# Patient Record
Sex: FEMALE | Race: WHITE | NOT HISPANIC OR LATINO | Employment: OTHER | URBAN - METROPOLITAN AREA
[De-identification: names, ages, dates, MRNs, and addresses within clinical notes are randomized per-mention and may not be internally consistent; named-entity substitution may affect disease eponyms.]

---

## 2017-11-08 ENCOUNTER — HOSPITAL ENCOUNTER (OUTPATIENT)
Dept: OTHER | Facility: HOSPITAL | Age: 72
Discharge: HOME | End: 2017-11-08
Attending: FAMILY MEDICINE | Admitting: FAMILY MEDICINE

## 2017-12-14 ENCOUNTER — CONVERSION ENCOUNTER (OUTPATIENT)
Dept: FAMILY MEDICINE CLINIC | Facility: CLINIC | Age: 72
End: 2017-12-14

## 2017-12-15 LAB
BASOPHILS # BLD AUTO: 70 CELLS/UL (ref 0–200)
BASOPHILS NFR BLD AUTO: 0.5 %
EOSINOPHIL # BLD AUTO: 1.4 %
EOSINOPHIL # BLD AUTO: 195 CELLS/UL (ref 15–500)
ERYTHROCYTE [DISTWIDTH] IN BLOOD BY AUTOMATED COUNT: 12.7 % (ref 11–15)
HCT VFR BLD AUTO: 40.7 % (ref 35–45)
HGB BLD-MCNC: 13.5 G/DL (ref 11.7–15.5)
LYMPHOCYTES # BLD AUTO: 7909 CELLS/UL (ref 850–3900)
LYMPHOCYTES NFR BLD AUTO: 56.9 %
MCH RBC QN AUTO: 29.1 PG (ref 27–33)
MCHC RBC AUTO-ENTMCNC: 33.2 G/DL (ref 32–36)
MCV RBC AUTO: 87.7 FL (ref 80–100)
MONOCYTES # BLD AUTO: 626 CELLS/UL (ref 200–950)
MONOCYTES NFR BLD AUTO: 4.5 %
NEUTROPHILS # BLD AUTO: 5101 CELLS/UL (ref 1500–7800)
NEUTROPHILS NFR BLD AUTO: 36.7 %
PLATELET # BLD AUTO: 265 10*3/UL (ref 140–400)
PMV BLD AUTO: 11 FL (ref 7.5–12.5)
RBC # BLD AUTO: 4.64 MILLION/UL (ref 3.8–5.1)
WBC # BLD AUTO: 13.9 10*3/UL (ref 3.8–10.8)

## 2018-04-02 ENCOUNTER — HOSPITAL ENCOUNTER (OUTPATIENT)
Dept: GENERAL RADIOLOGY | Facility: HOSPITAL | Age: 73
Discharge: HOME | End: 2018-04-02
Attending: FAMILY MEDICINE | Admitting: FAMILY MEDICINE

## 2018-04-16 ENCOUNTER — HOSPITAL ENCOUNTER (OUTPATIENT)
Dept: GENERAL RADIOLOGY | Facility: HOSPITAL | Age: 73
Discharge: HOME | End: 2018-04-16
Attending: FAMILY MEDICINE | Admitting: FAMILY MEDICINE

## 2018-05-04 ENCOUNTER — CONVERSION ENCOUNTER (OUTPATIENT)
Dept: FAMILY MEDICINE CLINIC | Facility: CLINIC | Age: 73
End: 2018-05-04

## 2018-05-05 LAB
ALBUMIN SERPL-MCNC: 4.4 G/DL (ref 3.6–5.1)
ALP SERPL-CCNC: 96 U/L (ref 33–130)
ALT SERPL-CCNC: 19 U/L (ref 6–29)
AST SERPL-CCNC: 19 U/L (ref 10–35)
BILIRUB SERPL-MCNC: 0.9 MG/DL (ref 0.2–1.2)
BUN SERPL-MCNC: 16 MG/DL (ref 7–25)
BUN/CREAT SERPL: NORMAL (CALC) (ref 6–22)
CALCIUM SERPL-MCNC: 9.5 MG/DL (ref 8.6–10.4)
CHLORIDE SERPL-SCNC: 101 MMOL/L (ref 98–110)
CHOLEST SERPL-MCNC: 163 MG/DL
CHOLEST/HDLC SERPL: 3.7 (CALC)
CONV CO2: 28 MMOL/L (ref 20–31)
CONV TOTAL PROTEIN: 7 G/DL (ref 6.1–8.1)
CREAT UR-MCNC: 0.7 MG/DL (ref 0.6–0.93)
GLOBULIN UR ELPH-MCNC: 2.6 MG/DL (ref 1.9–3.7)
GLUCOSE UR QL: 93 MG/DL (ref 65–99)
HDLC SERPL-MCNC: 44 MG/DL
INSULIN SERPL-ACNC: 1.7 (CALC) (ref 1–2.5)
LDLC SERPL CALC-MCNC: 94 MG/DL
NONHDLC SERPL-MCNC: 119 MG/DL
POTASSIUM SERPL-SCNC: 4 MMOL/L (ref 3.5–5.3)
SODIUM SERPL-SCNC: 140 MMOL/L (ref 135–146)
TRIGL SERPL-MCNC: 149 MG/DL

## 2019-01-18 ENCOUNTER — HOSPITAL ENCOUNTER (OUTPATIENT)
Dept: OTHER | Facility: HOSPITAL | Age: 74
Discharge: HOME | End: 2019-01-18
Attending: FAMILY MEDICINE | Admitting: FAMILY MEDICINE

## 2019-02-04 ENCOUNTER — HOSPITAL ENCOUNTER (OUTPATIENT)
Dept: OTHER | Facility: HOSPITAL | Age: 74
Discharge: HOME | End: 2019-02-04
Attending: FAMILY MEDICINE | Admitting: FAMILY MEDICINE

## 2019-03-05 ENCOUNTER — HOSPITAL ENCOUNTER (OUTPATIENT)
Dept: OTHER | Facility: HOSPITAL | Age: 74
Discharge: HOME | End: 2019-03-05
Attending: FAMILY MEDICINE | Admitting: FAMILY MEDICINE

## 2019-03-14 ENCOUNTER — HOSPITAL ENCOUNTER (OUTPATIENT)
Dept: OTHER | Facility: HOSPITAL | Age: 74
Discharge: HOME | End: 2019-03-14
Attending: FAMILY MEDICINE | Admitting: FAMILY MEDICINE

## 2019-05-15 ENCOUNTER — EPISODE CHANGES (OUTPATIENT)
Dept: CASE MANAGEMENT | Facility: OTHER | Age: 74
End: 2019-05-15

## 2019-05-15 ENCOUNTER — PATIENT OUTREACH (OUTPATIENT)
Dept: CASE MANAGEMENT | Facility: OTHER | Age: 74
End: 2019-05-15

## 2019-05-15 NOTE — OUTREACH NOTE
SNF Follow-up Note    Skilled Nursing Facility Discharge Assessment 5/15/2019   Acute Facility Discharged From [facility]   Acute Discharge Date 5/13/2019   Name of the Skilled Nursing Facility? [facility]   Purpose of SNF Admission SN;PT;OT   Estimated length of stay for the patient? Discharge notification via EPIC report   Who is the insurance provider or payor of patient stay? Medicare   Progression of Patient? New admission       Sarina Andersen RN    5/15/2019, 11:02 AM

## 2019-05-16 ENCOUNTER — PATIENT OUTREACH (OUTPATIENT)
Dept: CASE MANAGEMENT | Facility: OTHER | Age: 74
End: 2019-05-16

## 2019-05-16 NOTE — OUTREACH NOTE
SNF Follow-up Note    Skilled Nursing Facility Discharge Assessment 5/16/2019   Acute Facility Discharged From [facility]   Acute Discharge Date 5/13/2019   Name of the Skilled Nursing Facility? [facility]   Purpose of SNF Admission SN;PT;OT   Estimated length of stay for the patient? No d/c date set.   Who is the insurance provider or payor of patient stay? Medicare   Progression of Patient? Within Medicare Limits       Sarina Andersen RN    5/16/2019, 1:52 PM

## 2019-05-23 ENCOUNTER — PATIENT OUTREACH (OUTPATIENT)
Dept: CASE MANAGEMENT | Facility: OTHER | Age: 74
End: 2019-05-23

## 2019-05-23 NOTE — OUTREACH NOTE
SNF Follow-up Note    Skilled Nursing Facility Discharge Assessment 5/23/2019   Acute Facility Discharged From [facility]   Acute Discharge Date 5/13/2019   Name of the Skilled Nursing Facility? [facility]   Purpose of SNF Admission SN;PT;OT   Estimated length of stay for the patient? No d/c date set.   Who is the insurance provider or payor of patient stay? Medicare   Progression of Patient? Within Medicare Limits       Sarina Andersen RN    5/23/2019, 1:32 PM

## 2019-05-30 ENCOUNTER — PATIENT OUTREACH (OUTPATIENT)
Dept: CASE MANAGEMENT | Facility: OTHER | Age: 74
End: 2019-05-30

## 2019-05-30 NOTE — OUTREACH NOTE
SNF Follow-up Note    Skilled Nursing Facility Discharge Assessment 5/30/2019   Acute Facility Discharged From [facility]   Acute Discharge Date 5/13/2019   Name of the Skilled Nursing Facility? [facility]   Purpose of SNF Admission SN;PT;OT   Estimated length of stay for the patient? Discharged   Who is the insurance provider or payor of patient stay? -   Progression of Patient? -   Skilled Nursing Discharge Date? 5/28/2019   Where was the patient discharged to? Home       Sarina Andersen RN    5/30/2019, 2:49 PM

## 2019-06-06 ENCOUNTER — PATIENT OUTREACH (OUTPATIENT)
Dept: CASE MANAGEMENT | Facility: OTHER | Age: 74
End: 2019-06-06

## 2019-06-06 NOTE — OUTREACH NOTE
"Care Plan Note    Care Management Plan 6/6/2019   Lifestyle Goals Eat a healthy diet;Decrease falls risk;Have more energy;Medication management;Routine follow-up with doctor(s);Self monitor blood pressure   Self Management Home BP Monitoring;Medication Adherence   Suggested Appointments Other (See Comment)   Suggested Appointments Follow with providers as recommended.   Annual Wellness Visit:  Patient Has Completed   Specific Disease Process Teaching Heart Disease   Ed Visits past 12 months: 1   Hospitalizations past 12 months 2 or 3   Discharged From: [facility]   Discharged to: Home -    Discharge destination: Home   Medication Adherence Other (see comment)   Medication Adherence Patient states adherent.  Daughter assists as needed.     RN-CC outreach to patient following d/c to home from [facility].  Patient had acute non-stemi event at [facility] 05/03-13, 2019.  Patient was transferred to SNF for continued care.  Patient reports she came home \"a few days ago.\"  She reports having been seen by PCP Antwon since discharge and has another appointment next Tuesday.  She reported recent labs reflected a WBC of 14 and a decreased RBC.  She stated her instructions were to rest, eat well, and follow with provider as recommended.  Education provided.  Patient expresses pleasure of being home with her kitten Yo-Yo. Falls risk reviewed, patient v/u.   Patient indicated her daughter is available for assistance as needed.  Assessment and care plan created this session.  RN-CC contact information provided and patient encouraged to call as needed.     Sarina Andersen RN    6/6/2019, 2:50 PM    "

## 2019-06-13 LAB
BASOPHILS # BLD AUTO: 0.1 X10E3/UL (ref 0–0.2)
BASOPHILS NFR BLD AUTO: 0 %
EOSINOPHIL # BLD AUTO: 0.2 X10E3/UL (ref 0–0.4)
EOSINOPHIL NFR BLD AUTO: 1 %
ERYTHROCYTE [DISTWIDTH] IN BLOOD BY AUTOMATED COUNT: 15.1 % (ref 12.3–15.4)
HCT VFR BLD AUTO: 38.4 % (ref 34–46.6)
HGB BLD-MCNC: 12.1 G/DL (ref 11.1–15.9)
IMM GRANULOCYTES # BLD AUTO: 0 X10E3/UL (ref 0–0.1)
IMM GRANULOCYTES NFR BLD AUTO: 0 %
IRON SATN MFR SERPL: 15 % (ref 15–55)
IRON SERPL-MCNC: 42 UG/DL (ref 27–139)
LYMPHOCYTES # BLD AUTO: 15.2 X10E3/UL (ref 0.7–3.1)
LYMPHOCYTES NFR BLD AUTO: 57 %
MCH RBC QN AUTO: 28.2 PG (ref 26.6–33)
MCHC RBC AUTO-ENTMCNC: 31.5 G/DL (ref 31.5–35.7)
MCV RBC AUTO: 90 FL (ref 79–97)
MONOCYTES # BLD AUTO: 1.3 X10E3/UL (ref 0.1–0.9)
MONOCYTES NFR BLD AUTO: 5 %
MORPHOLOGY BLD-IMP: (no result)
NEUTROPHILS # BLD AUTO: 9.9 X10E3/UL (ref 1.4–7)
NEUTROPHILS NFR BLD AUTO: 37 %
PLATELET # BLD AUTO: 452 X10E3/UL (ref 150–450)
RBC # BLD AUTO: 4.29 X10E6/UL (ref 3.77–5.28)
TIBC SERPL-MCNC: 283 UG/DL (ref 250–450)
UIBC SERPL-MCNC: 241 UG/DL (ref 118–369)
WBC # BLD AUTO: 26.8 X10E3/UL (ref 3.4–10.8)

## 2019-06-18 ENCOUNTER — TELEPHONE (OUTPATIENT)
Dept: FAMILY MEDICINE CLINIC | Facility: CLINIC | Age: 74
End: 2019-06-18

## 2019-06-19 DIAGNOSIS — B00.1 RECURRENT COLD SORES: Primary | ICD-10-CM

## 2019-06-19 DIAGNOSIS — B00.1 RECURRENT COLD SORES: ICD-10-CM

## 2019-06-19 RX ORDER — ACYCLOVIR 200 MG/1
200 CAPSULE ORAL
Qty: 50 CAPSULE | Refills: 0 | Status: SHIPPED | OUTPATIENT
Start: 2019-06-19 | End: 2019-06-19 | Stop reason: SDUPTHER

## 2019-06-20 RX ORDER — ACYCLOVIR 200 MG/1
200 CAPSULE ORAL
Qty: 50 CAPSULE | Refills: 0 | Status: SHIPPED | OUTPATIENT
Start: 2019-06-20 | End: 2019-06-30

## 2019-07-05 ENCOUNTER — OFFICE VISIT (OUTPATIENT)
Dept: FAMILY MEDICINE CLINIC | Facility: CLINIC | Age: 74
End: 2019-07-05

## 2019-07-05 VITALS
SYSTOLIC BLOOD PRESSURE: 110 MMHG | BODY MASS INDEX: 30.08 KG/M2 | TEMPERATURE: 98.1 F | RESPIRATION RATE: 22 BRPM | HEART RATE: 95 BPM | OXYGEN SATURATION: 92 % | WEIGHT: 187.2 LBS | HEIGHT: 66 IN | DIASTOLIC BLOOD PRESSURE: 62 MMHG

## 2019-07-05 DIAGNOSIS — B96.89 ACUTE BACTERIAL BRONCHITIS: ICD-10-CM

## 2019-07-05 DIAGNOSIS — J44.1 CHRONIC OBSTRUCTIVE PULMONARY DISEASE WITH ACUTE EXACERBATION (HCC): ICD-10-CM

## 2019-07-05 DIAGNOSIS — I50.22 SYSTOLIC CHF, CHRONIC (HCC): Primary | ICD-10-CM

## 2019-07-05 DIAGNOSIS — J20.8 ACUTE BACTERIAL BRONCHITIS: ICD-10-CM

## 2019-07-05 PROBLEM — C91.10 CLL (CHRONIC LYMPHOCYTIC LEUKEMIA): Status: ACTIVE | Noted: 2019-07-05

## 2019-07-05 PROBLEM — J45.40 MODERATE PERSISTENT ASTHMA WITHOUT COMPLICATION: Status: ACTIVE | Noted: 2019-07-05

## 2019-07-05 PROBLEM — J43.1 PANLOBULAR EMPHYSEMA: Status: ACTIVE | Noted: 2019-07-05

## 2019-07-05 PROBLEM — E87.6 HYPOKALEMIA: Status: ACTIVE | Noted: 2019-07-05

## 2019-07-05 PROBLEM — J44.9 CHRONIC OBSTRUCTIVE PULMONARY DISEASE: Status: ACTIVE | Noted: 2019-06-04

## 2019-07-05 PROBLEM — D64.9 ANEMIA: Status: ACTIVE | Noted: 2019-07-05

## 2019-07-05 PROBLEM — I10 HYPERTENSION: Status: ACTIVE | Noted: 2019-06-04

## 2019-07-05 PROBLEM — M19.90 ARTHRITIS: Status: ACTIVE | Noted: 2019-07-05

## 2019-07-05 PROBLEM — K21.9 GERD (GASTROESOPHAGEAL REFLUX DISEASE): Status: ACTIVE | Noted: 2019-07-05

## 2019-07-05 PROBLEM — E78.5 DYSLIPIDEMIA: Status: ACTIVE | Noted: 2019-06-04

## 2019-07-05 PROCEDURE — 99214 OFFICE O/P EST MOD 30 MIN: CPT | Performed by: FAMILY MEDICINE

## 2019-07-05 RX ORDER — CEPHALEXIN 500 MG/1
500 CAPSULE ORAL 3 TIMES DAILY
Qty: 30 CAPSULE | Refills: 0 | Status: SHIPPED | OUTPATIENT
Start: 2019-07-05 | End: 2019-07-15

## 2019-07-05 RX ORDER — LISINOPRIL 10 MG/1
TABLET ORAL EVERY 24 HOURS
COMMUNITY
Start: 2019-06-04 | End: 2019-08-30

## 2019-07-05 RX ORDER — BUDESONIDE AND FORMOTEROL FUMARATE DIHYDRATE 160; 4.5 UG/1; UG/1
2 AEROSOL RESPIRATORY (INHALATION)
COMMUNITY
Start: 2019-04-18 | End: 2020-03-06

## 2019-07-05 RX ORDER — PAROXETINE HYDROCHLORIDE 20 MG/1
20 TABLET, FILM COATED ORAL DAILY
COMMUNITY
Start: 2019-06-24 | End: 2019-09-19 | Stop reason: SDUPTHER

## 2019-07-05 RX ORDER — POTASSIUM CHLORIDE 20 MEQ/1
TABLET, EXTENDED RELEASE ORAL
COMMUNITY
Start: 2019-05-31 | End: 2019-07-05 | Stop reason: SDUPTHER

## 2019-07-05 RX ORDER — GUAIFENESIN 1200 MG/1
1 TABLET, EXTENDED RELEASE ORAL DAILY
COMMUNITY
Start: 2019-06-04 | End: 2022-08-23

## 2019-07-05 RX ORDER — PRAMIPEXOLE DIHYDROCHLORIDE 0.25 MG/1
TABLET ORAL
COMMUNITY
Start: 2019-06-04 | End: 2020-05-14

## 2019-07-05 RX ORDER — CARVEDILOL 6.25 MG/1
TABLET ORAL
COMMUNITY
Start: 2019-06-04 | End: 2019-08-30

## 2019-07-05 RX ORDER — POTASSIUM CHLORIDE 20 MEQ/1
20 TABLET, EXTENDED RELEASE ORAL DAILY
Qty: 30 TABLET | Refills: 5 | Status: SHIPPED | OUTPATIENT
Start: 2019-07-05 | End: 2019-12-30

## 2019-07-05 RX ORDER — ASPIRIN 81 MG/1
81 TABLET ORAL DAILY
COMMUNITY
Start: 2019-06-04

## 2019-07-05 RX ORDER — BUMETANIDE 1 MG/1
1 TABLET ORAL 2 TIMES DAILY
Qty: 60 TABLET | Refills: 5 | Status: SHIPPED | OUTPATIENT
Start: 2019-07-05 | End: 2019-12-30

## 2019-07-05 RX ORDER — BUMETANIDE 1 MG/1
1 TABLET ORAL 2 TIMES DAILY
COMMUNITY
Start: 2019-05-31 | End: 2019-07-05 | Stop reason: SDUPTHER

## 2019-07-05 RX ORDER — ALBUTEROL SULFATE 2.5 MG/3ML
SOLUTION RESPIRATORY (INHALATION) EVERY 6 HOURS
COMMUNITY
Start: 2019-06-04 | End: 2020-04-14

## 2019-07-05 RX ORDER — TIOTROPIUM BROMIDE INHALATION SPRAY 3.12 UG/1
2 SPRAY, METERED RESPIRATORY (INHALATION)
COMMUNITY
Start: 2019-06-08 | End: 2020-05-07 | Stop reason: SDUPTHER

## 2019-07-05 RX ORDER — MULTIVIT WITH MINERALS/LUTEIN
TABLET ORAL
COMMUNITY

## 2019-07-05 RX ORDER — MONTELUKAST SODIUM 10 MG/1
10 TABLET ORAL NIGHTLY
COMMUNITY
Start: 2019-06-08 | End: 2019-09-10 | Stop reason: SDUPTHER

## 2019-07-05 RX ORDER — ESOMEPRAZOLE MAGNESIUM 40 MG/1
40 CAPSULE, DELAYED RELEASE ORAL
COMMUNITY
Start: 2019-06-04

## 2019-07-05 RX ORDER — LISINOPRIL AND HYDROCHLOROTHIAZIDE 20; 12.5 MG/1; MG/1
1 TABLET ORAL DAILY
COMMUNITY
Start: 2019-04-28 | End: 2019-11-23 | Stop reason: SDUPTHER

## 2019-07-05 RX ORDER — SIMVASTATIN 20 MG
20 TABLET ORAL DAILY
COMMUNITY
Start: 2019-06-25 | End: 2020-03-30

## 2019-07-05 NOTE — PROGRESS NOTES
Subjective   Krystyna Bennett is a 74 y.o. female.     Chief Complaint   Patient presents with   • Wheezing       Wheezing    This is a new problem. The current episode started in the past 7 days. The problem occurs constantly. Associated symptoms include coughing, shortness of breath and a sore throat. Pertinent negatives include no abdominal pain, chest pain or chills.   COPD   This is a recurrent problem. The current episode started in the past 7 days. The problem occurs constantly. The problem has been unchanged. Associated symptoms include coughing and a sore throat. Pertinent negatives include no abdominal pain, chest pain, chills, diaphoresis or nausea.        I personally reviewed and updated the patient's allergies, medications, problem list, and past medical, surgical, social, and family history.       History reviewed. No pertinent family history.    Social History     Tobacco Use   • Smoking status: Former Smoker   • Tobacco comment: quit 20 years ago   Substance Use Topics   • Alcohol use: Yes     Comment: rarely   • Drug use: No       Past Surgical History:   Procedure Laterality Date   • HYSTERECTOMY         Patient Active Problem List   Diagnosis   • Chronic obstructive pulmonary disease (CMS/HCC)   • Dyslipidemia   • Hypertension   • CLL (chronic lymphocytic leukemia) (CMS/HCC)   • Panlobular emphysema (CMS/HCC)   • Moderate persistent asthma without complication   • GERD (gastroesophageal reflux disease)   • Arthritis   • Systolic CHF, chronic (CMS/HCC)   • Anemia   • Hypokalemia   • Acute bacterial bronchitis         Current Outpatient Medications:   •  albuterol (PROVENTIL) (2.5 MG/3ML) 0.083% nebulizer solution, Every 6 (Six) Hours., Disp: , Rfl:   •  aspirin 81 MG EC tablet, Daily., Disp: , Rfl:   •  bumetanide (BUMEX) 1 MG tablet, Take 1 tablet by mouth 2 (Two) Times a Day., Disp: 60 tablet, Rfl: 5  •  carvedilol (COREG) 6.25 MG tablet, CARVEDILOL 6.25 MG TABS, Disp: , Rfl:   •  esomeprazole  "(NEXIUM) 40 MG capsule, NEXIUM 40 MG CPDR, Disp: , Rfl:   •  Glucosamine-Chondroitin (OSTEO BI-FLEX REGULAR STRENGTH) 250-200 MG tablet, Daily., Disp: , Rfl:   •  guaiFENesin ER 1200 MG tablet sustained-release 12 hour, Every 12 (Twelve) Hours., Disp: , Rfl:   •  lisinopril (PRINIVIL,ZESTRIL) 10 MG tablet, Daily., Disp: , Rfl:   •  lisinopril-hydrochlorothiazide (PRINZIDE,ZESTORETIC) 20-12.5 MG per tablet, , Disp: , Rfl:   •  montelukast (SINGULAIR) 10 MG tablet, , Disp: , Rfl:   •  Multiple Vitamins-Minerals (CENTRUM SILVER) tablet, CENTRUM SILVER TABS, Disp: , Rfl:   •  PARoxetine (PAXIL) 20 MG tablet, , Disp: , Rfl:   •  potassium chloride (K-DUR,KLOR-CON) 20 MEQ CR tablet, Take 1 tablet by mouth Daily., Disp: 30 tablet, Rfl: 5  •  pramipexole (MIRAPEX) 0.25 MG tablet, PRAMIPEXOLE DIHYDROCHLORIDE 0.25 MG TABS, Disp: , Rfl:   •  simvastatin (ZOCOR) 20 MG tablet, , Disp: , Rfl:   •  SPIRIVA RESPIMAT 2.5 MCG/ACT aerosol solution inhaler, , Disp: , Rfl:   •  SYMBICORT 160-4.5 MCG/ACT inhaler, , Disp: , Rfl:   •  cephalexin (KEFLEX) 500 MG capsule, Take 1 capsule by mouth 3 (Three) Times a Day for 10 days., Disp: 30 capsule, Rfl: 0         Review of Systems   Constitutional: Negative for chills and diaphoresis.   HENT: Positive for sore throat.    Eyes: Negative for visual disturbance.   Respiratory: Positive for cough, shortness of breath and wheezing.    Cardiovascular: Negative for chest pain and palpitations.   Gastrointestinal: Negative for abdominal pain and nausea.   Endocrine: Negative for polydipsia and polyphagia.   Musculoskeletal: Negative for neck stiffness.   Skin: Negative for color change and pallor.   Neurological: Negative for seizures and syncope.   Hematological: Negative for adenopathy.       Objective   /62   Pulse 95   Temp 98.1 °F (36.7 °C)   Resp 22   Ht 167.6 cm (66\")   Wt 84.9 kg (187 lb 3.2 oz)   SpO2 92% Comment: on 4 liter of oxygen  BMI 30.21 kg/m²   Physical Exam "   Constitutional: She is oriented to person, place, and time. She appears well-developed and well-nourished.   HENT:   Head: Normocephalic.   Right Ear: Hearing, external ear and ear canal normal. Tympanic membrane is erythematous. A middle ear effusion is present.   Left Ear: Hearing, external ear and ear canal normal. Tympanic membrane is erythematous. A middle ear effusion is present.   Nose: Congestion present. Right sinus exhibits maxillary sinus tenderness and frontal sinus tenderness. Left sinus exhibits maxillary sinus tenderness and frontal sinus tenderness.   Mouth/Throat: Posterior oropharyngeal erythema present. No tonsillar abscesses. Tonsils are 1+ on the right. Tonsils are 1+ on the left.   Eyes: Conjunctivae, EOM and lids are normal. Pupils are equal, round, and reactive to light.   Neck: No Brudzinski's sign and no Kernig's sign noted.   Cardiovascular: Normal rate, regular rhythm, S1 normal, S2 normal, normal heart sounds and normal pulses. Exam reveals no gallop and no friction rub.   No murmur heard.  Pulmonary/Chest: Effort normal. No accessory muscle usage or stridor. No respiratory distress. She has no decreased breath sounds. She has wheezes in the right upper field, the right middle field, the right lower field, the left upper field, the left middle field and the left lower field. She has rhonchi in the right upper field, the right middle field, the right lower field, the left upper field, the left middle field and the left lower field. She has no rales.   Abdominal: Soft. Normal appearance and bowel sounds are normal. She exhibits no distension and no mass. There is no tenderness. No hernia.   Neurological: She is alert and oriented to person, place, and time. No cranial nerve deficit. Coordination and gait normal.   Skin: Skin is warm and dry. Turgor is normal. She is not diaphoretic. No pallor.         Assessment/Plan   Problems Addressed this Visit        Cardiovascular and Mediastinum     Systolic CHF, chronic (CMS/[facility]) - Primary     Overall improved  She ran out of water pills, restart  follow up recheck  Call or return if worsening or persistent symptoms.          Relevant Medications    carvedilol (COREG) 6.25 MG tablet       Respiratory    Chronic obstructive pulmonary disease (CMS/HCC)    Relevant Medications    albuterol (PROVENTIL) (2.5 MG/3ML) 0.083% nebulizer solution    SYMBICORT 160-4.5 MCG/ACT inhaler    guaiFENesin ER 1200 MG tablet sustained-release 12 hour    montelukast (SINGULAIR) 10 MG tablet    SPIRIVA RESPIMAT 2.5 MCG/ACT aerosol solution inhaler    Acute bacterial bronchitis     Start antibiotics  Call or return if worsening or persistent symptoms.          Relevant Medications    albuterol (PROVENTIL) (2.5 MG/3ML) 0.083% nebulizer solution    SYMBICORT 160-4.5 MCG/ACT inhaler    guaiFENesin ER 1200 MG tablet sustained-release 12 hour    montelukast (SINGULAIR) 10 MG tablet    SPIRIVA RESPIMAT 2.5 MCG/ACT aerosol solution inhaler    cephalexin (KEFLEX) 500 MG capsule            Expected course, medications, and adverse effects discussed.  Call or return if worsening or persistent symptoms.

## 2019-07-05 NOTE — ASSESSMENT & PLAN NOTE
Overall improved  She ran out of water pills, restart  follow up recheck  Call or return if worsening or persistent symptoms.

## 2019-07-12 ENCOUNTER — TELEPHONE (OUTPATIENT)
Dept: FAMILY MEDICINE CLINIC | Facility: CLINIC | Age: 74
End: 2019-07-12

## 2019-08-21 ENCOUNTER — TELEPHONE (OUTPATIENT)
Dept: FAMILY MEDICINE CLINIC | Facility: CLINIC | Age: 74
End: 2019-08-21

## 2019-08-22 ENCOUNTER — TELEPHONE (OUTPATIENT)
Dept: FAMILY MEDICINE CLINIC | Facility: CLINIC | Age: 74
End: 2019-08-22

## 2019-08-30 ENCOUNTER — OFFICE VISIT (OUTPATIENT)
Dept: FAMILY MEDICINE CLINIC | Facility: CLINIC | Age: 74
End: 2019-08-30

## 2019-08-30 VITALS
HEIGHT: 66 IN | DIASTOLIC BLOOD PRESSURE: 80 MMHG | WEIGHT: 179 LBS | RESPIRATION RATE: 19 BRPM | OXYGEN SATURATION: 93 % | SYSTOLIC BLOOD PRESSURE: 136 MMHG | BODY MASS INDEX: 28.77 KG/M2 | TEMPERATURE: 98.9 F | HEART RATE: 83 BPM

## 2019-08-30 DIAGNOSIS — M94.0 COSTOCHONDRITIS: ICD-10-CM

## 2019-08-30 DIAGNOSIS — I50.22 SYSTOLIC CHF, CHRONIC (HCC): Primary | ICD-10-CM

## 2019-08-30 DIAGNOSIS — J43.1 PANLOBULAR EMPHYSEMA (HCC): ICD-10-CM

## 2019-08-30 PROBLEM — C43.9 MELANOMA: Status: ACTIVE | Noted: 2018-01-08

## 2019-08-30 PROBLEM — D72.820 LYMPHOCYTOSIS: Status: ACTIVE | Noted: 2018-01-08

## 2019-08-30 PROBLEM — J44.9 CHRONIC OBSTRUCTIVE PULMONARY DISEASE: Status: RESOLVED | Noted: 2019-06-04 | Resolved: 2019-08-30

## 2019-08-30 PROBLEM — Z86.19 HISTORY OF CLOSTRIDIUM DIFFICILE COLITIS: Status: ACTIVE | Noted: 2018-01-08

## 2019-08-30 PROCEDURE — 99214 OFFICE O/P EST MOD 30 MIN: CPT | Performed by: FAMILY MEDICINE

## 2019-08-30 RX ORDER — DIAPER,BRIEF,INFANT-TODD,DISP
1 EACH MISCELLANEOUS AS NEEDED
COMMUNITY
End: 2019-12-19

## 2019-08-30 RX ORDER — CHOLECALCIFEROL (VITAMIN D3) 25 MCG
1000 TABLET,CHEWABLE ORAL DAILY
COMMUNITY

## 2019-08-30 RX ORDER — ACYCLOVIR 50 MG/G
1 OINTMENT TOPICAL
COMMUNITY
End: 2020-03-10

## 2019-08-30 RX ORDER — CA/D3/MAG OX/ZINC/COP/MANG/BOR 600 MG-800
2 TABLET,CHEWABLE ORAL DAILY
COMMUNITY
Start: 2019-06-04

## 2019-08-30 NOTE — PROGRESS NOTES
Subjective   Krystyna Bennett is a 74 y.o. female.     Krystyna was seen by Dr. Geiger 08/29/2019 for a follow up on heart cath performed 05/2019.       Congestive Heart Failure   Presents for follow-up visit. Pertinent negatives include no abdominal pain, chest pain, chest pressure, claudication, muscle weakness, near-syncope, palpitations or shortness of breath. The symptoms have been stable. Compliance with total regimen is %.        The following portions of the patient's history were reviewed and updated as appropriate: allergies, current medications, past family history, past medical history, past social history, past surgical history and problem list.    History reviewed. No pertinent family history.    Social History     Tobacco Use   • Smoking status: Former Smoker     Types: Cigars   • Smokeless tobacco: Never Used   • Tobacco comment: quit 20 years ago   Substance Use Topics   • Alcohol use: Yes     Frequency: 2-4 times a month     Drinks per session: 1 or 2     Binge frequency: Never     Comment: rarely   • Drug use: No       Past Surgical History:   Procedure Laterality Date   • CARDIAC CATHETERIZATION  05/2019    [facility].   • HYSTERECTOMY         Patient Active Problem List   Diagnosis   • Dyslipidemia   • Hypertension   • CLL (chronic lymphocytic leukemia) (CMS/HCC)   • Panlobular emphysema (CMS/HCC)   • Moderate persistent asthma without complication   • GERD (gastroesophageal reflux disease)   • Arthritis   • Systolic CHF, chronic (CMS/HCC)   • Anemia   • Hypokalemia   • Acute bacterial bronchitis   • History of Clostridium difficile colitis   • Lymphocytosis   • Melanoma (CMS/HCC)       Current Outpatient Medications on File Prior to Visit   Medication Sig Dispense Refill   • albuterol (PROVENTIL) (2.5 MG/3ML) 0.083% nebulizer solution Every 6 (Six) Hours.     • aspirin 81 MG EC tablet Take 81 mg by mouth Daily.     • bumetanide (BUMEX) 1 MG tablet Take 1 tablet by mouth 2 (Two) Times a Day. 60  tablet 5   • Calcium Carbonate-Vit D-Min (CALTRATE 600+D PLUS MINERALS) 600-800 MG-UNIT chewable tablet Chew 2 tablets Daily.     • esomeprazole (NEXIUM) 40 MG capsule 1 tablet po daily     • Glucosamine-Chondroitin (OSTEO BI-FLEX REGULAR STRENGTH) 250-200 MG tablet Take 1 tablet by mouth Daily.     • guaiFENesin ER 1200 MG tablet sustained-release 12 hour Take 1 tablet by mouth Daily.     • lisinopril-hydrochlorothiazide (PRINZIDE,ZESTORETIC) 20-12.5 MG per tablet Take 1 tablet by mouth Daily.     • montelukast (SINGULAIR) 10 MG tablet Take 10 mg by mouth Every Night.     • Multiple Vitamins-Minerals (CENTRUM SILVER) tablet 1 tablet po daily     • PARoxetine (PAXIL) 20 MG tablet Take 20 mg by mouth Daily.     • potassium chloride (K-DUR,KLOR-CON) 20 MEQ CR tablet Take 1 tablet by mouth Daily. 30 tablet 5   • pramipexole (MIRAPEX) 0.25 MG tablet 1 tablet po daily     • simvastatin (ZOCOR) 20 MG tablet Take 20 mg by mouth Daily.     • SPIRIVA RESPIMAT 2.5 MCG/ACT aerosol solution inhaler Inhale 2 puffs Daily.     • SYMBICORT 160-4.5 MCG/ACT inhaler Inhale 2 puffs 2 (Two) Times a Day.     • [DISCONTINUED] lisinopril (PRINIVIL,ZESTRIL) 10 MG tablet Daily.     • acyclovir (ZOVIRAX) 5 % ointment Apply 1 application topically to the appropriate area as directed Every 3 (Three) Hours.     • Cyanocobalamin (B-12) 1000 MCG capsule Take 1,000 mcg by mouth Daily.     • hydrocortisone 1 % cream Apply 1 application topically to the appropriate area as directed As Needed.     • [DISCONTINUED] carvedilol (COREG) 6.25 MG tablet CARVEDILOL 6.25 MG TABS     • [DISCONTINUED] predniSONE (DELTASONE) 5 MG tablet 8 tablets p.o. day 1, wean by 1 tablet a day over 8 days 36 tablet 0     No current facility-administered medications on file prior to visit.        Allergies   Allergen Reactions   • Sulfacetamide Sodium-Sulfur Other (See Comments)     Tunnel vision,light headed       Review of Systems   Constitutional: Negative for activity  "change and fever.   HENT: Negative for ear pain, rhinorrhea, sinus pressure and voice change.    Eyes: Negative for visual disturbance.   Respiratory: Negative for cough and shortness of breath.    Cardiovascular: Negative for chest pain, palpitations, claudication and near-syncope.   Gastrointestinal: Negative for abdominal pain, diarrhea, nausea and vomiting.   Endocrine: Negative for cold intolerance and heat intolerance.   Genitourinary: Negative for frequency and urgency.   Musculoskeletal: Negative for arthralgias and muscle weakness.   Skin: Negative for rash.   Neurological: Negative for syncope.   Hematological: Does not bruise/bleed easily.   Psychiatric/Behavioral: Negative for depressed mood. The patient is not nervous/anxious.        Objective   Visit Vitals  /80 (BP Location: Right arm, Patient Position: Sitting, Cuff Size: Adult)   Pulse 83   Temp 98.9 °F (37.2 °C) (Oral)   Resp 19   Ht 167.6 cm (66\")   Wt 81.2 kg (179 lb)   LMP  (LMP Unknown)   SpO2 93% Comment: 4 L   Breastfeeding? No   BMI 28.89 kg/m²     Physical Exam   Constitutional: She is oriented to person, place, and time. She appears well-developed and well-nourished. She is cooperative.   Cardiovascular: Normal rate, regular rhythm and normal heart sounds. Exam reveals no gallop and no friction rub.   No murmur heard.  Pulmonary/Chest: Effort normal and breath sounds normal. She has no wheezes. She has no rales. She exhibits tenderness.       Neurological: She is alert and oriented to person, place, and time. Coordination normal.   Skin: Skin is warm and dry.   Psychiatric: She has a normal mood and affect.   Vitals reviewed.        Assessment/Plan .  Problem List Items Addressed This Visit        Cardiovascular and Mediastinum    Systolic CHF, chronic (CMS/HCC) - Primary       Respiratory    Panlobular emphysema (CMS/HCC)      Other Visit Diagnoses     Costochondritis            Chronic issues are stable. Discused nsaids for the " costochondritis. If it gets worse, she is to let me know  Went over cath report. She is on 4 liters. Advised she should go back to pulm rehab since cath was normal

## 2019-09-03 ENCOUNTER — TELEPHONE (OUTPATIENT)
Dept: FAMILY MEDICINE CLINIC | Facility: CLINIC | Age: 74
End: 2019-09-03

## 2019-09-10 RX ORDER — MONTELUKAST SODIUM 10 MG/1
TABLET ORAL
Qty: 90 TABLET | Refills: 11 | Status: SHIPPED | OUTPATIENT
Start: 2019-09-10 | End: 2020-09-09

## 2019-09-20 ENCOUNTER — OFFICE VISIT (OUTPATIENT)
Dept: FAMILY MEDICINE CLINIC | Facility: CLINIC | Age: 74
End: 2019-09-20

## 2019-09-20 VITALS
BODY MASS INDEX: 29.79 KG/M2 | DIASTOLIC BLOOD PRESSURE: 82 MMHG | RESPIRATION RATE: 16 BRPM | WEIGHT: 178.8 LBS | OXYGEN SATURATION: 94 % | SYSTOLIC BLOOD PRESSURE: 122 MMHG | TEMPERATURE: 98.3 F | HEART RATE: 86 BPM | HEIGHT: 65 IN

## 2019-09-20 DIAGNOSIS — E78.5 HYPERLIPIDEMIA, UNSPECIFIED HYPERLIPIDEMIA TYPE: ICD-10-CM

## 2019-09-20 DIAGNOSIS — Z23 INFLUENZA VACCINE NEEDED: ICD-10-CM

## 2019-09-20 DIAGNOSIS — Z23 NEED FOR VACCINATION FOR STREP PNEUMONIAE: ICD-10-CM

## 2019-09-20 DIAGNOSIS — J43.1 PANLOBULAR EMPHYSEMA (HCC): ICD-10-CM

## 2019-09-20 DIAGNOSIS — I10 ESSENTIAL HYPERTENSION: ICD-10-CM

## 2019-09-20 DIAGNOSIS — I50.22 SYSTOLIC CHF, CHRONIC (HCC): Primary | ICD-10-CM

## 2019-09-20 PROBLEM — J45.909 ASTHMA: Status: ACTIVE | Noted: 2019-09-20

## 2019-09-20 PROBLEM — Z86.79 HISTORY OF HYPERTENSION: Status: ACTIVE | Noted: 2019-09-20

## 2019-09-20 PROCEDURE — 99214 OFFICE O/P EST MOD 30 MIN: CPT | Performed by: FAMILY MEDICINE

## 2019-09-20 PROCEDURE — G0008 ADMIN INFLUENZA VIRUS VAC: HCPCS | Performed by: FAMILY MEDICINE

## 2019-09-20 PROCEDURE — 90653 IIV ADJUVANT VACCINE IM: CPT | Performed by: FAMILY MEDICINE

## 2019-09-20 RX ORDER — PAROXETINE HYDROCHLORIDE 20 MG/1
TABLET, FILM COATED ORAL
Qty: 30 TABLET | Refills: 11 | Status: SHIPPED | OUTPATIENT
Start: 2019-09-20 | End: 2020-05-12 | Stop reason: SDUPTHER

## 2019-09-20 NOTE — PROGRESS NOTES
Subjective   Krystyna Bennett is a 74 y.o. female.     Hyperlipidemia   This is a chronic problem. The current episode started more than 1 year ago. There are no known factors aggravating her hyperlipidemia. Associated symptoms include shortness of breath. Pertinent negatives include no chest pain, leg pain or myalgias. Current antihyperlipidemic treatment includes statins. There are no compliance problems.  Risk factors for coronary artery disease include post-menopausal, a sedentary lifestyle and hypertension.   Hypertension   This is a chronic problem. The current episode started more than 1 year ago. The problem is controlled. Associated symptoms include shortness of breath. Pertinent negatives include no chest pain. There are no associated agents to hypertension. Current antihypertension treatment includes diuretics and ACE inhibitors. There are no compliance problems.    COPD   She complains of shortness of breath. There is no cough. This is a chronic problem. The current episode started more than 1 year ago. The problem occurs constantly. The problem has been unchanged. Pertinent negatives include no chest pain, ear pain, fever, myalgias or rhinorrhea. Her symptoms are aggravated by climbing stairs, change in weather, exercise and URI. Relieved by: meds and o2. She reports significant improvement on treatment. Risk factors for lung disease include smoking/tobacco exposure.        The following portions of the patient's history were reviewed and updated as appropriate: allergies, current medications, past family history, past medical history, past social history, past surgical history and problem list.    Allergies:  Allergies   Allergen Reactions   • Sulfacetamide Sodium-Sulfur Other (See Comments)     Tunnel vision,light headed       Social History:  Social History     Socioeconomic History   • Marital status:      Spouse name: Not on file   • Number of children: Not on file   • Years of education: Not  on file   • Highest education level: Not on file   Tobacco Use   • Smoking status: Former Smoker     Types: Cigars   • Smokeless tobacco: Never Used   • Tobacco comment: quit 20 years ago   Substance and Sexual Activity   • Alcohol use: Yes     Frequency: 2-4 times a month     Drinks per session: 1 or 2     Binge frequency: Never     Comment: rarely   • Drug use: No   • Sexual activity: Defer       Family History:  History reviewed. No pertinent family history.    Past Medical History :  Patient Active Problem List   Diagnosis   • Dyslipidemia   • Hypertension   • CLL (chronic lymphocytic leukemia) (CMS/HCC)   • Panlobular emphysema (CMS/HCC)   • Moderate persistent asthma without complication   • GERD (gastroesophageal reflux disease)   • Arthritis   • Systolic CHF, chronic (CMS/HCC)   • Anemia   • Hypokalemia   • Acute bacterial bronchitis   • History of Clostridium difficile colitis   • Lymphocytosis   • Melanoma (CMS/HCC)   • Asthma   • History of hypertension   • Hyperlipidemia       Medication List:    Current Outpatient Medications:   •  acyclovir (ZOVIRAX) 5 % ointment, Apply 1 application topically to the appropriate area as directed Every 3 (Three) Hours., Disp: , Rfl:   •  albuterol (PROVENTIL) (2.5 MG/3ML) 0.083% nebulizer solution, Every 6 (Six) Hours., Disp: , Rfl:   •  aspirin 81 MG EC tablet, Take 81 mg by mouth Daily., Disp: , Rfl:   •  bumetanide (BUMEX) 1 MG tablet, Take 1 tablet by mouth 2 (Two) Times a Day., Disp: 60 tablet, Rfl: 5  •  Calcium Carbonate-Vit D-Min (CALTRATE 600+D PLUS MINERALS) 600-800 MG-UNIT chewable tablet, Chew 2 tablets Daily., Disp: , Rfl:   •  Cyanocobalamin (B-12) 1000 MCG capsule, Take 1,000 mcg by mouth Daily., Disp: , Rfl:   •  esomeprazole (NEXIUM) 40 MG capsule, 1 tablet po daily, Disp: , Rfl:   •  Glucosamine-Chondroitin (OSTEO BI-FLEX REGULAR STRENGTH) 250-200 MG tablet, Take 1 tablet by mouth Daily., Disp: , Rfl:   •  guaiFENesin ER 1200 MG tablet  sustained-release 12 hour, Take 1 tablet by mouth Daily., Disp: , Rfl:   •  hydrocortisone 1 % cream, Apply 1 application topically to the appropriate area as directed As Needed., Disp: , Rfl:   •  lisinopril-hydrochlorothiazide (PRINZIDE,ZESTORETIC) 20-12.5 MG per tablet, Take 1 tablet by mouth Daily., Disp: , Rfl:   •  montelukast (SINGULAIR) 10 MG tablet, TAKE ONE TABLET BY MOUTH AT BEDTIME, Disp: 90 tablet, Rfl: 11  •  Multiple Vitamins-Minerals (CENTRUM SILVER) tablet, 1 tablet po daily, Disp: , Rfl:   •  PARoxetine (PAXIL) 20 MG tablet, TAKE ONE TABLET BY MOUTH DAILY, Disp: 30 tablet, Rfl: 11  •  potassium chloride (K-DUR,KLOR-CON) 20 MEQ CR tablet, Take 1 tablet by mouth Daily., Disp: 30 tablet, Rfl: 5  •  pramipexole (MIRAPEX) 0.25 MG tablet, 1 tablet po daily, Disp: , Rfl:   •  simvastatin (ZOCOR) 20 MG tablet, Take 20 mg by mouth Daily., Disp: , Rfl:   •  SPIRIVA RESPIMAT 2.5 MCG/ACT aerosol solution inhaler, Inhale 2 puffs Daily., Disp: , Rfl:   •  SYMBICORT 160-4.5 MCG/ACT inhaler, Inhale 2 puffs 2 (Two) Times a Day., Disp: , Rfl:     Past Surgical History:  Past Surgical History:   Procedure Laterality Date   • CARDIAC CATHETERIZATION  05/2019    [facility].   • HYSTERECTOMY         Review of Systems:  Review of Systems   Constitutional: Negative for activity change and fever.   HENT: Negative for ear pain, rhinorrhea, sinus pressure and voice change.    Eyes: Negative for visual disturbance.   Respiratory: Positive for shortness of breath. Negative for cough.    Cardiovascular: Negative for chest pain.   Gastrointestinal: Negative for abdominal pain, diarrhea, nausea and vomiting.   Endocrine: Negative for cold intolerance and heat intolerance.   Genitourinary: Negative for frequency and urgency.   Musculoskeletal: Negative for arthralgias and myalgias.   Skin: Negative for rash.   Neurological: Negative for syncope.   Hematological: Does not bruise/bleed easily.   Psychiatric/Behavioral: Negative for  "depressed mood. The patient is not nervous/anxious.        Physical Exam:  Vital Signs:  Visit Vitals  /82   Pulse 86   Temp 98.3 °F (36.8 °C)   Resp 16   Ht 165.1 cm (65\")   Wt 81.1 kg (178 lb 12.8 oz)   LMP  (LMP Unknown)   SpO2 94%   BMI 29.75 kg/m²       Physical Exam   Constitutional: She is oriented to person, place, and time. She appears well-developed and well-nourished. She is cooperative.   Cardiovascular: Normal rate, regular rhythm and normal heart sounds. Exam reveals no gallop and no friction rub.   No murmur heard.  Pulmonary/Chest: Effort normal and breath sounds normal. She has no wheezes. She has no rales.   Neurological: She is alert and oriented to person, place, and time. Coordination normal.   Skin: Skin is warm and dry.   Psychiatric: She has a normal mood and affect.   Vitals reviewed.      Assessment and Plan:  Problem List Items Addressed This Visit        Cardiovascular and Mediastinum    Hypertension    Systolic CHF, chronic (CMS/HCC) - Primary    Hyperlipidemia    Relevant Orders    Comprehensive Metabolic Panel (Completed)    Lipid Panel With / Chol / HDL Ratio (Completed)       Respiratory    Panlobular emphysema (CMS/HCC)    Overview     She is in 4 liters  She has not started her therapy for this yet           Other Visit Diagnoses     Influenza vaccine needed        Relevant Orders    Fluad Tri 65yr+ (Completed)    Need for vaccination for Strep pneumoniae          Stable chronic issues. Will get labs      An After Visit Summary and PPPS were given to the patient.     "

## 2019-09-21 LAB
ALBUMIN SERPL-MCNC: 4.8 G/DL (ref 3.5–4.8)
ALBUMIN/GLOB SERPL: 2.3 {RATIO} (ref 1.2–2.2)
ALP SERPL-CCNC: 106 IU/L (ref 39–117)
ALT SERPL-CCNC: 22 IU/L (ref 0–32)
AST SERPL-CCNC: 23 IU/L (ref 0–40)
BILIRUB SERPL-MCNC: 0.6 MG/DL (ref 0–1.2)
BUN SERPL-MCNC: 20 MG/DL (ref 8–27)
BUN/CREAT SERPL: 21 (ref 12–28)
CALCIUM SERPL-MCNC: 10.3 MG/DL (ref 8.7–10.3)
CHLORIDE SERPL-SCNC: 92 MMOL/L (ref 96–106)
CHOLEST SERPL-MCNC: 187 MG/DL (ref 100–199)
CHOLEST/HDLC SERPL: 4.1 RATIO (ref 0–4.4)
CO2 SERPL-SCNC: 32 MMOL/L (ref 20–29)
CREAT SERPL-MCNC: 0.97 MG/DL (ref 0.57–1)
GLOBULIN SER CALC-MCNC: 2.1 G/DL (ref 1.5–4.5)
GLUCOSE SERPL-MCNC: 111 MG/DL (ref 65–99)
HDLC SERPL-MCNC: 46 MG/DL
LDLC SERPL CALC-MCNC: 93 MG/DL (ref 0–99)
POTASSIUM SERPL-SCNC: 4 MMOL/L (ref 3.5–5.2)
PROT SERPL-MCNC: 6.9 G/DL (ref 6–8.5)
SODIUM SERPL-SCNC: 143 MMOL/L (ref 134–144)
TRIGL SERPL-MCNC: 240 MG/DL (ref 0–149)
VLDLC SERPL CALC-MCNC: 48 MG/DL (ref 5–40)

## 2019-11-08 ENCOUNTER — TELEPHONE (OUTPATIENT)
Dept: FAMILY MEDICINE CLINIC | Facility: CLINIC | Age: 74
End: 2019-11-08

## 2019-11-08 DIAGNOSIS — J45.20 MILD INTERMITTENT ASTHMA WITHOUT COMPLICATION: Primary | ICD-10-CM

## 2019-11-08 RX ORDER — ALBUTEROL SULFATE 90 UG/1
2 AEROSOL, METERED RESPIRATORY (INHALATION) EVERY 4 HOURS PRN
Qty: 1 INHALER | Refills: 12 | Status: SHIPPED | OUTPATIENT
Start: 2019-11-08 | End: 2022-08-23

## 2019-11-25 RX ORDER — LISINOPRIL AND HYDROCHLOROTHIAZIDE 20; 12.5 MG/1; MG/1
TABLET ORAL
Qty: 90 TABLET | Refills: 1 | Status: SHIPPED | OUTPATIENT
Start: 2019-11-25 | End: 2020-05-18

## 2019-12-19 ENCOUNTER — OFFICE VISIT (OUTPATIENT)
Dept: FAMILY MEDICINE CLINIC | Facility: CLINIC | Age: 74
End: 2019-12-19

## 2019-12-19 VITALS
HEART RATE: 97 BPM | RESPIRATION RATE: 20 BRPM | DIASTOLIC BLOOD PRESSURE: 77 MMHG | BODY MASS INDEX: 27 KG/M2 | OXYGEN SATURATION: 93 % | SYSTOLIC BLOOD PRESSURE: 130 MMHG | TEMPERATURE: 97.7 F | HEIGHT: 67 IN | WEIGHT: 172 LBS

## 2019-12-19 DIAGNOSIS — E78.2 MIXED HYPERLIPIDEMIA: ICD-10-CM

## 2019-12-19 DIAGNOSIS — J43.1 PANLOBULAR EMPHYSEMA (HCC): Primary | ICD-10-CM

## 2019-12-19 DIAGNOSIS — I10 ESSENTIAL HYPERTENSION: ICD-10-CM

## 2019-12-19 DIAGNOSIS — I50.22 SYSTOLIC CHF, CHRONIC (HCC): ICD-10-CM

## 2019-12-19 DIAGNOSIS — B00.9 HERPES SIMPLEX: ICD-10-CM

## 2019-12-19 PROBLEM — N18.2 STAGE 2 CHRONIC KIDNEY DISEASE: Status: ACTIVE | Noted: 2019-12-19

## 2019-12-19 PROBLEM — E78.5 DYSLIPIDEMIA: Status: RESOLVED | Noted: 2019-06-04 | Resolved: 2019-12-19

## 2019-12-19 PROBLEM — J20.8 ACUTE BACTERIAL BRONCHITIS: Status: RESOLVED | Noted: 2019-07-05 | Resolved: 2019-12-19

## 2019-12-19 PROBLEM — J45.909 ASTHMA: Status: RESOLVED | Noted: 2019-09-20 | Resolved: 2019-12-19

## 2019-12-19 PROBLEM — N18.30 STAGE 3 CHRONIC KIDNEY DISEASE: Status: ACTIVE | Noted: 2019-12-19

## 2019-12-19 PROBLEM — B96.89 ACUTE BACTERIAL BRONCHITIS: Status: RESOLVED | Noted: 2019-07-05 | Resolved: 2019-12-19

## 2019-12-19 PROBLEM — E87.6 HYPOKALEMIA: Status: RESOLVED | Noted: 2019-07-05 | Resolved: 2019-12-19

## 2019-12-19 PROCEDURE — 99214 OFFICE O/P EST MOD 30 MIN: CPT | Performed by: FAMILY MEDICINE

## 2019-12-19 RX ORDER — ACYCLOVIR 200 MG/1
200 CAPSULE ORAL
Qty: 25 CAPSULE | Refills: 12 | Status: SHIPPED | OUTPATIENT
Start: 2019-12-19 | End: 2022-04-04

## 2019-12-19 RX ORDER — ACYCLOVIR 50 MG/G
OINTMENT TOPICAL
Qty: 2 EACH | Refills: 12 | Status: SHIPPED | OUTPATIENT
Start: 2019-12-19 | End: 2020-03-10

## 2019-12-19 NOTE — PROGRESS NOTES
Subjective   Krystyna Bennett is a 74 y.o. female.     Chief Complaint   Patient presents with   • Hyperlipidemia   • Hypertension       Hyperlipidemia   This is a chronic problem. The current episode started more than 1 year ago. Recent lipid tests were reviewed and are variable. Pertinent negatives include no chest pain or shortness of breath. Risk factors for coronary artery disease include post-menopausal, hypertension and dyslipidemia.   Hypertension   This is a chronic problem. The current episode started more than 1 year ago. The problem has been waxing and waning since onset. The problem is controlled. Pertinent negatives include no chest pain or shortness of breath. Risk factors for coronary artery disease include dyslipidemia and post-menopausal state. Past treatments include ACE inhibitors. Current antihypertension treatment includes ACE inhibitors and diuretics. The current treatment provides moderate improvement.   COPD   There is no cough or shortness of breath. The current episode started more than 1 year ago. The problem occurs constantly. The problem has been unchanged. Pertinent negatives include no chest pain, ear pain, fever or rhinorrhea. Her symptoms are aggravated by climbing stairs, exposure to smoke, exposure to fumes, pollen, strenuous activity and URI. Relieved by: oxygen. She reports significant improvement on treatment.   Congestive Heart Failure   Presents for follow-up visit. Pertinent negatives include no abdominal pain, chest pain or shortness of breath. The symptoms have been stable. Compliance with total regimen is %.        The following portions of the patient's history were reviewed and updated as appropriate: allergies, current medications, past family history, past medical history, past social history, past surgical history and problem list.    Allergies:  Allergies   Allergen Reactions   • Sulfacetamide Sodium-Sulfur Other (See Comments)     Tunnel vision,light headed        Social History:  Social History     Socioeconomic History   • Marital status:      Spouse name: Not on file   • Number of children: Not on file   • Years of education: Not on file   • Highest education level: Not on file   Tobacco Use   • Smoking status: Former Smoker     Types: Cigars   • Smokeless tobacco: Never Used   • Tobacco comment: quit 20 years ago   Substance and Sexual Activity   • Alcohol use: Yes     Frequency: 2-4 times a month     Drinks per session: 1 or 2     Binge frequency: Never     Comment: rarely   • Drug use: No   • Sexual activity: Defer       Family History:  History reviewed. No pertinent family history.    Past Medical History :  Patient Active Problem List   Diagnosis   • Essential hypertension   • CLL (chronic lymphocytic leukemia) (CMS/HCC)   • Panlobular emphysema (CMS/HCC)   • Moderate persistent asthma without complication   • GERD (gastroesophageal reflux disease)   • Arthritis   • Systolic CHF, chronic (CMS/HCC)   • Anemia   • History of Clostridium difficile colitis   • Lymphocytosis   • Melanoma (CMS/HCC)   • Mixed hyperlipidemia   • Herpes simplex   • Stage 2 chronic kidney disease       Medication List:    Current Outpatient Medications:   •  acyclovir (ZOVIRAX) 5 % ointment, Apply 1 application topically to the appropriate area as directed Every 3 (Three) Hours., Disp: , Rfl:   •  albuterol (PROVENTIL) (2.5 MG/3ML) 0.083% nebulizer solution, Every 6 (Six) Hours., Disp: , Rfl:   •  albuterol sulfate HFA (VENTOLIN HFA) 108 (90 Base) MCG/ACT inhaler, Inhale 2 puffs Every 4 (Four) Hours As Needed for Wheezing., Disp: 1 inhaler, Rfl: 12  •  aspirin 81 MG EC tablet, Take 81 mg by mouth Daily., Disp: , Rfl:   •  bumetanide (BUMEX) 1 MG tablet, Take 1 tablet by mouth 2 (Two) Times a Day., Disp: 60 tablet, Rfl: 5  •  Calcium Carbonate-Vit D-Min (CALTRATE 600+D PLUS MINERALS) 600-800 MG-UNIT chewable tablet, Chew 2 tablets Daily., Disp: , Rfl:   •  Coenzyme Q10 (COQ10 PO),  Take  by mouth., Disp: , Rfl:   •  Cyanocobalamin (B-12) 1000 MCG capsule, Take 1,000 mcg by mouth Daily., Disp: , Rfl:   •  esomeprazole (NEXIUM) 40 MG capsule, 1 tablet po daily, Disp: , Rfl:   •  Glucosamine-Chondroitin (OSTEO BI-FLEX REGULAR STRENGTH) 250-200 MG tablet, Take 1 tablet by mouth Daily., Disp: , Rfl:   •  guaiFENesin ER 1200 MG tablet sustained-release 12 hour, Take 1 tablet by mouth Daily., Disp: , Rfl:   •  lisinopril-hydrochlorothiazide (PRINZIDE,ZESTORETIC) 20-12.5 MG per tablet, TAKE ONE TABLET BY MOUTH DAILY, Disp: 90 tablet, Rfl: 1  •  montelukast (SINGULAIR) 10 MG tablet, TAKE ONE TABLET BY MOUTH AT BEDTIME, Disp: 90 tablet, Rfl: 11  •  Multiple Vitamins-Minerals (CENTRUM SILVER) tablet, 1 tablet po daily, Disp: , Rfl:   •  Omega-3 Fatty Acids (FISH OIL PO), Take  by mouth., Disp: , Rfl:   •  PARoxetine (PAXIL) 20 MG tablet, TAKE ONE TABLET BY MOUTH DAILY, Disp: 30 tablet, Rfl: 11  •  potassium chloride (K-DUR,KLOR-CON) 20 MEQ CR tablet, Take 1 tablet by mouth Daily., Disp: 30 tablet, Rfl: 5  •  pramipexole (MIRAPEX) 0.25 MG tablet, 1 tablet po daily, Disp: , Rfl:   •  simvastatin (ZOCOR) 20 MG tablet, Take 20 mg by mouth Daily., Disp: , Rfl:   •  SPIRIVA RESPIMAT 2.5 MCG/ACT aerosol solution inhaler, Inhale 2 puffs Daily., Disp: , Rfl:   •  SYMBICORT 160-4.5 MCG/ACT inhaler, Inhale 2 puffs 2 (Two) Times a Day., Disp: , Rfl:   •  acyclovir (ZOVIRAX) 200 MG capsule, Take 1 capsule by mouth Every 4 (Four) Hours While Awake. Take at first sign of recurrence., Disp: 25 capsule, Rfl: 12  •  acyclovir (ZOVIRAX) 5 % ointment, Apply  topically to the appropriate area as directed 5 (Five) Times a Day. Begin treatment at earliest sign or symptom., Disp: 2 each, Rfl: 12    Past Surgical History:  Past Surgical History:   Procedure Laterality Date   • CARDIAC CATHETERIZATION  05/2019    [facility].   • HYSTERECTOMY         Review of Systems:  Review of Systems   Constitutional: Negative for activity change and  "fever.   HENT: Negative for ear pain, rhinorrhea, sinus pressure and voice change.    Eyes: Negative for visual disturbance.   Respiratory: Negative for cough and shortness of breath.    Cardiovascular: Negative for chest pain.   Gastrointestinal: Negative for abdominal pain, diarrhea, nausea and vomiting.   Endocrine: Negative for cold intolerance and heat intolerance.   Genitourinary: Negative for frequency and urgency.   Musculoskeletal: Negative for arthralgias.   Skin: Negative for rash.   Neurological: Negative for syncope.   Hematological: Does not bruise/bleed easily.   Psychiatric/Behavioral: Negative for depressed mood. The patient is not nervous/anxious.        Physical Exam:  Vital Signs:  Visit Vitals  /77   Pulse 97   Temp 97.7 °F (36.5 °C) (Oral)   Resp 20   Ht 168.9 cm (66.5\")   Wt 78 kg (172 lb)   LMP  (LMP Unknown)   SpO2 93% Comment: pt on oxygen   BMI 27.35 kg/m²       Physical Exam   Constitutional: She is oriented to person, place, and time. She appears well-developed and well-nourished. She is cooperative.   HENT:   Head: Normocephalic and atraumatic.   Right Ear: External ear normal. Tympanic membrane is not injected, not erythematous, not retracted and not bulging. No middle ear effusion.   Left Ear: External ear normal. Tympanic membrane is not injected, not erythematous, not retracted and not bulging.  No middle ear effusion.   Nose: Nose normal. No rhinorrhea.   Mouth/Throat: Oropharynx is clear and moist. No oropharyngeal exudate.   Cardiovascular: Normal rate, regular rhythm and normal heart sounds. Exam reveals no gallop and no friction rub.   No murmur heard.  Pulmonary/Chest: Effort normal and breath sounds normal. No respiratory distress. She has no wheezes. She has no rales.   Lymphadenopathy:     She has no cervical adenopathy.   Neurological: She is alert and oriented to person, place, and time. Coordination normal.   Skin: Skin is warm and dry.   Blister under left nostril " (herpes simplex)   Psychiatric: She has a normal mood and affect.   Vitals reviewed.      Assessment and Plan:  Problem List Items Addressed This Visit        Cardiovascular and Mediastinum    Essential hypertension    Overview     stable         Systolic CHF, chronic (CMS/HCC)    Overview     stable         Mixed hyperlipidemia    Overview     Will get labs         Relevant Orders    Comprehensive Metabolic Panel (Completed)    Lipid Panel With / Chol / HDL Ratio (Completed)       Respiratory    Panlobular emphysema (CMS/HCC) - Primary    Overview     She is in 4 liters  She has not started her therapy for this yet            Other    Herpes simplex    Overview     Under left nostril         Relevant Medications    acyclovir (ZOVIRAX) 200 MG capsule    acyclovir (ZOVIRAX) 5 % ointment        Diagnosis, treatment and and course discussed. Potential side effects discussed. Return if there is worsening or persistence of symptoms.     An After Visit Summary and PPPS were given to the patient.

## 2019-12-20 LAB
ALBUMIN SERPL-MCNC: 4.5 G/DL (ref 3.5–4.8)
ALBUMIN/GLOB SERPL: 2 {RATIO} (ref 1.2–2.2)
ALP SERPL-CCNC: 98 IU/L (ref 39–117)
ALT SERPL-CCNC: 21 IU/L (ref 0–32)
AST SERPL-CCNC: 22 IU/L (ref 0–40)
BILIRUB SERPL-MCNC: 0.5 MG/DL (ref 0–1.2)
BUN SERPL-MCNC: 15 MG/DL (ref 8–27)
BUN/CREAT SERPL: 16 (ref 12–28)
CALCIUM SERPL-MCNC: 9.7 MG/DL (ref 8.7–10.3)
CHLORIDE SERPL-SCNC: 93 MMOL/L (ref 96–106)
CHOLEST SERPL-MCNC: 156 MG/DL (ref 100–199)
CHOLEST/HDLC SERPL: 3.2 RATIO (ref 0–4.4)
CO2 SERPL-SCNC: 31 MMOL/L (ref 20–29)
CREAT SERPL-MCNC: 0.96 MG/DL (ref 0.57–1)
GLOBULIN SER CALC-MCNC: 2.3 G/DL (ref 1.5–4.5)
GLUCOSE SERPL-MCNC: 97 MG/DL (ref 65–99)
HDLC SERPL-MCNC: 49 MG/DL
LDLC SERPL CALC-MCNC: 72 MG/DL (ref 0–99)
POTASSIUM SERPL-SCNC: 3.9 MMOL/L (ref 3.5–5.2)
PROT SERPL-MCNC: 6.8 G/DL (ref 6–8.5)
SODIUM SERPL-SCNC: 141 MMOL/L (ref 134–144)
TRIGL SERPL-MCNC: 177 MG/DL (ref 0–149)
VLDLC SERPL CALC-MCNC: 35 MG/DL (ref 5–40)

## 2019-12-26 DIAGNOSIS — I10 ESSENTIAL HYPERTENSION: Primary | ICD-10-CM

## 2019-12-26 DIAGNOSIS — I50.22 SYSTOLIC CHF, CHRONIC (HCC): ICD-10-CM

## 2019-12-30 RX ORDER — POTASSIUM CHLORIDE 20 MEQ/1
TABLET, EXTENDED RELEASE ORAL
Qty: 30 TABLET | Refills: 4 | Status: SHIPPED | OUTPATIENT
Start: 2019-12-30 | End: 2020-05-26

## 2019-12-30 RX ORDER — BUMETANIDE 1 MG/1
TABLET ORAL
Qty: 60 TABLET | Refills: 4 | Status: SHIPPED | OUTPATIENT
Start: 2019-12-30 | End: 2020-06-01

## 2020-02-26 DIAGNOSIS — J43.1 PANLOBULAR EMPHYSEMA (HCC): Primary | ICD-10-CM

## 2020-03-06 RX ORDER — BUDESONIDE AND FORMOTEROL FUMARATE DIHYDRATE 160; 4.5 UG/1; UG/1
AEROSOL RESPIRATORY (INHALATION)
Qty: 10.2 G | Refills: 11 | Status: SHIPPED | OUTPATIENT
Start: 2020-03-06 | End: 2020-11-02 | Stop reason: ALTCHOICE

## 2020-03-08 ENCOUNTER — HOSPITAL ENCOUNTER (EMERGENCY)
Facility: HOSPITAL | Age: 75
Discharge: HOME | End: 2020-03-09
Admitting: EMERGENCY MEDICINE

## 2020-03-08 ENCOUNTER — APPOINTMENT (OUTPATIENT)
Dept: GENERAL RADIOLOGY | Facility: HOSPITAL | Age: 75
End: 2020-03-08

## 2020-03-08 DIAGNOSIS — J44.1 COPD EXACERBATION (HCC): Primary | ICD-10-CM

## 2020-03-08 DIAGNOSIS — R06.09 DYSPNEA ON EXERTION: ICD-10-CM

## 2020-03-08 DIAGNOSIS — C91.10 CLL (CHRONIC LYMPHOCYTIC LEUKEMIA) (HCC): ICD-10-CM

## 2020-03-08 LAB
ALBUMIN SERPL-MCNC: 4.5 G/DL (ref 3.5–5.2)
ALBUMIN/GLOB SERPL: 1.7 G/DL
ALP SERPL-CCNC: 99 U/L (ref 39–117)
ALT SERPL W P-5'-P-CCNC: 23 U/L (ref 1–33)
ANION GAP SERPL CALCULATED.3IONS-SCNC: 13 MMOL/L (ref 5–15)
AST SERPL-CCNC: 21 U/L (ref 1–32)
BILIRUB SERPL-MCNC: 0.3 MG/DL (ref 0.2–1.2)
BUN BLD-MCNC: 17 MG/DL (ref 8–23)
BUN/CREAT SERPL: 17.2 (ref 7–25)
CALCIUM SPEC-SCNC: 9.8 MG/DL (ref 8.6–10.5)
CHLORIDE SERPL-SCNC: 94 MMOL/L (ref 98–107)
CO2 SERPL-SCNC: 35 MMOL/L (ref 22–29)
CREAT BLD-MCNC: 0.99 MG/DL (ref 0.57–1)
GFR SERPL CREATININE-BSD FRML MDRD: 55 ML/MIN/1.73
GLOBULIN UR ELPH-MCNC: 2.6 GM/DL
GLUCOSE BLD-MCNC: 97 MG/DL (ref 65–99)
HOLD SPECIMEN: NORMAL
HOLD SPECIMEN: NORMAL
INR PPP: 1 (ref 0.9–1.1)
NT-PROBNP SERPL-MCNC: 143.3 PG/ML (ref 5–900)
POTASSIUM BLD-SCNC: 4.6 MMOL/L (ref 3.5–5.2)
PROT SERPL-MCNC: 7.1 G/DL (ref 6–8.5)
PROTHROMBIN TIME: 10.5 SECONDS (ref 9.6–11.7)
SODIUM BLD-SCNC: 142 MMOL/L (ref 136–145)
TROPONIN T SERPL-MCNC: <0.01 NG/ML (ref 0–0.03)
WHOLE BLOOD HOLD SPECIMEN: NORMAL
WHOLE BLOOD HOLD SPECIMEN: NORMAL

## 2020-03-08 PROCEDURE — 94799 UNLISTED PULMONARY SVC/PX: CPT

## 2020-03-08 PROCEDURE — 25010000002 METHYLPREDNISOLONE PER 125 MG: Performed by: NURSE PRACTITIONER

## 2020-03-08 PROCEDURE — 94640 AIRWAY INHALATION TREATMENT: CPT

## 2020-03-08 PROCEDURE — 85007 BL SMEAR W/DIFF WBC COUNT: CPT | Performed by: NURSE PRACTITIONER

## 2020-03-08 PROCEDURE — 83880 ASSAY OF NATRIURETIC PEPTIDE: CPT | Performed by: NURSE PRACTITIONER

## 2020-03-08 PROCEDURE — 85025 COMPLETE CBC W/AUTO DIFF WBC: CPT | Performed by: NURSE PRACTITIONER

## 2020-03-08 PROCEDURE — 84484 ASSAY OF TROPONIN QUANT: CPT | Performed by: NURSE PRACTITIONER

## 2020-03-08 PROCEDURE — 99284 EMERGENCY DEPT VISIT MOD MDM: CPT

## 2020-03-08 PROCEDURE — 85610 PROTHROMBIN TIME: CPT | Performed by: NURSE PRACTITIONER

## 2020-03-08 PROCEDURE — 96374 THER/PROPH/DIAG INJ IV PUSH: CPT

## 2020-03-08 PROCEDURE — 71045 X-RAY EXAM CHEST 1 VIEW: CPT

## 2020-03-08 PROCEDURE — 80053 COMPREHEN METABOLIC PANEL: CPT | Performed by: NURSE PRACTITIONER

## 2020-03-08 PROCEDURE — 93005 ELECTROCARDIOGRAM TRACING: CPT

## 2020-03-08 RX ORDER — SODIUM CHLORIDE 0.9 % (FLUSH) 0.9 %
10 SYRINGE (ML) INJECTION AS NEEDED
Status: DISCONTINUED | OUTPATIENT
Start: 2020-03-08 | End: 2020-03-09 | Stop reason: HOSPADM

## 2020-03-08 RX ORDER — ALBUTEROL SULFATE 2.5 MG/3ML
2.5 SOLUTION RESPIRATORY (INHALATION) ONCE
Status: COMPLETED | OUTPATIENT
Start: 2020-03-08 | End: 2020-03-08

## 2020-03-08 RX ORDER — METHYLPREDNISOLONE SODIUM SUCCINATE 125 MG/2ML
125 INJECTION, POWDER, LYOPHILIZED, FOR SOLUTION INTRAMUSCULAR; INTRAVENOUS ONCE
Status: COMPLETED | OUTPATIENT
Start: 2020-03-08 | End: 2020-03-08

## 2020-03-08 RX ORDER — IPRATROPIUM BROMIDE AND ALBUTEROL SULFATE 2.5; .5 MG/3ML; MG/3ML
3 SOLUTION RESPIRATORY (INHALATION) ONCE
Status: COMPLETED | OUTPATIENT
Start: 2020-03-08 | End: 2020-03-08

## 2020-03-08 RX ADMIN — IPRATROPIUM BROMIDE AND ALBUTEROL SULFATE 3 ML: .5; 3 SOLUTION RESPIRATORY (INHALATION) at 23:14

## 2020-03-08 RX ADMIN — ALBUTEROL SULFATE 2.5 MG: 2.5 SOLUTION RESPIRATORY (INHALATION) at 23:08

## 2020-03-08 RX ADMIN — METHYLPREDNISOLONE SODIUM SUCCINATE 125 MG: 125 INJECTION, POWDER, FOR SOLUTION INTRAMUSCULAR; INTRAVENOUS at 23:15

## 2020-03-08 RX ADMIN — ALBUTEROL SULFATE 2.5 MG: 2.5 SOLUTION RESPIRATORY (INHALATION) at 23:11

## 2020-03-09 VITALS
SYSTOLIC BLOOD PRESSURE: 106 MMHG | HEIGHT: 66 IN | DIASTOLIC BLOOD PRESSURE: 44 MMHG | TEMPERATURE: 98.1 F | OXYGEN SATURATION: 96 % | BODY MASS INDEX: 27 KG/M2 | HEART RATE: 89 BPM | WEIGHT: 167.99 LBS | RESPIRATION RATE: 16 BRPM

## 2020-03-09 LAB
DEPRECATED RDW RBC AUTO: 41.6 FL (ref 37–54)
ERYTHROCYTE [DISTWIDTH] IN BLOOD BY AUTOMATED COUNT: 13.2 % (ref 12.3–15.4)
HCT VFR BLD AUTO: 34.4 % (ref 34–46.6)
HGB BLD-MCNC: 11.3 G/DL (ref 12–15.9)
LAB AP CASE REPORT: NORMAL
LYMPHOCYTES # BLD MANUAL: 18.88 10*3/MM3 (ref 0.7–3.1)
LYMPHOCYTES NFR BLD MANUAL: 5 % (ref 5–12)
LYMPHOCYTES NFR BLD MANUAL: 64 % (ref 19.6–45.3)
MCH RBC QN AUTO: 29.4 PG (ref 26.6–33)
MCHC RBC AUTO-ENTMCNC: 32.8 G/DL (ref 31.5–35.7)
MCV RBC AUTO: 89.7 FL (ref 79–97)
MONOCYTES # BLD AUTO: 1.48 10*3/MM3 (ref 0.1–0.9)
NEUTROPHILS # BLD AUTO: 9.15 10*3/MM3 (ref 1.7–7)
NEUTROPHILS NFR BLD MANUAL: 31 % (ref 42.7–76)
PATH REPORT.FINAL DX SPEC: NORMAL
PATHOLOGY REVIEW: YES
PLAT MORPH BLD: NORMAL
PLATELET # BLD AUTO: 294 10*3/MM3 (ref 140–450)
PMV BLD AUTO: 8.8 FL (ref 6–12)
RBC # BLD AUTO: 3.83 10*6/MM3 (ref 3.77–5.28)
RBC MORPH BLD: NORMAL
SCAN SLIDE: NORMAL
WBC MORPH BLD: NORMAL
WBC NRBC COR # BLD: 29.5 10*3/MM3 (ref 3.4–10.8)

## 2020-03-09 RX ORDER — PREDNISONE 20 MG/1
20 TABLET ORAL DAILY
Qty: 5 TABLET | Refills: 0 | Status: SHIPPED | OUTPATIENT
Start: 2020-03-09 | End: 2020-03-10

## 2020-03-09 NOTE — ED PROVIDER NOTES
Subjective   Is a 74-year-old female who presents with shortness of breath she does have COPD and emphysema and she states that she lives at home alone and today she was walking from her chair to the kitchen which is approximately 10 steps and she became so short of breath she had to stop-she denies pain she states she does have cough that is mildly productive with some mild yellow sputum.  She denies fever or chills she denies travel outside the [place] last 21 days.          Review of Systems   Constitutional: Negative for chills, fatigue and fever.   HENT: Negative for congestion, tinnitus and trouble swallowing.    Eyes: Negative for photophobia, discharge and redness.   Respiratory: Positive for cough, chest tightness and shortness of breath.    Cardiovascular: Negative for chest pain and palpitations.   Gastrointestinal: Negative for abdominal pain, diarrhea, nausea and vomiting.   Genitourinary: Negative for dysuria, frequency and urgency.   Musculoskeletal: Negative for back pain, joint swelling and myalgias.   Skin: Negative for rash.   Neurological: Negative for dizziness and headaches.   Psychiatric/Behavioral: Negative for confusion.   All other systems reviewed and are negative.      Past Medical History:   Diagnosis Date   • Acute bacterial bronchitis 7/5/2019   • Anemia 7/5/2019   • Arthritis 7/5/2019   • Asthma    • Chronic obstructive pulmonary disease (CMS/[facility]) 6/4/2019   • CLL (chronic lymphocytic leukemia) (CMS/[facility]) 7/5/2019   • COPD (chronic obstructive pulmonary disease) (CMS/[facility])    • GERD (gastroesophageal reflux disease) 7/5/2019   • History of Clostridium difficile colitis 1/8/2018   • Hypertension    • Hypokalemia 7/5/2019   • Lymphocytosis 1/8/2018   • Melanoma (CMS/[facility]) 1/8/2018   • Moderate persistent asthma without complication 7/5/2019   • Panlobular emphysema (CMS/[facility]) 7/5/2019   • Systolic CHF, chronic (CMS/[facility]) 7/5/2019       Allergies   Allergen Reactions   • Sulfacetamide  Sodium-Sulfur Other (See Comments)     Tunnel vision,light headed       Past Surgical History:   Procedure Laterality Date   • CARDIAC CATHETERIZATION  05/2019    [facility].   • HYSTERECTOMY         No family history on file.    Social History     Socioeconomic History   • Marital status:      Spouse name: Not on file   • Number of children: Not on file   • Years of education: Not on file   • Highest education level: Not on file   Tobacco Use   • Smoking status: Former Smoker     Types: Cigars   • Smokeless tobacco: Never Used   • Tobacco comment: quit 20 years ago   Substance and Sexual Activity   • Alcohol use: Yes     Frequency: 2-4 times a month     Drinks per session: 1 or 2     Binge frequency: Never     Comment: rarely   • Drug use: No   • Sexual activity: Defer           Objective   Physical Exam   Constitutional: She is oriented to person, place, and time. She appears well-developed and well-nourished.   HENT:   Head: Normocephalic and atraumatic.   Eyes: Pupils are equal, round, and reactive to light. Conjunctivae and EOM are normal.   Neck: Normal range of motion. Neck supple.   Cardiovascular: Normal rate, regular rhythm, normal heart sounds and intact distal pulses.   Pulmonary/Chest: Effort normal. No respiratory distress. She has decreased breath sounds in the right lower field and the left lower field. She has wheezes in the right lower field and the left lower field.   Abdominal: Soft. Bowel sounds are normal. She exhibits no distension and no mass. There is no tenderness. There is no rebound and no guarding.   Musculoskeletal: Normal range of motion. She exhibits no deformity.        Right lower leg: Normal.        Left lower leg: Normal.   Neurological: She is alert and oriented to person, place, and time. She displays normal reflexes. No cranial nerve deficit or sensory deficit. GCS eye subscore is 4. GCS verbal subscore is 5. GCS motor subscore is 6.   Skin: Skin is warm, dry and intact.  "Capillary refill takes less than 2 seconds. No rash noted.   Psychiatric: She has a normal mood and affect. Her speech is normal and behavior is normal. Judgment and thought content normal. Cognition and memory are normal.   Nursing note and vitals reviewed.      ECG 12 Lead    Date/Time: 3/9/2020 12:32 AM  Performed by: Nicki Coates APRN  Authorized by: Nicki Coates APRN   Interpreted by physician (Lelia)  Comparison: compared with previous ECG from 3/8/2020  Similar to previous ECG  Rhythm: sinus rhythm  Ectopy: atrial premature contractions  Rate: normal  BPM: 87  QRS axis: normal  Conduction: conduction normal  ST Segments: ST segments normal  T Waves: T waves normal  Other: no other findings  Clinical impression: non-specific ECG                 ED Course      /48   Pulse 93   Temp 99.9 °F (37.7 °C) (Rectal)   Resp 16   Ht 166.4 cm (65.5\")   Wt 76.2 kg (167 lb 15.9 oz)   LMP  (LMP Unknown)   SpO2 97%   Breastfeeding No   BMI 27.53 kg/m²   Labs Reviewed   COMPREHENSIVE METABOLIC PANEL - Abnormal; Notable for the following components:       Result Value    Chloride 94 (*)     CO2 35.0 (*)     eGFR Non  Amer 55 (*)     All other components within normal limits    Narrative:     GFR Normal >60  Chronic Kidney Disease <60  Kidney Failure <15     CBC WITH AUTO DIFFERENTIAL - Abnormal; Notable for the following components:    WBC 29.50 (*)     Hemoglobin 11.3 (*)     All other components within normal limits   MANUAL DIFFERENTIAL - Abnormal; Notable for the following components:    Neutrophil % 31.0 (*)     Lymphocyte % 64.0 (*)     Neutrophils Absolute 9.15 (*)     Lymphocytes Absolute 18.88 (*)     Monocytes Absolute 1.48 (*)     All other components within normal limits   PROTIME-INR - Normal   BNP (IN-HOUSE) - Normal    Narrative:     Among patients with dyspnea, NT-proBNP is highly sensitive for the detection of acute congestive heart failure. In addition NT-proBNP of <300 " pg/ml effectively rules out acute congestive heart failure with 99% negative predictive value.    Results may be falsely decreased if patient taking Biotin.     TROPONIN (IN-HOUSE) - Normal    Narrative:     Troponin T Reference Range:  <= 0.03 ng/mL-   Negative for AMI  >0.03 ng/mL-     Abnormal for myocardial necrosis.  Clinicians would have to utilize clinical acumen, EKG, Troponin and serial changes to determine if it is an Acute Myocardial Infarction or myocardial injury due to an underlying chronic condition.       Results may be falsely decreased if patient taking Biotin.     RAINBOW DRAW    Narrative:     The following orders were created for panel order [place] Draw.  Procedure                               Abnormality         Status                     ---------                               -----------         ------                     Light Blue Top[[id number]]                                   Final result               Green Top (Gel)[[id number]]                                  Final result               Lavender Top[[id number]]                                     Final result               Gold Top - SST[[id number]]                                   Final result                 Please view results for these tests on the individual orders.   SCAN SLIDE   PATH CONSULT REFLEX   PATHOLOGY CONSULTATION   LIGHT BLUE TOP   GREEN TOP   LAVENDER TOP   GOLD TOP - SST   CBC AND DIFFERENTIAL    Narrative:     The following orders were created for panel order CBC & Differential.  Procedure                               Abnormality         Status                     ---------                               -----------         ------                     CBC Auto Differential[[id number]]        Abnormal            Final result                 Please view results for these tests on the individual orders.     Medications   sodium chloride 0.9 % flush 10 mL (has no administration in time range)   sodium chloride 0.9 % flush  10 mL (has no administration in time range)   ipratropium-albuterol (DUO-NEB) nebulizer solution 3 mL (3 mL Nebulization Given 3/8/20 2314)   albuterol (PROVENTIL) nebulizer solution 0.083% 2.5 mg/3mL (2.5 mg Nebulization Given 3/8/20 2311)   albuterol (PROVENTIL) nebulizer solution 0.083% 2.5 mg/3mL (2.5 mg Nebulization Given 3/8/20 2308)   methylPREDNISolone sodium succinate (SOLU-Medrol) injection 125 mg (125 mg Intravenous Given 3/8/20 2315)     Chest xray- negative per Dr. Rowell                                     MDM  Number of Diagnoses or Management Options  CLL (chronic lymphocytic leukemia) (CMS/HCC):   COPD exacerbation (CMS/HCC):   Dyspnea on exertion:   Diagnosis management comments: She had IV established and blood work was obtained patient was given 125 of Solu-Medrol and duo and 2 albuterol's and chest x-ray was found to be negative white count was chronically high due to CLL-and patient has chronic renal disease which was stable.  On reevaluation the patient states she feels much better her lung sounds have cleared and she will be discharged home with some prednisone she verbalized understood the need to follow-up with Dr. Kapoor in 3 days for recheck return to the ER if worse       Amount and/or Complexity of Data Reviewed  Clinical lab tests: reviewed  Tests in the radiology section of CPT®: reviewed  Tests in the medicine section of CPT®: reviewed  Independent visualization of images, tracings, or specimens: yes    Patient Progress  Patient progress: improved      Final diagnoses:   CLL (chronic lymphocytic leukemia) (CMS/HCC)   Dyspnea on exertion   COPD exacerbation (CMS/[facility])            Nicki Coates, APRN  03/09/20 0029       Nicki Coates, APRN  03/09/20 0033

## 2020-03-10 ENCOUNTER — OFFICE VISIT (OUTPATIENT)
Dept: FAMILY MEDICINE CLINIC | Facility: CLINIC | Age: 75
End: 2020-03-10

## 2020-03-10 ENCOUNTER — EPISODE CHANGES (OUTPATIENT)
Dept: CASE MANAGEMENT | Facility: OTHER | Age: 75
End: 2020-03-10

## 2020-03-10 VITALS
TEMPERATURE: 98.4 F | RESPIRATION RATE: 18 BRPM | SYSTOLIC BLOOD PRESSURE: 124 MMHG | DIASTOLIC BLOOD PRESSURE: 78 MMHG | OXYGEN SATURATION: 97 % | HEIGHT: 66 IN | HEART RATE: 76 BPM | WEIGHT: 169 LBS | BODY MASS INDEX: 27.16 KG/M2

## 2020-03-10 DIAGNOSIS — J43.1 PANLOBULAR EMPHYSEMA (HCC): Primary | ICD-10-CM

## 2020-03-10 DIAGNOSIS — J20.8 ACUTE BACTERIAL BRONCHITIS: ICD-10-CM

## 2020-03-10 DIAGNOSIS — B96.89 ACUTE BACTERIAL BRONCHITIS: ICD-10-CM

## 2020-03-10 PROCEDURE — 99213 OFFICE O/P EST LOW 20 MIN: CPT | Performed by: FAMILY MEDICINE

## 2020-03-10 RX ORDER — CEFTRIAXONE SODIUM 250 MG/1
1 INJECTION, POWDER, FOR SOLUTION INTRAMUSCULAR; INTRAVENOUS ONCE
Status: COMPLETED | OUTPATIENT
Start: 2020-03-10 | End: 2020-03-10

## 2020-03-10 RX ORDER — DOXYCYCLINE 100 MG/1
100 CAPSULE ORAL 2 TIMES DAILY
Qty: 20 CAPSULE | Refills: 0 | Status: SHIPPED | OUTPATIENT
Start: 2020-03-10 | End: 2020-07-06

## 2020-03-10 RX ORDER — METHYLPREDNISOLONE SODIUM SUCCINATE 125 MG/2ML
125 INJECTION, POWDER, LYOPHILIZED, FOR SOLUTION INTRAMUSCULAR; INTRAVENOUS ONCE
Status: COMPLETED | OUTPATIENT
Start: 2020-03-10 | End: 2020-03-10

## 2020-03-10 RX ORDER — PREDNISONE 20 MG/1
20 TABLET ORAL DAILY
Qty: 20 TABLET | Refills: 0 | Status: SHIPPED | OUTPATIENT
Start: 2020-03-10 | End: 2020-07-06

## 2020-03-10 RX ADMIN — CEFTRIAXONE SODIUM 1 G: 250 INJECTION, POWDER, FOR SOLUTION INTRAMUSCULAR; INTRAVENOUS at 17:02

## 2020-03-10 RX ADMIN — METHYLPREDNISOLONE SODIUM SUCCINATE 125 MG: 125 INJECTION, POWDER, LYOPHILIZED, FOR SOLUTION INTRAMUSCULAR; INTRAVENOUS at 17:02

## 2020-03-10 NOTE — PROGRESS NOTES
Subjective  Answers for HPI/ROS submitted by the patient on 3/10/2020   Shortness of breath  What is the primary reason for your visit?: Shortness of Breath    Krystyna Bennett is a 74 y.o. female.     Chief Complaint   Patient presents with   • COPD   • URI       Patient went to [facility] 3/8/2020 for Shortness of breath and COPD exacerbation.  She was told she didn't have pneumonia, but feels like she's gotten worse since Sunday.    COPD   She complains of cough. There is no shortness of breath or wheezing. The current episode started in the past 7 days (saturday 3/7/20). The problem occurs constantly. The problem has been gradually worsening. Associated symptoms include rhinorrhea. Pertinent negatives include no chest pain, ear pain, fever, postnasal drip, sneezing, sore throat or trouble swallowing.   URI    This is a chronic problem. The current episode started in the past 7 days. The problem has been gradually worsening. There has been no fever. Associated symptoms include congestion, coughing, rhinorrhea and sinus pain. Pertinent negatives include no abdominal pain, chest pain, diarrhea, ear pain, nausea, rash, sneezing, sore throat, swollen glands, vomiting or wheezing.        The following portions of the patient's history were reviewed and updated as appropriate: allergies, current medications, past family history, past medical history, past social history, past surgical history and problem list.    Allergies:  Allergies   Allergen Reactions   • Sulfacetamide Sodium-Sulfur Other (See Comments)     Tunnel vision,light headed       Social History:  Social History     Socioeconomic History   • Marital status:      Spouse name: Not on file   • Number of children: Not on file   • Years of education: Not on file   • Highest education level: Not on file   Tobacco Use   • Smoking status: Former Smoker     Types: Cigars   • Smokeless tobacco: Never Used   • Tobacco comment: quit 20 years ago   Substance and  Sexual Activity   • Alcohol use: Yes     Frequency: 2-4 times a month     Drinks per session: 1 or 2     Binge frequency: Never     Comment: rarely   • Drug use: No   • Sexual activity: Defer       Family History:  History reviewed. No pertinent family history.    Past Medical History :  Patient Active Problem List   Diagnosis   • Essential hypertension   • CLL (chronic lymphocytic leukemia) (CMS/HCC)   • Panlobular emphysema (CMS/[facility])   • Moderate persistent asthma without complication   • GERD (gastroesophageal reflux disease)   • Arthritis   • Systolic CHF, chronic (CMS/[facility])   • Anemia   • History of Clostridium difficile colitis   • Lymphocytosis   • Melanoma (CMS/HCC)   • Mixed hyperlipidemia   • Herpes simplex   • Stage 3 chronic kidney disease (CMS/[facility])       Medication List:    Current Outpatient Medications:   •  albuterol (PROVENTIL) (2.5 MG/3ML) 0.083% nebulizer solution, Every 6 (Six) Hours., Disp: , Rfl:   •  albuterol sulfate HFA (VENTOLIN HFA) 108 (90 Base) MCG/ACT inhaler, Inhale 2 puffs Every 4 (Four) Hours As Needed for Wheezing., Disp: 1 inhaler, Rfl: 12  •  aspirin 81 MG EC tablet, Take 81 mg by mouth Daily., Disp: , Rfl:   •  budesonide-formoterol (SYMBICORT) 160-4.5 MCG/ACT inhaler, INHALE TWO PUFFS BY MOUTH TWICE A DAY, Disp: 10.2 g, Rfl: 11  •  bumetanide (BUMEX) 1 MG tablet, TAKE ONE TABLET BY MOUTH TWICE A DAY, Disp: 60 tablet, Rfl: 4  •  Calcium Carbonate-Vit D-Min (CALTRATE 600+D PLUS MINERALS) 600-800 MG-UNIT chewable tablet, Chew 2 tablets Daily., Disp: , Rfl:   •  Coenzyme Q10 (COQ10 PO), Take  by mouth., Disp: , Rfl:   •  Cyanocobalamin (B-12) 1000 MCG capsule, Take 1,000 mcg by mouth Daily., Disp: , Rfl:   •  esomeprazole (NEXIUM) 40 MG capsule, 1 tablet po daily, Disp: , Rfl:   •  Glucosamine-Chondroitin (OSTEO BI-FLEX REGULAR STRENGTH) 250-200 MG tablet, Take 1 tablet by mouth Daily., Disp: , Rfl:   •  guaiFENesin ER 1200 MG tablet sustained-release 12 hour, Take 1 tablet by mouth  Daily., Disp: , Rfl:   •  ipratropium (ATROVENT) 0.02 % nebulizer solution, Take 2.5 mL by nebulization Every 6 (Six) Hours As Needed for Wheezing or Shortness of Air., Disp: 90 mL, Rfl: 6  •  ipratropium (ATROVENT) 0.02 % nebulizer solution, INHALE CONTENTS OF ONE VIAL VIA NEBULIZER EVERY 6 HOURS AS NEEDED, Disp: 62.5 mL, Rfl: 11  •  lisinopril-hydrochlorothiazide (PRINZIDE,ZESTORETIC) 20-12.5 MG per tablet, TAKE ONE TABLET BY MOUTH DAILY, Disp: 90 tablet, Rfl: 1  •  montelukast (SINGULAIR) 10 MG tablet, TAKE ONE TABLET BY MOUTH AT BEDTIME, Disp: 90 tablet, Rfl: 11  •  Multiple Vitamins-Minerals (CENTRUM SILVER) tablet, 1 tablet po daily, Disp: , Rfl:   •  Omega-3 Fatty Acids (FISH OIL PO), Take  by mouth., Disp: , Rfl:   •  PARoxetine (PAXIL) 20 MG tablet, TAKE ONE TABLET BY MOUTH DAILY, Disp: 30 tablet, Rfl: 11  •  potassium chloride (K-DUR,KLOR-CON) 20 MEQ CR tablet, TAKE ONE TABLET BY MOUTH DAILY, Disp: 30 tablet, Rfl: 4  •  pramipexole (MIRAPEX) 0.25 MG tablet, 1 tablet po daily, Disp: , Rfl:   •  simvastatin (ZOCOR) 20 MG tablet, Take 20 mg by mouth Daily., Disp: , Rfl:   •  SPIRIVA RESPIMAT 2.5 MCG/ACT aerosol solution inhaler, Inhale 2 puffs Daily., Disp: , Rfl:   •  acyclovir (ZOVIRAX) 200 MG capsule, Take 1 capsule by mouth Every 4 (Four) Hours While Awake. Take at first sign of recurrence., Disp: 25 capsule, Rfl: 12  •  doxycycline (MONODOX) 100 MG capsule, Take 1 capsule by mouth 2 (Two) Times a Day., Disp: 20 capsule, Rfl: 0  •  predniSONE (DELTASONE) 20 MG tablet, Take 1 tablet by mouth Daily. TID x 3 days, BID x 3 days, QD x 3 days, 1/2 tab daily x 4 days, Disp: 20 tablet, Rfl: 0    Current Facility-Administered Medications:   •  methylPREDNISolone sodium succinate (SOLU-Medrol) injection 125 mg, 125 mg, Intramuscular, Once, Mireya Kapoor MD    Past Surgical History:  Past Surgical History:   Procedure Laterality Date   • CARDIAC CATHETERIZATION  05/2019    [facility].   • HYSTERECTOMY         Review of  "Systems:  Review of Systems   Constitutional: Negative for activity change, chills and fever.   HENT: Positive for congestion and rhinorrhea. Negative for ear pain, postnasal drip, sinus pressure, sneezing, sore throat, swollen glands, trouble swallowing and voice change.    Eyes: Negative for pain, redness, itching and visual disturbance.   Respiratory: Positive for cough. Negative for shortness of breath and wheezing.    Cardiovascular: Negative for chest pain.   Gastrointestinal: Negative for abdominal pain, diarrhea, nausea and vomiting.   Endocrine: Negative for cold intolerance and heat intolerance.   Genitourinary: Negative for frequency and urgency.   Musculoskeletal: Negative for arthralgias.   Skin: Negative for rash.   Neurological: Negative for syncope.   Hematological: Does not bruise/bleed easily.   Psychiatric/Behavioral: Negative for depressed mood. The patient is not nervous/anxious.        Physical Exam:  Vital Signs:  Visit Vitals  /78   Pulse 76   Temp 98.4 °F (36.9 °C)   Resp 18   Ht 166.4 cm (65.5\")   Wt 76.7 kg (169 lb)   LMP  (LMP Unknown)   SpO2 97% Comment: 4 liters   BMI 27.70 kg/m²       Physical Exam   Constitutional: She is oriented to person, place, and time. She appears well-developed and well-nourished. She is cooperative.   HENT:   Head: Normocephalic and atraumatic.   Right Ear: External ear normal. Tympanic membrane is not injected, not erythematous, not retracted and not bulging. No middle ear effusion.   Left Ear: External ear normal. Tympanic membrane is not injected, not erythematous, not retracted and not bulging.  No middle ear effusion.   Nose: Nose normal. No rhinorrhea.   Mouth/Throat: Oropharynx is clear and moist. No oropharyngeal exudate.   Cardiovascular: Normal rate, regular rhythm and normal heart sounds. Exam reveals no gallop and no friction rub.   No murmur heard.  Pulmonary/Chest: Effort normal. No respiratory distress. She has wheezes. She has no rales. "   Lymphadenopathy:     She has no cervical adenopathy.   Neurological: She is alert and oriented to person, place, and time. Coordination normal.   Skin: Skin is warm and dry.   Psychiatric: She has a normal mood and affect.   Vitals reviewed.      Assessment and Plan:  Problem List Items Addressed This Visit        Respiratory    Panlobular emphysema (CMS/HCC) - Primary    Overview     She is in 4 liters           Relevant Medications    methylPREDNISolone sodium succinate (SOLU-Medrol) injection 125 mg (Start on 3/10/2020  5:18 PM)    predniSONE (DELTASONE) 20 MG tablet      Other Visit Diagnoses     Acute bacterial bronchitis        Relevant Medications    doxycycline (MONODOX) 100 MG capsule        increase fluids, tylenol for fever, motrin for pain. Humidifier to help with congestion and to sleep at night. Dicussed OTC meds, gargle with warm salt water. If there is recurrent fever, shortness of breath, lethargy, advised to come in to the office or go to the ER.      An After Visit Summary and PPPS were given to the patient.

## 2020-03-11 ENCOUNTER — APPOINTMENT (OUTPATIENT)
Dept: MAMMOGRAPHY | Facility: HOSPITAL | Age: 75
End: 2020-03-11

## 2020-03-11 ENCOUNTER — EPISODE CHANGES (OUTPATIENT)
Dept: CASE MANAGEMENT | Facility: OTHER | Age: 75
End: 2020-03-11

## 2020-03-13 ENCOUNTER — OFFICE VISIT (OUTPATIENT)
Dept: FAMILY MEDICINE CLINIC | Facility: CLINIC | Age: 75
End: 2020-03-13

## 2020-03-13 ENCOUNTER — EPISODE CHANGES (OUTPATIENT)
Dept: CASE MANAGEMENT | Facility: OTHER | Age: 75
End: 2020-03-13

## 2020-03-13 ENCOUNTER — APPOINTMENT (OUTPATIENT)
Dept: MAMMOGRAPHY | Facility: HOSPITAL | Age: 75
End: 2020-03-13

## 2020-03-13 VITALS
HEART RATE: 94 BPM | TEMPERATURE: 97.8 F | WEIGHT: 168.8 LBS | OXYGEN SATURATION: 91 % | HEIGHT: 66 IN | RESPIRATION RATE: 16 BRPM | BODY MASS INDEX: 27.13 KG/M2 | SYSTOLIC BLOOD PRESSURE: 110 MMHG | DIASTOLIC BLOOD PRESSURE: 64 MMHG

## 2020-03-13 DIAGNOSIS — B96.89 ACUTE BACTERIAL BRONCHITIS: ICD-10-CM

## 2020-03-13 DIAGNOSIS — J20.8 ACUTE BACTERIAL BRONCHITIS: ICD-10-CM

## 2020-03-13 DIAGNOSIS — J43.1 PANLOBULAR EMPHYSEMA (HCC): Primary | ICD-10-CM

## 2020-03-13 PROCEDURE — 99214 OFFICE O/P EST MOD 30 MIN: CPT | Performed by: FAMILY MEDICINE

## 2020-03-13 PROCEDURE — 96372 THER/PROPH/DIAG INJ SC/IM: CPT | Performed by: FAMILY MEDICINE

## 2020-03-13 RX ORDER — CEFTRIAXONE SODIUM 250 MG/1
1 INJECTION, POWDER, FOR SOLUTION INTRAMUSCULAR; INTRAVENOUS ONCE
Status: COMPLETED | OUTPATIENT
Start: 2020-03-13 | End: 2020-03-13

## 2020-03-13 RX ADMIN — CEFTRIAXONE SODIUM 1 G: 250 INJECTION, POWDER, FOR SOLUTION INTRAMUSCULAR; INTRAVENOUS at 15:52

## 2020-03-13 NOTE — PROGRESS NOTES
Subjective   Krystyna Bennett is a 74 y.o. female.     Chief Complaint   Patient presents with   • Bronchitis     follow up        Bronchitis   This is a chronic problem. The current episode started in the past 7 days. The problem occurs daily. The problem has been gradually improving. Associated symptoms include headaches, a sore throat and swollen glands. Pertinent negatives include no abdominal pain, arthralgias, chest pain, chills, coughing, fever, nausea, neck pain, rash or vomiting. The symptoms are aggravated by exertion, walking, eating and standing. She has tried position changes and rest for the symptoms.   Shortness of Breath   This is a chronic problem. The current episode started in the past 7 days. The problem occurs daily. The problem has been unchanged. The average episode lasts 2 minutes. Associated symptoms include coryza, ear pain, headaches, orthopnea, rhinorrhea, a sore throat, sputum production, swollen glands and wheezing. Pertinent negatives include no abdominal pain, chest pain, claudication, fever, hemoptysis, leg pain, leg swelling, neck pain, PND, rash, syncope or vomiting. The symptoms are aggravated by exercise. The patient has no known risk factors for DVT/PE. She has tried body position changes, oral steroids, OTC cough suppressants, rest and steroid inhalers for the symptoms. The treatment provided mild relief. Her past medical history is significant for bronchiolitis, chronic lung disease, a heart failure and pneumonia.        The following portions of the patient's history were reviewed and updated as appropriate: allergies, current medications, past family history, past medical history, past social history, past surgical history and problem list.    Allergies:  Allergies   Allergen Reactions   • Sulfacetamide Sodium-Sulfur Other (See Comments)     Tunnel vision,light headed       Social History:  Social History     Socioeconomic History   • Marital status:      Spouse name:  Not on file   • Number of children: Not on file   • Years of education: Not on file   • Highest education level: Not on file   Tobacco Use   • Smoking status: Former Smoker     Types: Cigars   • Smokeless tobacco: Never Used   • Tobacco comment: quit 20 years ago   Substance and Sexual Activity   • Alcohol use: Yes     Frequency: 2-4 times a month     Drinks per session: 1 or 2     Binge frequency: Never     Comment: rarely   • Drug use: No   • Sexual activity: Defer       Family History:  History reviewed. No pertinent family history.    Past Medical History :  Patient Active Problem List   Diagnosis   • Essential hypertension   • CLL (chronic lymphocytic leukemia) (CMS/HCC)   • Panlobular emphysema (CMS/HCC)   • Moderate persistent asthma without complication   • GERD (gastroesophageal reflux disease)   • Arthritis   • Systolic CHF, chronic (CMS/HCC)   • Anemia   • History of Clostridium difficile colitis   • Lymphocytosis   • Melanoma (CMS/HCC)   • Mixed hyperlipidemia   • Herpes simplex   • Stage 3 chronic kidney disease (CMS/HCC)       Medication List:    Current Outpatient Medications:   •  acyclovir (ZOVIRAX) 200 MG capsule, Take 1 capsule by mouth Every 4 (Four) Hours While Awake. Take at first sign of recurrence., Disp: 25 capsule, Rfl: 12  •  albuterol (PROVENTIL) (2.5 MG/3ML) 0.083% nebulizer solution, Every 6 (Six) Hours., Disp: , Rfl:   •  albuterol sulfate HFA (VENTOLIN HFA) 108 (90 Base) MCG/ACT inhaler, Inhale 2 puffs Every 4 (Four) Hours As Needed for Wheezing., Disp: 1 inhaler, Rfl: 12  •  aspirin 81 MG EC tablet, Take 81 mg by mouth Daily., Disp: , Rfl:   •  budesonide-formoterol (SYMBICORT) 160-4.5 MCG/ACT inhaler, INHALE TWO PUFFS BY MOUTH TWICE A DAY, Disp: 10.2 g, Rfl: 11  •  bumetanide (BUMEX) 1 MG tablet, TAKE ONE TABLET BY MOUTH TWICE A DAY, Disp: 60 tablet, Rfl: 4  •  Calcium Carbonate-Vit D-Min (CALTRATE 600+D PLUS MINERALS) 600-800 MG-UNIT chewable tablet, Chew 2 tablets Daily., Disp: ,  Rfl:   •  Coenzyme Q10 (COQ10 PO), Take  by mouth., Disp: , Rfl:   •  Cyanocobalamin (B-12) 1000 MCG capsule, Take 1,000 mcg by mouth Daily., Disp: , Rfl:   •  doxycycline (MONODOX) 100 MG capsule, Take 1 capsule by mouth 2 (Two) Times a Day., Disp: 20 capsule, Rfl: 0  •  esomeprazole (NEXIUM) 40 MG capsule, 1 tablet po daily, Disp: , Rfl:   •  Glucosamine-Chondroitin (OSTEO BI-FLEX REGULAR STRENGTH) 250-200 MG tablet, Take 1 tablet by mouth Daily., Disp: , Rfl:   •  guaiFENesin ER 1200 MG tablet sustained-release 12 hour, Take 1 tablet by mouth Daily., Disp: , Rfl:   •  ipratropium (ATROVENT) 0.02 % nebulizer solution, Take 2.5 mL by nebulization Every 6 (Six) Hours As Needed for Wheezing or Shortness of Air., Disp: 90 mL, Rfl: 6  •  ipratropium (ATROVENT) 0.02 % nebulizer solution, INHALE CONTENTS OF ONE VIAL VIA NEBULIZER EVERY 6 HOURS AS NEEDED, Disp: 62.5 mL, Rfl: 11  •  lisinopril-hydrochlorothiazide (PRINZIDE,ZESTORETIC) 20-12.5 MG per tablet, TAKE ONE TABLET BY MOUTH DAILY, Disp: 90 tablet, Rfl: 1  •  montelukast (SINGULAIR) 10 MG tablet, TAKE ONE TABLET BY MOUTH AT BEDTIME, Disp: 90 tablet, Rfl: 11  •  Multiple Vitamins-Minerals (CENTRUM SILVER) tablet, 1 tablet po daily, Disp: , Rfl:   •  Omega-3 Fatty Acids (FISH OIL PO), Take  by mouth., Disp: , Rfl:   •  PARoxetine (PAXIL) 20 MG tablet, TAKE ONE TABLET BY MOUTH DAILY, Disp: 30 tablet, Rfl: 11  •  potassium chloride (K-DUR,KLOR-CON) 20 MEQ CR tablet, TAKE ONE TABLET BY MOUTH DAILY, Disp: 30 tablet, Rfl: 4  •  pramipexole (MIRAPEX) 0.25 MG tablet, 1 tablet po daily, Disp: , Rfl:   •  predniSONE (DELTASONE) 20 MG tablet, Take 1 tablet by mouth Daily. TID x 3 days, BID x 3 days, QD x 3 days, 1/2 tab daily x 4 days, Disp: 20 tablet, Rfl: 0  •  simvastatin (ZOCOR) 20 MG tablet, Take 20 mg by mouth Daily., Disp: , Rfl:   •  SPIRIVA RESPIMAT 2.5 MCG/ACT aerosol solution inhaler, Inhale 2 puffs Daily., Disp: , Rfl:     Past Surgical History:  Past Surgical  "History:   Procedure Laterality Date   • CARDIAC CATHETERIZATION  05/2019    [facility].   • HYSTERECTOMY         Review of Systems:  Review of Systems   Constitutional: Negative for activity change, chills and fever.   HENT: Positive for ear pain, rhinorrhea, sore throat and swollen glands. Negative for postnasal drip, sinus pressure, sneezing, trouble swallowing and voice change.    Eyes: Negative for pain, redness, itching and visual disturbance.   Respiratory: Positive for sputum production, shortness of breath and wheezing. Negative for cough and hemoptysis.    Cardiovascular: Positive for orthopnea. Negative for chest pain, claudication, leg swelling, syncope and PND.   Gastrointestinal: Negative for abdominal pain, diarrhea, nausea and vomiting.   Endocrine: Negative for cold intolerance and heat intolerance.   Genitourinary: Negative for frequency and urgency.   Musculoskeletal: Negative for arthralgias and neck pain.   Skin: Negative for rash.   Neurological: Negative for syncope.   Hematological: Does not bruise/bleed easily.   Psychiatric/Behavioral: Negative for depressed mood. The patient is not nervous/anxious.        Physical Exam:  Vital Signs:  Visit Vitals  /64 (BP Location: Left arm, Patient Position: Sitting, Cuff Size: Adult)   Pulse 94   Temp 97.8 °F (36.6 °C) (Oral)   Resp 16   Ht 166.4 cm (65.51\")   Wt 76.6 kg (168 lb 12.8 oz)   LMP  (LMP Unknown)   SpO2 91%   Breastfeeding No   BMI 27.65 kg/m²       Physical Exam   Constitutional: She is oriented to person, place, and time. She appears well-developed and well-nourished. She is cooperative.   HENT:   Head: Normocephalic and atraumatic.   Right Ear: External ear normal. Tympanic membrane is not injected, not erythematous, not retracted and not bulging. No middle ear effusion.   Left Ear: External ear normal. Tympanic membrane is not injected, not erythematous, not retracted and not bulging.  No middle ear effusion.   Nose: Nose normal. No " rhinorrhea.   Mouth/Throat: Oropharynx is clear and moist. No oropharyngeal exudate.   Cardiovascular: Normal rate, regular rhythm and normal heart sounds. Exam reveals no gallop and no friction rub.   No murmur heard.  Pulmonary/Chest: Effort normal. No respiratory distress. She has wheezes. She has no rales.   Lymphadenopathy:     She has no cervical adenopathy.   Neurological: She is alert and oriented to person, place, and time. Coordination normal.   Skin: Skin is warm and dry.   Psychiatric: She has a normal mood and affect.   Vitals reviewed.      Assessment and Plan:  Problem List Items Addressed This Visit        Respiratory    Panlobular emphysema (CMS/HCC) - Primary    Overview     She is in 4 liters  Exacerbated. She did not get her steroid. She will get it today             Other Visit Diagnoses     Acute bacterial bronchitis        Relevant Medications    cefTRIAXone (ROCEPHIN) injection 1 g (Completed)        increase fluids, tylenol for fever, motrin for pain. Humidifier to help with congestion and to sleep at night. Dicussed OTC meds, gargle with warm salt water. If there is recurrent fever, shortness of breath, lethargy, advised to come in to the office or go to the ER.      An After Visit Summary and PPPS were given to the patient.     Answers for HPI/ROS submitted by the patient on 3/12/2020   Shortness of breath  What is the primary reason for your visit?: Shortness of Breath

## 2020-03-18 ENCOUNTER — EPISODE CHANGES (OUTPATIENT)
Dept: CASE MANAGEMENT | Facility: OTHER | Age: 75
End: 2020-03-18

## 2020-03-19 ENCOUNTER — TELEPHONE (OUTPATIENT)
Dept: FAMILY MEDICINE CLINIC | Facility: CLINIC | Age: 75
End: 2020-03-19

## 2020-03-20 ENCOUNTER — TELEPHONE (OUTPATIENT)
Dept: FAMILY MEDICINE CLINIC | Facility: CLINIC | Age: 75
End: 2020-03-20

## 2020-03-20 NOTE — TELEPHONE ENCOUNTER
Called pt and asked how she was feeling. Pt replied said that she still had an upset stomach and diarrhea, but she is feeling much better.

## 2020-03-26 ENCOUNTER — APPOINTMENT (OUTPATIENT)
Dept: CARDIOLOGY | Facility: HOSPITAL | Age: 75
End: 2020-03-26

## 2020-03-30 RX ORDER — SIMVASTATIN 20 MG
TABLET ORAL
Qty: 90 TABLET | Refills: 3 | Status: SHIPPED | OUTPATIENT
Start: 2020-03-30 | End: 2021-03-08

## 2020-04-14 RX ORDER — ALBUTEROL SULFATE 2.5 MG/3ML
SOLUTION RESPIRATORY (INHALATION)
Qty: 75 VIAL | Refills: 11 | Status: SHIPPED | OUTPATIENT
Start: 2020-04-14 | End: 2020-04-16 | Stop reason: SDUPTHER

## 2020-04-17 ENCOUNTER — TELEPHONE (OUTPATIENT)
Dept: FAMILY MEDICINE CLINIC | Facility: CLINIC | Age: 75
End: 2020-04-17

## 2020-04-17 DIAGNOSIS — J45.20 MILD INTERMITTENT ASTHMA WITHOUT COMPLICATION: Primary | ICD-10-CM

## 2020-04-17 DIAGNOSIS — J43.1 PANLOBULAR EMPHYSEMA (HCC): ICD-10-CM

## 2020-04-17 RX ORDER — ALBUTEROL SULFATE 2.5 MG/3ML
2.5 SOLUTION RESPIRATORY (INHALATION) EVERY 4 HOURS PRN
Qty: 75 VIAL | Refills: 11 | Status: SHIPPED | OUTPATIENT
Start: 2020-04-17 | End: 2023-02-14 | Stop reason: SDUPTHER

## 2020-04-17 RX ORDER — ALBUTEROL SULFATE 2.5 MG/3ML
2.5 SOLUTION RESPIRATORY (INHALATION) EVERY 4 HOURS PRN
Qty: 75 VIAL | Refills: 11 | Status: SHIPPED | OUTPATIENT
Start: 2020-04-17 | End: 2020-04-17 | Stop reason: SDUPTHER

## 2020-04-20 ENCOUNTER — TELEPHONE (OUTPATIENT)
Dept: FAMILY MEDICINE CLINIC | Facility: CLINIC | Age: 75
End: 2020-04-20

## 2020-04-20 DIAGNOSIS — J45.20 MILD INTERMITTENT ASTHMA WITHOUT COMPLICATION: Primary | ICD-10-CM

## 2020-04-20 DIAGNOSIS — J43.1 PANLOBULAR EMPHYSEMA (HCC): ICD-10-CM

## 2020-05-07 ENCOUNTER — TELEPHONE (OUTPATIENT)
Dept: FAMILY MEDICINE CLINIC | Facility: CLINIC | Age: 75
End: 2020-05-07

## 2020-05-07 DIAGNOSIS — J44.1 CHRONIC OBSTRUCTIVE PULMONARY DISEASE WITH ACUTE EXACERBATION (HCC): Primary | ICD-10-CM

## 2020-05-07 NOTE — TELEPHONE ENCOUNTER
Pt daughter Ashley called and aske if she needed to refill her mothers Spiriva inhaler because she was out and just not taking it. If she needs to be taking it she would need a refill to [facility]. Please call back if she needs it.

## 2020-05-12 ENCOUNTER — TELEPHONE (OUTPATIENT)
Dept: FAMILY MEDICINE CLINIC | Facility: CLINIC | Age: 75
End: 2020-05-12

## 2020-05-12 DIAGNOSIS — J44.1 CHRONIC OBSTRUCTIVE PULMONARY DISEASE WITH ACUTE EXACERBATION (HCC): ICD-10-CM

## 2020-05-12 NOTE — TELEPHONE ENCOUNTER
Krystyna's Daughter called and asked if she is supposed to taking the Spiriva inhaler , pt ran out and had not been taking it. If we are going to refill Ashley would like to have us call her to pick it up at the pharmacy

## 2020-05-14 RX ORDER — PRAMIPEXOLE DIHYDROCHLORIDE 0.25 MG/1
TABLET ORAL
Qty: 90 TABLET | Refills: 2 | Status: SHIPPED | OUTPATIENT
Start: 2020-05-14 | End: 2021-02-08

## 2020-05-14 RX ORDER — PAROXETINE HYDROCHLORIDE 20 MG/1
20 TABLET, FILM COATED ORAL DAILY
Qty: 30 TABLET | Refills: 11 | Status: SHIPPED | OUTPATIENT
Start: 2020-05-14 | End: 2021-06-03

## 2020-05-18 RX ORDER — LISINOPRIL AND HYDROCHLOROTHIAZIDE 20; 12.5 MG/1; MG/1
TABLET ORAL
Qty: 90 TABLET | Refills: 12 | Status: SHIPPED | OUTPATIENT
Start: 2020-05-18 | End: 2021-05-18

## 2020-05-26 ENCOUNTER — TELEPHONE (OUTPATIENT)
Dept: FAMILY MEDICINE CLINIC | Facility: CLINIC | Age: 75
End: 2020-05-26

## 2020-05-26 DIAGNOSIS — I50.22 SYSTOLIC CHF, CHRONIC (HCC): ICD-10-CM

## 2020-05-26 RX ORDER — POTASSIUM CHLORIDE 20 MEQ/1
TABLET, EXTENDED RELEASE ORAL
Qty: 30 TABLET | Refills: 3 | Status: SHIPPED | OUTPATIENT
Start: 2020-05-26 | End: 2020-09-28

## 2020-05-31 DIAGNOSIS — I50.22 SYSTOLIC CHF, CHRONIC (HCC): ICD-10-CM

## 2020-06-01 RX ORDER — BUMETANIDE 1 MG/1
TABLET ORAL
Qty: 60 TABLET | Refills: 3 | Status: SHIPPED | OUTPATIENT
Start: 2020-06-01 | End: 2020-09-28

## 2020-06-02 ENCOUNTER — TELEPHONE (OUTPATIENT)
Dept: FAMILY MEDICINE CLINIC | Facility: CLINIC | Age: 75
End: 2020-06-02

## 2020-06-02 NOTE — TELEPHONE ENCOUNTER
Patient called and said that she is about 100% sure she has a sinus infection and was wondering if Dr. Kapoor could send her in something to help her sore throat due to drainage, eyeballs hurting and both ears are stopped up and hurting. Please send to [facility] in [place].

## 2020-06-04 ENCOUNTER — TELEPHONE (OUTPATIENT)
Dept: FAMILY MEDICINE CLINIC | Facility: CLINIC | Age: 75
End: 2020-06-04

## 2020-06-04 DIAGNOSIS — J01.00 ACUTE NON-RECURRENT MAXILLARY SINUSITIS: Primary | ICD-10-CM

## 2020-06-04 RX ORDER — CEPHALEXIN 500 MG/1
500 CAPSULE ORAL 3 TIMES DAILY
Qty: 30 CAPSULE | Refills: 0 | Status: SHIPPED | OUTPATIENT
Start: 2020-06-04 | End: 2020-07-06

## 2020-07-06 ENCOUNTER — OFFICE VISIT (OUTPATIENT)
Dept: FAMILY MEDICINE CLINIC | Facility: CLINIC | Age: 75
End: 2020-07-06

## 2020-07-06 VITALS
OXYGEN SATURATION: 93 % | RESPIRATION RATE: 16 BRPM | HEART RATE: 78 BPM | TEMPERATURE: 98 F | DIASTOLIC BLOOD PRESSURE: 62 MMHG | SYSTOLIC BLOOD PRESSURE: 112 MMHG | BODY MASS INDEX: 26.07 KG/M2 | WEIGHT: 162.2 LBS | HEIGHT: 66 IN

## 2020-07-06 DIAGNOSIS — C91.10 CLL (CHRONIC LYMPHOCYTIC LEUKEMIA) (HCC): ICD-10-CM

## 2020-07-06 DIAGNOSIS — N18.30 STAGE 3 CHRONIC KIDNEY DISEASE (HCC): ICD-10-CM

## 2020-07-06 DIAGNOSIS — R63.4 WEIGHT LOSS: ICD-10-CM

## 2020-07-06 DIAGNOSIS — I50.22 SYSTOLIC CHF, CHRONIC (HCC): ICD-10-CM

## 2020-07-06 DIAGNOSIS — I10 ESSENTIAL HYPERTENSION: ICD-10-CM

## 2020-07-06 DIAGNOSIS — Z13.29 SCREENING FOR THYROID DISORDER: ICD-10-CM

## 2020-07-06 DIAGNOSIS — Z00.00 MEDICARE ANNUAL WELLNESS VISIT, SUBSEQUENT: Primary | ICD-10-CM

## 2020-07-06 DIAGNOSIS — J43.1 PANLOBULAR EMPHYSEMA (HCC): ICD-10-CM

## 2020-07-06 DIAGNOSIS — J45.40 MODERATE PERSISTENT ASTHMA WITHOUT COMPLICATION: ICD-10-CM

## 2020-07-06 DIAGNOSIS — E78.2 MIXED HYPERLIPIDEMIA: ICD-10-CM

## 2020-07-06 PROBLEM — B00.9 HERPES SIMPLEX: Status: RESOLVED | Noted: 2019-12-19 | Resolved: 2020-07-06

## 2020-07-06 PROCEDURE — 99213 OFFICE O/P EST LOW 20 MIN: CPT | Performed by: FAMILY MEDICINE

## 2020-07-06 PROCEDURE — G0439 PPPS, SUBSEQ VISIT: HCPCS | Performed by: FAMILY MEDICINE

## 2020-07-06 PROCEDURE — 99497 ADVNCD CARE PLAN 30 MIN: CPT | Performed by: FAMILY MEDICINE

## 2020-07-06 NOTE — PROGRESS NOTES
QUICK REFERENCE INFORMATION:  The ABCs of the Annual Wellness Visit    Subsequent Medicare Wellness Visit     HEALTH RISK ASSESSMENT    : 1945    Recent Hospitalizations:  No hospitalization(s) within the last year..  ccc    Current Medical Providers:  Patient Care Team:  Mireya Kapoor MD as PCP - General  Mireya Kapoor MD as PCP - Family Medicine  Mireya Kapoor MD as PCP - Claims Attributed    Smoking Status:  Social History     Tobacco Use   Smoking Status Former Smoker   • Years: 45.00   • Types: Cigars   Smokeless Tobacco Never Used   Tobacco Comment    quit 20 years ago       Alcohol Consumption:  Social History     Substance and Sexual Activity   Alcohol Use Yes   • Frequency: 2-4 times a month   • Drinks per session: 1 or 2   • Binge frequency: Never    Comment: rarely       Depression Screen:   PHQ-2/PHQ-9 Depression Screening 2020   Little interest or pleasure in doing things 0   Feeling down, depressed, or hopeless 0   Trouble falling or staying asleep, or sleeping too much 0   Feeling tired or having little energy 0   Poor appetite or overeating 0   Feeling bad about yourself - or that you are a failure or have let yourself or your family down 0   Trouble concentrating on things, such as reading the newspaper or watching television 0   Moving or speaking so slowly that other people could have noticed. Or the opposite - being so fidgety or restless that you have been moving around a lot more than usual 0   Thoughts that you would be better off dead, or of hurting yourself in some way 0   Total Score 0   If you checked off any problems, how difficult have these problems made it for you to do your work, take care of things at home, or get along with other people? Not difficult at all     We spent more than 16 minutes asking patient questions, counseling and documenting in the chart.    Health Habits and Functional and Cognitive Screening:  Functional & Cognitive Status 2020   Do  you have difficulty preparing food and eating? No   Do you have difficulty bathing yourself, getting dressed or grooming yourself? No   Do you have difficulty using the toilet? No   Do you have difficulty moving around from place to place? No   Do you have trouble with steps or getting out of a bed or a chair? No   Current Diet Well Balanced Diet   Dental Exam Not up to date   Eye Exam Up to date   Exercise (times per week) 1 times per week   Current Exercise Activities Include Housecleaning   Do you need help using the phone?  No   Are you deaf or do you have serious difficulty hearing?  No   Do you need help with transportation? No   Do you need help shopping? No   Do you need help preparing meals?  No   Do you need help with housework?  No   Do you need help with laundry? No   Do you need help taking your medications? No   Do you need help managing money? No   Do you ever drive or ride in a car without wearing a seat belt? No   Have you felt unusual stress, anger or loneliness in the last month? No   Who do you live with? Alone   If you need help, do you have trouble finding someone available to you? No   Do you have difficulty concentrating, remembering or making decisions? No       Does the patient have evidence of cognitive impairment? No    Aspirin use counseling: Start ASA 81 mg daily     Recent Lab Results:    Lab Results   Component Value Date    GLU 97 12/19/2019        Lab Results   Component Value Date    CHOL 164 03/07/2019    TRIG 177 (H) 12/19/2019    HDL 49 12/19/2019    VLDL 35 12/19/2019       Age-appropriate Screening Schedule:  Refer to the list below for future screening recommendations based on patient's age, sex and/or medical conditions. Orders for these recommended tests are listed in the plan section. The patient has been provided with a written plan.    Health Maintenance   Topic Date Due   • TDAP/TD VACCINES (1 - Tdap) 07/06/1956   • ZOSTER VACCINE (1 of 2) 07/06/1997   • COLONOSCOPY   08/30/2020 (Originally 5/15/2019)   • INFLUENZA VACCINE  08/01/2020   • DIABETIC EYE EXAM  10/23/2020   • LIPID PANEL  12/19/2020   • MAMMOGRAM  03/14/2021   • DIABETIC FOOT EXAM  Discontinued   • HEMOGLOBIN A1C  Discontinued   • URINE MICROALBUMIN  Discontinued        Subjective   History of Present Illness    Krystyna Bennett is a 75 y.o. female who presents for an Annual Wellness Visit.  Hypertension   This is a chronic problem. The current episode started more than 1 year ago. The problem is unchanged. The problem is controlled. Pertinent negatives include no chest pain, malaise/fatigue, palpitations, PND or shortness of breath. Risk factors for coronary artery disease include post-menopausal state. Past treatments include beta blockers and ACE inhibitors. Current antihypertension treatment includes beta blockers. The current treatment provides significant improvement. There are no compliance problems.    Hyperlipidemia   This is a chronic problem. The current episode started more than 1 year ago. The problem is controlled. Factors aggravating her hyperlipidemia include thiazides and beta blockers. Pertinent negatives include no chest pain or shortness of breath. Current antihyperlipidemic treatment includes statins.   COPD   There is no cough, shortness of breath or wheezing. This is a chronic problem. The current episode started more than 1 year ago. The problem occurs constantly. The problem has been unchanged. Pertinent negatives include no chest pain, ear pain, fever, malaise/fatigue, orthopnea, PND, postnasal drip or rhinorrhea. Her symptoms are aggravated by change in weather, URI, exposure to smoke, exposure to fumes and exercise. Relieved by: oxygen. She reports moderate improvement on treatment.   Congestive Heart Failure   Presents for follow-up visit. Pertinent negatives include no abdominal pain, chest pain, orthopnea, palpitations or shortness of breath. The symptoms have been stable. Compliance with  total regimen is %. Compliance problems include adherence to exercise.    Chronic Kidney Disease   This is a chronic problem. The current episode started more than 1 year ago. The problem occurs constantly. The problem has been unchanged. Pertinent negatives include no abdominal pain, arthralgias, chest pain, coughing, fever, nausea, rash or vomiting. Nothing aggravates the symptoms. She has tried nothing for the symptoms.       The following portions of the patient's history were reviewed and updated as appropriate: allergies, current medications, past family history, past medical history, past social history, past surgical history and problem list.    Outpatient Medications Prior to Visit   Medication Sig Dispense Refill   • acyclovir (ZOVIRAX) 200 MG capsule Take 1 capsule by mouth Every 4 (Four) Hours While Awake. Take at first sign of recurrence. 25 capsule 12   • albuterol (PROVENTIL) (2.5 MG/3ML) 0.083% nebulizer solution Take 2.5 mg by nebulization Every 4 (Four) Hours As Needed for Wheezing. 75 vial 11   • albuterol sulfate HFA (VENTOLIN HFA) 108 (90 Base) MCG/ACT inhaler Inhale 2 puffs Every 4 (Four) Hours As Needed for Wheezing. 1 inhaler 12   • aspirin 81 MG EC tablet Take 81 mg by mouth Daily.     • budesonide-formoterol (SYMBICORT) 160-4.5 MCG/ACT inhaler INHALE TWO PUFFS BY MOUTH TWICE A DAY 10.2 g 11   • bumetanide (BUMEX) 1 MG tablet TAKE ONE TABLET BY MOUTH TWICE A DAY 60 tablet 3   • Calcium Carbonate-Vit D-Min (CALTRATE 600+D PLUS MINERALS) 600-800 MG-UNIT chewable tablet Chew 2 tablets Daily.     • Coenzyme Q10 (COQ10 PO) Take  by mouth.     • Cyanocobalamin (B-12) 1000 MCG capsule Take 1,000 mcg by mouth Daily.     • esomeprazole (NEXIUM) 40 MG capsule 1 tablet po daily     • Glucosamine-Chondroitin (OSTEO BI-FLEX REGULAR STRENGTH) 250-200 MG tablet Take 1 tablet by mouth Daily.     • guaiFENesin ER 1200 MG tablet sustained-release 12 hour Take 1 tablet by mouth Daily.     • ipratropium  (ATROVENT) 0.02 % nebulizer solution Take 2.5 mL by nebulization 3 (Three) Times a Day As Needed for Wheezing or Shortness of Air. 62.5 mL 11   • lisinopril-hydrochlorothiazide (PRINZIDE,ZESTORETIC) 20-12.5 MG per tablet TAKE ONE TABLET BY MOUTH DAILY 90 tablet 12   • montelukast (SINGULAIR) 10 MG tablet TAKE ONE TABLET BY MOUTH AT BEDTIME 90 tablet 11   • Multiple Vitamins-Minerals (CENTRUM SILVER) tablet 1 tablet po daily     • Omega-3 Fatty Acids (FISH OIL PO) Take  by mouth.     • PARoxetine (PAXIL) 20 MG tablet Take 1 tablet by mouth Daily. 30 tablet 11   • potassium chloride (K-DUR,KLOR-CON) 20 MEQ CR tablet TAKE ONE TABLET BY MOUTH DAILY 30 tablet 3   • pramipexole (MIRAPEX) 0.25 MG tablet TAKE ONE TABLET BY MOUTH DAILY 90 tablet 2   • simvastatin (ZOCOR) 20 MG tablet TAKE ONE TABLET BY MOUTH DAILY 90 tablet 3   • tiotropium bromide monohydrate (Spiriva Respimat) 2.5 MCG/ACT aerosol solution inhaler Inhale 2 puffs Daily. 1 inhaler 12   • cephalexin (KEFLEX) 500 MG capsule Take 1 capsule by mouth 3 (Three) Times a Day. 30 capsule 0   • doxycycline (MONODOX) 100 MG capsule Take 1 capsule by mouth 2 (Two) Times a Day. 20 capsule 0   • predniSONE (DELTASONE) 20 MG tablet Take 1 tablet by mouth Daily. TID x 3 days, BID x 3 days, QD x 3 days, 1/2 tab daily x 4 days 20 tablet 0     No facility-administered medications prior to visit.        Patient Active Problem List   Diagnosis   • Essential hypertension   • CLL (chronic lymphocytic leukemia) (CMS/HCC)   • Panlobular emphysema (CMS/HCC)   • Moderate persistent asthma without complication   • GERD (gastroesophageal reflux disease)   • Arthritis   • Systolic CHF, chronic (CMS/HCC)   • Anemia   • History of Clostridium difficile colitis   • Melanoma (CMS/HCC)   • Mixed hyperlipidemia   • Stage 3 chronic kidney disease (CMS/HCC)   • Medicare annual wellness visit, subsequent       Advance Care Planning:  ACP discussion was held with the patient during this visit.  Patient does not have an advance directive, information provided.     A face-to-face visit was completed today with patient.  Counseling explanation, and discussion of advanced directives was performed.   The last advanced care visit was performed in 2019.  In a near life ending situation, from which she is not expected to recover functionally, and she is not able to speak for her, she does not want life sustaining measures. We discussed feeding tubes, ventilators and cardiac support as well as dialysis.    We spent more than 16 minutes discussing Advanced Care Planning information and documenting in the chart.      Identification of Risk Factors:  Risk factors include: Advance Directive Discussion  Breast Cancer/Mammogram Screening.    Review of Systems   Constitutional: Negative for activity change, fever and malaise/fatigue.   HENT: Negative for ear pain, postnasal drip, rhinorrhea, sinus pressure and voice change.    Eyes: Negative for visual disturbance.   Respiratory: Negative for cough, shortness of breath and wheezing.    Cardiovascular: Negative for chest pain, palpitations and PND.   Gastrointestinal: Negative for abdominal pain, diarrhea, nausea and vomiting.   Endocrine: Negative for cold intolerance and heat intolerance.   Genitourinary: Negative for frequency and urgency.   Musculoskeletal: Negative for arthralgias.   Skin: Negative for rash.   Neurological: Negative for syncope.   Hematological: Does not bruise/bleed easily.   Psychiatric/Behavioral: Negative for depressed mood. The patient is not nervous/anxious.          Compared to one year ago, the patient feels her physical health is the same.  Compared to one year ago, the patient feels her mental health is the same.    Objective     Physical Exam   Constitutional: She is oriented to person, place, and time. She appears well-developed and well-nourished. She is cooperative. No distress.   HENT:   Head: Normocephalic and atraumatic.   Right Ear:  "External ear normal.   Left Ear: External ear normal.   Nose: Nose normal.   Mouth/Throat: Oropharynx is clear and moist. No oropharyngeal exudate.   Eyes: Pupils are equal, round, and reactive to light. Conjunctivae and EOM are normal. Right eye exhibits no discharge. Left eye exhibits no discharge. No scleral icterus.   Neck: Normal range of motion. Neck supple. No thyromegaly present.   Cardiovascular: Normal rate, regular rhythm, normal heart sounds and intact distal pulses. Exam reveals no gallop and no friction rub.   No murmur heard.  Pulmonary/Chest: Effort normal and breath sounds normal. No respiratory distress. She has no wheezes. She has no rales. Right breast exhibits no inverted nipple, no mass, no nipple discharge, no skin change and no tenderness. Left breast exhibits no inverted nipple, no mass, no nipple discharge, no skin change and no tenderness.   Abdominal: Soft. Bowel sounds are normal. She exhibits no distension. There is no tenderness. There is no rebound and no guarding.   Genitourinary:   Genitourinary Comments: decline   Musculoskeletal: Normal range of motion. She exhibits no edema, tenderness or deformity.   Lymphadenopathy:     She has no cervical adenopathy.   Neurological: She is alert and oriented to person, place, and time. She displays normal reflexes. No cranial nerve deficit. She exhibits normal muscle tone. Coordination normal.   Skin: Skin is warm and dry. Capillary refill takes less than 2 seconds. Turgor is normal. No rash noted. She is not diaphoretic. No erythema. No pallor.   Psychiatric: She has a normal mood and affect. Her behavior is normal.   Vitals reviewed.      Vitals:    07/06/20 1526 07/06/20 1606   BP: 112/62    BP Location: Left arm    Pulse: 51 78   Resp: 16    Temp: 98 °F (36.7 °C)    TempSrc: Oral    SpO2: 91% 93%   Weight: 73.6 kg (162 lb 3.2 oz)    Height: 166.4 cm (65.51\")        Patient's Body mass index is 26.57 kg/m². BMI is within normal parameters. " No follow-up required..      Assessment/Plan   Patient Self-Management and Personalized Health Advice  The patient has been provided with information about: diet, exercise and designing advance directives and preventive services including:   · Annual Wellness Visit (AWV).    Visit Diagnoses:    Problem List Items Addressed This Visit        Cardiovascular and Mediastinum    Essential hypertension     Stable with current treatment         Systolic CHF, chronic (CMS/[facility])     Stable with current treatment         Mixed hyperlipidemia    Relevant Orders    Lipid Panel With / Chol / HDL Ratio       Respiratory    Panlobular emphysema (CMS/[facility])     Stable on 4 liters Continue current treatments             Moderate persistent asthma without complication       Genitourinary    Stage 3 chronic kidney disease (CMS/[facility])     She sees Dr Bob  Stable            Hematopoietic and Hemostatic    CLL (chronic lymphocytic leukemia) (CMS/[facility])       Other    Medicare annual wellness visit, subsequent - Primary    Relevant Orders    CBC & Differential    Comprehensive Metabolic Panel    POC Urinalysis Dipstick, Automated      Other Visit Diagnoses     Screening for thyroid disorder        Relevant Orders    TSH    Weight loss        Relevant Orders    XR Chest 2 View       Weight loss just began. Will get cxr and labs     Reviewed use of high risk medication in the elderly: yes  Reviewed for potential of harmful drug interactions in the elderly: yes    Follow Up:  No follow-ups on file.     An After Visit Summary and PPPS with all of these plans were given to the patient.           Discussed wearing sunscreen, seatbelts. Avoidance or caution with alcohol. Discussed screening as appropriate for age.     I wore protective equipment throughout this patient encounter to include mask and gloves. Hand hygiene was performed before donning protective equipment and after removal when leaving the room.

## 2020-07-12 PROBLEM — D72.820 LYMPHOCYTOSIS: Status: RESOLVED | Noted: 2018-01-08 | Resolved: 2020-07-12

## 2020-07-13 NOTE — TELEPHONE ENCOUNTER
Patient calling to get a refill on the fever blister medication that Dr. Kapoor had previously prescribed. Thanks lForida   No

## 2020-08-06 ENCOUNTER — HOSPITAL ENCOUNTER (OUTPATIENT)
Dept: GENERAL RADIOLOGY | Facility: HOSPITAL | Age: 75
Discharge: HOME | End: 2020-08-06
Admitting: FAMILY MEDICINE

## 2020-08-06 PROCEDURE — 71046 X-RAY EXAM CHEST 2 VIEWS: CPT

## 2020-08-07 LAB
25(OH)D3+25(OH)D2 SERPL-MCNC: 52.4 NG/ML (ref 30–100)
ALBUMIN SERPL-MCNC: 4.4 G/DL (ref 3.7–4.7)
ALBUMIN/CREAT UR: 12 MG/G CREAT (ref 0–29)
ALBUMIN/GLOB SERPL: 2.1 {RATIO} (ref 1.2–2.2)
ALP SERPL-CCNC: 102 IU/L (ref 39–117)
ALT SERPL-CCNC: 22 IU/L (ref 0–32)
APPEARANCE UR: CLEAR
AST SERPL-CCNC: 26 IU/L (ref 0–40)
BASOPHILS # BLD AUTO: 0.1 X10E3/UL (ref 0–0.2)
BASOPHILS NFR BLD AUTO: 0 %
BILIRUB SERPL-MCNC: 0.3 MG/DL (ref 0–1.2)
BILIRUB UR QL STRIP: NEGATIVE
BUN SERPL-MCNC: 22 MG/DL (ref 8–27)
BUN/CREAT SERPL: 23 (ref 12–28)
CALCIUM SERPL-MCNC: 9.7 MG/DL (ref 8.7–10.3)
CHLORIDE SERPL-SCNC: 91 MMOL/L (ref 96–106)
CHOLEST SERPL-MCNC: 168 MG/DL (ref 100–199)
CHOLEST/HDLC SERPL: 3.5 RATIO (ref 0–4.4)
CO2 SERPL-SCNC: 37 MMOL/L (ref 20–29)
COLOR UR: YELLOW
CREAT SERPL-MCNC: 0.97 MG/DL (ref 0.57–1)
CREAT UR-MCNC: 30.1 MG/DL
EOSINOPHIL # BLD AUTO: 0.5 X10E3/UL (ref 0–0.4)
EOSINOPHIL NFR BLD AUTO: 2 %
ERYTHROCYTE [DISTWIDTH] IN BLOOD BY AUTOMATED COUNT: 11.8 % (ref 11.7–15.4)
GLOBULIN SER CALC-MCNC: 2.1 G/DL (ref 1.5–4.5)
GLUCOSE SERPL-MCNC: 85 MG/DL (ref 65–99)
GLUCOSE UR QL: NEGATIVE
HCT VFR BLD AUTO: 35.4 % (ref 34–46.6)
HDLC SERPL-MCNC: 48 MG/DL
HGB BLD-MCNC: 10.9 G/DL (ref 11.1–15.9)
HGB UR QL STRIP: NEGATIVE
IMM GRANULOCYTES # BLD AUTO: 0 X10E3/UL (ref 0–0.1)
IMM GRANULOCYTES NFR BLD AUTO: 0 %
KETONES UR QL STRIP: NEGATIVE
LDLC SERPL CALC-MCNC: 80 MG/DL (ref 0–99)
LEUKOCYTE ESTERASE UR QL STRIP: NEGATIVE
LYMPHOCYTES # BLD AUTO: 24.7 X10E3/UL (ref 0.7–3.1)
LYMPHOCYTES NFR BLD AUTO: 72 %
MCH RBC QN AUTO: 28.8 PG (ref 26.6–33)
MCHC RBC AUTO-ENTMCNC: 30.8 G/DL (ref 31.5–35.7)
MCV RBC AUTO: 93 FL (ref 79–97)
MICRO URNS: NORMAL
MICROALBUMIN UR-MCNC: 3.5 UG/ML
MONOCYTES # BLD AUTO: 4 X10E3/UL (ref 0.1–0.9)
MONOCYTES NFR BLD AUTO: 12 %
MORPHOLOGY BLD-IMP: ABNORMAL
NEUTROPHILS # BLD AUTO: 5 X10E3/UL (ref 1.4–7)
NEUTROPHILS NFR BLD AUTO: 14 %
NITRITE UR QL STRIP: NEGATIVE
PH UR STRIP: 6.5 [PH] (ref 5–7.5)
PHOSPHATE SERPL-MCNC: 3.6 MG/DL (ref 3–4.3)
PLATELET # BLD AUTO: 268 X10E3/UL (ref 150–450)
POTASSIUM SERPL-SCNC: 4.2 MMOL/L (ref 3.5–5.2)
PROT SERPL-MCNC: 6.5 G/DL (ref 6–8.5)
PROT UR QL STRIP: NEGATIVE
PTH-INTACT SERPL-MCNC: 65 PG/ML (ref 15–65)
RBC # BLD AUTO: 3.79 X10E6/UL (ref 3.77–5.28)
SODIUM SERPL-SCNC: 140 MMOL/L (ref 134–144)
SP GR UR: 1.01 (ref 1–1.03)
TRIGL SERPL-MCNC: 198 MG/DL (ref 0–149)
TSH SERPL DL<=0.005 MIU/L-ACNC: 1.42 UIU/ML (ref 0.45–4.5)
UROBILINOGEN UR STRIP-MCNC: 0.2 MG/DL (ref 0.2–1)
VLDLC SERPL CALC-MCNC: 40 MG/DL (ref 5–40)
WBC # BLD AUTO: 34.4 X10E3/UL (ref 3.4–10.8)

## 2020-08-18 ENCOUNTER — HOSPITAL ENCOUNTER (OUTPATIENT)
Dept: MAMMOGRAPHY | Facility: HOSPITAL | Age: 75
Discharge: HOME | End: 2020-08-18
Admitting: FAMILY MEDICINE

## 2020-08-18 ENCOUNTER — HOSPITAL ENCOUNTER (OUTPATIENT)
Dept: CARDIOLOGY | Facility: HOSPITAL | Age: 75
Discharge: HOME | End: 2020-08-18

## 2020-08-18 VITALS — WEIGHT: 162 LBS | BODY MASS INDEX: 26.03 KG/M2 | HEIGHT: 66 IN

## 2020-08-18 DIAGNOSIS — Z12.31 SCREENING MAMMOGRAM, ENCOUNTER FOR: ICD-10-CM

## 2020-08-18 DIAGNOSIS — I27.20 PULMONARY HYPERTENSION (HCC): ICD-10-CM

## 2020-08-18 PROCEDURE — 93306 TTE W/DOPPLER COMPLETE: CPT

## 2020-08-18 PROCEDURE — 77067 SCR MAMMO BI INCL CAD: CPT

## 2020-08-18 PROCEDURE — 77063 BREAST TOMOSYNTHESIS BI: CPT

## 2020-08-19 LAB
BH CV ECHO MEAS - ACS: 1.5 CM
BH CV ECHO MEAS - AO MAX PG (FULL): 3 MMHG
BH CV ECHO MEAS - AO MAX PG: 6.5 MMHG
BH CV ECHO MEAS - AO MEAN PG (FULL): 2 MMHG
BH CV ECHO MEAS - AO MEAN PG: 3.6 MMHG
BH CV ECHO MEAS - AO ROOT AREA (BSA CORRECTED): 1.8
BH CV ECHO MEAS - AO ROOT AREA: 8.1 CM^2
BH CV ECHO MEAS - AO ROOT DIAM: 3.2 CM
BH CV ECHO MEAS - AO V2 MAX: 127.9 CM/SEC
BH CV ECHO MEAS - AO V2 MEAN: 90.5 CM/SEC
BH CV ECHO MEAS - AO V2 VTI: 24.6 CM
BH CV ECHO MEAS - AVA(I,A): 2.5 CM^2
BH CV ECHO MEAS - AVA(I,D): 2.5 CM^2
BH CV ECHO MEAS - AVA(V,A): 2.4 CM^2
BH CV ECHO MEAS - AVA(V,D): 2.4 CM^2
BH CV ECHO MEAS - BSA(HAYCOCK): 1.9 M^2
BH CV ECHO MEAS - BSA: 1.8 M^2
BH CV ECHO MEAS - BZI_BMI: 26.5 KILOGRAMS/M^2
BH CV ECHO MEAS - BZI_METRIC_HEIGHT: 166.4 CM
BH CV ECHO MEAS - BZI_METRIC_WEIGHT: 73.5 KG
BH CV ECHO MEAS - EDV(CUBED): 95.3 ML
BH CV ECHO MEAS - EDV(MOD-SP4): 47.1 ML
BH CV ECHO MEAS - EDV(TEICH): 95.7 ML
BH CV ECHO MEAS - EF(CUBED): 64.2 %
BH CV ECHO MEAS - EF(MOD-BP): 55 %
BH CV ECHO MEAS - EF(MOD-SP4): 70 %
BH CV ECHO MEAS - EF(TEICH): 55.8 %
BH CV ECHO MEAS - ESV(CUBED): 34.1 ML
BH CV ECHO MEAS - ESV(MOD-SP4): 14.1 ML
BH CV ECHO MEAS - ESV(TEICH): 42.3 ML
BH CV ECHO MEAS - FS: 29 %
BH CV ECHO MEAS - IVS/LVPW: 0.83
BH CV ECHO MEAS - IVSD: 0.81 CM
BH CV ECHO MEAS - LA DIMENSION: 3 CM
BH CV ECHO MEAS - LA/AO: 0.93
BH CV ECHO MEAS - LV DIASTOLIC VOL/BSA (35-75): 25.9 ML/M^2
BH CV ECHO MEAS - LV MASS(C)D: 133.2 GRAMS
BH CV ECHO MEAS - LV MASS(C)DI: 73.2 GRAMS/M^2
BH CV ECHO MEAS - LV MAX PG: 3.5 MMHG
BH CV ECHO MEAS - LV MEAN PG: 1.6 MMHG
BH CV ECHO MEAS - LV SYSTOLIC VOL/BSA (12-30): 7.8 ML/M^2
BH CV ECHO MEAS - LV V1 MAX: 93.6 CM/SEC
BH CV ECHO MEAS - LV V1 MEAN: 59.6 CM/SEC
BH CV ECHO MEAS - LV V1 VTI: 18.9 CM
BH CV ECHO MEAS - LVIDD: 4.6 CM
BH CV ECHO MEAS - LVIDS: 3.2 CM
BH CV ECHO MEAS - LVOT AREA: 3.3 CM^2
BH CV ECHO MEAS - LVOT DIAM: 2 CM
BH CV ECHO MEAS - LVPWD: 0.97 CM
BH CV ECHO MEAS - MV A MAX VEL: 83.7 CM/SEC
BH CV ECHO MEAS - MV E MAX VEL: 65.5 CM/SEC
BH CV ECHO MEAS - MV E/A: 0.78
BH CV ECHO MEAS - MV MAX PG: 4 MMHG
BH CV ECHO MEAS - MV MEAN PG: 1.7 MMHG
BH CV ECHO MEAS - MV V2 MAX: 100.5 CM/SEC
BH CV ECHO MEAS - MV V2 MEAN: 60.5 CM/SEC
BH CV ECHO MEAS - MV V2 VTI: 22.3 CM
BH CV ECHO MEAS - MVA(VTI): 2.8 CM^2
BH CV ECHO MEAS - PA ACC TIME: 0.09 SEC
BH CV ECHO MEAS - PA MAX PG: 8.9 MMHG
BH CV ECHO MEAS - PA PR(ACCEL): 39.2 MMHG
BH CV ECHO MEAS - PA V2 MAX: 149 CM/SEC
BH CV ECHO MEAS - PI END-D VEL: 155.9 CM/SEC
BH CV ECHO MEAS - RAP SYSTOLE: 10 MMHG
BH CV ECHO MEAS - RVDD: 1.8 CM
BH CV ECHO MEAS - RVSP: 47.9 MMHG
BH CV ECHO MEAS - SI(AO): 109.7 ML/M^2
BH CV ECHO MEAS - SI(CUBED): 33.6 ML/M^2
BH CV ECHO MEAS - SI(LVOT): 34.2 ML/M^2
BH CV ECHO MEAS - SI(MOD-SP4): 18.1 ML/M^2
BH CV ECHO MEAS - SI(TEICH): 29.4 ML/M^2
BH CV ECHO MEAS - SV(AO): 199.5 ML
BH CV ECHO MEAS - SV(CUBED): 61.2 ML
BH CV ECHO MEAS - SV(LVOT): 62.2 ML
BH CV ECHO MEAS - SV(MOD-SP4): 32.9 ML
BH CV ECHO MEAS - SV(TEICH): 53.4 ML
BH CV ECHO MEAS - TR MAX VEL: 301.7 CM/SEC
LV EF 2D ECHO EST: 55 %
MAXIMAL PREDICTED HEART RATE: 147 BPM
STRESS TARGET HR: 125 BPM

## 2020-08-19 PROCEDURE — 93306 TTE W/DOPPLER COMPLETE: CPT | Performed by: INTERNAL MEDICINE

## 2020-09-09 RX ORDER — MONTELUKAST SODIUM 10 MG/1
TABLET ORAL
Qty: 90 TABLET | Refills: 3 | Status: SHIPPED | OUTPATIENT
Start: 2020-09-09 | End: 2021-04-20 | Stop reason: HOSPADM

## 2020-09-28 DIAGNOSIS — I50.22 SYSTOLIC CHF, CHRONIC (HCC): ICD-10-CM

## 2020-09-28 RX ORDER — BUMETANIDE 1 MG/1
TABLET ORAL
Qty: 60 TABLET | Refills: 2 | Status: SHIPPED | OUTPATIENT
Start: 2020-09-28 | End: 2020-12-29

## 2020-09-28 RX ORDER — POTASSIUM CHLORIDE 1500 MG/1
TABLET, EXTENDED RELEASE ORAL
Qty: 30 TABLET | Refills: 2 | Status: SHIPPED | OUTPATIENT
Start: 2020-09-28 | End: 2020-12-29

## 2020-10-21 ENCOUNTER — OFFICE VISIT (OUTPATIENT)
Dept: PULMONOLOGY | Facility: HOSPITAL | Age: 75
End: 2020-10-21

## 2020-10-21 ENCOUNTER — TELEPHONE (OUTPATIENT)
Dept: FAMILY MEDICINE CLINIC | Facility: CLINIC | Age: 75
End: 2020-10-21

## 2020-10-21 VITALS
RESPIRATION RATE: 18 BRPM | OXYGEN SATURATION: 97 % | TEMPERATURE: 98.7 F | HEIGHT: 65 IN | SYSTOLIC BLOOD PRESSURE: 83 MMHG | WEIGHT: 154 LBS | DIASTOLIC BLOOD PRESSURE: 56 MMHG | BODY MASS INDEX: 25.66 KG/M2 | HEART RATE: 101 BPM

## 2020-10-21 DIAGNOSIS — J96.11 CHRONIC RESPIRATORY FAILURE WITH HYPOXIA (HCC): ICD-10-CM

## 2020-10-21 DIAGNOSIS — J43.2 CENTRILOBULAR EMPHYSEMA (HCC): ICD-10-CM

## 2020-10-21 DIAGNOSIS — J44.9 CHRONIC OBSTRUCTIVE PULMONARY DISEASE, UNSPECIFIED COPD TYPE (HCC): Primary | ICD-10-CM

## 2020-10-21 DIAGNOSIS — I50.22 SYSTOLIC CHF, CHRONIC (HCC): ICD-10-CM

## 2020-10-21 PROCEDURE — G0463 HOSPITAL OUTPT CLINIC VISIT: HCPCS
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
## 2020-10-22 ENCOUNTER — TELEPHONE (OUTPATIENT)
Dept: FAMILY MEDICINE CLINIC | Facility: CLINIC | Age: 75
End: 2020-10-22

## 2020-10-22 NOTE — TELEPHONE ENCOUNTER
Called pt and stated that she is feeling ok. Pt stated that is taking Lisinopril- hydrochlorothiazide 20-12.5mg and she is taking her bumex . Pt stated that she was going to go buy a blood pressure machine. If it is still low or feels like she is dizzy or lightheaded to go to be seen in the ER. Called and informed pt that if still low to hold her lisinopril - hydrochlorothiazide and to call the office with readings.

## 2020-11-02 NOTE — TELEPHONE ENCOUNTER
Pt called requesting a Rx for Trelegy sent to pharmacy. LOV Dr. Barcenas gave her a sample and stated that it works well for her. Updated medication list and Rx ready for co~signature.

## 2020-11-10 ENCOUNTER — TELEPHONE (OUTPATIENT)
Dept: FAMILY MEDICINE CLINIC | Facility: CLINIC | Age: 75
End: 2020-11-10

## 2020-11-10 NOTE — TELEPHONE ENCOUNTER
Pt called in stated that her blood pressure is elevated since starting Trilegy prescribed by Dr. Barcenas 3 weeks ago. Pt states that her BP has been 130/90 and in the same range. Pt asked if she should take her BP medication twice a day instead of one time a day?

## 2020-11-13 ENCOUNTER — RESULTS ENCOUNTER (OUTPATIENT)
Dept: FAMILY MEDICINE CLINIC | Facility: CLINIC | Age: 75
End: 2020-11-13

## 2020-11-13 ENCOUNTER — OFFICE VISIT (OUTPATIENT)
Dept: FAMILY MEDICINE CLINIC | Facility: CLINIC | Age: 75
End: 2020-11-13

## 2020-11-13 VITALS
DIASTOLIC BLOOD PRESSURE: 60 MMHG | HEART RATE: 88 BPM | HEIGHT: 65 IN | RESPIRATION RATE: 18 BRPM | WEIGHT: 159 LBS | BODY MASS INDEX: 26.49 KG/M2 | TEMPERATURE: 98 F | SYSTOLIC BLOOD PRESSURE: 112 MMHG | OXYGEN SATURATION: 96 %

## 2020-11-13 DIAGNOSIS — E78.2 MIXED HYPERLIPIDEMIA: ICD-10-CM

## 2020-11-13 DIAGNOSIS — Z23 NEED FOR INFLUENZA VACCINATION: Primary | ICD-10-CM

## 2020-11-13 DIAGNOSIS — Z12.11 COLON CANCER SCREENING: ICD-10-CM

## 2020-11-13 DIAGNOSIS — I10 ESSENTIAL HYPERTENSION: ICD-10-CM

## 2020-11-13 PROBLEM — G25.81 RESTLESS LEGS SYNDROME: Status: ACTIVE | Noted: 2020-11-13

## 2020-11-13 PROBLEM — F41.9 ANXIETY: Status: ACTIVE | Noted: 2020-11-13

## 2020-11-13 PROCEDURE — 99213 OFFICE O/P EST LOW 20 MIN: CPT | Performed by: FAMILY MEDICINE

## 2020-11-13 PROCEDURE — 90694 VACC AIIV4 NO PRSRV 0.5ML IM: CPT | Performed by: FAMILY MEDICINE

## 2020-11-13 PROCEDURE — G0008 ADMIN INFLUENZA VIRUS VAC: HCPCS | Performed by: FAMILY MEDICINE

## 2020-11-13 NOTE — PROGRESS NOTES
Subjective   Krystyna Bennett is a 75 y.o. female.     Chief Complaint   Patient presents with   • Hypertension       Switched to treilgy and BP has been fluctuating dramatically    Hypertension  This is a chronic problem. The current episode started more than 1 year ago. The problem has been waxing and waning since onset. The problem is controlled. Associated symptoms include shortness of breath and sweats. Pertinent negatives include no anxiety, blurred vision, chest pain, headaches, malaise/fatigue, neck pain or palpitations. There are no associated agents to hypertension. Risk factors for coronary artery disease include dyslipidemia. Past treatments include nothing. Current antihypertension treatment includes ACE inhibitors. The current treatment provides moderate improvement. There are no compliance problems.     The trelegy for her breathing has really helped. She had lost weight but it is getting better. She has eating better now. Weight is going up.     The following portions of the patient's history were reviewed and updated as appropriate: allergies, current medications, past family history, past medical history, past social history, past surgical history and problem list.    Allergies:  Allergies   Allergen Reactions   • Sulfacetamide Sodium-Sulfur Other (See Comments)     Tunnel vision,light headed/ may not be allergic to it any longer        Social History:  Social History     Socioeconomic History   • Marital status:      Spouse name: Not on file   • Number of children: Not on file   • Years of education: Not on file   • Highest education level: Not on file   Tobacco Use   • Smoking status: Former Smoker     Years: 45.00     Types: Cigars   • Smokeless tobacco: Never Used   • Tobacco comment: quit 20 years ago   Substance and Sexual Activity   • Alcohol use: Yes     Frequency: 2-4 times a month     Drinks per session: 1 or 2     Binge frequency: Never     Comment: rarely   • Drug use: No   •  Sexual activity: Defer       Family History:  Family History   Problem Relation Age of Onset   • Heart disease Father    • Stroke Father    • Leukemia Paternal Grandmother    • Anemia Paternal Grandmother    • Heart disease Paternal Grandmother        Past Medical History :  Patient Active Problem List   Diagnosis   • Essential hypertension   • CLL (chronic lymphocytic leukemia) (CMS/HCC)   • Centrilobular emphysema (CMS/HCC)   • COPD (chronic obstructive pulmonary disease) (CMS/HCC)   • GERD (gastroesophageal reflux disease)   • Arthritis   • Systolic CHF, chronic (CMS/HCC)   • Anemia   • History of Clostridium difficile colitis   • Melanoma (CMS/HCC)   • Mixed hyperlipidemia   • Stage 3 chronic kidney disease   • Medicare annual wellness visit, subsequent   • Chronic respiratory failure with hypoxia (CMS/HCC)   • Anxiety   • Restless legs syndrome   • Colon cancer screening       Medication List:    Current Outpatient Medications:   •  acyclovir (ZOVIRAX) 200 MG capsule, Take 1 capsule by mouth Every 4 (Four) Hours While Awake. Take at first sign of recurrence., Disp: 25 capsule, Rfl: 12  •  albuterol (PROVENTIL) (2.5 MG/3ML) 0.083% nebulizer solution, Take 2.5 mg by nebulization Every 4 (Four) Hours As Needed for Wheezing., Disp: 75 vial, Rfl: 11  •  albuterol sulfate HFA (VENTOLIN HFA) 108 (90 Base) MCG/ACT inhaler, Inhale 2 puffs Every 4 (Four) Hours As Needed for Wheezing., Disp: 1 inhaler, Rfl: 12  •  aspirin 81 MG EC tablet, Take 81 mg by mouth Daily., Disp: , Rfl:   •  bumetanide (BUMEX) 1 MG tablet, TAKE ONE TABLET BY MOUTH TWICE A DAY, Disp: 60 tablet, Rfl: 2  •  Calcium Carbonate-Vit D-Min (CALTRATE 600+D PLUS MINERALS) 600-800 MG-UNIT chewable tablet, Chew 2 tablets Daily., Disp: , Rfl:   •  Coenzyme Q10 (COQ10 PO), Take  by mouth., Disp: , Rfl:   •  Cyanocobalamin (B-12) 1000 MCG capsule, Take 1,000 mcg by mouth Daily., Disp: , Rfl:   •  esomeprazole (NEXIUM) 40 MG capsule, 1 tablet po daily, Disp: ,  Rfl:   •  Fluticasone-Umeclidin-Vilant 100-62.5-25 MCG/INH aerosol powder , Inhale 1 puff Daily., Disp: 60 each, Rfl: 3  •  Glucosamine-Chondroitin (OSTEO BI-FLEX REGULAR STRENGTH) 250-200 MG tablet, Take 1 tablet by mouth Daily., Disp: , Rfl:   •  guaiFENesin ER 1200 MG tablet sustained-release 12 hour, Take 1 tablet by mouth Daily., Disp: , Rfl:   •  ipratropium (ATROVENT) 0.02 % nebulizer solution, Take 2.5 mL by nebulization 3 (Three) Times a Day As Needed for Wheezing or Shortness of Air., Disp: 62.5 mL, Rfl: 11  •  KLOR-CON 20 MEQ CR tablet, TAKE ONE TABLET BY MOUTH DAILY, Disp: 30 tablet, Rfl: 2  •  lisinopril-hydrochlorothiazide (PRINZIDE,ZESTORETIC) 20-12.5 MG per tablet, TAKE ONE TABLET BY MOUTH DAILY, Disp: 90 tablet, Rfl: 12  •  montelukast (SINGULAIR) 10 MG tablet, TAKE ONE TABLET BY MOUTH AT BEDTIME, Disp: 90 tablet, Rfl: 3  •  Multiple Vitamins-Minerals (CENTRUM SILVER) tablet, 1 tablet po daily, Disp: , Rfl:   •  Omega-3 Fatty Acids (FISH OIL PO), Take  by mouth., Disp: , Rfl:   •  PARoxetine (PAXIL) 20 MG tablet, Take 1 tablet by mouth Daily., Disp: 30 tablet, Rfl: 11  •  pramipexole (MIRAPEX) 0.25 MG tablet, TAKE ONE TABLET BY MOUTH DAILY, Disp: 90 tablet, Rfl: 2  •  simvastatin (ZOCOR) 20 MG tablet, TAKE ONE TABLET BY MOUTH DAILY, Disp: 90 tablet, Rfl: 3    Past Surgical History:  Past Surgical History:   Procedure Laterality Date   • CARDIAC CATHETERIZATION  05/2019    [facility].   • HYSTERECTOMY         Review of Systems:  Review of Systems   Constitutional: Negative for activity change, fever and malaise/fatigue.   HENT: Negative for ear pain, rhinorrhea, sinus pressure and voice change.    Eyes: Negative for blurred vision and visual disturbance.   Respiratory: Positive for shortness of breath. Negative for cough.    Cardiovascular: Negative for chest pain and palpitations.   Gastrointestinal: Negative for abdominal pain, diarrhea, nausea and vomiting.   Endocrine: Negative for cold intolerance and  "heat intolerance.   Genitourinary: Negative for frequency and urgency.   Musculoskeletal: Negative for arthralgias and neck pain.   Skin: Negative for rash.   Neurological: Negative for syncope.   Hematological: Does not bruise/bleed easily.   Psychiatric/Behavioral: Negative for depressed mood. The patient is not nervous/anxious.        Physical Exam:  Vital Signs:  Visit Vitals  /60   Pulse 88   Temp 98 °F (36.7 °C)   Resp 18   Ht 165.1 cm (65\")   Wt 72.1 kg (159 lb)   LMP  (LMP Unknown)   SpO2 96%   BMI 26.46 kg/m²       Physical Exam  Vitals signs reviewed.   Constitutional:       Appearance: Normal appearance. She is well-developed.   HENT:      Head: Normocephalic and atraumatic.   Cardiovascular:      Rate and Rhythm: Normal rate and regular rhythm.      Heart sounds: Normal heart sounds. No murmur. No friction rub. No gallop.    Pulmonary:      Effort: Pulmonary effort is normal.      Breath sounds: Normal breath sounds. No wheezing or rales.   Skin:     General: Skin is warm and dry.   Neurological:      Mental Status: She is alert and oriented to person, place, and time.      Coordination: Coordination normal.      Gait: Gait normal.   Psychiatric:         Behavior: Behavior is cooperative.         Assessment and Plan:  Problem List Items Addressed This Visit        Cardiovascular and Mediastinum    Essential hypertension    Overview     Trending low. Cut zestoretic in half.   Recheck in 2 weeks.          Mixed hyperlipidemia    Overview     Will get labs         Relevant Orders    Comprehensive Metabolic Panel    Lipid Panel With / Chol / HDL Ratio       Other    Colon cancer screening    Relevant Orders    Cologuard - Stool, Per Rectum      Other Visit Diagnoses     Need for influenza vaccination    -  Primary    Relevant Orders    Fluad Quad >65 years (Completed)          An After Visit Summary and PPPS were given to the patient.             I wore protective equipment throughout this patient " encounter to include mask, gloves and eye protection. Hand hygiene was performed before donning protective equipment and after removal when leaving the room.

## 2020-11-24 ENCOUNTER — OFFICE VISIT (OUTPATIENT)
Dept: FAMILY MEDICINE CLINIC | Facility: CLINIC | Age: 75
End: 2020-11-24

## 2020-11-24 VITALS
TEMPERATURE: 97.7 F | DIASTOLIC BLOOD PRESSURE: 84 MMHG | OXYGEN SATURATION: 95 % | BODY MASS INDEX: 26.26 KG/M2 | HEART RATE: 105 BPM | HEIGHT: 65 IN | SYSTOLIC BLOOD PRESSURE: 122 MMHG | WEIGHT: 157.6 LBS | RESPIRATION RATE: 18 BRPM

## 2020-11-24 DIAGNOSIS — I10 ESSENTIAL HYPERTENSION: ICD-10-CM

## 2020-11-24 DIAGNOSIS — H01.132 ECZEMATOUS DERMATITIS OF UPPER AND LOWER EYELIDS OF BOTH EYES: Primary | ICD-10-CM

## 2020-11-24 DIAGNOSIS — H01.135 ECZEMATOUS DERMATITIS OF UPPER AND LOWER EYELIDS OF BOTH EYES: Primary | ICD-10-CM

## 2020-11-24 DIAGNOSIS — H01.134 ECZEMATOUS DERMATITIS OF UPPER AND LOWER EYELIDS OF BOTH EYES: Primary | ICD-10-CM

## 2020-11-24 DIAGNOSIS — H01.131 ECZEMATOUS DERMATITIS OF UPPER AND LOWER EYELIDS OF BOTH EYES: Primary | ICD-10-CM

## 2020-11-24 PROCEDURE — 99213 OFFICE O/P EST LOW 20 MIN: CPT | Performed by: FAMILY MEDICINE

## 2020-11-25 LAB
ALBUMIN SERPL-MCNC: 4.3 G/DL (ref 3.7–4.7)
ALBUMIN/GLOB SERPL: 1.9 {RATIO} (ref 1.2–2.2)
ALP SERPL-CCNC: 118 IU/L (ref 39–117)
ALT SERPL-CCNC: 16 IU/L (ref 0–32)
AST SERPL-CCNC: 18 IU/L (ref 0–40)
BILIRUB SERPL-MCNC: 0.5 MG/DL (ref 0–1.2)
BUN SERPL-MCNC: 60 MG/DL (ref 8–27)
BUN/CREAT SERPL: 38 (ref 12–28)
CALCIUM SERPL-MCNC: 10.1 MG/DL (ref 8.7–10.3)
CHLORIDE SERPL-SCNC: 90 MMOL/L (ref 96–106)
CHOLEST SERPL-MCNC: 178 MG/DL (ref 100–199)
CHOLEST/HDLC SERPL: 4.2 RATIO (ref 0–4.4)
CO2 SERPL-SCNC: 31 MMOL/L (ref 20–29)
CREAT SERPL-MCNC: 1.58 MG/DL (ref 0.57–1)
GLOBULIN SER CALC-MCNC: 2.3 G/DL (ref 1.5–4.5)
GLUCOSE SERPL-MCNC: 107 MG/DL (ref 65–99)
HDLC SERPL-MCNC: 42 MG/DL
LDLC SERPL CALC-MCNC: 97 MG/DL (ref 0–99)
POTASSIUM SERPL-SCNC: 4.3 MMOL/L (ref 3.5–5.2)
PROT SERPL-MCNC: 6.6 G/DL (ref 6–8.5)
SODIUM SERPL-SCNC: 140 MMOL/L (ref 134–144)
TRIGL SERPL-MCNC: 229 MG/DL (ref 0–149)
VLDLC SERPL CALC-MCNC: 39 MG/DL (ref 5–40)

## 2020-11-25 NOTE — ASSESSMENT & PLAN NOTE
Diagnosis, treatment and and course discussed. Potential side effects discussed. Return if there is worsening or persistence of symptoms.

## 2020-12-03 ENCOUNTER — TELEPHONE (OUTPATIENT)
Dept: FAMILY MEDICINE CLINIC | Facility: CLINIC | Age: 75
End: 2020-12-03

## 2020-12-03 DIAGNOSIS — E78.2 MIXED HYPERLIPIDEMIA: Primary | ICD-10-CM

## 2020-12-03 RX ORDER — SIMVASTATIN 40 MG
40 TABLET ORAL NIGHTLY
Qty: 30 TABLET | Refills: 12 | Status: CANCELLED | OUTPATIENT
Start: 2020-12-03

## 2020-12-27 DIAGNOSIS — I50.22 SYSTOLIC CHF, CHRONIC (HCC): ICD-10-CM

## 2020-12-29 ENCOUNTER — TELEPHONE (OUTPATIENT)
Dept: FAMILY MEDICINE CLINIC | Facility: CLINIC | Age: 75
End: 2020-12-29

## 2020-12-29 RX ORDER — POTASSIUM CHLORIDE 20 MEQ/1
TABLET, EXTENDED RELEASE ORAL
Qty: 30 TABLET | Refills: 1 | Status: SHIPPED | OUTPATIENT
Start: 2020-12-29 | End: 2021-02-23

## 2020-12-29 RX ORDER — BUMETANIDE 1 MG/1
TABLET ORAL
Qty: 60 TABLET | Refills: 1 | Status: SHIPPED | OUTPATIENT
Start: 2020-12-29 | End: 2021-02-23

## 2020-12-29 NOTE — TELEPHONE ENCOUNTER
Pt called asking about refills for her water pill and potassium called and let her know that we sent it in.

## 2021-01-15 ENCOUNTER — TELEPHONE (OUTPATIENT)
Dept: FAMILY MEDICINE CLINIC | Facility: CLINIC | Age: 76
End: 2021-01-15

## 2021-02-08 RX ORDER — PRAMIPEXOLE DIHYDROCHLORIDE 0.25 MG/1
TABLET ORAL
Qty: 90 TABLET | Refills: 1 | Status: SHIPPED | OUTPATIENT
Start: 2021-02-08 | End: 2021-07-26

## 2021-02-22 DIAGNOSIS — I50.22 SYSTOLIC CHF, CHRONIC (HCC): ICD-10-CM

## 2021-02-23 RX ORDER — POTASSIUM CHLORIDE 1500 MG/1
TABLET, FILM COATED, EXTENDED RELEASE ORAL
Qty: 30 TABLET | Refills: 12 | Status: SHIPPED | OUTPATIENT
Start: 2021-02-23 | End: 2022-02-28 | Stop reason: SDUPTHER

## 2021-02-23 RX ORDER — BUMETANIDE 1 MG/1
TABLET ORAL
Qty: 60 TABLET | Refills: 12 | Status: SHIPPED | OUTPATIENT
Start: 2021-02-23 | End: 2022-02-28 | Stop reason: SDUPTHER

## 2021-03-08 RX ORDER — SIMVASTATIN 20 MG
TABLET ORAL
Qty: 90 TABLET | Refills: 2 | Status: SHIPPED | OUTPATIENT
Start: 2021-03-08 | End: 2021-12-13

## 2021-04-20 ENCOUNTER — OFFICE VISIT (OUTPATIENT)
Dept: PULMONOLOGY | Facility: HOSPITAL | Age: 76
End: 2021-04-20

## 2021-04-20 VITALS
TEMPERATURE: 98.2 F | HEART RATE: 69 BPM | HEIGHT: 65 IN | WEIGHT: 158 LBS | DIASTOLIC BLOOD PRESSURE: 68 MMHG | SYSTOLIC BLOOD PRESSURE: 104 MMHG | OXYGEN SATURATION: 98 % | RESPIRATION RATE: 14 BRPM | BODY MASS INDEX: 26.33 KG/M2

## 2021-04-20 DIAGNOSIS — J44.9 CHRONIC OBSTRUCTIVE PULMONARY DISEASE, UNSPECIFIED COPD TYPE (HCC): Primary | ICD-10-CM

## 2021-04-20 PROCEDURE — G0463 HOSPITAL OUTPT CLINIC VISIT: HCPCS
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
## 2021-04-22 ENCOUNTER — TRANSCRIBE ORDERS (OUTPATIENT)
Dept: ADMINISTRATIVE | Facility: HOSPITAL | Age: 76
End: 2021-04-22

## 2021-04-22 DIAGNOSIS — Z01.818 OTHER SPECIFIED PRE-OPERATIVE EXAMINATION: Primary | ICD-10-CM

## 2021-05-03 ENCOUNTER — LAB (OUTPATIENT)
Dept: LAB | Facility: HOSPITAL | Age: 76
End: 2021-05-03

## 2021-05-03 DIAGNOSIS — Z01.818 OTHER SPECIFIED PRE-OPERATIVE EXAMINATION: ICD-10-CM

## 2021-05-03 LAB — SARS-COV-2 ORF1AB RESP QL NAA+PROBE: NOT DETECTED

## 2021-05-03 PROCEDURE — U0004 COV-19 TEST NON-CDC HGH THRU: HCPCS

## 2021-05-03 PROCEDURE — C9803 HOPD COVID-19 SPEC COLLECT: HCPCS

## 2021-05-03 PROCEDURE — U0005 INFEC AGEN DETEC AMPLI PROBE: HCPCS

## 2021-05-05 ENCOUNTER — HOSPITAL ENCOUNTER (OUTPATIENT)
Dept: CT IMAGING | Facility: HOSPITAL | Age: 76
Discharge: HOME | End: 2021-05-05

## 2021-05-05 ENCOUNTER — HOSPITAL ENCOUNTER (OUTPATIENT)
Dept: RESPIRATORY THERAPY | Facility: HOSPITAL | Age: 76
Discharge: HOME | End: 2021-05-05

## 2021-05-05 DIAGNOSIS — J44.9 CHRONIC OBSTRUCTIVE PULMONARY DISEASE, UNSPECIFIED COPD TYPE (HCC): ICD-10-CM

## 2021-05-05 PROCEDURE — 94726 PLETHYSMOGRAPHY LUNG VOLUMES: CPT

## 2021-05-05 PROCEDURE — 94060 EVALUATION OF WHEEZING: CPT

## 2021-05-05 PROCEDURE — 71250 CT THORAX DX C-: CPT

## 2021-05-05 PROCEDURE — 63710000001 ALBUTEROL SULFATE HFA 108 (90 BASE) MCG/ACT AEROSOL SOLUTION 6.7 G INHALER: Performed by: INTERNAL MEDICINE

## 2021-05-05 PROCEDURE — A9270 NON-COVERED ITEM OR SERVICE: HCPCS | Performed by: INTERNAL MEDICINE

## 2021-05-05 PROCEDURE — 94729 DIFFUSING CAPACITY: CPT

## 2021-05-05 RX ORDER — ALBUTEROL SULFATE 90 UG/1
2 AEROSOL, METERED RESPIRATORY (INHALATION) ONCE
Status: COMPLETED | OUTPATIENT
Start: 2021-05-05 | End: 2021-05-05

## 2021-05-05 RX ADMIN — ALBUTEROL SULFATE 2 PUFF: 108 AEROSOL, METERED RESPIRATORY (INHALATION) at 15:08

## 2021-05-18 RX ORDER — LISINOPRIL AND HYDROCHLOROTHIAZIDE 20; 12.5 MG/1; MG/1
TABLET ORAL
Qty: 90 TABLET | Refills: 11 | Status: SHIPPED | OUTPATIENT
Start: 2021-05-18 | End: 2022-07-30

## 2021-06-03 RX ORDER — PAROXETINE HYDROCHLORIDE 20 MG/1
TABLET, FILM COATED ORAL
Qty: 30 TABLET | Refills: 10 | Status: SHIPPED | OUTPATIENT
Start: 2021-06-03 | End: 2022-03-08

## 2021-07-26 RX ORDER — PRAMIPEXOLE DIHYDROCHLORIDE 0.25 MG/1
TABLET ORAL
Qty: 90 TABLET | Refills: 0 | Status: SHIPPED | OUTPATIENT
Start: 2021-07-26 | End: 2021-10-22

## 2021-08-28 RX ORDER — MONTELUKAST SODIUM 10 MG/1
TABLET ORAL
Qty: 90 TABLET | Refills: 3 | Status: SHIPPED | OUTPATIENT
Start: 2021-08-28 | End: 2021-12-09

## 2021-10-20 ENCOUNTER — OFFICE VISIT (OUTPATIENT)
Dept: PULMONOLOGY | Facility: HOSPITAL | Age: 76
End: 2021-10-20

## 2021-10-20 VITALS
BODY MASS INDEX: 27.99 KG/M2 | WEIGHT: 168 LBS | DIASTOLIC BLOOD PRESSURE: 71 MMHG | OXYGEN SATURATION: 93 % | SYSTOLIC BLOOD PRESSURE: 118 MMHG | HEIGHT: 65 IN | HEART RATE: 83 BPM | TEMPERATURE: 98.2 F | RESPIRATION RATE: 13 BRPM

## 2021-10-20 DIAGNOSIS — J44.9 CHRONIC OBSTRUCTIVE PULMONARY DISEASE, UNSPECIFIED COPD TYPE (HCC): Primary | ICD-10-CM

## 2021-10-20 PROCEDURE — G0463 HOSPITAL OUTPT CLINIC VISIT: HCPCS

## 2021-10-20 RX ORDER — THEOPHYLLINE 300 MG/1
300 TABLET, EXTENDED RELEASE ORAL DAILY
Qty: 30 TABLET | Refills: 11 | Status: SHIPPED | OUTPATIENT
Start: 2021-10-20 | End: 2021-12-09
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
## 2021-10-22 RX ORDER — PRAMIPEXOLE DIHYDROCHLORIDE 0.25 MG/1
TABLET ORAL
Qty: 90 TABLET | Refills: 0 | Status: SHIPPED | OUTPATIENT
Start: 2021-10-22 | End: 2022-01-26

## 2021-12-09 ENCOUNTER — OFFICE VISIT (OUTPATIENT)
Dept: FAMILY MEDICINE CLINIC | Facility: CLINIC | Age: 76
End: 2021-12-09

## 2021-12-09 VITALS
WEIGHT: 167.6 LBS | HEART RATE: 64 BPM | RESPIRATION RATE: 18 BRPM | TEMPERATURE: 98.7 F | BODY MASS INDEX: 27.92 KG/M2 | DIASTOLIC BLOOD PRESSURE: 62 MMHG | HEIGHT: 65 IN | SYSTOLIC BLOOD PRESSURE: 120 MMHG | OXYGEN SATURATION: 97 %

## 2021-12-09 DIAGNOSIS — Z12.11 COLON CANCER SCREENING: ICD-10-CM

## 2021-12-09 DIAGNOSIS — N18.31 STAGE 3A CHRONIC KIDNEY DISEASE (HCC): ICD-10-CM

## 2021-12-09 DIAGNOSIS — J43.2 CENTRILOBULAR EMPHYSEMA (HCC): ICD-10-CM

## 2021-12-09 DIAGNOSIS — Z53.20 MAMMOGRAM DECLINED: ICD-10-CM

## 2021-12-09 DIAGNOSIS — Z23 FLU VACCINE NEED: ICD-10-CM

## 2021-12-09 DIAGNOSIS — E78.2 MIXED HYPERLIPIDEMIA: ICD-10-CM

## 2021-12-09 DIAGNOSIS — I50.22 SYSTOLIC CHF, CHRONIC (HCC): ICD-10-CM

## 2021-12-09 DIAGNOSIS — Z00.00 MEDICARE ANNUAL WELLNESS VISIT, SUBSEQUENT: Primary | ICD-10-CM

## 2021-12-09 DIAGNOSIS — I10 ESSENTIAL HYPERTENSION: ICD-10-CM

## 2021-12-09 DIAGNOSIS — R19.5 POSITIVE COLORECTAL CANCER SCREENING USING COLOGUARD TEST: ICD-10-CM

## 2021-12-09 DIAGNOSIS — C91.10 CLL (CHRONIC LYMPHOCYTIC LEUKEMIA) (HCC): ICD-10-CM

## 2021-12-09 PROBLEM — J96.11 CHRONIC RESPIRATORY FAILURE WITH HYPOXIA: Status: RESOLVED | Noted: 2020-10-21 | Resolved: 2021-12-09

## 2021-12-09 PROBLEM — J44.9 COPD (CHRONIC OBSTRUCTIVE PULMONARY DISEASE): Status: RESOLVED | Noted: 2019-07-05 | Resolved: 2021-12-09

## 2021-12-09 PROCEDURE — 99214 OFFICE O/P EST MOD 30 MIN: CPT | Performed by: FAMILY MEDICINE

## 2021-12-09 PROCEDURE — 1126F AMNT PAIN NOTED NONE PRSNT: CPT | Performed by: FAMILY MEDICINE

## 2021-12-09 PROCEDURE — G0008 ADMIN INFLUENZA VIRUS VAC: HCPCS | Performed by: FAMILY MEDICINE

## 2021-12-09 PROCEDURE — 1160F RVW MEDS BY RX/DR IN RCRD: CPT | Performed by: FAMILY MEDICINE

## 2021-12-09 PROCEDURE — 90662 IIV NO PRSV INCREASED AG IM: CPT | Performed by: FAMILY MEDICINE

## 2021-12-09 PROCEDURE — 1170F FXNL STATUS ASSESSED: CPT | Performed by: FAMILY MEDICINE

## 2021-12-09 PROCEDURE — G0439 PPPS, SUBSEQ VISIT: HCPCS | Performed by: FAMILY MEDICINE

## 2021-12-09 NOTE — PROGRESS NOTES
Subsequent Medicare Wellness Visit    Chief Complaint   Patient presents with   • Medicare Wellness-subsequent   • Hypertension   • Hyperlipidemia       Subjective   History of Present Illness:  Krystyna Bennett is a 76 y.o. female who presents for a Subsequent Medicare Wellness Visit. The patient is here: to follow up on multiple medical problems. Overall has: sedentary activity. Nutrition: eating a variety of foods.     Hypertension  This is a chronic problem. The current episode started more than 1 year ago. The problem is unchanged. The problem is controlled. Pertinent negatives include no chest pain or shortness of breath. There are no associated agents to hypertension. Risk factors for coronary artery disease include post-menopausal state and sedentary lifestyle. Current antihypertension treatment includes ACE inhibitors and diuretics. The current treatment provides moderate improvement. There are no compliance problems.    Hyperlipidemia  This is a chronic problem. The current episode started more than 1 year ago. The problem is controlled. Recent lipid tests were reviewed and are variable. There are no known factors aggravating her hyperlipidemia. Pertinent negatives include no chest pain or shortness of breath. Current antihyperlipidemic treatment includes statins. The current treatment provides moderate improvement of lipids. There are no compliance problems.  Risk factors for coronary artery disease include hypertension, post-menopausal and a sedentary lifestyle.       HEALTH RISK ASSESSMENT    Recent Hospitalizations:  No hospitalization(s) within the last year.    Current Medical Providers:  Patient Care Team:  Mireya Kapoor MD as PCP - General  Mireya Kapoor MD as PCP - Family Medicine    Smoking Status:  Social History     Tobacco Use   Smoking Status Former Smoker   • Packs/day: 0.50   • Years: 37.00   • Pack years: 18.50   • Types: Cigarettes, Cigars   • Start date: 1960   • Quit date: 1999    • Years since quittin.9   Smokeless Tobacco Never Used       Alcohol Consumption:  Social History     Substance and Sexual Activity   Alcohol Use Yes    Comment: social drinker       Depression Screen:   PHQ-2/PHQ-9 Depression Screening 2021   Little interest or pleasure in doing things 0   Feeling down, depressed, or hopeless 0   Trouble falling or staying asleep, or sleeping too much 0   Feeling tired or having little energy 0   Poor appetite or overeating 0   Feeling bad about yourself - or that you are a failure or have let yourself or your family down 0   Trouble concentrating on things, such as reading the newspaper or watching television 0   Moving or speaking so slowly that other people could have noticed. Or the opposite - being so fidgety or restless that you have been moving around a lot more than usual 0   Thoughts that you would be better off dead, or of hurting yourself in some way 0   Total Score 0   If you checked off any problems, how difficult have these problems made it for you to do your work, take care of things at home, or get along with other people? Not difficult at all     I spent more than 16 minutes asking patient questions, counseling and documenting in the chart    Fall Risk Screen:  LOURDES Fall Risk Assessment was completed, and patient is at LOW risk for falls.Assessment completed on:2021    Health Habits and Functional and Cognitive Screening:  Functional & Cognitive Status 2021   Do you have difficulty preparing food and eating? No   Do you have difficulty bathing yourself, getting dressed or grooming yourself? No   Do you have difficulty using the toilet? No   Do you have difficulty moving around from place to place? No   Do you have trouble with steps or getting out of a bed or a chair? No   Current Diet Well Balanced Diet   Dental Exam Up to date   Eye Exam Up to date   Exercise (times per week) 0 times per week   Current Exercises Include No Regular Exercise    Current Exercise Activities Include -   Do you need help using the phone?  No   Are you deaf or do you have serious difficulty hearing?  No   Do you need help with transportation? Yes   Do you need help shopping? No   Do you need help preparing meals?  No   Do you need help with housework?  No   Do you need help with laundry? No   Do you need help taking your medications? No   Do you need help managing money? No   Do you ever drive or ride in a car without wearing a seat belt? No   Have you felt unusual stress, anger or loneliness in the last month? No   Who do you live with? Alone   If you need help, do you have trouble finding someone available to you? No   Have you been bothered in the last four weeks by sexual problems? No   Do you have difficulty concentrating, remembering or making decisions? No       Does the patient have evidence of cognitive impairment? No    Asprin use counseling:Taking ASA appropriately as indicated    Recent Lab Results:     Age-appropriate Screening Schedule:  Refer to the list below for future screening recommendations based on patient's age, sex and/or medical conditions. Orders for these recommended tests are listed in the plan section. The patient has been provided with a written plan.    Health Maintenance   Topic Date Due   • TDAP/TD VACCINES (1 - Tdap) Never done   • ZOSTER VACCINE (1 of 2) Never done   • DXA SCAN  03/14/2021   • LIPID PANEL  11/24/2021   • MAMMOGRAM  08/18/2022   • INFLUENZA VACCINE  Completed          The following portions of the patient's history were reviewed and updated as appropriate: allergies, current medications, past family history, past medical history, past social history, past surgical history and problem list.      Outpatient Medications Prior to Visit   Medication Sig Dispense Refill   • acyclovir (ZOVIRAX) 200 MG capsule Take 1 capsule by mouth Every 4 (Four) Hours While Awake. Take at first sign of recurrence. 25 capsule 12   • albuterol  (PROVENTIL) (2.5 MG/3ML) 0.083% nebulizer solution Take 2.5 mg by nebulization Every 4 (Four) Hours As Needed for Wheezing. 75 vial 11   • albuterol sulfate HFA (VENTOLIN HFA) 108 (90 Base) MCG/ACT inhaler Inhale 2 puffs Every 4 (Four) Hours As Needed for Wheezing. 1 inhaler 12   • aspirin 81 MG EC tablet Take 81 mg by mouth Daily.     • bumetanide (BUMEX) 1 MG tablet TAKE ONE TABLET BY MOUTH TWICE A DAY 60 tablet 12   • Calcium Carbonate-Vit D-Min (CALTRATE 600+D PLUS MINERALS) 600-800 MG-UNIT chewable tablet Chew 2 tablets Daily.     • Coenzyme Q10 (COQ10 PO) Take  by mouth.     • Cyanocobalamin (B-12) 1000 MCG capsule Take 1,000 mcg by mouth Daily.     • esomeprazole (NEXIUM) 40 MG capsule 1 tablet po daily     • Glucosamine-Chondroitin (OSTEO BI-FLEX REGULAR STRENGTH) 250-200 MG tablet Take 1 tablet by mouth Daily.     • guaiFENesin ER 1200 MG tablet sustained-release 12 hour Take 1 tablet by mouth Daily.     • ipratropium (ATROVENT) 0.02 % nebulizer solution Take 2.5 mL by nebulization 3 (Three) Times a Day As Needed for Wheezing or Shortness of Air. 62.5 mL 11   • lisinopril-hydrochlorothiazide (PRINZIDE,ZESTORETIC) 20-12.5 MG per tablet TAKE ONE TABLET BY MOUTH DAILY 90 tablet 11   • Multiple Vitamins-Minerals (CENTRUM SILVER) tablet 1 tablet po daily     • Omega-3 Fatty Acids (FISH OIL PO) Take  by mouth.     • PARoxetine (PAXIL) 20 MG tablet TAKE ONE TABLET BY MOUTH DAILY 30 tablet 10   • potassium chloride ER (K-TAB) 20 MEQ tablet controlled-release ER tablet TAKE ONE TABLET BY MOUTH DAILY 30 tablet 12   • pramipexole (MIRAPEX) 0.25 MG tablet TAKE ONE TABLET BY MOUTH DAILY 90 tablet 0   • Fluticasone-Umeclidin-Vilant (TRELEGY) 100-62.5-25 MCG/INH inhaler Inhale 1 puff Daily. 60 each 3   • simvastatin (ZOCOR) 20 MG tablet TAKE ONE TABLET BY MOUTH DAILY 90 tablet 2   • montelukast (SINGULAIR) 10 MG tablet TAKE ONE TABLET BY MOUTH AT BEDTIME 90 tablet 3   • theophylline (THEODUR) 300 MG 12 hr tablet Take 1  tablet by mouth Daily. 30 tablet 11     No facility-administered medications prior to visit.       Patient Active Problem List   Diagnosis   • Essential hypertension   • CLL (chronic lymphocytic leukemia) (HCC)   • Centrilobular emphysema (HCC)   • GERD (gastroesophageal reflux disease)   • Arthritis   • Systolic CHF, chronic (HCC)   • Anemia   • History of Clostridium difficile colitis   • Melanoma (HCC)   • Mixed hyperlipidemia   • Stage 3 chronic kidney disease (HCC)   • Medicare annual wellness visit, subsequent   • Anxiety   • Restless legs syndrome   • Colon cancer screening   • Eczematous dermatitis of upper and lower eyelids of both eyes   • Positive colorectal cancer screening using Cologuard test   • Mammogram declined   • Flu vaccine need       Advanced Care Planning:  ACP discussion was declined by the patient. Patient has an advance directive in EMR which is still valid.     A face-to-face visit was completed today with patient.  Counseling explanation, and discussion of advanced directives was performed.   The last advanced care visit was performed in 2019.  In a near life ending situation, from which she is not expected to recover functionally, and she is not able to speak for her, she does not want life sustaining measures. We discussed feeding tubes, ventilators and cardiac support as well as dialysis.    I spent less than 16 minutes discussing Advanced Care Planning information and documenting in the chart.    Review of Systems   Constitutional: Negative for activity change and fever.   HENT: Negative for ear pain, rhinorrhea, sinus pressure and voice change.    Eyes: Negative for visual disturbance.   Respiratory: Negative for cough and shortness of breath.    Cardiovascular: Negative for chest pain.   Gastrointestinal: Negative for abdominal pain, diarrhea, nausea and vomiting.   Endocrine: Negative for cold intolerance and heat intolerance.   Genitourinary: Negative for frequency and urgency.  "  Musculoskeletal: Negative for arthralgias.   Skin: Negative for rash.   Neurological: Negative for syncope.   Hematological: Does not bruise/bleed easily.   Psychiatric/Behavioral: Negative for depressed mood. The patient is not nervous/anxious.        I have reviewed and confirmed the accuracy of the HPI and ROS as documented by the MA/LPN/RN Mireya Kapoor MD    Compared to one year ago, the patient feels her physical health is the same.  Compared to one year ago, the patient feels her mental health is the same.    Reviewed chart for potential of high risk medication in the elderly: yes  Reviewed chart for potential of harmful drug interactions in the elderly:yes    Objective      Vitals:    12/09/21 1618   BP: 120/62   BP Location: Left arm   Patient Position: Sitting   Cuff Size: Large Adult   Pulse: 64   Resp: 18   Temp: 98.7 °F (37.1 °C)   TempSrc: Skin   SpO2: 97%   Weight: 76 kg (167 lb 9.6 oz)   Height: 165.1 cm (65\")   PainSc: 0-No pain       Body mass index is 27.89 kg/m².  Discussed the patient's BMI with her. The BMI is above average; BMI management plan is completed.    Physical Exam  Vitals and nursing note reviewed.   Constitutional:       General: She is not in acute distress.     Appearance: She is well-developed. She is not diaphoretic.   HENT:      Head: Normocephalic and atraumatic.      Right Ear: External ear normal.      Left Ear: External ear normal.      Nose: Nose normal.      Mouth/Throat:      Pharynx: No oropharyngeal exudate.   Eyes:      General: No scleral icterus.        Right eye: No discharge.         Left eye: No discharge.      Conjunctiva/sclera: Conjunctivae normal.      Pupils: Pupils are equal, round, and reactive to light.   Neck:      Thyroid: No thyromegaly.      Trachea: No tracheal deviation.   Cardiovascular:      Rate and Rhythm: Normal rate and regular rhythm.      Heart sounds: Normal heart sounds. No murmur heard.  No friction rub. No gallop.    Pulmonary:      " Effort: Pulmonary effort is normal. No respiratory distress.      Breath sounds: Normal breath sounds. No stridor. No wheezing or rales.   Chest:      Comments: decline  Abdominal:      General: Bowel sounds are normal. There is no distension.      Palpations: Abdomen is soft. There is no mass.      Tenderness: There is no abdominal tenderness. There is no guarding or rebound.   Genitourinary:     Comments: decline  Musculoskeletal:         General: No tenderness or deformity. Normal range of motion.      Cervical back: Normal range of motion and neck supple.   Lymphadenopathy:      Cervical: No cervical adenopathy.   Skin:     General: Skin is warm and dry.      Capillary Refill: Capillary refill takes less than 2 seconds.      Coloration: Skin is not pale.      Findings: No erythema or rash.   Neurological:      Mental Status: She is alert and oriented to person, place, and time.      Cranial Nerves: No cranial nerve deficit.      Sensory: No sensory deficit.      Motor: No tremor, atrophy or abnormal muscle tone.      Coordination: Coordination normal.      Gait: Gait normal.      Deep Tendon Reflexes: Reflexes are normal and symmetric. Reflexes normal.   Psychiatric:         Behavior: Behavior normal.         Thought Content: Thought content normal.         Cognition and Memory: Memory is not impaired. She does not exhibit impaired recent memory or impaired remote memory.         Judgment: Judgment normal.         Assessment    Medicare Risks and Personalized Health Plan  CMS Preventative Services Quick Reference  Advance Directive Discussion  Breast Cancer/Mammogram Screening    The above risks/problems have been discussed with the patient.  Pertinent information has been shared with the patient in the After Visit Summary.  Follow up plans and orders are seen below in the Assessment/Plan Section.    Medications were reviewed and deemed appropriate to continue. Refills sent in. Labs ordered for surveillance or  reviewed in note.       Visit Diagnoses:    Problems Addressed this Visit        Advance Directives and General Issues    Mammogram declined       Cardiac and Vasculature    Essential hypertension     Hypertension is unchanged.  Continue current treatment regimen.  Blood pressure will be reassessed at the next regular appointment.         Relevant Orders    TSH    Systolic CHF, chronic (HCC)     Congestive heart failure due to coronary artery disease (CAD) and hypertension.  Heart failure is unchanged.           Mixed hyperlipidemia    Relevant Orders    Lipid Panel With / Chol / HDL Ratio    CBC & Differential       Gastrointestinal Abdominal     Colon cancer screening     Positive cologuard  She declined colonscopy.             Genitourinary and Reproductive     Stage 3 chronic kidney disease (HCC)       Health Encounters    Medicare annual wellness visit, subsequent - Primary       Hematology and Neoplasia    CLL (chronic lymphocytic leukemia) (HCC)     Will have her see oncology again         Positive colorectal cancer screening using Cologuard test     Declines colonscopy            Pulmonary and Pneumonias    Centrilobular emphysema (HCC)     COPD is unchanged.  Continue current medications.   Stable on 4 liters                Other    Flu vaccine need    Relevant Orders    Fluzone High-Dose 65+yrs (Completed)      Diagnoses       Codes Comments    Medicare annual wellness visit, subsequent    -  Primary ICD-10-CM: Z00.00  ICD-9-CM: V70.0     Colon cancer screening     ICD-10-CM: Z12.11  ICD-9-CM: V76.51     Centrilobular emphysema (HCC)     ICD-10-CM: J43.2  ICD-9-CM: 492.8     CLL (chronic lymphocytic leukemia) (HCC)     ICD-10-CM: C91.10  ICD-9-CM: 204.10     Systolic CHF, chronic (HCC)     ICD-10-CM: I50.22  ICD-9-CM: 428.22, 428.0     Stage 3a chronic kidney disease (HCC)     ICD-10-CM: N18.31  ICD-9-CM: 585.3     Positive colorectal cancer screening using Cologuard test     ICD-10-CM: R19.5  ICD-9-CM:  787.7     Mammogram declined     ICD-10-CM: Z53.20  ICD-9-CM: V64.2     Mixed hyperlipidemia     ICD-10-CM: E78.2  ICD-9-CM: 272.2     Essential hypertension     ICD-10-CM: I10  ICD-9-CM: 401.9     Flu vaccine need     ICD-10-CM: Z23  ICD-9-CM: V04.81                Follow Up:  No follow-ups on file.     An After Visit Summary and PPPS were given to the patient.    I wore protective equipment throughout this patient encounter to include mask. Hand hygiene was performed before donning protective equipment and after removal when leaving the room.    Discussed injury prevention, diet and exercise, safe sexual practices, and screening for common diseases. Encouraged use of sunscreen and seatbelts. Discussed timing of  screenings. Avoidance of tobacco encouraged. Limitation or avoidance of alcohol encouraged. Recommend yearly dental and eye exams. Also discussed monitoring of blood pressure, lipids.

## 2021-12-13 RX ORDER — SIMVASTATIN 20 MG
TABLET ORAL
Qty: 90 TABLET | Refills: 2 | Status: SHIPPED | OUTPATIENT
Start: 2021-12-13 | End: 2022-09-06

## 2021-12-14 ENCOUNTER — TELEPHONE (OUTPATIENT)
Dept: FAMILY MEDICINE CLINIC | Facility: CLINIC | Age: 76
End: 2021-12-14

## 2021-12-14 DIAGNOSIS — J44.9 CHRONIC OBSTRUCTIVE PULMONARY DISEASE, UNSPECIFIED COPD TYPE (HCC): Primary | ICD-10-CM

## 2021-12-14 NOTE — TELEPHONE ENCOUNTER
----- Message from Mireya Kapoor MD sent at 12/13/2021 12:40 PM EST -----  I received her labs from Dr Bob. Her WBC was 94 and she is anemic. Is she still seeing hematolgy?

## 2021-12-19 NOTE — ASSESSMENT & PLAN NOTE
Congestive heart failure due to coronary artery disease (CAD) and hypertension.  Heart failure is unchanged.

## 2022-01-26 RX ORDER — PRAMIPEXOLE DIHYDROCHLORIDE 0.25 MG/1
TABLET ORAL
Qty: 90 TABLET | Refills: 0 | Status: SHIPPED | OUTPATIENT
Start: 2022-01-26 | End: 2022-04-26

## 2022-02-21 ENCOUNTER — OFFICE VISIT (OUTPATIENT)
Dept: FAMILY MEDICINE CLINIC | Facility: CLINIC | Age: 77
End: 2022-02-21

## 2022-02-21 VITALS
HEIGHT: 65 IN | HEART RATE: 64 BPM | RESPIRATION RATE: 18 BRPM | WEIGHT: 168 LBS | DIASTOLIC BLOOD PRESSURE: 66 MMHG | TEMPERATURE: 97.8 F | OXYGEN SATURATION: 92 % | SYSTOLIC BLOOD PRESSURE: 132 MMHG | BODY MASS INDEX: 27.99 KG/M2

## 2022-02-21 DIAGNOSIS — H92.01 RIGHT EAR PAIN: Primary | ICD-10-CM

## 2022-02-21 PROCEDURE — 99213 OFFICE O/P EST LOW 20 MIN: CPT | Performed by: FAMILY MEDICINE

## 2022-02-21 PROCEDURE — 96372 THER/PROPH/DIAG INJ SC/IM: CPT | Performed by: FAMILY MEDICINE

## 2022-02-21 RX ORDER — IBRUTINIB 420 MG/1
420 TABLET, FILM COATED ORAL DAILY
COMMUNITY
Start: 2022-01-05 | End: 2022-12-22

## 2022-02-21 RX ORDER — LISINOPRIL 20 MG/1
TABLET ORAL
COMMUNITY
Start: 2021-12-14 | End: 2022-02-21

## 2022-02-21 RX ORDER — CEFTRIAXONE SODIUM 250 MG/1
1000 INJECTION, POWDER, FOR SOLUTION INTRAMUSCULAR; INTRAVENOUS ONCE
Status: COMPLETED | OUTPATIENT
Start: 2022-02-21 | End: 2022-02-21

## 2022-02-21 RX ORDER — PANTOPRAZOLE SODIUM 40 MG/1
TABLET, DELAYED RELEASE ORAL
COMMUNITY
Start: 2022-02-07 | End: 2022-04-04

## 2022-02-21 RX ADMIN — CEFTRIAXONE SODIUM 1000 MG: 250 INJECTION, POWDER, FOR SOLUTION INTRAMUSCULAR; INTRAVENOUS at 14:35

## 2022-02-28 DIAGNOSIS — I50.22 SYSTOLIC CHF, CHRONIC: ICD-10-CM

## 2022-02-28 RX ORDER — MONTELUKAST SODIUM 10 MG/1
TABLET ORAL
COMMUNITY
Start: 2022-02-28 | End: 2022-04-20

## 2022-02-28 RX ORDER — BUMETANIDE 1 MG/1
1 TABLET ORAL 2 TIMES DAILY
Qty: 60 TABLET | Refills: 12 | Status: SHIPPED | OUTPATIENT
Start: 2022-02-28 | End: 2022-11-30 | Stop reason: HOSPADM

## 2022-02-28 RX ORDER — POTASSIUM CHLORIDE 1500 MG/1
20 TABLET, FILM COATED, EXTENDED RELEASE ORAL DAILY
Qty: 30 TABLET | Refills: 12 | Status: SHIPPED | OUTPATIENT
Start: 2022-02-28 | End: 2022-11-30 | Stop reason: HOSPADM

## 2022-02-28 NOTE — TELEPHONE ENCOUNTER
Caller: Krystyna Bennett    Relationship: Self    Best call back number: [phone]    Requested Prescriptions:   Requested Prescriptions     Pending Prescriptions Disp Refills   • potassium chloride ER (K-TAB) 20 MEQ tablet controlled-release ER tablet 30 tablet 12     Sig: Take 1 tablet by mouth Daily.   • bumetanide (BUMEX) 1 MG tablet 60 tablet 12     Sig: Take 1 tablet by mouth 2 (Two) Times a Day.        [facility] [place] [address] [phone] [facility] [phone] [facility]     Additional details provided by patient: PATIENT REQUESTING REFILL. PLEASE ADVISE THANK YOU!    Does the patient have less than a 3 day supply:  [] Yes  [x] No    Brian Michaels Rep   02/28/22 12:21 EST

## 2022-03-08 RX ORDER — PAROXETINE HYDROCHLORIDE 20 MG/1
TABLET, FILM COATED ORAL
Qty: 90 TABLET | Refills: 3 | Status: SHIPPED | OUTPATIENT
Start: 2022-03-08 | End: 2023-01-16 | Stop reason: SDUPTHER

## 2022-03-12 PROBLEM — H92.01 RIGHT EAR PAIN: Status: ACTIVE | Noted: 2022-03-12

## 2022-03-12 NOTE — ASSESSMENT & PLAN NOTE
she was prescribed Cortisopirin to treat her symptoms.    Patient was given a Rocephin 1 g IM injection.  Increase fluids. Tylenol/motrin for pain or fever.   Medication and medication adverse effects discussed.    Follow-up 5-7 days for reevaluation if not improved or sooner if needed.

## 2022-03-25 LAB
BASOPHILS # BLD AUTO: 0.1 X10E3/UL (ref 0–0.2)
BASOPHILS NFR BLD AUTO: 0 %
CHOLEST SERPL-MCNC: 167 MG/DL (ref 100–199)
CHOLEST/HDLC SERPL: 2.7 RATIO (ref 0–4.4)
EOSINOPHIL # BLD AUTO: 0.2 X10E3/UL (ref 0–0.4)
EOSINOPHIL NFR BLD AUTO: 1 %
ERYTHROCYTE [DISTWIDTH] IN BLOOD BY AUTOMATED COUNT: 12.4 % (ref 11.7–15.4)
HCT VFR BLD AUTO: 34 % (ref 34–46.6)
HDLC SERPL-MCNC: 61 MG/DL
HGB BLD-MCNC: 10.6 G/DL (ref 11.1–15.9)
IMM GRANULOCYTES # BLD AUTO: 0 X10E3/UL (ref 0–0.1)
IMM GRANULOCYTES NFR BLD AUTO: 0 %
LDLC SERPL CALC-MCNC: 84 MG/DL (ref 0–99)
LYMPHOCYTES # BLD AUTO: 20.8 X10E3/UL (ref 0.7–3.1)
LYMPHOCYTES NFR BLD AUTO: 68 %
MCH RBC QN AUTO: 27.2 PG (ref 26.6–33)
MCHC RBC AUTO-ENTMCNC: 31.2 G/DL (ref 31.5–35.7)
MCV RBC AUTO: 87 FL (ref 79–97)
MONOCYTES # BLD AUTO: 0.8 X10E3/UL (ref 0.1–0.9)
MONOCYTES NFR BLD AUTO: 3 %
MORPHOLOGY BLD-IMP: ABNORMAL
NEUTROPHILS # BLD AUTO: 8.7 X10E3/UL (ref 1.4–7)
NEUTROPHILS NFR BLD AUTO: 28 %
PLATELET # BLD AUTO: 278 X10E3/UL (ref 150–450)
RBC # BLD AUTO: 3.9 X10E6/UL (ref 3.77–5.28)
TRIGL SERPL-MCNC: 123 MG/DL (ref 0–149)
TSH SERPL DL<=0.005 MIU/L-ACNC: 2.3 UIU/ML (ref 0.45–4.5)
VLDLC SERPL CALC-MCNC: 22 MG/DL (ref 5–40)
WBC # BLD AUTO: 30.8 X10E3/UL (ref 3.4–10.8)

## 2022-04-04 ENCOUNTER — OFFICE VISIT (OUTPATIENT)
Dept: FAMILY MEDICINE CLINIC | Facility: CLINIC | Age: 77
End: 2022-04-04

## 2022-04-04 VITALS
OXYGEN SATURATION: 98 % | BODY MASS INDEX: 27.59 KG/M2 | HEIGHT: 65 IN | HEART RATE: 80 BPM | TEMPERATURE: 97.1 F | RESPIRATION RATE: 18 BRPM | DIASTOLIC BLOOD PRESSURE: 72 MMHG | WEIGHT: 165.6 LBS | SYSTOLIC BLOOD PRESSURE: 124 MMHG

## 2022-04-04 DIAGNOSIS — D84.89 IMMUNODEFICIENCY (CELL-MEDIATED): ICD-10-CM

## 2022-04-04 DIAGNOSIS — H65.01 NON-RECURRENT ACUTE SEROUS OTITIS MEDIA OF RIGHT EAR: ICD-10-CM

## 2022-04-04 DIAGNOSIS — K21.9 GASTROESOPHAGEAL REFLUX DISEASE, UNSPECIFIED WHETHER ESOPHAGITIS PRESENT: Primary | ICD-10-CM

## 2022-04-04 DIAGNOSIS — N18.32 STAGE 3B CHRONIC KIDNEY DISEASE: ICD-10-CM

## 2022-04-04 DIAGNOSIS — E78.2 MIXED HYPERLIPIDEMIA: ICD-10-CM

## 2022-04-04 DIAGNOSIS — I50.22 SYSTOLIC CHF, CHRONIC: ICD-10-CM

## 2022-04-04 DIAGNOSIS — C91.10 CLL (CHRONIC LYMPHOCYTIC LEUKEMIA): ICD-10-CM

## 2022-04-04 DIAGNOSIS — J30.89 NON-SEASONAL ALLERGIC RHINITIS, UNSPECIFIED TRIGGER: ICD-10-CM

## 2022-04-04 DIAGNOSIS — J43.2 CENTRILOBULAR EMPHYSEMA: ICD-10-CM

## 2022-04-04 DIAGNOSIS — I10 ESSENTIAL HYPERTENSION: ICD-10-CM

## 2022-04-04 PROCEDURE — 99214 OFFICE O/P EST MOD 30 MIN: CPT | Performed by: FAMILY MEDICINE

## 2022-04-04 RX ORDER — SUCRALFATE 1 G/1
1 TABLET ORAL 3 TIMES DAILY
Qty: 90 TABLET | Refills: 1 | Status: SHIPPED | OUTPATIENT
Start: 2022-04-04 | End: 2022-04-20

## 2022-04-04 RX ORDER — AZELASTINE HCL 205.5 UG/1
2 SPRAY NASAL DAILY
Qty: 30 ML | Refills: 12 | Status: SHIPPED | OUTPATIENT
Start: 2022-04-04 | End: 2022-12-22

## 2022-04-04 NOTE — PROGRESS NOTES
Subjective   Krystyna Bennett is a 76 y.o. female. Presents to [facility]    Chief Complaint   Patient presents with   • COPD   • Hypertension       Pt. Stated that her acid reflex is worse. Not better. She stated that Nexum doesn't work. Seem like it got worse when she started taking imbruvica but it helps with her breathing.  Also stated that Dr. Maldonado said she still has fluid in her ears. He is going to monitor it.      Hypertension  This is a chronic problem. The current episode started more than 1 year ago. The problem has been gradually improving since onset. The problem is controlled. Associated symptoms include malaise/fatigue, shortness of breath and sweats. Pertinent negatives include no anxiety, blurred vision, chest pain, headaches, neck pain or palpitations. Risk factors for coronary artery disease include dyslipidemia, post-menopausal state and family history. Current antihypertension treatment includes ACE inhibitors. The current treatment provides moderate improvement.   Congestive Heart Failure  Presents for follow-up visit. Associated symptoms include fatigue and shortness of breath. Pertinent negatives include no abdominal pain, chest pain or palpitations. (Pt state that she has acid reflex bad. ) The symptoms have been stable. Compliance with diet is %. Compliance with exercise is % (walking, house chores). Compliance with medications is %.   COPD  She complains of cough, frequent throat clearing, shortness of breath and sputum production (drianage back of throat. ). There is no wheezing. This is a chronic problem. The current episode started more than 1 year ago. Associated symptoms include ear congestion, ear pain, heartburn, malaise/fatigue, nasal congestion, postnasal drip and sweats. Pertinent negatives include no chest pain, fever, headaches, rhinorrhea or trouble swallowing (only when coughing). Her symptoms are aggravated by eating and pollen. She reports  moderate improvement on treatment.        I personally reviewed and updated the patient's allergies, medications, problem list, and past medical, surgical, social, and family history. I have reviewed and confirmed the accuracy of the History of Present Illness and Review of Symptoms as documented by the MA/LPN/RN. Mireya Kapoor MD    Allergies:  Allergies   Allergen Reactions   • Sulfacetamide Sodium-Sulfur Other (See Comments)     Tunnel vision,light headed/ may not be allergic to it any longer        Social History:  Social History     Socioeconomic History   • Marital status:    Tobacco Use   • Smoking status: Former Smoker     Packs/day: 0.50     Years: 37.00     Pack years: 18.50     Types: Cigarettes, Cigars     Start date:      Quit date:      Years since quittin.2   • Smokeless tobacco: Never Used   Vaping Use   • Vaping Use: Never used   Substance and Sexual Activity   • Alcohol use: Yes     Comment: social drinker   • Drug use: No   • Sexual activity: Not Currently     Birth control/protection: Post-menopausal       Family History:  Family History   Problem Relation Age of Onset   • Heart disease Father            • Stroke Father    • Leukemia Paternal Grandmother    • Anemia Paternal Grandmother    • Heart disease Paternal Grandmother    • Alcohol abuse Maternal Aunt    • Cancer Maternal Aunt            • Asthma Maternal Grandmother            • Hyperlipidemia Maternal Grandmother            • Diabetes Paternal Aunt            • Hypertension Son    • Hypertension Daughter        Past Medical History :  Patient Active Problem List   Diagnosis   • Essential hypertension   • CLL (chronic lymphocytic leukemia) (HCC)   • Centrilobular emphysema (HCC)   • GERD (gastroesophageal reflux disease)   • Arthritis   • Systolic CHF, chronic (HCC)   • Anemia   • History of Clostridium difficile colitis   • Melanoma (HCC)   • Mixed hyperlipidemia   • Stage 3b  chronic kidney disease (HCC)   • Medicare annual wellness visit, subsequent   • Anxiety   • Restless legs syndrome   • Colon cancer screening   • Eczematous dermatitis of upper and lower eyelids of both eyes   • Positive colorectal cancer screening using Cologuard test   • Mammogram declined   • Flu vaccine need   • Right ear pain   • Non-seasonal allergic rhinitis   • Non-recurrent acute serous otitis media of right ear       Medication List:    Current Outpatient Medications:   •  albuterol (PROVENTIL) (2.5 MG/3ML) 0.083% nebulizer solution, Take 2.5 mg by nebulization Every 4 (Four) Hours As Needed for Wheezing., Disp: 75 vial, Rfl: 11  •  albuterol sulfate HFA (VENTOLIN HFA) 108 (90 Base) MCG/ACT inhaler, Inhale 2 puffs Every 4 (Four) Hours As Needed for Wheezing., Disp: 1 inhaler, Rfl: 12  •  aspirin 81 MG EC tablet, Take 81 mg by mouth Daily., Disp: , Rfl:   •  bumetanide (BUMEX) 1 MG tablet, Take 1 tablet by mouth 2 (Two) Times a Day., Disp: 60 tablet, Rfl: 12  •  Calcium Carbonate-Vit D-Min (CALTRATE 600+D PLUS MINERALS) 600-800 MG-UNIT chewable tablet, Chew 2 tablets Daily., Disp: , Rfl:   •  Coenzyme Q10 (COQ10 PO), Take  by mouth., Disp: , Rfl:   •  Cyanocobalamin (B-12) 1000 MCG capsule, Take 1,000 mcg by mouth Daily., Disp: , Rfl:   •  esomeprazole (nexIUM) 40 MG capsule, 1 tablet po daily, Disp: , Rfl:   •  Fluticasone-Umeclidin-Vilant (TRELEGY) 100-62.5-25 MCG/INH inhaler, Inhale 1 puff Daily., Disp: 60 each, Rfl: 3  •  Glucosamine-Chondroitin 250-200 MG tablet, Take 1 tablet by mouth Daily., Disp: , Rfl:   •  guaiFENesin ER 1200 MG tablet sustained-release 12 hour, Take 1 tablet by mouth Daily., Disp: , Rfl:   •  Imbruvica 420 MG tablet tablet, , Disp: , Rfl:   •  ipratropium (ATROVENT) 0.02 % nebulizer solution, Take 2.5 mL by nebulization 3 (Three) Times a Day As Needed for Wheezing or Shortness of Air., Disp: 62.5 mL, Rfl: 11  •  lisinopril-hydrochlorothiazide (PRINZIDE,ZESTORETIC) 20-12.5 MG per  tablet, TAKE ONE TABLET BY MOUTH DAILY, Disp: 90 tablet, Rfl: 11  •  montelukast (SINGULAIR) 10 MG tablet, , Disp: , Rfl:   •  Multiple Vitamins-Minerals (CENTRUM SILVER) tablet, 1 tablet po daily, Disp: , Rfl:   •  Omega-3 Fatty Acids (FISH OIL PO), Take  by mouth., Disp: , Rfl:   •  PARoxetine (PAXIL) 20 MG tablet, TAKE ONE TABLET BY MOUTH DAILY, Disp: 90 tablet, Rfl: 3  •  potassium chloride ER (K-TAB) 20 MEQ tablet controlled-release ER tablet, Take 1 tablet by mouth Daily., Disp: 30 tablet, Rfl: 12  •  pramipexole (MIRAPEX) 0.25 MG tablet, TAKE ONE TABLET BY MOUTH DAILY, Disp: 90 tablet, Rfl: 0  •  simvastatin (ZOCOR) 20 MG tablet, TAKE ONE TABLET BY MOUTH DAILY, Disp: 90 tablet, Rfl: 2  •  azelastine (ASTEPRO) 0.15 % solution nasal spray, 2 sprays into the nostril(s) as directed by provider Daily., Disp: 30 mL, Rfl: 12  •  sucralfate (CARAFATE) 1 g tablet, Take 1 tablet by mouth 3 (Three) Times a Day., Disp: 90 tablet, Rfl: 1    Past Surgical History:  Past Surgical History:   Procedure Laterality Date   • APPENDECTOMY     • CARDIAC CATHETERIZATION  05/2019    [facility].   • HYSTERECTOMY     • TONSILLECTOMY         Review of Systems:  Review of Systems   Constitutional: Positive for fatigue and malaise/fatigue. Negative for activity change and fever.   HENT: Positive for ear pain and postnasal drip. Negative for rhinorrhea, sinus pressure, trouble swallowing (only when coughing) and voice change.    Eyes: Negative for blurred vision and visual disturbance.   Respiratory: Positive for cough, sputum production (drianage back of throat. ) and shortness of breath. Negative for wheezing.    Cardiovascular: Negative for chest pain and palpitations.   Gastrointestinal: Negative for abdominal pain, diarrhea, nausea and vomiting.   Endocrine: Negative for cold intolerance and heat intolerance.   Genitourinary: Negative for frequency and urgency.   Musculoskeletal: Negative for arthralgias and neck pain.   Skin: Negative for  "rash.   Neurological: Negative for syncope.   Hematological: Does not bruise/bleed easily.   Psychiatric/Behavioral: Negative for depressed mood. The patient is not nervous/anxious.        Physical Exam:  Vital Signs:  Vital Signs:   /72 (BP Location: Right arm, Patient Position: Sitting, Cuff Size: Adult)   Pulse 80   Temp 97.1 °F (36.2 °C) (Temporal)   Resp 18   Ht 165.1 cm (65\")   Wt 75.1 kg (165 lb 9.6 oz)   SpO2 98%   BMI 27.56 kg/m²     Result Review :                Physical Exam  Vitals reviewed.   Constitutional:       Appearance: Normal appearance. She is well-developed.   HENT:      Head: Normocephalic and atraumatic.      Right Ear: External ear normal. No decreased hearing noted. No tenderness. No middle ear effusion. Tympanic membrane is not injected, erythematous, retracted or bulging.      Left Ear: External ear normal. No decreased hearing noted. No tenderness.  No middle ear effusion. Tympanic membrane is not injected, erythematous, retracted or bulging.      Nose: Nose normal. No rhinorrhea.      Mouth/Throat:      Pharynx: No oropharyngeal exudate or posterior oropharyngeal erythema.   Eyes:      General:         Right eye: No discharge.         Left eye: No discharge.   Cardiovascular:      Rate and Rhythm: Normal rate and regular rhythm.      Heart sounds: Normal heart sounds. No murmur heard.    No friction rub. No gallop.   Pulmonary:      Effort: Pulmonary effort is normal. No respiratory distress.      Breath sounds: Normal breath sounds. No wheezing or rales.   Lymphadenopathy:      Cervical: No cervical adenopathy.   Skin:     General: Skin is warm and dry.      Findings: No rash.   Neurological:      Mental Status: She is alert and oriented to person, place, and time.      Coordination: Coordination normal.      Gait: Gait normal.   Psychiatric:         Behavior: Behavior is cooperative.         None today  Assessment and Plan:  Problems Addressed this Visit        Allergies " and Adverse Reactions    Non-seasonal allergic rhinitis       Cardiac and Vasculature    Essential hypertension     Hypertension is unchanged.  Continue current treatment regimen.  Blood pressure will be reassessed in 3 months.           Systolic CHF, chronic (HCC)     Congestive heart failure due to hypertension.  Heart failure is unchanged.  Continue current treatment regimen.  Heart failure will be reassessed in 3 months.           Mixed hyperlipidemia       ENT    Non-recurrent acute serous otitis media of right ear    Relevant Medications    azelastine (ASTEPRO) 0.15 % solution nasal spray       Gastrointestinal Abdominal     GERD (gastroesophageal reflux disease) - Primary     Worse secondary to new cll drug. Start carafate  Diagnosis, treatment and and course discussed. Potential side effects discussed. Return if there is worsening or persistence of symptoms.              Relevant Medications    sucralfate (CARAFATE) 1 g tablet       Genitourinary and Reproductive     Stage 3b chronic kidney disease (HCC)     Dr Garcia is following              Hematology and Neoplasia    CLL (chronic lymphocytic leukemia) ([facility])       Pulmonary and Pneumonias    Centrilobular emphysema ([facility])     COPD is unchanged.  Continue current medications.  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[facility])          Diagnoses       Codes Comments    Gastroesophageal reflux disease, unspecified whether esophagitis present    -  Primary ICD-10-CM: K21.9  ICD-9-CM: 530.81     Mixed hyperlipidemia     ICD-10-CM: E78.2  ICD-9-CM: 272.2     Essential hypertension     ICD-10-CM: I10  ICD-9-CM: 401.9     Systolic CHF, chronic (HCC)     ICD-10-CM: I50.22  ICD-9-CM: 428.22, 428.0     Stage 3b chronic kidney disease (HCC)     ICD-10-CM: N18.32  ICD-9-CM: 585.3     CLL (chronic lymphocytic leukemia) ([facility])     ICD-10-CM:  C91.10  ICD-9-CM: 204.10     Immunodeficiency (cell-mediated) (HCC)     ICD-10-CM: D84.89  ICD-9-CM: 279.3     Centrilobular emphysema (HCC)     ICD-10-CM: J43.2  ICD-9-CM: 492.8     Non-seasonal allergic rhinitis, unspecified trigger     ICD-10-CM: J30.89  ICD-9-CM: 477.8     Non-recurrent acute serous otitis media of right ear     ICD-10-CM: H65.01  ICD-9-CM: 381.01            An After Visit Summary and PPPS were given to the patient.       I wore protective equipment throughout this patient encounter to include mask. Hand hygiene was performed before donning protective equipment and after removal when leaving the room.

## 2022-04-11 PROBLEM — N18.32 STAGE 3B CHRONIC KIDNEY DISEASE: Status: ACTIVE | Noted: 2019-12-19

## 2022-04-11 NOTE — ASSESSMENT & PLAN NOTE
Congestive heart failure due to hypertension.  Heart failure is unchanged.  Continue current treatment regimen.  Heart failure will be reassessed in 3 months.

## 2022-04-11 NOTE — ASSESSMENT & PLAN NOTE
Worse secondary to new cll drug. Start carafate  Diagnosis, treatment and and course discussed. Potential side effects discussed. Return if there is worsening or persistence of symptoms.

## 2022-04-20 ENCOUNTER — OFFICE VISIT (OUTPATIENT)
Dept: PULMONOLOGY | Facility: HOSPITAL | Age: 77
End: 2022-04-20

## 2022-04-20 VITALS
HEIGHT: 65 IN | RESPIRATION RATE: 12 BRPM | SYSTOLIC BLOOD PRESSURE: 103 MMHG | WEIGHT: 166.8 LBS | BODY MASS INDEX: 27.79 KG/M2 | DIASTOLIC BLOOD PRESSURE: 63 MMHG | OXYGEN SATURATION: 96 % | HEART RATE: 55 BPM

## 2022-04-20 DIAGNOSIS — J43.2 CENTRILOBULAR EMPHYSEMA: Primary | ICD-10-CM

## 2022-04-20 DIAGNOSIS — J96.11 CHRONIC RESPIRATORY FAILURE WITH HYPOXIA: ICD-10-CM

## 2022-04-20 DIAGNOSIS — J44.9 CHRONIC OBSTRUCTIVE PULMONARY DISEASE, UNSPECIFIED COPD TYPE: ICD-10-CM

## 2022-04-20 DIAGNOSIS — R91.1 LUNG NODULE: ICD-10-CM

## 2022-04-20 PROCEDURE — G0463 HOSPITAL OUTPT CLINIC VISIT: HCPCS

## 2022-04-20 RX ORDER — DOXYCYCLINE HYCLATE 50 MG/1
324 CAPSULE, GELATIN COATED ORAL
COMMUNITY
Start: 2022-04-07 | End: 2022-10-27

## 2022-04-20 NOTE — PROGRESS NOTES
Subjective   Krystyna Bennett is a 76 y.o. female.     History of Present Illness   Follow-up visit for COPD/emphysema, pulmonary hypertension, CHF and chronic hypoxemia.  She is still using oxygen 4 liters  Patient reports her breathing is feeling much better since she started treatment for CLL    Non-smoker    Patient Active Problem List   Diagnosis   • Essential hypertension   • CLL (chronic lymphocytic leukemia) (HCC)   • Centrilobular emphysema (HCC)   • GERD (gastroesophageal reflux disease)   • Arthritis   • Systolic CHF, chronic (HCC)   • Anemia   • History of Clostridium difficile colitis   • Melanoma (HCC)   • Mixed hyperlipidemia   • Stage 3b chronic kidney disease (HCC)   • Medicare annual wellness visit, subsequent   • Anxiety   • Restless legs syndrome   • Colon cancer screening   • Eczematous dermatitis of upper and lower eyelids of both eyes   • Positive colorectal cancer screening using Cologuard test   • Mammogram declined   • Flu vaccine need   • Right ear pain   • Non-seasonal allergic rhinitis   • Non-recurrent acute serous otitis media of right ear     Current Outpatient Medications on File Prior to Visit   Medication Sig Dispense Refill   • albuterol (PROVENTIL) (2.5 MG/3ML) 0.083% nebulizer solution Take 2.5 mg by nebulization Every 4 (Four) Hours As Needed for Wheezing. 75 vial 11   • albuterol sulfate HFA (VENTOLIN HFA) 108 (90 Base) MCG/ACT inhaler Inhale 2 puffs Every 4 (Four) Hours As Needed for Wheezing. 1 inhaler 12   • aspirin 81 MG EC tablet Take 81 mg by mouth Daily.     • azelastine (ASTEPRO) 0.15 % solution nasal spray 2 sprays into the nostril(s) as directed by provider Daily. 30 mL 12   • bumetanide (BUMEX) 1 MG tablet Take 1 tablet by mouth 2 (Two) Times a Day. 60 tablet 12   • Calcium Carbonate-Vit D-Min (CALTRATE 600+D PLUS MINERALS) 600-800 MG-UNIT chewable tablet Chew 2 tablets Daily.     • Coenzyme Q10 (COQ10 PO) Take  by mouth.     • Cyanocobalamin (B-12) 1000 MCG capsule  Take 1,000 mcg by mouth Daily.     • esomeprazole (nexIUM) 40 MG capsule 1 tablet po daily     • ferrous gluconate (FERGON) 324 MG tablet Take 324 mg by mouth Daily With Breakfast.     • Glucosamine-Chondroitin 250-200 MG tablet Take 1 tablet by mouth Daily.     • guaiFENesin ER 1200 MG tablet sustained-release 12 hour Take 1 tablet by mouth Daily.     • Imbruvica 420 MG tablet tablet Take 420 mg by mouth Daily.     • ipratropium (ATROVENT) 0.02 % nebulizer solution Take 2.5 mL by nebulization 3 (Three) Times a Day As Needed for Wheezing or Shortness of Air. 62.5 mL 11   • lisinopril-hydrochlorothiazide (PRINZIDE,ZESTORETIC) 20-12.5 MG per tablet TAKE ONE TABLET BY MOUTH DAILY 90 tablet 11   • Multiple Vitamins-Minerals (CENTRUM SILVER) tablet 1 tablet po daily     • Omega-3 Fatty Acids (FISH OIL PO) Take  by mouth.     • PARoxetine (PAXIL) 20 MG tablet TAKE ONE TABLET BY MOUTH DAILY 90 tablet 3   • potassium chloride ER (K-TAB) 20 MEQ tablet controlled-release ER tablet Take 1 tablet by mouth Daily. 30 tablet 12   • pramipexole (MIRAPEX) 0.25 MG tablet TAKE ONE TABLET BY MOUTH DAILY 90 tablet 0   • simvastatin (ZOCOR) 20 MG tablet TAKE ONE TABLET BY MOUTH DAILY 90 tablet 2   • [DISCONTINUED] Fluticasone-Umeclidin-Vilant (TRELEGY) 100-62.5-25 MCG/INH inhaler Inhale 1 puff Daily. 60 each 3   • [DISCONTINUED] montelukast (SINGULAIR) 10 MG tablet      • [DISCONTINUED] sucralfate (CARAFATE) 1 g tablet Take 1 tablet by mouth 3 (Three) Times a Day. 90 tablet 1     No current facility-administered medications on file prior to visit.       The following portions of the patient's history were reviewed and updated as appropriate: allergies, current medications, past family history, past medical history, past social history, past surgical history and problem list.    Review of Systems  Constitutional: Negative for chills, fever and malaise/fatigue.   HENT: Negative.    Eyes: Negative.    Cardiovascular: Negative.   "  Respiratory: Positive for cough and shortness of breath.    Skin: Negative.    Musculoskeletal: Negative.    Gastrointestinal: Negative.    Genitourinary: Negative.    Neurological: Negative.    Psychiatric/Behavioral: Negative.  Objective   Physical Exam  Blood pressure 103/63, pulse 55, resp. rate 12, height 165.1 cm (65\"), weight 75.7 kg (166 lb 12.8 oz), SpO2 96 %, not currently breastfeeding.    General Appearance:  Alert   HEENT:  Normocephalic, without obvious abnormality, Conjunctiva/corneas clear,.   Nares normal, no drainage     Neck:  Supple, symmetrical, trachea midline. No JVD.  Lungs /Chest wall:   Bilateral basal rhonchi, respirations unlabored, symmetrical wall movement.     Heart:  Regular rate and rhythm, S1 S2 normal  Abdomen: Soft, non-tender, no masses, no organomegaly.    Extremities: No edema, no clubbing or cyanosis    Results for orders placed during the hospital encounter of 08/18/20    Adult Transthoracic Echo Complete W/ Cont if Necessary Per Protocol    Interpretation Summary  · Mild tricuspid valve regurgitation is present.  · Right ventricular cavity is mildly dilated.  · Left ventricular diastolic dysfunction (grade I) consistent with impaired relaxation.  · Estimated EF = 55%.  · Left ventricular systolic function is normal.  · Left atrial cavity size is mildly dilated.    No radiology results for the last 90 days.    Assessment/Plan   Chronic obstructive pulmonary disease, unspecified COPD type (CMS/HCC) (Primary)  -Doing better with Trelegy: Continue same       Centrilobular emphysema (CMS/HCC)   FEV1 0.68 L which is 28% improved to 34% postbronchodilator  FEV1/FVC ratio 34  Expiratory reserve volume 84%  Residual volume 182%  Total lung capacity 127%  Diffusion capacity 21%.     Pulmonary function test was extremely hard on the patient she passed out 3 times during the test     Systolic CHF, chronic (CMS/HCC) diastolic dysfunction  Pulmonary hypertension RVSP 47  - On " diuretics.       Chronic respiratory failure with hypoxia (CMS/HCC)  - On home O2 4 Liters, benefits from use.           Lung nodule: Discussed with patient the finding of lung nodule RLL 10 mm, patient declined PET, she says she will accepted what ever it is without tissue diagnosis she will not do anything for it anyway     Up-to-date on vaccinations for pneumonia, flu and COVID-19

## 2022-04-26 RX ORDER — PRAMIPEXOLE DIHYDROCHLORIDE 0.25 MG/1
TABLET ORAL
Qty: 90 TABLET | Refills: 3 | Status: SHIPPED | OUTPATIENT
Start: 2022-04-26

## 2022-04-28 ENCOUNTER — TELEPHONE (OUTPATIENT)
Dept: FAMILY MEDICINE CLINIC | Facility: CLINIC | Age: 77
End: 2022-04-28
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
## 2022-04-28 RX ORDER — AMOXICILLIN AND CLAVULANATE POTASSIUM 500; 125 MG/1; MG/1
1 TABLET, FILM COATED ORAL 2 TIMES DAILY
Qty: 20 TABLET | Refills: 0 | Status: SHIPPED | OUTPATIENT
Start: 2022-04-28 | End: 2022-05-11

## 2022-04-28 NOTE — TELEPHONE ENCOUNTER
Caller: Krystyna Bennett    Relationship: Self    Best call back number: [phone]    What is the best time to reach you: ANYTIME    Who are you requesting to speak with (clinical staff, provider,  specific staff member): CLINICAL    What was the call regarding: PATIENT WAS BITTEN BY HER CAT AND IS NEEDING AN ANTIBIOTIC CALLED IN FOR HER AS SHE IS UNABLE TO COME IN TO THE OFFICE.     PLEASE CALL TO DISCUSS AND ADVISE    Do you require a callback: YES

## 2022-05-09 ENCOUNTER — OFFICE VISIT (OUTPATIENT)
Dept: FAMILY MEDICINE CLINIC | Facility: CLINIC | Age: 77
End: 2022-05-09

## 2022-05-09 VITALS
HEIGHT: 65 IN | OXYGEN SATURATION: 97 % | RESPIRATION RATE: 18 BRPM | HEART RATE: 69 BPM | TEMPERATURE: 98.7 F | BODY MASS INDEX: 28.22 KG/M2 | SYSTOLIC BLOOD PRESSURE: 124 MMHG | WEIGHT: 169.4 LBS | DIASTOLIC BLOOD PRESSURE: 78 MMHG

## 2022-05-09 DIAGNOSIS — S61.511A TEAR OF SKIN OF RIGHT WRIST, INITIAL ENCOUNTER: ICD-10-CM

## 2022-05-09 DIAGNOSIS — L57.0 AK (ACTINIC KERATOSIS): Primary | ICD-10-CM
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
## 2022-05-09 DIAGNOSIS — S61.551A CAT BITE OF RIGHT WRIST, INITIAL ENCOUNTER: ICD-10-CM

## 2022-05-09 DIAGNOSIS — K21.9 GASTROESOPHAGEAL REFLUX DISEASE, UNSPECIFIED WHETHER ESOPHAGITIS PRESENT: ICD-10-CM

## 2022-05-09 PROCEDURE — 99212 OFFICE O/P EST SF 10 MIN: CPT | Performed by: FAMILY MEDICINE

## 2022-05-09 PROCEDURE — 17000 DESTRUCT PREMALG LESION: CPT | Performed by: FAMILY MEDICINE

## 2022-05-09 NOTE — PROGRESS NOTES
Subjective   Krystyna Bennett is a 76 y.o. female. Presents to [facility]    Chief Complaint   Patient presents with   • Heartburn   • Animal Bite       Heartburn  She complains of coughing. She reports no abdominal pain, no belching, no chest pain, no choking, no heartburn, no nausea or no wheezing. This is a new problem. The current episode started more than 1 month ago. The problem occurs occasionally. The problem has been gradually improving. Exacerbated by: unknown. Associated symptoms include anemia. Pertinent negatives include no fatigue. Treatments tried: nexium. The treatment provided moderate relief.   Animal Bite   Episode onset: 1 week ago. The incident occurred at home. Means of arrival: did not go to ED. Head/neck injury location: right wrist. The pain is mild. It is unlikely that a foreign body is present. Associated symptoms include cough. Pertinent negatives include no chest pain, no fussiness, no numbness, no abdominal pain, no bowel incontinence, no nausea, no vomiting, no bladder incontinence, no neck pain, no pain when bearing weight, no light-headedness and no loss of consciousness. Her tetanus status is unknown. There were no sick contacts. Recently, medical care has been given by the PCP (patient was given an antiboitc by PCP). Services received include medications given.      She had a cat bite on her right arm. She started and is still taking augmentin. It is healing.     I personally reviewed and updated the patient's allergies, medications, problem list, and past medical, surgical, social, and family history. I have reviewed and confirmed the accuracy of the History of Present Illness and Review of Symptoms as documented by the MA/LPN/RN. Mireya Kapoor MD    Allergies:  Allergies   Allergen Reactions   • Sulfacetamide Sodium-Sulfur Other (See Comments)     Tunnel vision,light headed/ may not be allergic to it any longer        Social History:  Social History      Socioeconomic History   • Marital status:    Tobacco Use   • Smoking status: Former Smoker     Packs/day: 0.50     Years: 37.00     Pack years: 18.50     Types: Cigarettes, Cigars     Start date: 1960     Quit date: 2019     Years since quittin.8   • Smokeless tobacco: Never Used   Vaping Use   • Vaping Use: Never used   Substance and Sexual Activity   • Alcohol use: Yes     Alcohol/week: 4.0 standard drinks     Types: 2 Glasses of wine, 2 Shots of liquor per week     Comment: social drinker   • Drug use: No   • Sexual activity: Not Currently     Birth control/protection: Post-menopausal       Family History:  Family History   Problem Relation Age of Onset   • Heart disease Father            • Stroke Father    • Leukemia Paternal Grandmother    • Anemia Paternal Grandmother    • Heart disease Paternal Grandmother    • Alcohol abuse Maternal Aunt    • Cancer Maternal Aunt            • Asthma Maternal Grandmother            • Hyperlipidemia Maternal Grandmother            • Diabetes Paternal Aunt            • Hypertension Son    • Hypertension Daughter        Past Medical History :  Patient Active Problem List   Diagnosis   • Essential hypertension   • CLL (chronic lymphocytic leukemia) (HCC)   • Centrilobular emphysema (HCC)   • GERD (gastroesophageal reflux disease)   • Arthritis   • Systolic CHF, chronic (HCC)   • Anemia   • History of Clostridium difficile colitis   • Melanoma (HCC)   • Mixed hyperlipidemia   • Stage 3b chronic kidney disease (HCC)   • Medicare annual wellness visit, subsequent   • Anxiety   • Restless legs syndrome   • Colon cancer screening   • Eczematous dermatitis of upper and lower eyelids of both eyes   • Positive colorectal cancer screening using Cologuard test   • Mammogram declined   • Flu vaccine need   • Right ear pain   • Non-seasonal allergic rhinitis   • Non-recurrent acute serous otitis media of right ear   • AK  (actinic keratosis)   • Cat bite of right wrist   • Tear of skin of right wrist       Medication List:    Current Outpatient Medications:   •  albuterol (PROVENTIL) (2.5 MG/3ML) 0.083% nebulizer solution, Take 2.5 mg by nebulization Every 4 (Four) Hours As Needed for Wheezing., Disp: 75 vial, Rfl: 11  •  albuterol sulfate HFA (VENTOLIN HFA) 108 (90 Base) MCG/ACT inhaler, Inhale 2 puffs Every 4 (Four) Hours As Needed for Wheezing., Disp: 1 inhaler, Rfl: 12  •  aspirin 81 MG EC tablet, Take 81 mg by mouth Daily., Disp: , Rfl:   •  azelastine (ASTEPRO) 0.15 % solution nasal spray, 2 sprays into the nostril(s) as directed by provider Daily., Disp: 30 mL, Rfl: 12  •  bumetanide (BUMEX) 1 MG tablet, Take 1 tablet by mouth 2 (Two) Times a Day., Disp: 60 tablet, Rfl: 12  •  Calcium Carbonate-Vit D-Min (CALTRATE 600+D PLUS MINERALS) 600-800 MG-UNIT chewable tablet, Chew 2 tablets Daily., Disp: , Rfl:   •  Coenzyme Q10 (COQ10 PO), Take  by mouth., Disp: , Rfl:   •  Cyanocobalamin (B-12) 1000 MCG capsule, Take 1,000 mcg by mouth Daily., Disp: , Rfl:   •  esomeprazole (nexIUM) 40 MG capsule, 1 tablet po daily, Disp: , Rfl:   •  ferrous gluconate (FERGON) 324 MG tablet, Take 324 mg by mouth Daily With Breakfast., Disp: , Rfl:   •  Fluticasone-Umeclidin-Vilant (TRELEGY) 100-62.5-25 MCG/INH inhaler, Inhale 1 puff Daily., Disp: 60 each, Rfl: 11  •  Glucosamine-Chondroitin 250-200 MG tablet, Take 1 tablet by mouth Daily., Disp: , Rfl:   •  guaiFENesin ER 1200 MG tablet sustained-release 12 hour, Take 1 tablet by mouth Daily., Disp: , Rfl:   •  Imbruvica 420 MG tablet tablet, Take 420 mg by mouth Daily., Disp: , Rfl:   •  ipratropium (ATROVENT) 0.02 % nebulizer solution, Take 2.5 mL by nebulization 3 (Three) Times a Day As Needed for Wheezing or Shortness of Air., Disp: 62.5 mL, Rfl: 11  •  lisinopril-hydrochlorothiazide (PRINZIDE,ZESTORETIC) 20-12.5 MG per tablet, TAKE ONE TABLET BY MOUTH DAILY, Disp: 90 tablet, Rfl: 11  •  Multiple  "Vitamins-Minerals (CENTRUM SILVER) tablet, 1 tablet po daily, Disp: , Rfl:   •  Omega-3 Fatty Acids (FISH OIL PO), Take  by mouth., Disp: , Rfl:   •  PARoxetine (PAXIL) 20 MG tablet, TAKE ONE TABLET BY MOUTH DAILY, Disp: 90 tablet, Rfl: 3  •  potassium chloride ER (K-TAB) 20 MEQ tablet controlled-release ER tablet, Take 1 tablet by mouth Daily., Disp: 30 tablet, Rfl: 12  •  pramipexole (MIRAPEX) 0.25 MG tablet, TAKE ONE TABLET BY MOUTH DAILY, Disp: 90 tablet, Rfl: 3  •  simvastatin (ZOCOR) 20 MG tablet, TAKE ONE TABLET BY MOUTH DAILY, Disp: 90 tablet, Rfl: 2  •  amoxicillin-clavulanate (Augmentin) 500-125 MG per tablet, Take 1 tablet by mouth 2 (Two) Times a Day., Disp: 20 tablet, Rfl: 0    Past Surgical History:  Past Surgical History:   Procedure Laterality Date   • APPENDECTOMY     • CARDIAC CATHETERIZATION  05/2019    [facility].   • HYSTERECTOMY     • TONSILLECTOMY         Review of Systems:  Review of Systems   Constitutional: Negative for fatigue.   Respiratory: Positive for cough. Negative for choking and wheezing.    Cardiovascular: Negative for chest pain.   Gastrointestinal: Negative for abdominal pain, bowel incontinence, nausea and vomiting.   Genitourinary: Negative for urinary incontinence.   Musculoskeletal: Negative for neck pain.   Neurological: Negative for loss of consciousness, light-headedness and numbness.       Physical Exam:  Vital Signs:  Vital Signs:   /78   Pulse 69   Temp 98.7 °F (37.1 °C)   Resp 18   Ht 165.1 cm (65\")   Wt 76.8 kg (169 lb 6.4 oz)   SpO2 97%   BMI 28.19 kg/m²     Result Review :          Cryotherapy, Skin Lesion    Date/Time: 5/11/2022 9:44 PM  Performed by: Mireya Kapoor MD  Authorized by: Mireya Kapoor MD   Patient tolerance: patient tolerated the procedure well with no immediate complications           Physical Exam  Vitals reviewed.   Constitutional:       Appearance: Normal appearance. She is well-developed.   HENT:      Head: Normocephalic and " atraumatic.   Eyes:      General:         Right eye: No discharge.         Left eye: No discharge.   Cardiovascular:      Rate and Rhythm: Normal rate and regular rhythm.      Heart sounds: Normal heart sounds. No murmur heard.    No friction rub. No gallop.   Pulmonary:      Effort: Pulmonary effort is normal. No respiratory distress.      Breath sounds: Normal breath sounds. No wheezing or rales.   Abdominal:      General: Abdomen is flat. Bowel sounds are normal. There is no distension.      Palpations: Abdomen is soft. There is no mass.      Tenderness: There is no abdominal tenderness. There is no guarding or rebound.      Hernia: No hernia is present.   Musculoskeletal:      Comments: Bruising right forearm   Skin:     General: Skin is warm and dry.      Findings: No rash.      Comments: Tip of nose with erythema scaly skin   Neurological:      Mental Status: She is alert and oriented to person, place, and time.      Coordination: Coordination normal.      Gait: Gait normal.   Psychiatric:         Behavior: Behavior is cooperative.         Assessment and Plan:  Problems Addressed this Visit        Gastrointestinal Abdominal     GERD (gastroesophageal reflux disease)     Better  Continue current treatment              Musculoskeletal and Injuries    Cat bite of right wrist     Healing well           Tear of skin of right wrist     healing              Skin    AK (actinic keratosis) - Primary     Tip of nose             Diagnoses       Codes Comments    AK (actinic keratosis)    -  Primary ICD-10-CM: L57.0  ICD-9-CM: 702.0     Cat bite of right wrist, initial encounter     ICD-10-CM: S61.551A, W55.01XA  ICD-9-CM: 881.02, E906.3     Tear of skin of right wrist, initial encounter     ICD-10-CM: S61.511A  ICD-9-CM: 881.02     Gastroesophageal reflux disease, unspecified whether esophagitis present     ICD-10-CM: K21.9  ICD-9-CM: 530.81            BMI is >= 25 and < 30. (Overweight) The following options were  offered after discussion: nutrition counseling/recommendations      An After Visit Summary and PPPS were given to the patient.       I wore protective equipment throughout this patient encounter to include mask. Hand hygiene was performed before donning protective equipment and after removal when leaving the room.

## 2022-05-23 ENCOUNTER — OFFICE VISIT (OUTPATIENT)
Dept: FAMILY MEDICINE CLINIC | Facility: CLINIC | Age: 77
End: 2022-05-23

## 2022-05-23 VITALS
BODY MASS INDEX: 27.79 KG/M2 | DIASTOLIC BLOOD PRESSURE: 82 MMHG | WEIGHT: 166.8 LBS | TEMPERATURE: 97.5 F | HEIGHT: 65 IN | HEART RATE: 82 BPM | OXYGEN SATURATION: 94 % | SYSTOLIC BLOOD PRESSURE: 124 MMHG | RESPIRATION RATE: 20 BRPM

## 2022-05-23 DIAGNOSIS — L57.0 AK (ACTINIC KERATOSIS): Primary | ICD-10-CM

## 2022-05-23 PROCEDURE — 17000 DESTRUCT PREMALG LESION: CPT | Performed by: FAMILY MEDICINE

## 2022-05-23 RX ORDER — MONTELUKAST SODIUM 10 MG/1
TABLET ORAL
COMMUNITY
Start: 2022-05-21 | End: 2022-07-30

## 2022-05-23 NOTE — PROGRESS NOTES
Subjective   Krystyna Bennett is a 76 y.o. female. Presents to [facility]    Chief Complaint   Patient presents with   • Skin lesion   Lesion:   Krystyna Bennett is here today for a lesion. The lesion appeared gradual and has been occurring for years.. The course has been increasing in size. The lesion is characterized as no sign of infection. The lesion is located over nose. The symptoms have been associated with none.         I personally reviewed and updated the patient's allergies, medications, problem list, and past medical, surgical, social, and family history. I have reviewed and confirmed the accuracy of the History of Present Illness and Review of Symptoms as documented by the MA/LPN/RN. Mireya Kapoor MD    Allergies:  Allergies   Allergen Reactions   • Sulfacetamide Sodium-Sulfur Other (See Comments)     Tunnel vision,light headed/ may not be allergic to it any longer        Social History:  Social History     Socioeconomic History   • Marital status:    Tobacco Use   • Smoking status: Former Smoker     Packs/day: 0.50     Years: 37.00     Pack years: 18.50     Types: Cigarettes, Cigars     Start date: 1960     Quit date: 2019     Years since quittin.8   • Smokeless tobacco: Never Used   Vaping Use   • Vaping Use: Never used   Substance and Sexual Activity   • Alcohol use: Yes     Alcohol/week: 4.0 standard drinks     Types: 2 Glasses of wine, 2 Shots of liquor per week     Comment: social drinker   • Drug use: No   • Sexual activity: Not Currently     Birth control/protection: Post-menopausal       Family History:  Family History   Problem Relation Age of Onset   • Heart disease Father            • Stroke Father    • Leukemia Paternal Grandmother    • Anemia Paternal Grandmother    • Heart disease Paternal Grandmother    • Alcohol abuse Maternal Aunt    • Cancer Maternal Aunt            • Asthma Maternal Grandmother            • Hyperlipidemia  Maternal Grandmother            • Diabetes Paternal Aunt            • Hypertension Son    • Hypertension Daughter        Past Medical History :  Patient Active Problem List   Diagnosis   • Essential hypertension   • CLL (chronic lymphocytic leukemia) (HCC)   • Centrilobular emphysema (HCC)   • GERD (gastroesophageal reflux disease)   • Arthritis   • Systolic CHF, chronic (HCC)   • Anemia   • History of Clostridium difficile colitis   • Melanoma (HCC)   • Mixed hyperlipidemia   • Stage 3b chronic kidney disease (HCC)   • Medicare annual wellness visit, subsequent   • Anxiety   • Restless legs syndrome   • Colon cancer screening   • Eczematous dermatitis of upper and lower eyelids of both eyes   • Positive colorectal cancer screening using Cologuard test   • Mammogram declined   • Flu vaccine need   • Right ear pain   • Non-seasonal allergic rhinitis   • Non-recurrent acute serous otitis media of right ear   • AK (actinic keratosis)   • Cat bite of right wrist   • Tear of skin of right wrist       Medication List:    Current Outpatient Medications:   •  albuterol (PROVENTIL) (2.5 MG/3ML) 0.083% nebulizer solution, Take 2.5 mg by nebulization Every 4 (Four) Hours As Needed for Wheezing., Disp: 75 vial, Rfl: 11  •  albuterol sulfate HFA (VENTOLIN HFA) 108 (90 Base) MCG/ACT inhaler, Inhale 2 puffs Every 4 (Four) Hours As Needed for Wheezing., Disp: 1 inhaler, Rfl: 12  •  aspirin 81 MG EC tablet, Take 81 mg by mouth Daily., Disp: , Rfl:   •  azelastine (ASTEPRO) 0.15 % solution nasal spray, 2 sprays into the nostril(s) as directed by provider Daily., Disp: 30 mL, Rfl: 12  •  bumetanide (BUMEX) 1 MG tablet, Take 1 tablet by mouth 2 (Two) Times a Day., Disp: 60 tablet, Rfl: 12  •  Calcium Carbonate-Vit D-Min (CALTRATE 600+D PLUS MINERALS) 600-800 MG-UNIT chewable tablet, Chew 2 tablets Daily., Disp: , Rfl:   •  Coenzyme Q10 (COQ10 PO), Take  by mouth., Disp: , Rfl:   •  Cyanocobalamin (B-12) 1000 MCG  capsule, Take 1,000 mcg by mouth Daily., Disp: , Rfl:   •  esomeprazole (nexIUM) 40 MG capsule, 1 tablet po daily, Disp: , Rfl:   •  ferrous gluconate (FERGON) 324 MG tablet, Take 324 mg by mouth Daily With Breakfast., Disp: , Rfl:   •  Fluticasone-Umeclidin-Vilant (TRELEGY) 100-62.5-25 MCG/INH inhaler, Inhale 1 puff Daily., Disp: 60 each, Rfl: 11  •  Glucosamine-Chondroitin 250-200 MG tablet, Take 1 tablet by mouth Daily., Disp: , Rfl:   •  guaiFENesin ER 1200 MG tablet sustained-release 12 hour, Take 1 tablet by mouth Daily., Disp: , Rfl:   •  Imbruvica 420 MG tablet tablet, Take 420 mg by mouth Daily., Disp: , Rfl:   •  ipratropium (ATROVENT) 0.02 % nebulizer solution, Take 2.5 mL by nebulization 3 (Three) Times a Day As Needed for Wheezing or Shortness of Air., Disp: 62.5 mL, Rfl: 11  •  lisinopril-hydrochlorothiazide (PRINZIDE,ZESTORETIC) 20-12.5 MG per tablet, TAKE ONE TABLET BY MOUTH DAILY, Disp: 90 tablet, Rfl: 11  •  montelukast (SINGULAIR) 10 MG tablet, , Disp: , Rfl:   •  Multiple Vitamins-Minerals (CENTRUM SILVER) tablet, 1 tablet po daily, Disp: , Rfl:   •  Omega-3 Fatty Acids (FISH OIL PO), Take  by mouth., Disp: , Rfl:   •  PARoxetine (PAXIL) 20 MG tablet, TAKE ONE TABLET BY MOUTH DAILY, Disp: 90 tablet, Rfl: 3  •  potassium chloride ER (K-TAB) 20 MEQ tablet controlled-release ER tablet, Take 1 tablet by mouth Daily., Disp: 30 tablet, Rfl: 12  •  pramipexole (MIRAPEX) 0.25 MG tablet, TAKE ONE TABLET BY MOUTH DAILY, Disp: 90 tablet, Rfl: 3  •  simvastatin (ZOCOR) 20 MG tablet, TAKE ONE TABLET BY MOUTH DAILY, Disp: 90 tablet, Rfl: 2    Past Surgical History:  Past Surgical History:   Procedure Laterality Date   • APPENDECTOMY     • CARDIAC CATHETERIZATION  05/2019    [facility].   • HYSTERECTOMY     • TONSILLECTOMY         Review of Systems:  Review of Systems   Skin: Positive for skin lesions.       Physical Exam:  Vital Signs:  Vital Signs:   /82 (BP Location: Right arm, Patient Position: Sitting, Cuff  "Size: Adult)   Pulse 82   Temp 97.5 °F (36.4 °C) (Temporal)   Resp 20   Ht 165.1 cm (65\")   Wt 75.7 kg (166 lb 12.8 oz)   SpO2 94% Comment: 4 liters oxygen  BMI 27.76 kg/m²     Result Review :          Cryotherapy, Skin Lesion    Date/Time: 5/23/2022 11:30 AM  Performed by: Mireya Kapoor MD  Authorized by: Mireya Kapoor MD             Physical Exam  Skin:     Comments: Nose with 2 scaly macules         Assessment and Plan:  Problems Addressed this Visit        Skin    AK (actinic keratosis) - Primary      Diagnoses       Codes Comments    AK (actinic keratosis)    -  Primary ICD-10-CM: L57.0  ICD-9-CM: 702.0            BMI is >= 25 and < 30. (Overweight) The following options were offered after discussion: exercise counseling/recommendations and nutrition counseling/recommendations      An After Visit Summary and PPPS were given to the patient.       I wore protective equipment throughout this patient encounter to include mask. Hand hygiene was performed before donning protective equipment and after removal when leaving the room.    "

## 2022-07-30 RX ORDER — MONTELUKAST SODIUM 10 MG/1
TABLET ORAL
Qty: 90 TABLET | Refills: 3 | Status: SHIPPED | OUTPATIENT
Start: 2022-07-30

## 2022-07-30 RX ORDER — LISINOPRIL AND HYDROCHLOROTHIAZIDE 20; 12.5 MG/1; MG/1
TABLET ORAL
Qty: 90 TABLET | Refills: 3 | Status: SHIPPED | OUTPATIENT
Start: 2022-07-30 | End: 2022-11-30 | Stop reason: HOSPADM

## 2022-08-01 ENCOUNTER — TELEPHONE (OUTPATIENT)
Dept: FAMILY MEDICINE CLINIC | Facility: CLINIC | Age: 77
End: 2022-08-01

## 2022-08-01 NOTE — TELEPHONE ENCOUNTER
PATIENT CALLED STATING SHE WAS IN DR MCNEAL/ ONCOLOGY OFFICE AND HER BLOOD PRESSURE WAS LOW AND THEY ADVISED HER TO CONTACT YOU FOR ADVISE     128/63 WAS TODAY'S READINGS       PLEASE CALL AND LET HER KNOW WHAT SHE SHOULD DO FOR IT     Krystyna Bennett (Self) [phone]

## 2022-08-04 NOTE — TELEPHONE ENCOUNTER
SHE CUT HER LISINIPRIL IN 1/2 AND HER BLOOD PRESSURE READINGS ARE:  TUES /76, TUES LUNCH  113/70,  TUES. 1PM 127/63, WED /66, WED 4PM 123/66, THURS NOON 134/67,  WED @ 1PM 139/66.  SHE DOES NOT FEEL BAD OR ANYTHING.  SHE WILL CONTINUE TO TAKE 1/2 PILL UNLESS SOMEONE CALLS AND TELLS HER OTHERWISE.

## 2022-08-05 NOTE — TELEPHONE ENCOUNTER
Called and spoke to patient about message she sent. I told her per Dr. Kapoor taking the 1/2 pill is okay.

## 2022-09-06 RX ORDER — SIMVASTATIN 20 MG
TABLET ORAL
Qty: 90 TABLET | Refills: 2 | Status: SHIPPED | OUTPATIENT
Start: 2022-09-06

## 2022-10-27 ENCOUNTER — OFFICE VISIT (OUTPATIENT)
Dept: PULMONOLOGY | Facility: HOSPITAL | Age: 77
End: 2022-10-27

## 2022-10-27 VITALS
SYSTOLIC BLOOD PRESSURE: 109 MMHG | HEIGHT: 65 IN | OXYGEN SATURATION: 95 % | BODY MASS INDEX: 27.66 KG/M2 | WEIGHT: 166 LBS | DIASTOLIC BLOOD PRESSURE: 62 MMHG | RESPIRATION RATE: 14 BRPM | HEART RATE: 52 BPM

## 2022-10-27 DIAGNOSIS — R91.1 LUNG NODULE: ICD-10-CM

## 2022-10-27 DIAGNOSIS — J44.9 CHRONIC OBSTRUCTIVE PULMONARY DISEASE, UNSPECIFIED COPD TYPE: Primary | ICD-10-CM

## 2022-10-27 PROCEDURE — G0463 HOSPITAL OUTPT CLINIC VISIT: HCPCS

## 2022-10-27 RX ORDER — MONTELUKAST SODIUM 10 MG/1
10 TABLET ORAL NIGHTLY
Qty: 30 TABLET | Refills: 11 | Status: SHIPPED | OUTPATIENT
Start: 2022-10-27 | End: 2022-11-19 | Stop reason: SDUPTHER
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
## 2022-10-28 DIAGNOSIS — J43.2 CENTRILOBULAR EMPHYSEMA: Primary | ICD-10-CM

## 2022-10-28 DIAGNOSIS — J44.9 CHRONIC OBSTRUCTIVE PULMONARY DISEASE, UNSPECIFIED COPD TYPE: ICD-10-CM

## 2022-10-28 RX ORDER — ROFLUMILAST 500 UG/1
500 TABLET ORAL DAILY
Qty: 30 TABLET | Refills: 3 | Status: SHIPPED | OUTPATIENT
Start: 2022-10-28 | End: 2022-12-22

## 2022-10-28 NOTE — TELEPHONE ENCOUNTER
Pt was seen in clinic 10/27/22 and Dr. Naun bass to send the new Rx in for Daliresp 500 once daily. Added to med list and sent to pharmacy requested.

## 2022-11-08 ENCOUNTER — TELEPHONE (OUTPATIENT)
Dept: PULMONOLOGY | Facility: HOSPITAL | Age: 77
End: 2022-11-08

## 2022-11-08 NOTE — TELEPHONE ENCOUNTER
Pt left vm in regards to a new Rx. Think she said mucinex. At her office visit on 10/27/22 Singular and Daliresp was prescribed.   I LMOM for pt.

## 2022-11-15 ENCOUNTER — HOSPITAL ENCOUNTER (OUTPATIENT)
Dept: CT IMAGING | Facility: HOSPITAL | Age: 77
Discharge: HOME | End: 2022-11-15
Admitting: INTERNAL MEDICINE

## 2022-11-15 DIAGNOSIS — R91.1 LUNG NODULE: ICD-10-CM

## 2022-11-15 PROCEDURE — 71250 CT THORAX DX C-: CPT

## 2022-11-18 ENCOUNTER — APPOINTMENT (OUTPATIENT)
Dept: GENERAL RADIOLOGY | Facility: HOSPITAL | Age: 77
End: 2022-11-18

## 2022-11-18 ENCOUNTER — HOSPITAL ENCOUNTER (EMERGENCY)
Facility: HOSPITAL | Age: 77
Discharge: HOME | End: 2022-11-18
Attending: EMERGENCY MEDICINE | Admitting: EMERGENCY MEDICINE

## 2022-11-18 VITALS
TEMPERATURE: 98 F | DIASTOLIC BLOOD PRESSURE: 59 MMHG | WEIGHT: 166 LBS | HEART RATE: 87 BPM | RESPIRATION RATE: 14 BRPM | SYSTOLIC BLOOD PRESSURE: 102 MMHG | OXYGEN SATURATION: 90 % | BODY MASS INDEX: 27.66 KG/M2 | HEIGHT: 65 IN

## 2022-11-18 DIAGNOSIS — J18.9 PNEUMONIA OF BOTH LUNGS DUE TO INFECTIOUS ORGANISM, UNSPECIFIED PART OF LUNG: ICD-10-CM

## 2022-11-18 DIAGNOSIS — R06.00 DYSPNEA, UNSPECIFIED TYPE: Primary | ICD-10-CM

## 2022-11-18 LAB
ANION GAP SERPL CALCULATED.3IONS-SCNC: 12 MMOL/L (ref 5–15)
ARTERIAL PATENCY WRIST A: POSITIVE
ATMOSPHERIC PRESS: ABNORMAL MM[HG]
B PARAPERT DNA SPEC QL NAA+PROBE: NOT DETECTED
B PERT DNA SPEC QL NAA+PROBE: NOT DETECTED
BASE EXCESS BLDA CALC-SCNC: 4.9 MMOL/L (ref 0–3)
BASOPHILS # BLD AUTO: 0.2 10*3/MM3 (ref 0–0.2)
BASOPHILS NFR BLD AUTO: 1 % (ref 0–1.5)
BDY SITE: ABNORMAL
BUN SERPL-MCNC: 30 MG/DL (ref 8–23)
BUN/CREAT SERPL: 21.4 (ref 7–25)
C PNEUM DNA NPH QL NAA+NON-PROBE: NOT DETECTED
CALCIUM SPEC-SCNC: 9.5 MG/DL (ref 8.6–10.5)
CHLORIDE SERPL-SCNC: 92 MMOL/L (ref 98–107)
CO2 BLDA-SCNC: 31.8 MMOL/L (ref 22–29)
CO2 SERPL-SCNC: 30 MMOL/L (ref 22–29)
CREAT SERPL-MCNC: 1.4 MG/DL (ref 0.57–1)
DEPRECATED RDW RBC AUTO: 41.1 FL (ref 37–54)
EGFRCR SERPLBLD CKD-EPI 2021: 39.3 ML/MIN/1.73
EOSINOPHIL # BLD AUTO: 0 10*3/MM3 (ref 0–0.4)
EOSINOPHIL NFR BLD AUTO: 0.2 % (ref 0.3–6.2)
ERYTHROCYTE [DISTWIDTH] IN BLOOD BY AUTOMATED COUNT: 13.1 % (ref 12.3–15.4)
FLUAV SUBTYP SPEC NAA+PROBE: NOT DETECTED
FLUBV RNA ISLT QL NAA+PROBE: NOT DETECTED
GLUCOSE SERPL-MCNC: 123 MG/DL (ref 65–99)
HADV DNA SPEC NAA+PROBE: NOT DETECTED
HCO3 BLDA-SCNC: 30.3 MMOL/L (ref 21–28)
HCOV 229E RNA SPEC QL NAA+PROBE: NOT DETECTED
HCOV HKU1 RNA SPEC QL NAA+PROBE: NOT DETECTED
HCOV NL63 RNA SPEC QL NAA+PROBE: NOT DETECTED
HCOV OC43 RNA SPEC QL NAA+PROBE: NOT DETECTED
HCT VFR BLD AUTO: 37 % (ref 34–46.6)
HEMODILUTION: NO
HGB BLD-MCNC: 11.7 G/DL (ref 12–15.9)
HMPV RNA NPH QL NAA+NON-PROBE: NOT DETECTED
HPIV1 RNA ISLT QL NAA+PROBE: NOT DETECTED
HPIV2 RNA SPEC QL NAA+PROBE: NOT DETECTED
HPIV3 RNA NPH QL NAA+PROBE: NOT DETECTED
HPIV4 P GENE NPH QL NAA+PROBE: NOT DETECTED
INHALED O2 CONCENTRATION: 36 %
LYMPHOCYTES # BLD AUTO: 5.7 10*3/MM3 (ref 0.7–3.1)
LYMPHOCYTES NFR BLD AUTO: 22.2 % (ref 19.6–45.3)
M PNEUMO IGG SER IA-ACNC: NOT DETECTED
MCH RBC QN AUTO: 28.6 PG (ref 26.6–33)
MCHC RBC AUTO-ENTMCNC: 31.6 G/DL (ref 31.5–35.7)
MCV RBC AUTO: 90.5 FL (ref 79–97)
MODALITY: ABNORMAL
MONOCYTES # BLD AUTO: 1.5 10*3/MM3 (ref 0.1–0.9)
MONOCYTES NFR BLD AUTO: 6.1 % (ref 5–12)
NEUTROPHILS NFR BLD AUTO: 18 10*3/MM3 (ref 1.7–7)
NEUTROPHILS NFR BLD AUTO: 70.5 % (ref 42.7–76)
NRBC BLD AUTO-RTO: 0 /100 WBC (ref 0–0.2)
PCO2 BLDA: 47 MM HG (ref 35–48)
PH BLDA: 7.42 PH UNITS (ref 7.35–7.45)
PLATELET # BLD AUTO: 322 10*3/MM3 (ref 140–450)
PMV BLD AUTO: 10.3 FL (ref 6–12)
PO2 BLDA: 78.2 MM HG (ref 83–108)
POTASSIUM SERPL-SCNC: 3.8 MMOL/L (ref 3.5–5.2)
RBC # BLD AUTO: 4.08 10*6/MM3 (ref 3.77–5.28)
RHINOVIRUS RNA SPEC NAA+PROBE: NOT DETECTED
RSV RNA NPH QL NAA+NON-PROBE: NOT DETECTED
SAO2 % BLDCOA: 95.5 % (ref 94–98)
SARS-COV-2 RNA NPH QL NAA+NON-PROBE: NOT DETECTED
SODIUM SERPL-SCNC: 134 MMOL/L (ref 136–145)
WBC NRBC COR # BLD: 25.5 10*3/MM3 (ref 3.4–10.8)

## 2022-11-18 PROCEDURE — 71045 X-RAY EXAM CHEST 1 VIEW: CPT

## 2022-11-18 PROCEDURE — 94799 UNLISTED PULMONARY SVC/PX: CPT

## 2022-11-18 PROCEDURE — 25010000002 METHYLPREDNISOLONE PER 125 MG: Performed by: EMERGENCY MEDICINE

## 2022-11-18 PROCEDURE — 82803 BLOOD GASES ANY COMBINATION: CPT

## 2022-11-18 PROCEDURE — 99284 EMERGENCY DEPT VISIT MOD MDM: CPT

## 2022-11-18 PROCEDURE — 96374 THER/PROPH/DIAG INJ IV PUSH: CPT

## 2022-11-18 PROCEDURE — 94640 AIRWAY INHALATION TREATMENT: CPT

## 2022-11-18 PROCEDURE — 85025 COMPLETE CBC W/AUTO DIFF WBC: CPT | Performed by: EMERGENCY MEDICINE

## 2022-11-18 PROCEDURE — 36600 WITHDRAWAL OF ARTERIAL BLOOD: CPT

## 2022-11-18 PROCEDURE — 80048 BASIC METABOLIC PNL TOTAL CA: CPT | Performed by: EMERGENCY MEDICINE

## 2022-11-18 PROCEDURE — 0202U NFCT DS 22 TRGT SARS-COV-2: CPT | Performed by: EMERGENCY MEDICINE

## 2022-11-18 RX ORDER — METHYLPREDNISOLONE SODIUM SUCCINATE 125 MG/2ML
125 INJECTION, POWDER, LYOPHILIZED, FOR SOLUTION INTRAMUSCULAR; INTRAVENOUS ONCE
Status: COMPLETED | OUTPATIENT
Start: 2022-11-18 | End: 2022-11-18

## 2022-11-18 RX ORDER — LEVOFLOXACIN 500 MG/1
500 TABLET, FILM COATED ORAL DAILY
Qty: 10 TABLET | Refills: 0 | Status: SHIPPED | OUTPATIENT
Start: 2022-11-18 | End: 2022-11-30 | Stop reason: HOSPADM

## 2022-11-18 RX ORDER — SODIUM CHLORIDE 0.9 % (FLUSH) 0.9 %
10 SYRINGE (ML) INJECTION AS NEEDED
Status: DISCONTINUED | OUTPATIENT
Start: 2022-11-18 | End: 2022-11-19 | Stop reason: HOSPADM

## 2022-11-18 RX ORDER — IPRATROPIUM BROMIDE AND ALBUTEROL SULFATE 2.5; .5 MG/3ML; MG/3ML
3 SOLUTION RESPIRATORY (INHALATION) ONCE
Status: COMPLETED | OUTPATIENT
Start: 2022-11-18 | End: 2022-11-18

## 2022-11-18 RX ADMIN — METHYLPREDNISOLONE SODIUM SUCCINATE 125 MG: 125 INJECTION, POWDER, FOR SOLUTION INTRAMUSCULAR; INTRAVENOUS at 21:37

## 2022-11-18 RX ADMIN — IPRATROPIUM BROMIDE AND ALBUTEROL SULFATE 3 ML: .5; 3 SOLUTION RESPIRATORY (INHALATION) at 21:22

## 2022-11-19 ENCOUNTER — HOSPITAL ENCOUNTER (INPATIENT)
Facility: HOSPITAL | Age: 77
LOS: 11 days | Discharge: HOME | End: 2022-11-30
Attending: EMERGENCY MEDICINE | Admitting: INTERNAL MEDICINE

## 2022-11-19 ENCOUNTER — APPOINTMENT (OUTPATIENT)
Dept: CARDIOLOGY | Facility: HOSPITAL | Age: 77
End: 2022-11-19

## 2022-11-19 ENCOUNTER — APPOINTMENT (OUTPATIENT)
Dept: CT IMAGING | Facility: HOSPITAL | Age: 77
End: 2022-11-19

## 2022-11-19 DIAGNOSIS — J18.9 MULTIFOCAL PNEUMONIA: Primary | ICD-10-CM

## 2022-11-19 DIAGNOSIS — R77.8 ELEVATED TROPONIN: ICD-10-CM

## 2022-11-19 DIAGNOSIS — I20.8 ANGINA AT REST: ICD-10-CM

## 2022-11-19 DIAGNOSIS — R06.00 DYSPNEA, UNSPECIFIED TYPE: ICD-10-CM

## 2022-11-19 LAB
ANION GAP SERPL CALCULATED.3IONS-SCNC: 14 MMOL/L (ref 5–15)
ANION GAP SERPL CALCULATED.3IONS-SCNC: 15 MMOL/L (ref 5–15)
APTT PPP: 26 SECONDS (ref 61–76.5)
BASOPHILS # BLD AUTO: 0 10*3/MM3 (ref 0–0.2)
BASOPHILS NFR BLD AUTO: 0.1 % (ref 0–1.5)
BUN SERPL-MCNC: 31 MG/DL (ref 8–23)
BUN SERPL-MCNC: 34 MG/DL (ref 8–23)
BUN/CREAT SERPL: 25.2 (ref 7–25)
BUN/CREAT SERPL: 27.9 (ref 7–25)
CALCIUM SPEC-SCNC: 8.6 MG/DL (ref 8.6–10.5)
CALCIUM SPEC-SCNC: 8.8 MG/DL (ref 8.6–10.5)
CHLORIDE SERPL-SCNC: 90 MMOL/L (ref 98–107)
CHLORIDE SERPL-SCNC: 94 MMOL/L (ref 98–107)
CO2 SERPL-SCNC: 27 MMOL/L (ref 22–29)
CO2 SERPL-SCNC: 27 MMOL/L (ref 22–29)
CREAT SERPL-MCNC: 1.11 MG/DL (ref 0.57–1)
CREAT SERPL-MCNC: 1.35 MG/DL (ref 0.57–1)
D DIMER PPP FEU-MCNC: 2.06 MG/L (FEU) (ref 0–0.59)
D-LACTATE SERPL-SCNC: 1.7 MMOL/L (ref 0.5–2)
DEPRECATED RDW RBC AUTO: 42 FL (ref 37–54)
DEPRECATED RDW RBC AUTO: 42.4 FL (ref 37–54)
EGFRCR SERPLBLD CKD-EPI 2021: 41.1 ML/MIN/1.73
EGFRCR SERPLBLD CKD-EPI 2021: 51.9 ML/MIN/1.73
EOSINOPHIL # BLD AUTO: 0 10*3/MM3 (ref 0–0.4)
EOSINOPHIL NFR BLD AUTO: 0 % (ref 0.3–6.2)
ERYTHROCYTE [DISTWIDTH] IN BLOOD BY AUTOMATED COUNT: 12.9 % (ref 12.3–15.4)
ERYTHROCYTE [DISTWIDTH] IN BLOOD BY AUTOMATED COUNT: 13.2 % (ref 12.3–15.4)
GIANT PLATELETS: ABNORMAL
GLUCOSE SERPL-MCNC: 137 MG/DL (ref 65–99)
GLUCOSE SERPL-MCNC: 206 MG/DL (ref 65–99)
HCT VFR BLD AUTO: 36.7 % (ref 34–46.6)
HCT VFR BLD AUTO: 37 % (ref 34–46.6)
HGB BLD-MCNC: 11.8 G/DL (ref 12–15.9)
HGB BLD-MCNC: 12 G/DL (ref 12–15.9)
INR PPP: 0.99 (ref 0.93–1.1)
LYMPHOCYTES # BLD AUTO: 4.6 10*3/MM3 (ref 0.7–3.1)
LYMPHOCYTES # BLD MANUAL: 6.22 10*3/MM3 (ref 0.7–3.1)
LYMPHOCYTES NFR BLD AUTO: 15.8 % (ref 19.6–45.3)
MCH RBC QN AUTO: 29 PG (ref 26.6–33)
MCH RBC QN AUTO: 29.2 PG (ref 26.6–33)
MCHC RBC AUTO-ENTMCNC: 31.9 G/DL (ref 31.5–35.7)
MCHC RBC AUTO-ENTMCNC: 32.7 G/DL (ref 31.5–35.7)
MCV RBC AUTO: 89.3 FL (ref 79–97)
MCV RBC AUTO: 90.9 FL (ref 79–97)
MONOCYTES # BLD AUTO: 0.4 10*3/MM3 (ref 0.1–0.9)
MONOCYTES NFR BLD AUTO: 1.3 % (ref 5–12)
NEUTROPHILS # BLD AUTO: 24.88 10*3/MM3 (ref 1.7–7)
NEUTROPHILS NFR BLD AUTO: 24 10*3/MM3 (ref 1.7–7)
NEUTROPHILS NFR BLD AUTO: 82.8 % (ref 42.7–76)
NEUTROPHILS NFR BLD MANUAL: 75 % (ref 42.7–76)
NEUTS BAND NFR BLD MANUAL: 5 % (ref 0–5)
NEUTS VAC BLD QL SMEAR: ABNORMAL
NRBC BLD AUTO-RTO: 0 /100 WBC (ref 0–0.2)
PLATELET # BLD AUTO: 287 10*3/MM3 (ref 140–450)
PLATELET # BLD AUTO: 302 10*3/MM3 (ref 140–450)
PMV BLD AUTO: 10.7 FL (ref 6–12)
PMV BLD AUTO: 10.8 FL (ref 6–12)
POTASSIUM SERPL-SCNC: 3.5 MMOL/L (ref 3.5–5.2)
POTASSIUM SERPL-SCNC: 4 MMOL/L (ref 3.5–5.2)
PROCALCITONIN SERPL-MCNC: 0.04 NG/ML (ref 0–0.25)
PROTHROMBIN TIME: 10.2 SECONDS (ref 9.6–11.7)
RBC # BLD AUTO: 4.07 10*6/MM3 (ref 3.77–5.28)
RBC # BLD AUTO: 4.11 10*6/MM3 (ref 3.77–5.28)
RBC MORPH BLD: NORMAL
SCAN SLIDE: NORMAL
SODIUM SERPL-SCNC: 132 MMOL/L (ref 136–145)
SODIUM SERPL-SCNC: 135 MMOL/L (ref 136–145)
TROPONIN T SERPL-MCNC: 0.81 NG/ML (ref 0–0.03)
VARIANT LYMPHS NFR BLD MANUAL: 20 % (ref 19.6–45.3)
WBC NRBC COR # BLD: 28.9 10*3/MM3 (ref 3.4–10.8)
WBC NRBC COR # BLD: 31.1 10*3/MM3 (ref 3.4–10.8)

## 2022-11-19 PROCEDURE — 80048 BASIC METABOLIC PNL TOTAL CA: CPT | Performed by: EMERGENCY MEDICINE

## 2022-11-19 PROCEDURE — 25010000002 ONDANSETRON PER 1 MG: Performed by: EMERGENCY MEDICINE

## 2022-11-19 PROCEDURE — 25010000002 ONDANSETRON PER 1 MG: Performed by: HOSPITALIST

## 2022-11-19 PROCEDURE — 93306 TTE W/DOPPLER COMPLETE: CPT | Performed by: INTERNAL MEDICINE

## 2022-11-19 PROCEDURE — 80048 BASIC METABOLIC PNL TOTAL CA: CPT | Performed by: HOSPITALIST

## 2022-11-19 PROCEDURE — 99222 1ST HOSP IP/OBS MODERATE 55: CPT | Performed by: INTERNAL MEDICINE

## 2022-11-19 PROCEDURE — 92610 EVALUATE SWALLOWING FUNCTION: CPT

## 2022-11-19 PROCEDURE — 36415 COLL VENOUS BLD VENIPUNCTURE: CPT | Performed by: HOSPITALIST

## 2022-11-19 PROCEDURE — 85379 FIBRIN DEGRADATION QUANT: CPT | Performed by: EMERGENCY MEDICINE

## 2022-11-19 PROCEDURE — 99285 EMERGENCY DEPT VISIT HI MDM: CPT

## 2022-11-19 PROCEDURE — 85025 COMPLETE CBC W/AUTO DIFF WBC: CPT | Performed by: HOSPITALIST

## 2022-11-19 PROCEDURE — 84484 ASSAY OF TROPONIN QUANT: CPT | Performed by: EMERGENCY MEDICINE

## 2022-11-19 PROCEDURE — 25010000002 ONDANSETRON PER 1 MG: Performed by: INTERNAL MEDICINE

## 2022-11-19 PROCEDURE — 85730 THROMBOPLASTIN TIME PARTIAL: CPT | Performed by: EMERGENCY MEDICINE

## 2022-11-19 PROCEDURE — 84145 PROCALCITONIN (PCT): CPT | Performed by: HOSPITALIST

## 2022-11-19 PROCEDURE — 83605 ASSAY OF LACTIC ACID: CPT

## 2022-11-19 PROCEDURE — 83036 HEMOGLOBIN GLYCOSYLATED A1C: CPT | Performed by: HOSPITALIST

## 2022-11-19 PROCEDURE — 94799 UNLISTED PULMONARY SVC/PX: CPT

## 2022-11-19 PROCEDURE — 85025 COMPLETE CBC W/AUTO DIFF WBC: CPT | Performed by: EMERGENCY MEDICINE

## 2022-11-19 PROCEDURE — 0 IOPAMIDOL PER 1 ML: Performed by: EMERGENCY MEDICINE

## 2022-11-19 PROCEDURE — 85610 PROTHROMBIN TIME: CPT | Performed by: EMERGENCY MEDICINE

## 2022-11-19 PROCEDURE — 87040 BLOOD CULTURE FOR BACTERIA: CPT | Performed by: EMERGENCY MEDICINE

## 2022-11-19 PROCEDURE — 93306 TTE W/DOPPLER COMPLETE: CPT

## 2022-11-19 PROCEDURE — 25010000002 MORPHINE PER 10 MG: Performed by: EMERGENCY MEDICINE

## 2022-11-19 PROCEDURE — 94640 AIRWAY INHALATION TREATMENT: CPT

## 2022-11-19 PROCEDURE — 71275 CT ANGIOGRAPHY CHEST: CPT

## 2022-11-19 PROCEDURE — 93005 ELECTROCARDIOGRAM TRACING: CPT | Performed by: EMERGENCY MEDICINE

## 2022-11-19 PROCEDURE — 85007 BL SMEAR W/DIFF WBC COUNT: CPT | Performed by: EMERGENCY MEDICINE

## 2022-11-19 PROCEDURE — 25010000002 CEFTRIAXONE PER 250 MG: Performed by: EMERGENCY MEDICINE

## 2022-11-19 RX ORDER — ACETAMINOPHEN 325 MG/1
650 TABLET ORAL EVERY 6 HOURS PRN
Status: DISCONTINUED | OUTPATIENT
Start: 2022-11-19 | End: 2022-11-25

## 2022-11-19 RX ORDER — SODIUM CHLORIDE 9 MG/ML
40 INJECTION, SOLUTION INTRAVENOUS AS NEEDED
Status: DISCONTINUED | OUTPATIENT
Start: 2022-11-19 | End: 2022-11-30 | Stop reason: HOSPADM

## 2022-11-19 RX ORDER — AZELASTINE 1 MG/ML
2 SPRAY, METERED NASAL DAILY
Status: DISCONTINUED | OUTPATIENT
Start: 2022-11-19 | End: 2022-11-30 | Stop reason: HOSPADM

## 2022-11-19 RX ORDER — PANTOPRAZOLE SODIUM 40 MG/10ML
40 INJECTION, POWDER, LYOPHILIZED, FOR SOLUTION INTRAVENOUS
Status: DISCONTINUED | OUTPATIENT
Start: 2022-11-19 | End: 2022-11-21

## 2022-11-19 RX ORDER — PAROXETINE HYDROCHLORIDE 20 MG/1
20 TABLET, FILM COATED ORAL DAILY
Status: DISCONTINUED | OUTPATIENT
Start: 2022-11-19 | End: 2022-11-19

## 2022-11-19 RX ORDER — MONTELUKAST SODIUM 10 MG/1
10 TABLET ORAL NIGHTLY
Status: DISCONTINUED | OUTPATIENT
Start: 2022-11-19 | End: 2022-11-30 | Stop reason: HOSPADM

## 2022-11-19 RX ORDER — ONDANSETRON 2 MG/ML
4 INJECTION INTRAMUSCULAR; INTRAVENOUS EVERY 6 HOURS PRN
Status: DISCONTINUED | OUTPATIENT
Start: 2022-11-19 | End: 2022-11-30 | Stop reason: HOSPADM

## 2022-11-19 RX ORDER — HEPARIN SODIUM 5000 [USP'U]/ML
5000 INJECTION, SOLUTION INTRAVENOUS; SUBCUTANEOUS EVERY 12 HOURS SCHEDULED
Status: DISCONTINUED | OUTPATIENT
Start: 2022-11-19 | End: 2022-11-19

## 2022-11-19 RX ORDER — SODIUM CHLORIDE 0.9 % (FLUSH) 0.9 %
10 SYRINGE (ML) INJECTION AS NEEDED
Status: DISCONTINUED | OUTPATIENT
Start: 2022-11-19 | End: 2022-11-30 | Stop reason: HOSPADM

## 2022-11-19 RX ORDER — ONDANSETRON 2 MG/ML
4 INJECTION INTRAMUSCULAR; INTRAVENOUS EVERY 6 HOURS PRN
Status: DISCONTINUED | OUTPATIENT
Start: 2022-11-19 | End: 2022-11-19 | Stop reason: SDUPTHER

## 2022-11-19 RX ORDER — PRAMIPEXOLE DIHYDROCHLORIDE 0.25 MG/1
0.25 TABLET ORAL DAILY
Status: DISCONTINUED | OUTPATIENT
Start: 2022-11-19 | End: 2022-11-30 | Stop reason: HOSPADM

## 2022-11-19 RX ORDER — SODIUM CHLORIDE 0.9 % (FLUSH) 0.9 %
10 SYRINGE (ML) INJECTION EVERY 12 HOURS SCHEDULED
Status: DISCONTINUED | OUTPATIENT
Start: 2022-11-19 | End: 2022-11-30 | Stop reason: HOSPADM

## 2022-11-19 RX ORDER — ONDANSETRON 2 MG/ML
4 INJECTION INTRAMUSCULAR; INTRAVENOUS ONCE
Status: COMPLETED | OUTPATIENT
Start: 2022-11-19 | End: 2022-11-19

## 2022-11-19 RX ORDER — PANTOPRAZOLE SODIUM 40 MG/1
40 TABLET, DELAYED RELEASE ORAL DAILY
Status: DISCONTINUED | OUTPATIENT
Start: 2022-11-19 | End: 2022-11-19

## 2022-11-19 RX ORDER — ATORVASTATIN CALCIUM 10 MG/1
10 TABLET, FILM COATED ORAL NIGHTLY
Status: DISCONTINUED | OUTPATIENT
Start: 2022-11-19 | End: 2022-11-30 | Stop reason: HOSPADM

## 2022-11-19 RX ORDER — ROFLUMILAST 500 UG/1
500 TABLET ORAL DAILY
Status: DISCONTINUED | OUTPATIENT
Start: 2022-11-19 | End: 2022-11-30 | Stop reason: HOSPADM

## 2022-11-19 RX ORDER — BUDESONIDE AND FORMOTEROL FUMARATE DIHYDRATE 80; 4.5 UG/1; UG/1
2 AEROSOL RESPIRATORY (INHALATION)
Status: DISCONTINUED | OUTPATIENT
Start: 2022-11-19 | End: 2022-11-21

## 2022-11-19 RX ADMIN — Medication 10 ML: at 11:45

## 2022-11-19 RX ADMIN — IPRATROPIUM BROMIDE 0.5 MG: 0.5 SOLUTION RESPIRATORY (INHALATION) at 23:28

## 2022-11-19 RX ADMIN — ONDANSETRON 4 MG: 2 INJECTION INTRAMUSCULAR; INTRAVENOUS at 11:45

## 2022-11-19 RX ADMIN — Medication 10 ML: at 22:41

## 2022-11-19 RX ADMIN — DOXYCYCLINE 100 MG: 100 INJECTION, POWDER, LYOPHILIZED, FOR SOLUTION INTRAVENOUS at 07:30

## 2022-11-19 RX ADMIN — IBRUTINIB 420 MG: 420 TABLET, FILM COATED ORAL at 22:41

## 2022-11-19 RX ADMIN — IOPAMIDOL 100 ML: 755 INJECTION, SOLUTION INTRAVENOUS at 07:05

## 2022-11-19 RX ADMIN — ATORVASTATIN CALCIUM 10 MG: 10 TABLET, FILM COATED ORAL at 22:39

## 2022-11-19 RX ADMIN — MONTELUKAST SODIUM 10 MG: 10 TABLET, COATED ORAL at 22:39

## 2022-11-19 RX ADMIN — ACETAMINOPHEN 650 MG: 325 TABLET, FILM COATED ORAL at 22:40

## 2022-11-19 RX ADMIN — CEFTRIAXONE 1 G: 1 INJECTION, POWDER, FOR SOLUTION INTRAMUSCULAR; INTRAVENOUS at 07:09

## 2022-11-19 RX ADMIN — ONDANSETRON 4 MG: 2 INJECTION INTRAMUSCULAR; INTRAVENOUS at 06:40

## 2022-11-19 RX ADMIN — MORPHINE SULFATE 4 MG: 4 INJECTION, SOLUTION INTRAMUSCULAR; INTRAVENOUS at 06:40

## 2022-11-19 RX ADMIN — BUDESONIDE AND FORMOTEROL FUMARATE DIHYDRATE 2 PUFF: 80; 4.5 AEROSOL RESPIRATORY (INHALATION) at 19:58

## 2022-11-19 RX ADMIN — IPRATROPIUM BROMIDE 0.5 MG: 0.5 SOLUTION RESPIRATORY (INHALATION) at 19:58

## 2022-11-19 RX ADMIN — ONDANSETRON 4 MG: 2 INJECTION INTRAMUSCULAR; INTRAVENOUS at 20:57

## 2022-11-20 LAB
ANION GAP SERPL CALCULATED.3IONS-SCNC: 12 MMOL/L (ref 5–15)
BASOPHILS # BLD MANUAL: 0.31 10*3/MM3 (ref 0–0.2)
BASOPHILS NFR BLD MANUAL: 1 % (ref 0–1.5)
BH CV ECHO MEAS - AO MAX PG: 4.2 MMHG
BH CV ECHO MEAS - AO MEAN PG: 2.9 MMHG
BH CV ECHO MEAS - AO ROOT DIAM: 2.21 CM
BH CV ECHO MEAS - AO V2 MAX: 102.7 CM/SEC
BH CV ECHO MEAS - AO V2 VTI: 20.2 CM
BH CV ECHO MEAS - AVA(I,D): 1.45 CM2
BH CV ECHO MEAS - EDV(CUBED): 58 ML
BH CV ECHO MEAS - EDV(MOD-SP4): 153.6 ML
BH CV ECHO MEAS - EF(MOD-BP): 24 %
BH CV ECHO MEAS - EF(MOD-SP4): 24.5 %
BH CV ECHO MEAS - ESV(CUBED): 16.7 ML
BH CV ECHO MEAS - ESV(MOD-SP4): 116 ML
BH CV ECHO MEAS - FS: 33.9 %
BH CV ECHO MEAS - IVS/LVPW: 0.84 CM
BH CV ECHO MEAS - IVSD: 0.83 CM
BH CV ECHO MEAS - LA DIMENSION: 3 CM
BH CV ECHO MEAS - LV DIASTOLIC VOL/BSA (35-75): 82.3 CM2
BH CV ECHO MEAS - LV MASS(C)D: 106 GRAMS
BH CV ECHO MEAS - LV MAX PG: 2.45 MMHG
BH CV ECHO MEAS - LV MEAN PG: 1.56 MMHG
BH CV ECHO MEAS - LV SYSTOLIC VOL/BSA (12-30): 62.1 CM2
BH CV ECHO MEAS - LV V1 MAX: 78.2 CM/SEC
BH CV ECHO MEAS - LV V1 VTI: 13.3 CM
BH CV ECHO MEAS - LVIDD: 3.9 CM
BH CV ECHO MEAS - LVIDS: 2.6 CM
BH CV ECHO MEAS - LVOT AREA: 2.21 CM2
BH CV ECHO MEAS - LVOT DIAM: 1.68 CM
BH CV ECHO MEAS - LVPWD: 0.99 CM
BH CV ECHO MEAS - MV A MAX VEL: 40.2 CM/SEC
BH CV ECHO MEAS - MV DEC TIME: 0.07 MSEC
BH CV ECHO MEAS - MV E MAX VEL: 74.6 CM/SEC
BH CV ECHO MEAS - MV E/A: 1.86
BH CV ECHO MEAS - MV MAX PG: 3.1 MMHG
BH CV ECHO MEAS - MV MEAN PG: 1.35 MMHG
BH CV ECHO MEAS - MV V2 VTI: 17.9 CM
BH CV ECHO MEAS - MVA(VTI): 1.64 CM2
BH CV ECHO MEAS - PA V2 MAX: 122.8 CM/SEC
BH CV ECHO MEAS - RVDD: 3.9 CM
BH CV ECHO MEAS - SI(MOD-SP4): 20.1 ML/M2
BH CV ECHO MEAS - SV(LVOT): 29.4 ML
BH CV ECHO MEAS - SV(MOD-SP4): 37.6 ML
BH CV ECHO MEAS - TR MAX PG: 11.6 MMHG
BH CV ECHO MEAS - TR MAX VEL: 169.6 CM/SEC
BUN SERPL-MCNC: 28 MG/DL (ref 8–23)
BUN/CREAT SERPL: 27.5 (ref 7–25)
CALCIUM SPEC-SCNC: 8.9 MG/DL (ref 8.6–10.5)
CHLORIDE SERPL-SCNC: 95 MMOL/L (ref 98–107)
CO2 SERPL-SCNC: 32 MMOL/L (ref 22–29)
CREAT SERPL-MCNC: 1.02 MG/DL (ref 0.57–1)
DEPRECATED RDW RBC AUTO: 42.4 FL (ref 37–54)
EGFRCR SERPLBLD CKD-EPI 2021: 57.5 ML/MIN/1.73
ERYTHROCYTE [DISTWIDTH] IN BLOOD BY AUTOMATED COUNT: 12.8 % (ref 12.3–15.4)
GLUCOSE SERPL-MCNC: 121 MG/DL (ref 65–99)
HCT VFR BLD AUTO: 35.7 % (ref 34–46.6)
HGB BLD-MCNC: 11.7 G/DL (ref 12–15.9)
HOLD SPECIMEN: NORMAL
HOLD SPECIMEN: NORMAL
LARGE PLATELETS: ABNORMAL
LYMPHOCYTES # BLD MANUAL: 3.13 10*3/MM3 (ref 0.7–3.1)
LYMPHOCYTES NFR BLD MANUAL: 4 % (ref 5–12)
MAXIMAL PREDICTED HEART RATE: 145 BPM
MCH RBC QN AUTO: 29.7 PG (ref 26.6–33)
MCHC RBC AUTO-ENTMCNC: 32.8 G/DL (ref 31.5–35.7)
MCV RBC AUTO: 90.3 FL (ref 79–97)
METAMYELOCYTES NFR BLD MANUAL: 1 % (ref 0–0)
MONOCYTES # BLD: 1.25 10*3/MM3 (ref 0.1–0.9)
MYELOCYTES NFR BLD MANUAL: 2 % (ref 0–0)
NEUTROPHILS # BLD AUTO: 25.67 10*3/MM3 (ref 1.7–7)
NEUTROPHILS NFR BLD MANUAL: 76 % (ref 42.7–76)
NEUTS BAND NFR BLD MANUAL: 6 % (ref 0–5)
PLATELET # BLD AUTO: 242 10*3/MM3 (ref 140–450)
PMV BLD AUTO: 10.6 FL (ref 6–12)
POTASSIUM SERPL-SCNC: 4.1 MMOL/L (ref 3.5–5.2)
QT INTERVAL: 425 MS
RBC # BLD AUTO: 3.96 10*6/MM3 (ref 3.77–5.28)
RBC MORPH BLD: NORMAL
SCAN SLIDE: NORMAL
SODIUM SERPL-SCNC: 139 MMOL/L (ref 136–145)
STRESS TARGET HR: 123 BPM
TROPONIN T SERPL-MCNC: 0.23 NG/ML (ref 0–0.03)
TROPONIN T SERPL-MCNC: 0.59 NG/ML (ref 0–0.03)
VARIANT LYMPHS NFR BLD MANUAL: 10 % (ref 19.6–45.3)
WBC MORPH BLD: NORMAL
WBC NRBC COR # BLD: 31.3 10*3/MM3 (ref 3.4–10.8)
WHOLE BLOOD HOLD SPECIMEN: NORMAL

## 2022-11-20 PROCEDURE — 85613 RUSSELL VIPER VENOM DILUTED: CPT

## 2022-11-20 PROCEDURE — 93010 ELECTROCARDIOGRAM REPORT: CPT | Performed by: INTERNAL MEDICINE

## 2022-11-20 PROCEDURE — 85670 THROMBIN TIME PLASMA: CPT

## 2022-11-20 PROCEDURE — 86038 ANTINUCLEAR ANTIBODIES: CPT

## 2022-11-20 PROCEDURE — 86037 ANCA TITER EACH ANTIBODY: CPT

## 2022-11-20 PROCEDURE — 83516 IMMUNOASSAY NONANTIBODY: CPT

## 2022-11-20 PROCEDURE — 99232 SBSQ HOSP IP/OBS MODERATE 35: CPT | Performed by: INTERNAL MEDICINE

## 2022-11-20 PROCEDURE — 94799 UNLISTED PULMONARY SVC/PX: CPT

## 2022-11-20 PROCEDURE — 94761 N-INVAS EAR/PLS OXIMETRY MLT: CPT

## 2022-11-20 PROCEDURE — 93005 ELECTROCARDIOGRAM TRACING: CPT | Performed by: HOSPITALIST

## 2022-11-20 PROCEDURE — 85007 BL SMEAR W/DIFF WBC COUNT: CPT | Performed by: HOSPITALIST

## 2022-11-20 PROCEDURE — 85025 COMPLETE CBC W/AUTO DIFF WBC: CPT | Performed by: HOSPITALIST

## 2022-11-20 PROCEDURE — 94664 DEMO&/EVAL PT USE INHALER: CPT

## 2022-11-20 PROCEDURE — 86147 CARDIOLIPIN ANTIBODY EA IG: CPT

## 2022-11-20 PROCEDURE — 25010000002 METHYLPREDNISOLONE PER 40 MG

## 2022-11-20 PROCEDURE — 85597 PHOSPHOLIPID PLTLT NEUTRALIZ: CPT

## 2022-11-20 PROCEDURE — 84484 ASSAY OF TROPONIN QUANT: CPT | Performed by: HOSPITALIST

## 2022-11-20 PROCEDURE — 80048 BASIC METABOLIC PNL TOTAL CA: CPT | Performed by: HOSPITALIST

## 2022-11-20 PROCEDURE — 25010000002 ONDANSETRON PER 1 MG: Performed by: INTERNAL MEDICINE

## 2022-11-20 PROCEDURE — 86146 BETA-2 GLYCOPROTEIN ANTIBODY: CPT

## 2022-11-20 PROCEDURE — 85732 THROMBOPLASTIN TIME PARTIAL: CPT

## 2022-11-20 PROCEDURE — 25010000002 CEFTRIAXONE PER 250 MG: Performed by: HOSPITALIST

## 2022-11-20 PROCEDURE — 85610 PROTHROMBIN TIME: CPT

## 2022-11-20 PROCEDURE — 85730 THROMBOPLASTIN TIME PARTIAL: CPT

## 2022-11-20 PROCEDURE — 36415 COLL VENOUS BLD VENIPUNCTURE: CPT | Performed by: HOSPITALIST

## 2022-11-20 PROCEDURE — 85598 HEXAGNAL PHOSPH PLTLT NEUTRL: CPT

## 2022-11-20 RX ORDER — HYDROCODONE BITARTRATE AND ACETAMINOPHEN 5; 325 MG/1; MG/1
1 TABLET ORAL EVERY 6 HOURS PRN
Status: DISPENSED | OUTPATIENT
Start: 2022-11-20 | End: 2022-11-27

## 2022-11-20 RX ORDER — CARVEDILOL 3.12 MG/1
3.12 TABLET ORAL 2 TIMES DAILY WITH MEALS
Status: DISCONTINUED | OUTPATIENT
Start: 2022-11-20 | End: 2022-11-22

## 2022-11-20 RX ORDER — METHYLPREDNISOLONE SODIUM SUCCINATE 40 MG/ML
40 INJECTION, POWDER, LYOPHILIZED, FOR SOLUTION INTRAMUSCULAR; INTRAVENOUS EVERY 8 HOURS
Status: DISCONTINUED | OUTPATIENT
Start: 2022-11-20 | End: 2022-11-28

## 2022-11-20 RX ADMIN — CEFTRIAXONE 1 G: 1 INJECTION, POWDER, FOR SOLUTION INTRAMUSCULAR; INTRAVENOUS at 08:22

## 2022-11-20 RX ADMIN — PANTOPRAZOLE SODIUM 40 MG: 40 INJECTION, POWDER, FOR SOLUTION INTRAVENOUS at 05:58

## 2022-11-20 RX ADMIN — ROFLUMILAST 500 MCG: 500 TABLET ORAL at 08:23

## 2022-11-20 RX ADMIN — MONTELUKAST SODIUM 10 MG: 10 TABLET, COATED ORAL at 20:48

## 2022-11-20 RX ADMIN — ONDANSETRON 4 MG: 2 INJECTION INTRAMUSCULAR; INTRAVENOUS at 14:44

## 2022-11-20 RX ADMIN — BUDESONIDE AND FORMOTEROL FUMARATE DIHYDRATE 2 PUFF: 80; 4.5 AEROSOL RESPIRATORY (INHALATION) at 07:43

## 2022-11-20 RX ADMIN — METHYLPREDNISOLONE SODIUM SUCCINATE 40 MG: 40 INJECTION, POWDER, FOR SOLUTION INTRAMUSCULAR; INTRAVENOUS at 16:08

## 2022-11-20 RX ADMIN — DOXYCYCLINE 100 MG: 100 INJECTION, POWDER, LYOPHILIZED, FOR SOLUTION INTRAVENOUS at 20:49

## 2022-11-20 RX ADMIN — BUDESONIDE AND FORMOTEROL FUMARATE DIHYDRATE 2 PUFF: 80; 4.5 AEROSOL RESPIRATORY (INHALATION) at 18:54

## 2022-11-20 RX ADMIN — AZELASTINE HYDROCHLORIDE 2 SPRAY: 137 SPRAY, METERED NASAL at 08:23

## 2022-11-20 RX ADMIN — ONDANSETRON 4 MG: 2 INJECTION INTRAMUSCULAR; INTRAVENOUS at 02:36

## 2022-11-20 RX ADMIN — CARVEDILOL 3.12 MG: 3.12 TABLET, FILM COATED ORAL at 13:16

## 2022-11-20 RX ADMIN — IPRATROPIUM BROMIDE 0.5 MG: 0.5 SOLUTION RESPIRATORY (INHALATION) at 07:38

## 2022-11-20 RX ADMIN — PRAMIPEXOLE DIHYDROCHLORIDE 0.25 MG: 0.25 TABLET ORAL at 08:23

## 2022-11-20 RX ADMIN — DOXYCYCLINE 100 MG: 100 INJECTION, POWDER, LYOPHILIZED, FOR SOLUTION INTRAVENOUS at 09:21

## 2022-11-20 RX ADMIN — METHYLPREDNISOLONE SODIUM SUCCINATE 40 MG: 40 INJECTION, POWDER, FOR SOLUTION INTRAMUSCULAR; INTRAVENOUS at 08:29

## 2022-11-20 RX ADMIN — IBRUTINIB 420 MG: 420 TABLET, FILM COATED ORAL at 20:49

## 2022-11-20 RX ADMIN — IPRATROPIUM BROMIDE 0.5 MG: 0.5 SOLUTION RESPIRATORY (INHALATION) at 18:54

## 2022-11-20 RX ADMIN — ACETAMINOPHEN 650 MG: 325 TABLET, FILM COATED ORAL at 12:25

## 2022-11-20 RX ADMIN — Medication 10 ML: at 08:23

## 2022-11-20 RX ADMIN — IPRATROPIUM BROMIDE 0.5 MG: 0.5 SOLUTION RESPIRATORY (INHALATION) at 11:14

## 2022-11-20 RX ADMIN — Medication 10 ML: at 20:47

## 2022-11-20 RX ADMIN — HYDROCODONE BITARTRATE AND ACETAMINOPHEN 1 TABLET: 5; 325 TABLET ORAL at 16:09

## 2022-11-20 RX ADMIN — ATORVASTATIN CALCIUM 10 MG: 10 TABLET, FILM COATED ORAL at 20:48

## 2022-11-21 LAB
ALBUMIN SERPL-MCNC: 3.4 G/DL (ref 3.5–5.2)
ALBUMIN/GLOB SERPL: 1.3 G/DL
ALP SERPL-CCNC: 82 U/L (ref 39–117)
ALT SERPL W P-5'-P-CCNC: 17 U/L (ref 1–33)
ANION GAP SERPL CALCULATED.3IONS-SCNC: 15 MMOL/L (ref 5–15)
AST SERPL-CCNC: 30 U/L (ref 1–32)
BILIRUB SERPL-MCNC: 0.5 MG/DL (ref 0–1.2)
BUN SERPL-MCNC: 20 MG/DL (ref 8–23)
BUN/CREAT SERPL: 23.8 (ref 7–25)
CALCIUM SPEC-SCNC: 9.2 MG/DL (ref 8.6–10.5)
CHLORIDE SERPL-SCNC: 95 MMOL/L (ref 98–107)
CO2 SERPL-SCNC: 27 MMOL/L (ref 22–29)
CREAT SERPL-MCNC: 0.84 MG/DL (ref 0.57–1)
CRP SERPL-MCNC: 7.13 MG/DL (ref 0–0.5)
DEPRECATED RDW RBC AUTO: 44.2 FL (ref 37–54)
EGFRCR SERPLBLD CKD-EPI 2021: 72.6 ML/MIN/1.73
ERYTHROCYTE [DISTWIDTH] IN BLOOD BY AUTOMATED COUNT: 13 % (ref 12.3–15.4)
ERYTHROCYTE [SEDIMENTATION RATE] IN BLOOD: 16 MM/HR (ref 0–30)
GLOBULIN UR ELPH-MCNC: 2.7 GM/DL
GLUCOSE SERPL-MCNC: 114 MG/DL (ref 65–99)
HBA1C MFR BLD: 4.9 % (ref 3.5–5.6)
HCT VFR BLD AUTO: 34.1 % (ref 34–46.6)
HGB BLD-MCNC: 10.9 G/DL (ref 12–15.9)
LARGE PLATELETS: ABNORMAL
LYMPHOCYTES # BLD MANUAL: 3.8 10*3/MM3 (ref 0.7–3.1)
LYMPHOCYTES NFR BLD MANUAL: 1 % (ref 5–12)
MAGNESIUM SERPL-MCNC: 2 MG/DL (ref 1.6–2.4)
MCH RBC QN AUTO: 29.4 PG (ref 26.6–33)
MCHC RBC AUTO-ENTMCNC: 32.1 G/DL (ref 31.5–35.7)
MCV RBC AUTO: 91.6 FL (ref 79–97)
MONOCYTES # BLD: 0.32 10*3/MM3 (ref 0.1–0.9)
MRSA DNA SPEC QL NAA+PROBE: NORMAL
NEUTROPHILS # BLD AUTO: 27.58 10*3/MM3 (ref 1.7–7)
NEUTROPHILS NFR BLD MANUAL: 86 % (ref 42.7–76)
NEUTS BAND NFR BLD MANUAL: 1 % (ref 0–5)
PHOSPHATE SERPL-MCNC: 3 MG/DL (ref 2.5–4.5)
PLATELET # BLD AUTO: 235 10*3/MM3 (ref 140–450)
PMV BLD AUTO: 11.3 FL (ref 6–12)
POIKILOCYTOSIS BLD QL SMEAR: ABNORMAL
POTASSIUM SERPL-SCNC: 4.3 MMOL/L (ref 3.5–5.2)
PROCALCITONIN SERPL-MCNC: 0.04 NG/ML (ref 0–0.25)
PROT SERPL-MCNC: 6.1 G/DL (ref 6–8.5)
QT INTERVAL: 349 MS
RBC # BLD AUTO: 3.72 10*6/MM3 (ref 3.77–5.28)
SCAN SLIDE: NORMAL
SMALL PLATELETS BLD QL SMEAR: ADEQUATE
SODIUM SERPL-SCNC: 137 MMOL/L (ref 136–145)
TROPONIN T SERPL-MCNC: 0.2 NG/ML (ref 0–0.03)
VARIANT LYMPHS NFR BLD MANUAL: 12 % (ref 19.6–45.3)
WBC MORPH BLD: NORMAL
WBC NRBC COR # BLD: 31.7 10*3/MM3 (ref 3.4–10.8)

## 2022-11-21 PROCEDURE — 84145 PROCALCITONIN (PCT): CPT | Performed by: INTERNAL MEDICINE

## 2022-11-21 PROCEDURE — 80053 COMPREHEN METABOLIC PANEL: CPT | Performed by: INTERNAL MEDICINE

## 2022-11-21 PROCEDURE — 85652 RBC SED RATE AUTOMATED: CPT | Performed by: INTERNAL MEDICINE

## 2022-11-21 PROCEDURE — 25010000002 METHYLPREDNISOLONE PER 40 MG

## 2022-11-21 PROCEDURE — 86140 C-REACTIVE PROTEIN: CPT | Performed by: INTERNAL MEDICINE

## 2022-11-21 PROCEDURE — 84484 ASSAY OF TROPONIN QUANT: CPT | Performed by: HOSPITALIST

## 2022-11-21 PROCEDURE — 99222 1ST HOSP IP/OBS MODERATE 55: CPT | Performed by: INTERNAL MEDICINE

## 2022-11-21 PROCEDURE — 94799 UNLISTED PULMONARY SVC/PX: CPT

## 2022-11-21 PROCEDURE — 83735 ASSAY OF MAGNESIUM: CPT | Performed by: INTERNAL MEDICINE

## 2022-11-21 PROCEDURE — 92526 ORAL FUNCTION THERAPY: CPT

## 2022-11-21 PROCEDURE — 85007 BL SMEAR W/DIFF WBC COUNT: CPT | Performed by: INTERNAL MEDICINE

## 2022-11-21 PROCEDURE — 97162 PT EVAL MOD COMPLEX 30 MIN: CPT | Performed by: PHYSICAL THERAPIST

## 2022-11-21 PROCEDURE — 99232 SBSQ HOSP IP/OBS MODERATE 35: CPT | Performed by: INTERNAL MEDICINE

## 2022-11-21 PROCEDURE — 84100 ASSAY OF PHOSPHORUS: CPT | Performed by: INTERNAL MEDICINE

## 2022-11-21 PROCEDURE — 85025 COMPLETE CBC W/AUTO DIFF WBC: CPT | Performed by: INTERNAL MEDICINE

## 2022-11-21 PROCEDURE — 25010000002 CEFTRIAXONE PER 250 MG: Performed by: HOSPITALIST

## 2022-11-21 PROCEDURE — 25010000002 FUROSEMIDE PER 20 MG: Performed by: INTERNAL MEDICINE

## 2022-11-21 PROCEDURE — 87641 MR-STAPH DNA AMP PROBE: CPT | Performed by: INTERNAL MEDICINE

## 2022-11-21 RX ORDER — FUROSEMIDE 40 MG/1
40 TABLET ORAL DAILY
Status: DISCONTINUED | OUTPATIENT
Start: 2022-11-22 | End: 2022-11-30 | Stop reason: HOSPADM

## 2022-11-21 RX ORDER — IPRATROPIUM BROMIDE AND ALBUTEROL SULFATE 2.5; .5 MG/3ML; MG/3ML
3 SOLUTION RESPIRATORY (INHALATION)
Status: DISCONTINUED | OUTPATIENT
Start: 2022-11-21 | End: 2022-11-27

## 2022-11-21 RX ORDER — BUDESONIDE 0.5 MG/2ML
1 INHALANT ORAL
Status: DISCONTINUED | OUTPATIENT
Start: 2022-11-21 | End: 2022-11-30 | Stop reason: HOSPADM

## 2022-11-21 RX ORDER — FUROSEMIDE 10 MG/ML
40 INJECTION INTRAMUSCULAR; INTRAVENOUS ONCE
Status: COMPLETED | OUTPATIENT
Start: 2022-11-21 | End: 2022-11-21

## 2022-11-21 RX ORDER — PANTOPRAZOLE SODIUM 40 MG/1
40 TABLET, DELAYED RELEASE ORAL
Status: DISCONTINUED | OUTPATIENT
Start: 2022-11-22 | End: 2022-11-30 | Stop reason: HOSPADM

## 2022-11-21 RX ADMIN — CEFTRIAXONE 1 G: 1 INJECTION, POWDER, FOR SOLUTION INTRAMUSCULAR; INTRAVENOUS at 08:53

## 2022-11-21 RX ADMIN — CARVEDILOL 3.12 MG: 3.12 TABLET, FILM COATED ORAL at 08:53

## 2022-11-21 RX ADMIN — ATORVASTATIN CALCIUM 10 MG: 10 TABLET, FILM COATED ORAL at 20:27

## 2022-11-21 RX ADMIN — MONTELUKAST SODIUM 10 MG: 10 TABLET, COATED ORAL at 20:27

## 2022-11-21 RX ADMIN — ROFLUMILAST 500 MCG: 500 TABLET ORAL at 08:53

## 2022-11-21 RX ADMIN — METHYLPREDNISOLONE SODIUM SUCCINATE 40 MG: 40 INJECTION, POWDER, FOR SOLUTION INTRAMUSCULAR; INTRAVENOUS at 17:37

## 2022-11-21 RX ADMIN — PANTOPRAZOLE SODIUM 40 MG: 40 INJECTION, POWDER, FOR SOLUTION INTRAVENOUS at 08:52

## 2022-11-21 RX ADMIN — METHYLPREDNISOLONE SODIUM SUCCINATE 40 MG: 40 INJECTION, POWDER, FOR SOLUTION INTRAMUSCULAR; INTRAVENOUS at 01:51

## 2022-11-21 RX ADMIN — IPRATROPIUM BROMIDE AND ALBUTEROL SULFATE 3 ML: 2.5; .5 SOLUTION RESPIRATORY (INHALATION) at 16:16

## 2022-11-21 RX ADMIN — IPRATROPIUM BROMIDE AND ALBUTEROL SULFATE 3 ML: 2.5; .5 SOLUTION RESPIRATORY (INHALATION) at 18:19

## 2022-11-21 RX ADMIN — IPRATROPIUM BROMIDE 0.5 MG: 0.5 SOLUTION RESPIRATORY (INHALATION) at 11:28

## 2022-11-21 RX ADMIN — Medication 10 ML: at 08:52

## 2022-11-21 RX ADMIN — METHYLPREDNISOLONE SODIUM SUCCINATE 40 MG: 40 INJECTION, POWDER, FOR SOLUTION INTRAMUSCULAR; INTRAVENOUS at 08:52

## 2022-11-21 RX ADMIN — IPRATROPIUM BROMIDE 0.5 MG: 0.5 SOLUTION RESPIRATORY (INHALATION) at 06:57

## 2022-11-21 RX ADMIN — CARVEDILOL 3.12 MG: 3.12 TABLET, FILM COATED ORAL at 17:37

## 2022-11-21 RX ADMIN — Medication 10 ML: at 20:27

## 2022-11-21 RX ADMIN — IPRATROPIUM BROMIDE 0.5 MG: 0.5 SOLUTION RESPIRATORY (INHALATION) at 01:25

## 2022-11-21 RX ADMIN — FUROSEMIDE 40 MG: 10 INJECTION, SOLUTION INTRAMUSCULAR; INTRAVENOUS at 12:54

## 2022-11-21 RX ADMIN — DOXYCYCLINE 100 MG: 100 INJECTION, POWDER, LYOPHILIZED, FOR SOLUTION INTRAVENOUS at 20:27

## 2022-11-21 RX ADMIN — BUDESONIDE 1 MG: 0.5 INHALANT RESPIRATORY (INHALATION) at 18:22

## 2022-11-21 RX ADMIN — PRAMIPEXOLE DIHYDROCHLORIDE 0.25 MG: 0.25 TABLET ORAL at 08:53

## 2022-11-22 ENCOUNTER — APPOINTMENT (OUTPATIENT)
Dept: GENERAL RADIOLOGY | Facility: HOSPITAL | Age: 77
End: 2022-11-22

## 2022-11-22 LAB
ALBUMIN SERPL-MCNC: 3.3 G/DL (ref 3.5–5.2)
ALBUMIN/GLOB SERPL: 1.3 G/DL
ALP SERPL-CCNC: 81 U/L (ref 39–117)
ALT SERPL W P-5'-P-CCNC: 17 U/L (ref 1–33)
ANA SER QL IF: NEGATIVE
ANION GAP SERPL CALCULATED.3IONS-SCNC: 12 MMOL/L (ref 5–15)
AST SERPL-CCNC: 26 U/L (ref 1–32)
BASOPHILS # BLD AUTO: 0 10*3/MM3 (ref 0–0.2)
BASOPHILS NFR BLD AUTO: 0.1 % (ref 0–1.5)
BILIRUB SERPL-MCNC: 0.5 MG/DL (ref 0–1.2)
BUN SERPL-MCNC: 28 MG/DL (ref 8–23)
BUN/CREAT SERPL: 30.8 (ref 7–25)
CALCIUM SPEC-SCNC: 9.1 MG/DL (ref 8.6–10.5)
CHLORIDE SERPL-SCNC: 95 MMOL/L (ref 98–107)
CO2 SERPL-SCNC: 29 MMOL/L (ref 22–29)
CREAT SERPL-MCNC: 0.91 MG/DL (ref 0.57–1)
DEPRECATED RDW RBC AUTO: 42 FL (ref 37–54)
EGFRCR SERPLBLD CKD-EPI 2021: 65.9 ML/MIN/1.73
EOSINOPHIL # BLD AUTO: 0 10*3/MM3 (ref 0–0.4)
EOSINOPHIL NFR BLD AUTO: 0 % (ref 0.3–6.2)
ERYTHROCYTE [DISTWIDTH] IN BLOOD BY AUTOMATED COUNT: 12.8 % (ref 12.3–15.4)
GLOBULIN UR ELPH-MCNC: 2.5 GM/DL
GLUCOSE SERPL-MCNC: 131 MG/DL (ref 65–99)
HCT VFR BLD AUTO: 31 % (ref 34–46.6)
HGB BLD-MCNC: 10.1 G/DL (ref 12–15.9)
LABORATORY COMMENT REPORT: NORMAL
LYMPHOCYTES # BLD AUTO: 3.4 10*3/MM3 (ref 0.7–3.1)
LYMPHOCYTES NFR BLD AUTO: 12.6 % (ref 19.6–45.3)
MAGNESIUM SERPL-MCNC: 2 MG/DL (ref 1.6–2.4)
MCH RBC QN AUTO: 29.1 PG (ref 26.6–33)
MCHC RBC AUTO-ENTMCNC: 32.6 G/DL (ref 31.5–35.7)
MCV RBC AUTO: 89.3 FL (ref 79–97)
MONOCYTES # BLD AUTO: 0.4 10*3/MM3 (ref 0.1–0.9)
MONOCYTES NFR BLD AUTO: 1.5 % (ref 5–12)
NEUTROPHILS NFR BLD AUTO: 23.4 10*3/MM3 (ref 1.7–7)
NEUTROPHILS NFR BLD AUTO: 85.8 % (ref 42.7–76)
NRBC BLD AUTO-RTO: 0.1 /100 WBC (ref 0–0.2)
PHOSPHATE SERPL-MCNC: 3.3 MG/DL (ref 2.5–4.5)
PLATELET # BLD AUTO: 231 10*3/MM3 (ref 140–450)
PMV BLD AUTO: 11 FL (ref 6–12)
POTASSIUM SERPL-SCNC: 3.9 MMOL/L (ref 3.5–5.2)
PROT SERPL-MCNC: 5.8 G/DL (ref 6–8.5)
RBC # BLD AUTO: 3.47 10*6/MM3 (ref 3.77–5.28)
SODIUM SERPL-SCNC: 136 MMOL/L (ref 136–145)
WBC NRBC COR # BLD: 27.3 10*3/MM3 (ref 3.4–10.8)

## 2022-11-22 PROCEDURE — 99232 SBSQ HOSP IP/OBS MODERATE 35: CPT | Performed by: INTERNAL MEDICINE

## 2022-11-22 PROCEDURE — 85025 COMPLETE CBC W/AUTO DIFF WBC: CPT | Performed by: INTERNAL MEDICINE

## 2022-11-22 PROCEDURE — 94664 DEMO&/EVAL PT USE INHALER: CPT

## 2022-11-22 PROCEDURE — 80053 COMPREHEN METABOLIC PANEL: CPT | Performed by: INTERNAL MEDICINE

## 2022-11-22 PROCEDURE — 83735 ASSAY OF MAGNESIUM: CPT | Performed by: INTERNAL MEDICINE

## 2022-11-22 PROCEDURE — 97530 THERAPEUTIC ACTIVITIES: CPT

## 2022-11-22 PROCEDURE — 94799 UNLISTED PULMONARY SVC/PX: CPT

## 2022-11-22 PROCEDURE — 97116 GAIT TRAINING THERAPY: CPT

## 2022-11-22 PROCEDURE — 71045 X-RAY EXAM CHEST 1 VIEW: CPT

## 2022-11-22 PROCEDURE — 25010000002 METHYLPREDNISOLONE PER 40 MG

## 2022-11-22 PROCEDURE — 25010000002 CEFTRIAXONE PER 250 MG: Performed by: HOSPITALIST

## 2022-11-22 PROCEDURE — 84100 ASSAY OF PHOSPHORUS: CPT | Performed by: INTERNAL MEDICINE

## 2022-11-22 RX ORDER — CARVEDILOL 6.25 MG/1
6.25 TABLET ORAL 2 TIMES DAILY WITH MEALS
Status: DISCONTINUED | OUTPATIENT
Start: 2022-11-22 | End: 2022-11-23

## 2022-11-22 RX ADMIN — PANTOPRAZOLE SODIUM 40 MG: 40 TABLET, DELAYED RELEASE ORAL at 09:23

## 2022-11-22 RX ADMIN — FUROSEMIDE 40 MG: 40 TABLET ORAL at 09:23

## 2022-11-22 RX ADMIN — PRAMIPEXOLE DIHYDROCHLORIDE 0.25 MG: 0.25 TABLET ORAL at 09:23

## 2022-11-22 RX ADMIN — Medication 10 ML: at 09:20

## 2022-11-22 RX ADMIN — CEFTRIAXONE 1 G: 1 INJECTION, POWDER, FOR SOLUTION INTRAMUSCULAR; INTRAVENOUS at 09:24

## 2022-11-22 RX ADMIN — DOXYCYCLINE 100 MG: 100 INJECTION, POWDER, LYOPHILIZED, FOR SOLUTION INTRAVENOUS at 20:58

## 2022-11-22 RX ADMIN — MONTELUKAST SODIUM 10 MG: 10 TABLET, COATED ORAL at 20:58

## 2022-11-22 RX ADMIN — AZELASTINE HYDROCHLORIDE 2 SPRAY: 137 SPRAY, METERED NASAL at 09:21

## 2022-11-22 RX ADMIN — METHYLPREDNISOLONE SODIUM SUCCINATE 40 MG: 40 INJECTION, POWDER, FOR SOLUTION INTRAMUSCULAR; INTRAVENOUS at 17:07

## 2022-11-22 RX ADMIN — METHYLPREDNISOLONE SODIUM SUCCINATE 40 MG: 40 INJECTION, POWDER, FOR SOLUTION INTRAMUSCULAR; INTRAVENOUS at 09:24

## 2022-11-22 RX ADMIN — ATORVASTATIN CALCIUM 10 MG: 10 TABLET, FILM COATED ORAL at 20:58

## 2022-11-22 RX ADMIN — Medication 10 ML: at 20:58

## 2022-11-22 RX ADMIN — CARVEDILOL 3.12 MG: 3.12 TABLET, FILM COATED ORAL at 09:24

## 2022-11-22 RX ADMIN — IPRATROPIUM BROMIDE AND ALBUTEROL SULFATE 3 ML: 2.5; .5 SOLUTION RESPIRATORY (INHALATION) at 15:08

## 2022-11-22 RX ADMIN — IPRATROPIUM BROMIDE AND ALBUTEROL SULFATE 3 ML: 2.5; .5 SOLUTION RESPIRATORY (INHALATION) at 10:25

## 2022-11-22 RX ADMIN — ROFLUMILAST 500 MCG: 500 TABLET ORAL at 09:23

## 2022-11-22 RX ADMIN — Medication 10 ML: at 17:07

## 2022-11-22 RX ADMIN — METHYLPREDNISOLONE SODIUM SUCCINATE 40 MG: 40 INJECTION, POWDER, FOR SOLUTION INTRAMUSCULAR; INTRAVENOUS at 00:21

## 2022-11-22 RX ADMIN — IPRATROPIUM BROMIDE AND ALBUTEROL SULFATE 3 ML: 2.5; .5 SOLUTION RESPIRATORY (INHALATION) at 18:55

## 2022-11-22 RX ADMIN — BUDESONIDE 1 MG: 0.5 INHALANT RESPIRATORY (INHALATION) at 19:00

## 2022-11-22 RX ADMIN — HYDROCODONE BITARTRATE AND ACETAMINOPHEN 1 TABLET: 5; 325 TABLET ORAL at 00:21

## 2022-11-22 RX ADMIN — DOXYCYCLINE 100 MG: 100 INJECTION, POWDER, LYOPHILIZED, FOR SOLUTION INTRAVENOUS at 10:16

## 2022-11-23 ENCOUNTER — APPOINTMENT (OUTPATIENT)
Dept: GENERAL RADIOLOGY | Facility: HOSPITAL | Age: 77
End: 2022-11-23

## 2022-11-23 ENCOUNTER — APPOINTMENT (OUTPATIENT)
Dept: CT IMAGING | Facility: HOSPITAL | Age: 77
End: 2022-11-23

## 2022-11-23 LAB
ALBUMIN SERPL-MCNC: 3.6 G/DL (ref 3.5–5.2)
ALBUMIN SERPL-MCNC: 3.7 G/DL (ref 3.5–5.2)
ALBUMIN/GLOB SERPL: 1.5 G/DL
ALBUMIN/GLOB SERPL: 1.6 G/DL
ALP SERPL-CCNC: 102 U/L (ref 39–117)
ALP SERPL-CCNC: 111 U/L (ref 39–117)
ALT SERPL W P-5'-P-CCNC: 22 U/L (ref 1–33)
ALT SERPL W P-5'-P-CCNC: 26 U/L (ref 1–33)
ANION GAP SERPL CALCULATED.3IONS-SCNC: 14 MMOL/L (ref 5–15)
ANION GAP SERPL CALCULATED.3IONS-SCNC: 17 MMOL/L (ref 5–15)
ANISOCYTOSIS BLD QL: ABNORMAL
ARTERIAL PATENCY WRIST A: POSITIVE
AST SERPL-CCNC: 29 U/L (ref 1–32)
AST SERPL-CCNC: 35 U/L (ref 1–32)
ATMOSPHERIC PRESS: ABNORMAL MM[HG]
BASE EXCESS BLDA CALC-SCNC: -0.8 MMOL/L (ref 0–3)
BASOPHILS # BLD AUTO: 0 10*3/MM3 (ref 0–0.2)
BASOPHILS NFR BLD AUTO: 0.1 % (ref 0–1.5)
BDY SITE: ABNORMAL
BILIRUB SERPL-MCNC: 0.6 MG/DL (ref 0–1.2)
BILIRUB SERPL-MCNC: 0.6 MG/DL (ref 0–1.2)
BUN SERPL-MCNC: 34 MG/DL (ref 8–23)
BUN SERPL-MCNC: 36 MG/DL (ref 8–23)
BUN/CREAT SERPL: 35.3 (ref 7–25)
BUN/CREAT SERPL: 39.5 (ref 7–25)
C-ANCA TITR SER IF: NORMAL TITER
CA-I BLDA-SCNC: 1.16 MMOL/L (ref 1.15–1.33)
CALCIUM SPEC-SCNC: 9.1 MG/DL (ref 8.6–10.5)
CALCIUM SPEC-SCNC: 9.4 MG/DL (ref 8.6–10.5)
CHLORIDE SERPL-SCNC: 96 MMOL/L (ref 98–107)
CHLORIDE SERPL-SCNC: 97 MMOL/L (ref 98–107)
CO2 SERPL-SCNC: 23 MMOL/L (ref 22–29)
CO2 SERPL-SCNC: 28 MMOL/L (ref 22–29)
CREAT SERPL-MCNC: 0.86 MG/DL (ref 0.57–1)
CREAT SERPL-MCNC: 1.02 MG/DL (ref 0.57–1)
D-LACTATE SERPL-SCNC: 3.5 MMOL/L (ref 0.5–2)
D-LACTATE SERPL-SCNC: 4.7 MMOL/L (ref 0.5–2)
DEPRECATED RDW RBC AUTO: 41.1 FL (ref 37–54)
DEPRECATED RDW RBC AUTO: 42 FL (ref 37–54)
EGFRCR SERPLBLD CKD-EPI 2021: 57.5 ML/MIN/1.73
EGFRCR SERPLBLD CKD-EPI 2021: 70.6 ML/MIN/1.73
EOSINOPHIL # BLD AUTO: 0 10*3/MM3 (ref 0–0.4)
EOSINOPHIL NFR BLD AUTO: 0 % (ref 0.3–6.2)
ERYTHROCYTE [DISTWIDTH] IN BLOOD BY AUTOMATED COUNT: 12.8 % (ref 12.3–15.4)
ERYTHROCYTE [DISTWIDTH] IN BLOOD BY AUTOMATED COUNT: 13 % (ref 12.3–15.4)
GLOBULIN UR ELPH-MCNC: 2.2 GM/DL
GLOBULIN UR ELPH-MCNC: 2.5 GM/DL
GLUCOSE BLDC GLUCOMTR-MCNC: 278 MG/DL (ref 74–100)
GLUCOSE BLDC GLUCOMTR-MCNC: 278 MG/DL (ref 74–100)
GLUCOSE SERPL-MCNC: 113 MG/DL (ref 65–99)
GLUCOSE SERPL-MCNC: 239 MG/DL (ref 65–99)
HCO3 BLDA-SCNC: 24.3 MMOL/L (ref 21–28)
HCT VFR BLD AUTO: 35.3 % (ref 34–46.6)
HCT VFR BLD AUTO: 35.5 % (ref 34–46.6)
HCT VFR BLDA CALC: 35 % (ref 38–51)
HEMODILUTION: NO
HGB BLD-MCNC: 11.1 G/DL (ref 12–15.9)
HGB BLD-MCNC: 11.4 G/DL (ref 12–15.9)
HGB BLDA-MCNC: 11.8 G/DL (ref 12–17)
INHALED O2 CONCENTRATION: 40 %
LARGE PLATELETS: ABNORMAL
LARGE PLATELETS: ABNORMAL
LYMPHOCYTES # BLD AUTO: 6.2 10*3/MM3 (ref 0.7–3.1)
LYMPHOCYTES # BLD MANUAL: 3.63 10*3/MM3 (ref 0.7–3.1)
LYMPHOCYTES # BLD MANUAL: 4.97 10*3/MM3 (ref 0.7–3.1)
LYMPHOCYTES NFR BLD AUTO: 18.7 % (ref 19.6–45.3)
LYMPHOCYTES NFR BLD MANUAL: 1 % (ref 5–12)
LYMPHOCYTES NFR BLD MANUAL: 3 % (ref 5–12)
MAGNESIUM SERPL-MCNC: 2 MG/DL (ref 1.6–2.4)
MCH RBC QN AUTO: 28.6 PG (ref 26.6–33)
MCH RBC QN AUTO: 29.1 PG (ref 26.6–33)
MCHC RBC AUTO-ENTMCNC: 31.3 G/DL (ref 31.5–35.7)
MCHC RBC AUTO-ENTMCNC: 32.4 G/DL (ref 31.5–35.7)
MCV RBC AUTO: 89.7 FL (ref 79–97)
MCV RBC AUTO: 91.3 FL (ref 79–97)
MODALITY: ABNORMAL
MONOCYTES # BLD AUTO: 0.4 10*3/MM3 (ref 0.1–0.9)
MONOCYTES # BLD: 0.33 10*3/MM3 (ref 0.1–0.9)
MONOCYTES # BLD: 0.84 10*3/MM3 (ref 0.1–0.9)
MONOCYTES NFR BLD AUTO: 1.2 % (ref 5–12)
MYELOPEROXIDASE AB SER IA-ACNC: <0.2 UNITS (ref 0–0.9)
NEUTROPHILS # BLD AUTO: 23.44 10*3/MM3 (ref 1.7–7)
NEUTROPHILS # BLD AUTO: 27.8 10*3/MM3 (ref 1.7–7)
NEUTROPHILS NFR BLD AUTO: 26.5 10*3/MM3 (ref 1.7–7)
NEUTROPHILS NFR BLD AUTO: 80 % (ref 42.7–76)
NEUTROPHILS NFR BLD MANUAL: 84 % (ref 42.7–76)
NEUTROPHILS NFR BLD MANUAL: 84 % (ref 42.7–76)
NRBC BLD AUTO-RTO: 0.5 /100 WBC (ref 0–0.2)
P-ANCA ATYPICAL TITR SER IF: NORMAL TITER
P-ANCA TITR SER IF: NORMAL TITER
PCO2 BLDA: 41.1 MM HG (ref 35–48)
PH BLDA: 7.38 PH UNITS (ref 7.35–7.45)
PHOSPHATE SERPL-MCNC: 4.1 MG/DL (ref 2.5–4.5)
PLATELET # BLD AUTO: 299 10*3/MM3 (ref 140–450)
PLATELET # BLD AUTO: 372 10*3/MM3 (ref 140–450)
PMV BLD AUTO: 11.1 FL (ref 6–12)
PMV BLD AUTO: 11.2 FL (ref 6–12)
PO2 BLDA: 90.6 MM HG (ref 83–108)
POIKILOCYTOSIS BLD QL SMEAR: ABNORMAL
POTASSIUM BLDA-SCNC: 4 MMOL/L (ref 3.5–4.5)
POTASSIUM SERPL-SCNC: 3.6 MMOL/L (ref 3.5–5.2)
POTASSIUM SERPL-SCNC: 3.7 MMOL/L (ref 3.5–5.2)
PROT SERPL-MCNC: 5.8 G/DL (ref 6–8.5)
PROT SERPL-MCNC: 6.2 G/DL (ref 6–8.5)
PROTEINASE3 AB SER IA-ACNC: <0.2 UNITS (ref 0–0.9)
QT INTERVAL: 351 MS
RBC # BLD AUTO: 3.89 10*6/MM3 (ref 3.77–5.28)
RBC # BLD AUTO: 3.94 10*6/MM3 (ref 3.77–5.28)
RBC MORPH BLD: NORMAL
SAO2 % BLDCOA: 96.8 % (ref 94–98)
SCAN SLIDE: NORMAL
SODIUM BLD-SCNC: 135 MMOL/L (ref 138–146)
SODIUM SERPL-SCNC: 137 MMOL/L (ref 136–145)
SODIUM SERPL-SCNC: 138 MMOL/L (ref 136–145)
VARIANT LYMPHS NFR BLD MANUAL: 13 % (ref 19.6–45.3)
VARIANT LYMPHS NFR BLD MANUAL: 15 % (ref 19.6–45.3)
WBC MORPH BLD: NORMAL
WBC MORPH BLD: NORMAL
WBC NRBC COR # BLD: 27.9 10*3/MM3 (ref 3.4–10.8)
WBC NRBC COR # BLD: 33.1 10*3/MM3 (ref 3.4–10.8)

## 2022-11-23 PROCEDURE — 85025 COMPLETE CBC W/AUTO DIFF WBC: CPT | Performed by: INTERNAL MEDICINE

## 2022-11-23 PROCEDURE — 93010 ELECTROCARDIOGRAM REPORT: CPT | Performed by: INTERNAL MEDICINE

## 2022-11-23 PROCEDURE — 94799 UNLISTED PULMONARY SVC/PX: CPT

## 2022-11-23 PROCEDURE — 36600 WITHDRAWAL OF ARTERIAL BLOOD: CPT

## 2022-11-23 PROCEDURE — 25010000002 METHYLPREDNISOLONE PER 125 MG: Performed by: INTERNAL MEDICINE

## 2022-11-23 PROCEDURE — 25010000002 LORAZEPAM PER 2 MG

## 2022-11-23 PROCEDURE — 99232 SBSQ HOSP IP/OBS MODERATE 35: CPT | Performed by: INTERNAL MEDICINE

## 2022-11-23 PROCEDURE — 94761 N-INVAS EAR/PLS OXIMETRY MLT: CPT

## 2022-11-23 PROCEDURE — 0 IOPAMIDOL PER 1 ML: Performed by: INTERNAL MEDICINE

## 2022-11-23 PROCEDURE — 80053 COMPREHEN METABOLIC PANEL: CPT | Performed by: INTERNAL MEDICINE

## 2022-11-23 PROCEDURE — 71275 CT ANGIOGRAPHY CHEST: CPT

## 2022-11-23 PROCEDURE — 85007 BL SMEAR W/DIFF WBC COUNT: CPT | Performed by: INTERNAL MEDICINE

## 2022-11-23 PROCEDURE — 82330 ASSAY OF CALCIUM: CPT

## 2022-11-23 PROCEDURE — 83735 ASSAY OF MAGNESIUM: CPT | Performed by: INTERNAL MEDICINE

## 2022-11-23 PROCEDURE — 71045 X-RAY EXAM CHEST 1 VIEW: CPT

## 2022-11-23 PROCEDURE — 25010000002 CEFTRIAXONE PER 250 MG: Performed by: HOSPITALIST

## 2022-11-23 PROCEDURE — 25010000002 METHYLPREDNISOLONE PER 40 MG

## 2022-11-23 PROCEDURE — 82962 GLUCOSE BLOOD TEST: CPT

## 2022-11-23 PROCEDURE — 94660 CPAP INITIATION&MGMT: CPT

## 2022-11-23 PROCEDURE — 83605 ASSAY OF LACTIC ACID: CPT

## 2022-11-23 PROCEDURE — 80051 ELECTROLYTE PANEL: CPT

## 2022-11-23 PROCEDURE — 85018 HEMOGLOBIN: CPT

## 2022-11-23 PROCEDURE — 82803 BLOOD GASES ANY COMBINATION: CPT

## 2022-11-23 PROCEDURE — 25010000002 LORAZEPAM PER 2 MG: Performed by: INTERNAL MEDICINE

## 2022-11-23 PROCEDURE — 84100 ASSAY OF PHOSPHORUS: CPT | Performed by: INTERNAL MEDICINE

## 2022-11-23 PROCEDURE — 93005 ELECTROCARDIOGRAM TRACING: CPT | Performed by: INTERNAL MEDICINE

## 2022-11-23 PROCEDURE — 94664 DEMO&/EVAL PT USE INHALER: CPT

## 2022-11-23 PROCEDURE — 25010000002 FUROSEMIDE PER 20 MG: Performed by: INTERNAL MEDICINE

## 2022-11-23 RX ORDER — LORAZEPAM 2 MG/ML
0.5 INJECTION INTRAMUSCULAR ONCE
Status: DISCONTINUED | OUTPATIENT
Start: 2022-11-23 | End: 2022-11-23 | Stop reason: SDUPTHER

## 2022-11-23 RX ORDER — METHYLPREDNISOLONE SODIUM SUCCINATE 125 MG/2ML
125 INJECTION, POWDER, LYOPHILIZED, FOR SOLUTION INTRAMUSCULAR; INTRAVENOUS ONCE
Status: COMPLETED | OUTPATIENT
Start: 2022-11-23 | End: 2022-11-23

## 2022-11-23 RX ORDER — IPRATROPIUM BROMIDE AND ALBUTEROL SULFATE 2.5; .5 MG/3ML; MG/3ML
3 SOLUTION RESPIRATORY (INHALATION) EVERY 4 HOURS PRN
Status: DISCONTINUED | OUTPATIENT
Start: 2022-11-23 | End: 2022-11-30 | Stop reason: HOSPADM

## 2022-11-23 RX ORDER — CARVEDILOL 6.25 MG/1
12.5 TABLET ORAL 2 TIMES DAILY WITH MEALS
Status: DISCONTINUED | OUTPATIENT
Start: 2022-11-23 | End: 2022-11-30 | Stop reason: HOSPADM

## 2022-11-23 RX ORDER — LORAZEPAM 2 MG/ML
0.5 INJECTION INTRAMUSCULAR ONCE
Status: COMPLETED | OUTPATIENT
Start: 2022-11-23 | End: 2022-11-23

## 2022-11-23 RX ORDER — FUROSEMIDE 10 MG/ML
40 INJECTION INTRAMUSCULAR; INTRAVENOUS ONCE
Status: DISCONTINUED | OUTPATIENT
Start: 2022-11-23 | End: 2022-11-23 | Stop reason: SDUPTHER

## 2022-11-23 RX ORDER — PAROXETINE HYDROCHLORIDE 20 MG/1
20 TABLET, FILM COATED ORAL DAILY
Status: DISCONTINUED | OUTPATIENT
Start: 2022-11-23 | End: 2022-11-30 | Stop reason: HOSPADM

## 2022-11-23 RX ORDER — LORAZEPAM 2 MG/ML
INJECTION INTRAMUSCULAR
Status: COMPLETED
Start: 2022-11-23 | End: 2022-11-23

## 2022-11-23 RX ORDER — HYDROXYZINE HYDROCHLORIDE 25 MG/1
25 TABLET, FILM COATED ORAL 3 TIMES DAILY PRN
Status: DISCONTINUED | OUTPATIENT
Start: 2022-11-23 | End: 2022-11-30 | Stop reason: HOSPADM

## 2022-11-23 RX ORDER — FUROSEMIDE 10 MG/ML
40 INJECTION INTRAMUSCULAR; INTRAVENOUS ONCE
Status: COMPLETED | OUTPATIENT
Start: 2022-11-23 | End: 2022-11-23

## 2022-11-23 RX ADMIN — FUROSEMIDE 40 MG: 40 TABLET ORAL at 09:14

## 2022-11-23 RX ADMIN — ATORVASTATIN CALCIUM 10 MG: 10 TABLET, FILM COATED ORAL at 21:04

## 2022-11-23 RX ADMIN — METHYLPREDNISOLONE SODIUM SUCCINATE 40 MG: 40 INJECTION, POWDER, FOR SOLUTION INTRAMUSCULAR; INTRAVENOUS at 18:20

## 2022-11-23 RX ADMIN — ROFLUMILAST 500 MCG: 500 TABLET ORAL at 09:15

## 2022-11-23 RX ADMIN — BUDESONIDE 0.5 MG: 0.5 INHALANT RESPIRATORY (INHALATION) at 08:24

## 2022-11-23 RX ADMIN — METHYLPREDNISOLONE SODIUM SUCCINATE 40 MG: 40 INJECTION, POWDER, FOR SOLUTION INTRAMUSCULAR; INTRAVENOUS at 01:39

## 2022-11-23 RX ADMIN — Medication 10 ML: at 09:00

## 2022-11-23 RX ADMIN — DOXYCYCLINE 100 MG: 100 INJECTION, POWDER, LYOPHILIZED, FOR SOLUTION INTRAVENOUS at 10:13

## 2022-11-23 RX ADMIN — PANTOPRAZOLE SODIUM 40 MG: 40 TABLET, DELAYED RELEASE ORAL at 09:13

## 2022-11-23 RX ADMIN — PAROXETINE HYDROCHLORIDE 20 MG: 20 TABLET, FILM COATED ORAL at 09:13

## 2022-11-23 RX ADMIN — CARVEDILOL 6.25 MG: 6.25 TABLET, FILM COATED ORAL at 09:14

## 2022-11-23 RX ADMIN — AZELASTINE HYDROCHLORIDE 2 SPRAY: 137 SPRAY, METERED NASAL at 09:11

## 2022-11-23 RX ADMIN — IOPAMIDOL 100 ML: 755 INJECTION, SOLUTION INTRAVENOUS at 15:47

## 2022-11-23 RX ADMIN — HYDROXYZINE HYDROCHLORIDE 25 MG: 25 TABLET ORAL at 21:03

## 2022-11-23 RX ADMIN — FUROSEMIDE 40 MG: 10 INJECTION, SOLUTION INTRAMUSCULAR; INTRAVENOUS at 12:20

## 2022-11-23 RX ADMIN — PRAMIPEXOLE DIHYDROCHLORIDE 0.25 MG: 0.25 TABLET ORAL at 09:13

## 2022-11-23 RX ADMIN — HYDROCODONE BITARTRATE AND ACETAMINOPHEN 1 TABLET: 5; 325 TABLET ORAL at 01:42

## 2022-11-23 RX ADMIN — CEFTRIAXONE 1 G: 1 INJECTION, POWDER, FOR SOLUTION INTRAMUSCULAR; INTRAVENOUS at 09:16

## 2022-11-23 RX ADMIN — LORAZEPAM 0.5 MG: 2 INJECTION INTRAMUSCULAR; INTRAVENOUS at 12:17

## 2022-11-23 RX ADMIN — MONTELUKAST SODIUM 10 MG: 10 TABLET, COATED ORAL at 21:03

## 2022-11-23 RX ADMIN — IPRATROPIUM BROMIDE AND ALBUTEROL SULFATE 3 ML: 2.5; .5 SOLUTION RESPIRATORY (INHALATION) at 08:25

## 2022-11-23 RX ADMIN — METHYLPREDNISOLONE SODIUM SUCCINATE 40 MG: 40 INJECTION, POWDER, FOR SOLUTION INTRAMUSCULAR; INTRAVENOUS at 09:15

## 2022-11-23 RX ADMIN — METHYLPREDNISOLONE SODIUM SUCCINATE 125 MG: 125 INJECTION, POWDER, FOR SOLUTION INTRAMUSCULAR; INTRAVENOUS at 12:16

## 2022-11-23 RX ADMIN — LORAZEPAM 0.5 MG: 2 INJECTION INTRAMUSCULAR; INTRAVENOUS at 12:23

## 2022-11-23 RX ADMIN — BUDESONIDE 1 MG: 0.5 INHALANT RESPIRATORY (INHALATION) at 20:07

## 2022-11-23 RX ADMIN — IPRATROPIUM BROMIDE AND ALBUTEROL SULFATE 3 ML: 2.5; .5 SOLUTION RESPIRATORY (INHALATION) at 01:30

## 2022-11-23 RX ADMIN — Medication 10 ML: at 21:04

## 2022-11-23 RX ADMIN — LORAZEPAM 0.5 MG: 2 INJECTION INTRAMUSCULAR at 12:17

## 2022-11-23 RX ADMIN — CARVEDILOL 12.5 MG: 6.25 TABLET, FILM COATED ORAL at 18:20

## 2022-11-23 RX ADMIN — IPRATROPIUM BROMIDE AND ALBUTEROL SULFATE 3 ML: 2.5; .5 SOLUTION RESPIRATORY (INHALATION) at 11:56

## 2022-11-23 RX ADMIN — DOXYCYCLINE 100 MG: 100 INJECTION, POWDER, LYOPHILIZED, FOR SOLUTION INTRAVENOUS at 21:04

## 2022-11-23 RX ADMIN — IPRATROPIUM BROMIDE AND ALBUTEROL SULFATE 3 ML: 2.5; .5 SOLUTION RESPIRATORY (INHALATION) at 20:07

## 2022-11-24 ENCOUNTER — APPOINTMENT (OUTPATIENT)
Dept: GENERAL RADIOLOGY | Facility: HOSPITAL | Age: 77
End: 2022-11-24

## 2022-11-24 ENCOUNTER — APPOINTMENT (OUTPATIENT)
Dept: CARDIOLOGY | Facility: HOSPITAL | Age: 77
End: 2022-11-24

## 2022-11-24 LAB
ALBUMIN SERPL-MCNC: 3.5 G/DL (ref 3.5–5.2)
ALBUMIN/GLOB SERPL: 1.5 G/DL
ALP SERPL-CCNC: 74 U/L (ref 39–117)
ALT SERPL W P-5'-P-CCNC: 24 U/L (ref 1–33)
ANION GAP SERPL CALCULATED.3IONS-SCNC: 11 MMOL/L (ref 5–15)
AST SERPL-CCNC: 27 U/L (ref 1–32)
BACTERIA SPEC AEROBE CULT: NORMAL
BACTERIA SPEC AEROBE CULT: NORMAL
BH CV ECHO MEAS - ACS: 1.67 CM
BH CV ECHO MEAS - AO ROOT DIAM: 2.9 CM
BH CV ECHO MEAS - EDV(CUBED): 109.3 ML
BH CV ECHO MEAS - EDV(MOD-SP4): 135.7 ML
BH CV ECHO MEAS - EF(MOD-SP4): 22 %
BH CV ECHO MEAS - ESV(CUBED): 82.4 ML
BH CV ECHO MEAS - ESV(MOD-SP4): 105.9 ML
BH CV ECHO MEAS - FS: 9 %
BH CV ECHO MEAS - IVS/LVPW: 1.07 CM
BH CV ECHO MEAS - IVSD: 1.13 CM
BH CV ECHO MEAS - LA DIMENSION: 3.8 CM
BH CV ECHO MEAS - LV DIASTOLIC VOL/BSA (35-75): 72.9 CM2
BH CV ECHO MEAS - LV MASS(C)D: 190.6 GRAMS
BH CV ECHO MEAS - LV SYSTOLIC VOL/BSA (12-30): 56.9 CM2
BH CV ECHO MEAS - LVIDD: 4.8 CM
BH CV ECHO MEAS - LVIDS: 4.4 CM
BH CV ECHO MEAS - LVOT AREA: 2.5 CM2
BH CV ECHO MEAS - LVOT DIAM: 1.8 CM
BH CV ECHO MEAS - LVPWD: 1.06 CM
BH CV ECHO MEAS - RVDD: 2.44 CM
BH CV ECHO MEAS - SI(MOD-SP4): 16.1 ML/M2
BH CV ECHO MEAS - SV(MOD-SP4): 29.9 ML
BILIRUB SERPL-MCNC: 0.5 MG/DL (ref 0–1.2)
BUN SERPL-MCNC: 36 MG/DL (ref 8–23)
BUN/CREAT SERPL: 38.3 (ref 7–25)
CALCIUM SPEC-SCNC: 9.1 MG/DL (ref 8.6–10.5)
CHLORIDE SERPL-SCNC: 97 MMOL/L (ref 98–107)
CO2 SERPL-SCNC: 29 MMOL/L (ref 22–29)
CREAT SERPL-MCNC: 0.94 MG/DL (ref 0.57–1)
DEPRECATED RDW RBC AUTO: 42.4 FL (ref 37–54)
EGFRCR SERPLBLD CKD-EPI 2021: 63.4 ML/MIN/1.73
ERYTHROCYTE [DISTWIDTH] IN BLOOD BY AUTOMATED COUNT: 12.9 % (ref 12.3–15.4)
GLOBULIN UR ELPH-MCNC: 2.3 GM/DL
GLUCOSE SERPL-MCNC: 124 MG/DL (ref 65–99)
HCT VFR BLD AUTO: 32.8 % (ref 34–46.6)
HGB BLD-MCNC: 10.6 G/DL (ref 12–15.9)
LYMPHOCYTES # BLD MANUAL: 3.12 10*3/MM3 (ref 0.7–3.1)
LYMPHOCYTES NFR BLD MANUAL: 1 % (ref 5–12)
MAGNESIUM SERPL-MCNC: 1.8 MG/DL (ref 1.6–2.4)
MAXIMAL PREDICTED HEART RATE: 145 BPM
MCH RBC QN AUTO: 29.2 PG (ref 26.6–33)
MCHC RBC AUTO-ENTMCNC: 32.4 G/DL (ref 31.5–35.7)
MCV RBC AUTO: 90.2 FL (ref 79–97)
MONOCYTES # BLD: 0.24 10*3/MM3 (ref 0.1–0.9)
NEUTROPHILS # BLD AUTO: 20.64 10*3/MM3 (ref 1.7–7)
NEUTROPHILS NFR BLD MANUAL: 84 % (ref 42.7–76)
NEUTS BAND NFR BLD MANUAL: 2 % (ref 0–5)
NT-PROBNP SERPL-MCNC: ABNORMAL PG/ML (ref 0–1800)
PHOSPHATE SERPL-MCNC: 4.5 MG/DL (ref 2.5–4.5)
PLATELET # BLD AUTO: 267 10*3/MM3 (ref 140–450)
PMV BLD AUTO: 10.9 FL (ref 6–12)
POTASSIUM SERPL-SCNC: 3.4 MMOL/L (ref 3.5–5.2)
PROT SERPL-MCNC: 5.8 G/DL (ref 6–8.5)
RBC # BLD AUTO: 3.64 10*6/MM3 (ref 3.77–5.28)
RBC MORPH BLD: NORMAL
SCAN SLIDE: NORMAL
SMALL PLATELETS BLD QL SMEAR: ADEQUATE
SODIUM SERPL-SCNC: 137 MMOL/L (ref 136–145)
STRESS TARGET HR: 123 BPM
VARIANT LYMPHS NFR BLD MANUAL: 13 % (ref 19.6–45.3)
WBC MORPH BLD: NORMAL
WBC NRBC COR # BLD: 24 10*3/MM3 (ref 3.4–10.8)

## 2022-11-24 PROCEDURE — 83880 ASSAY OF NATRIURETIC PEPTIDE: CPT | Performed by: INTERNAL MEDICINE

## 2022-11-24 PROCEDURE — 25010000002 CEFTRIAXONE PER 250 MG: Performed by: INTERNAL MEDICINE

## 2022-11-24 PROCEDURE — 25010000002 SULFUR HEXAFLUORIDE MICROSPH 60.7-25 MG RECONSTITUTED SUSPENSION: Performed by: INTERNAL MEDICINE

## 2022-11-24 PROCEDURE — 94761 N-INVAS EAR/PLS OXIMETRY MLT: CPT

## 2022-11-24 PROCEDURE — 94799 UNLISTED PULMONARY SVC/PX: CPT

## 2022-11-24 PROCEDURE — 93308 TTE F-UP OR LMTD: CPT

## 2022-11-24 PROCEDURE — 83735 ASSAY OF MAGNESIUM: CPT | Performed by: INTERNAL MEDICINE

## 2022-11-24 PROCEDURE — 99232 SBSQ HOSP IP/OBS MODERATE 35: CPT | Performed by: INTERNAL MEDICINE

## 2022-11-24 PROCEDURE — 84100 ASSAY OF PHOSPHORUS: CPT | Performed by: INTERNAL MEDICINE

## 2022-11-24 PROCEDURE — 94664 DEMO&/EVAL PT USE INHALER: CPT

## 2022-11-24 PROCEDURE — 25010000002 METHYLPREDNISOLONE PER 40 MG

## 2022-11-24 PROCEDURE — 71045 X-RAY EXAM CHEST 1 VIEW: CPT

## 2022-11-24 PROCEDURE — 93325 DOPPLER ECHO COLOR FLOW MAPG: CPT | Performed by: INTERNAL MEDICINE

## 2022-11-24 PROCEDURE — 94660 CPAP INITIATION&MGMT: CPT

## 2022-11-24 PROCEDURE — 93325 DOPPLER ECHO COLOR FLOW MAPG: CPT

## 2022-11-24 PROCEDURE — 93308 TTE F-UP OR LMTD: CPT | Performed by: INTERNAL MEDICINE

## 2022-11-24 PROCEDURE — 80053 COMPREHEN METABOLIC PANEL: CPT | Performed by: INTERNAL MEDICINE

## 2022-11-24 PROCEDURE — 85025 COMPLETE CBC W/AUTO DIFF WBC: CPT | Performed by: INTERNAL MEDICINE

## 2022-11-24 PROCEDURE — 85007 BL SMEAR W/DIFF WBC COUNT: CPT | Performed by: INTERNAL MEDICINE

## 2022-11-24 RX ORDER — DOXYCYCLINE 100 MG/1
100 TABLET ORAL EVERY 12 HOURS SCHEDULED
Status: COMPLETED | OUTPATIENT
Start: 2022-11-24 | End: 2022-11-25

## 2022-11-24 RX ADMIN — BUDESONIDE 1 MG: 0.5 INHALANT RESPIRATORY (INHALATION) at 07:18

## 2022-11-24 RX ADMIN — HYDROXYZINE HYDROCHLORIDE 25 MG: 25 TABLET ORAL at 20:39

## 2022-11-24 RX ADMIN — DOXYCYCLINE 100 MG: 100 TABLET ORAL at 20:39

## 2022-11-24 RX ADMIN — MONTELUKAST SODIUM 10 MG: 10 TABLET, COATED ORAL at 20:39

## 2022-11-24 RX ADMIN — PRAMIPEXOLE DIHYDROCHLORIDE 0.25 MG: 0.25 TABLET ORAL at 10:49

## 2022-11-24 RX ADMIN — IPRATROPIUM BROMIDE AND ALBUTEROL SULFATE 3 ML: 2.5; .5 SOLUTION RESPIRATORY (INHALATION) at 07:18

## 2022-11-24 RX ADMIN — SULFUR HEXAFLUORIDE 3 ML: KIT at 11:54

## 2022-11-24 RX ADMIN — Medication 10 ML: at 20:39

## 2022-11-24 RX ADMIN — CARVEDILOL 12.5 MG: 6.25 TABLET, FILM COATED ORAL at 18:02

## 2022-11-24 RX ADMIN — BUDESONIDE 1 MG: 0.5 INHALANT RESPIRATORY (INHALATION) at 20:51

## 2022-11-24 RX ADMIN — ATORVASTATIN CALCIUM 10 MG: 10 TABLET, FILM COATED ORAL at 20:39

## 2022-11-24 RX ADMIN — IPRATROPIUM BROMIDE AND ALBUTEROL SULFATE 3 ML: 2.5; .5 SOLUTION RESPIRATORY (INHALATION) at 11:14

## 2022-11-24 RX ADMIN — DOXYCYCLINE 100 MG: 100 TABLET ORAL at 13:15

## 2022-11-24 RX ADMIN — IPRATROPIUM BROMIDE AND ALBUTEROL SULFATE 3 ML: 2.5; .5 SOLUTION RESPIRATORY (INHALATION) at 20:51

## 2022-11-24 RX ADMIN — CEFTRIAXONE 2 G: 2 INJECTION, POWDER, FOR SOLUTION INTRAMUSCULAR; INTRAVENOUS at 00:29

## 2022-11-24 RX ADMIN — METHYLPREDNISOLONE SODIUM SUCCINATE 40 MG: 40 INJECTION, POWDER, FOR SOLUTION INTRAMUSCULAR; INTRAVENOUS at 10:50

## 2022-11-24 RX ADMIN — CARVEDILOL 12.5 MG: 6.25 TABLET, FILM COATED ORAL at 10:49

## 2022-11-24 RX ADMIN — Medication 10 ML: at 10:50

## 2022-11-24 RX ADMIN — ROFLUMILAST 500 MCG: 500 TABLET ORAL at 10:55

## 2022-11-24 RX ADMIN — METHYLPREDNISOLONE SODIUM SUCCINATE 40 MG: 40 INJECTION, POWDER, FOR SOLUTION INTRAMUSCULAR; INTRAVENOUS at 00:29

## 2022-11-24 RX ADMIN — FUROSEMIDE 40 MG: 40 TABLET ORAL at 10:49

## 2022-11-24 RX ADMIN — PAROXETINE HYDROCHLORIDE 20 MG: 20 TABLET, FILM COATED ORAL at 10:49

## 2022-11-24 RX ADMIN — IPRATROPIUM BROMIDE AND ALBUTEROL SULFATE 3 ML: 2.5; .5 SOLUTION RESPIRATORY (INHALATION) at 14:48

## 2022-11-24 RX ADMIN — PANTOPRAZOLE SODIUM 40 MG: 40 TABLET, DELAYED RELEASE ORAL at 10:49

## 2022-11-24 RX ADMIN — METHYLPREDNISOLONE SODIUM SUCCINATE 40 MG: 40 INJECTION, POWDER, FOR SOLUTION INTRAMUSCULAR; INTRAVENOUS at 18:02

## 2022-11-25 ENCOUNTER — APPOINTMENT (OUTPATIENT)
Dept: GENERAL RADIOLOGY | Facility: HOSPITAL | Age: 77
End: 2022-11-25

## 2022-11-25 PROBLEM — I20.89 ANGINA AT REST: Status: ACTIVE | Noted: 2022-11-19

## 2022-11-25 PROBLEM — I20.8 ANGINA AT REST: Status: ACTIVE | Noted: 2022-11-19

## 2022-11-25 LAB
ALBUMIN SERPL-MCNC: 3.4 G/DL (ref 3.5–5.2)
ALBUMIN/GLOB SERPL: 1.7 G/DL
ALP SERPL-CCNC: 69 U/L (ref 39–117)
ALT SERPL W P-5'-P-CCNC: 37 U/L (ref 1–33)
ANION GAP SERPL CALCULATED.3IONS-SCNC: 12 MMOL/L (ref 5–15)
APTT PPP: 22.3 SECONDS (ref 24–31)
AST SERPL-CCNC: 33 U/L (ref 1–32)
BILIRUB SERPL-MCNC: 0.5 MG/DL (ref 0–1.2)
BUN SERPL-MCNC: 39 MG/DL (ref 8–23)
BUN/CREAT SERPL: 47 (ref 7–25)
CALCIUM SPEC-SCNC: 8.8 MG/DL (ref 8.6–10.5)
CHLORIDE SERPL-SCNC: 97 MMOL/L (ref 98–107)
CO2 SERPL-SCNC: 28 MMOL/L (ref 22–29)
CREAT SERPL-MCNC: 0.83 MG/DL (ref 0.57–1)
CRP SERPL-MCNC: <0.3 MG/DL (ref 0–0.5)
DEPRECATED RDW RBC AUTO: 41.6 FL (ref 37–54)
EGFRCR SERPLBLD CKD-EPI 2021: 73.6 ML/MIN/1.73
ERYTHROCYTE [DISTWIDTH] IN BLOOD BY AUTOMATED COUNT: 12.7 % (ref 12.3–15.4)
ERYTHROCYTE [SEDIMENTATION RATE] IN BLOOD: 4 MM/HR (ref 0–30)
GLOBULIN UR ELPH-MCNC: 2 GM/DL
GLUCOSE SERPL-MCNC: 121 MG/DL (ref 65–99)
HCT VFR BLD AUTO: 30.9 % (ref 34–46.6)
HGB BLD-MCNC: 10.3 G/DL (ref 12–15.9)
INR PPP: 1.07 (ref 0.93–1.1)
LYMPHOCYTES # BLD MANUAL: 2.74 10*3/MM3 (ref 0.7–3.1)
LYMPHOCYTES NFR BLD MANUAL: 1 % (ref 5–12)
MAGNESIUM SERPL-MCNC: 1.8 MG/DL (ref 1.6–2.4)
MCH RBC QN AUTO: 29.8 PG (ref 26.6–33)
MCHC RBC AUTO-ENTMCNC: 33.2 G/DL (ref 31.5–35.7)
MCV RBC AUTO: 89.9 FL (ref 79–97)
MONOCYTES # BLD: 0.21 10*3/MM3 (ref 0.1–0.9)
NEUTROPHILS # BLD AUTO: 18.15 10*3/MM3 (ref 1.7–7)
NEUTROPHILS NFR BLD MANUAL: 85 % (ref 42.7–76)
NEUTS BAND NFR BLD MANUAL: 1 % (ref 0–5)
PHOSPHATE SERPL-MCNC: 3.9 MG/DL (ref 2.5–4.5)
PLAT MORPH BLD: NORMAL
PLATELET # BLD AUTO: 242 10*3/MM3 (ref 140–450)
PMV BLD AUTO: 11.1 FL (ref 6–12)
POTASSIUM SERPL-SCNC: 3.4 MMOL/L (ref 3.5–5.2)
PROT SERPL-MCNC: 5.4 G/DL (ref 6–8.5)
PROTHROMBIN TIME: 11 SECONDS (ref 9.6–11.7)
RBC # BLD AUTO: 3.44 10*6/MM3 (ref 3.77–5.28)
RBC MORPH BLD: NORMAL
SCAN SLIDE: NORMAL
SODIUM SERPL-SCNC: 137 MMOL/L (ref 136–145)
VARIANT LYMPHS NFR BLD MANUAL: 13 % (ref 19.6–45.3)
WBC MORPH BLD: NORMAL
WBC NRBC COR # BLD: 21.1 10*3/MM3 (ref 3.4–10.8)

## 2022-11-25 PROCEDURE — 94799 UNLISTED PULMONARY SVC/PX: CPT

## 2022-11-25 PROCEDURE — 94761 N-INVAS EAR/PLS OXIMETRY MLT: CPT

## 2022-11-25 PROCEDURE — 83735 ASSAY OF MAGNESIUM: CPT | Performed by: INTERNAL MEDICINE

## 2022-11-25 PROCEDURE — 4A023N7 MEASUREMENT OF CARDIAC SAMPLING AND PRESSURE, LEFT HEART, PERCUTANEOUS APPROACH: ICD-10-PCS | Performed by: INTERNAL MEDICINE

## 2022-11-25 PROCEDURE — 25010000002 METHYLPREDNISOLONE PER 40 MG: Performed by: INTERNAL MEDICINE

## 2022-11-25 PROCEDURE — 99152 MOD SED SAME PHYS/QHP 5/>YRS: CPT | Performed by: INTERNAL MEDICINE

## 2022-11-25 PROCEDURE — 25010000002 CEFTRIAXONE PER 250 MG: Performed by: INTERNAL MEDICINE

## 2022-11-25 PROCEDURE — 86140 C-REACTIVE PROTEIN: CPT | Performed by: INTERNAL MEDICINE

## 2022-11-25 PROCEDURE — 93454 CORONARY ARTERY ANGIO S&I: CPT | Performed by: INTERNAL MEDICINE

## 2022-11-25 PROCEDURE — 25010000002 HEPARIN (PORCINE) PER 1000 UNITS: Performed by: INTERNAL MEDICINE

## 2022-11-25 PROCEDURE — 99232 SBSQ HOSP IP/OBS MODERATE 35: CPT | Performed by: INTERNAL MEDICINE

## 2022-11-25 PROCEDURE — 94664 DEMO&/EVAL PT USE INHALER: CPT

## 2022-11-25 PROCEDURE — 0 IOPAMIDOL PER 1 ML: Performed by: INTERNAL MEDICINE

## 2022-11-25 PROCEDURE — 85652 RBC SED RATE AUTOMATED: CPT | Performed by: INTERNAL MEDICINE

## 2022-11-25 PROCEDURE — 25010000002 METHYLPREDNISOLONE PER 40 MG

## 2022-11-25 PROCEDURE — 94660 CPAP INITIATION&MGMT: CPT

## 2022-11-25 PROCEDURE — 85025 COMPLETE CBC W/AUTO DIFF WBC: CPT | Performed by: INTERNAL MEDICINE

## 2022-11-25 PROCEDURE — 71045 X-RAY EXAM CHEST 1 VIEW: CPT

## 2022-11-25 PROCEDURE — B2111ZZ FLUOROSCOPY OF MULTIPLE CORONARY ARTERIES USING LOW OSMOLAR CONTRAST: ICD-10-PCS | Performed by: INTERNAL MEDICINE

## 2022-11-25 PROCEDURE — 85730 THROMBOPLASTIN TIME PARTIAL: CPT | Performed by: INTERNAL MEDICINE

## 2022-11-25 PROCEDURE — 25010000002 MIDAZOLAM PER 1 MG: Performed by: INTERNAL MEDICINE

## 2022-11-25 PROCEDURE — 85610 PROTHROMBIN TIME: CPT | Performed by: INTERNAL MEDICINE

## 2022-11-25 PROCEDURE — 85007 BL SMEAR W/DIFF WBC COUNT: CPT | Performed by: INTERNAL MEDICINE

## 2022-11-25 PROCEDURE — C1894 INTRO/SHEATH, NON-LASER: HCPCS | Performed by: INTERNAL MEDICINE

## 2022-11-25 PROCEDURE — 84100 ASSAY OF PHOSPHORUS: CPT | Performed by: INTERNAL MEDICINE

## 2022-11-25 PROCEDURE — 80053 COMPREHEN METABOLIC PANEL: CPT | Performed by: INTERNAL MEDICINE

## 2022-11-25 PROCEDURE — 99153 MOD SED SAME PHYS/QHP EA: CPT | Performed by: INTERNAL MEDICINE

## 2022-11-25 PROCEDURE — 25010000002 FENTANYL CITRATE (PF) 100 MCG/2ML SOLUTION: Performed by: INTERNAL MEDICINE

## 2022-11-25 PROCEDURE — C1769 GUIDE WIRE: HCPCS | Performed by: INTERNAL MEDICINE

## 2022-11-25 RX ORDER — HEPARIN SODIUM 1000 [USP'U]/ML
INJECTION, SOLUTION INTRAVENOUS; SUBCUTANEOUS
Status: DISCONTINUED | OUTPATIENT
Start: 2022-11-25 | End: 2022-11-25 | Stop reason: HOSPADM

## 2022-11-25 RX ORDER — LIDOCAINE HYDROCHLORIDE 20 MG/ML
INJECTION, SOLUTION INFILTRATION; PERINEURAL
Status: DISCONTINUED | OUTPATIENT
Start: 2022-11-25 | End: 2022-11-25 | Stop reason: HOSPADM

## 2022-11-25 RX ORDER — ACETAMINOPHEN 325 MG/1
650 TABLET ORAL EVERY 4 HOURS PRN
Status: DISCONTINUED | OUTPATIENT
Start: 2022-11-25 | End: 2022-11-30 | Stop reason: HOSPADM

## 2022-11-25 RX ORDER — POTASSIUM CHLORIDE 20 MEQ/1
20 TABLET, EXTENDED RELEASE ORAL DAILY
Status: DISCONTINUED | OUTPATIENT
Start: 2022-11-25 | End: 2022-11-30 | Stop reason: HOSPADM

## 2022-11-25 RX ORDER — NICARDIPINE HYDROCHLORIDE 2.5 MG/ML
INJECTION INTRAVENOUS
Status: DISCONTINUED | OUTPATIENT
Start: 2022-11-25 | End: 2022-11-25 | Stop reason: HOSPADM

## 2022-11-25 RX ORDER — SODIUM CHLORIDE 9 MG/ML
75 INJECTION, SOLUTION INTRAVENOUS CONTINUOUS
Status: DISCONTINUED | OUTPATIENT
Start: 2022-11-25 | End: 2022-11-28

## 2022-11-25 RX ORDER — FENTANYL CITRATE 50 UG/ML
INJECTION, SOLUTION INTRAMUSCULAR; INTRAVENOUS
Status: DISCONTINUED | OUTPATIENT
Start: 2022-11-25 | End: 2022-11-25 | Stop reason: HOSPADM

## 2022-11-25 RX ORDER — POTASSIUM CHLORIDE 20 MEQ/1
40 TABLET, EXTENDED RELEASE ORAL ONCE
Status: COMPLETED | OUTPATIENT
Start: 2022-11-25 | End: 2022-11-25

## 2022-11-25 RX ORDER — NITROGLYCERIN 5 MG/ML
INJECTION, SOLUTION INTRAVENOUS
Status: DISCONTINUED | OUTPATIENT
Start: 2022-11-25 | End: 2022-11-25 | Stop reason: HOSPADM

## 2022-11-25 RX ORDER — SODIUM CHLORIDE 9 MG/ML
INJECTION, SOLUTION INTRAVENOUS
Status: COMPLETED | OUTPATIENT
Start: 2022-11-25 | End: 2022-11-25

## 2022-11-25 RX ORDER — MIDAZOLAM HYDROCHLORIDE 1 MG/ML
INJECTION INTRAMUSCULAR; INTRAVENOUS
Status: DISCONTINUED | OUTPATIENT
Start: 2022-11-25 | End: 2022-11-25 | Stop reason: HOSPADM

## 2022-11-25 RX ADMIN — CEFTRIAXONE 2 G: 2 INJECTION, POWDER, FOR SOLUTION INTRAMUSCULAR; INTRAVENOUS at 00:29

## 2022-11-25 RX ADMIN — Medication 10 ML: at 09:54

## 2022-11-25 RX ADMIN — Medication 10 ML: at 20:31

## 2022-11-25 RX ADMIN — CARVEDILOL 12.5 MG: 6.25 TABLET, FILM COATED ORAL at 09:49

## 2022-11-25 RX ADMIN — PRAMIPEXOLE DIHYDROCHLORIDE 0.25 MG: 0.25 TABLET ORAL at 09:48

## 2022-11-25 RX ADMIN — ROFLUMILAST 500 MCG: 500 TABLET ORAL at 09:48

## 2022-11-25 RX ADMIN — DOXYCYCLINE 100 MG: 100 TABLET ORAL at 09:48

## 2022-11-25 RX ADMIN — ATORVASTATIN CALCIUM 10 MG: 10 TABLET, FILM COATED ORAL at 20:30

## 2022-11-25 RX ADMIN — PANTOPRAZOLE SODIUM 40 MG: 40 TABLET, DELAYED RELEASE ORAL at 09:48

## 2022-11-25 RX ADMIN — METHYLPREDNISOLONE SODIUM SUCCINATE 40 MG: 40 INJECTION, POWDER, FOR SOLUTION INTRAMUSCULAR; INTRAVENOUS at 09:49

## 2022-11-25 RX ADMIN — PAROXETINE HYDROCHLORIDE 20 MG: 20 TABLET, FILM COATED ORAL at 09:48

## 2022-11-25 RX ADMIN — IPRATROPIUM BROMIDE AND ALBUTEROL SULFATE 3 ML: 2.5; .5 SOLUTION RESPIRATORY (INHALATION) at 07:05

## 2022-11-25 RX ADMIN — IPRATROPIUM BROMIDE AND ALBUTEROL SULFATE 3 ML: 2.5; .5 SOLUTION RESPIRATORY (INHALATION) at 20:20

## 2022-11-25 RX ADMIN — CARVEDILOL 12.5 MG: 6.25 TABLET, FILM COATED ORAL at 17:25

## 2022-11-25 RX ADMIN — MONTELUKAST SODIUM 10 MG: 10 TABLET, COATED ORAL at 20:30

## 2022-11-25 RX ADMIN — BUDESONIDE 0.5 MG: 0.5 INHALANT RESPIRATORY (INHALATION) at 20:16

## 2022-11-25 RX ADMIN — POTASSIUM CHLORIDE 40 MEQ: 1500 TABLET, EXTENDED RELEASE ORAL at 09:49

## 2022-11-25 RX ADMIN — METHYLPREDNISOLONE SODIUM SUCCINATE 40 MG: 40 INJECTION, POWDER, FOR SOLUTION INTRAMUSCULAR; INTRAVENOUS at 17:25

## 2022-11-25 RX ADMIN — METHYLPREDNISOLONE SODIUM SUCCINATE 40 MG: 40 INJECTION, POWDER, FOR SOLUTION INTRAMUSCULAR; INTRAVENOUS at 00:29

## 2022-11-25 RX ADMIN — BUDESONIDE 1 MG: 0.5 INHALANT RESPIRATORY (INHALATION) at 07:05

## 2022-11-25 RX ADMIN — IPRATROPIUM BROMIDE AND ALBUTEROL SULFATE 3 ML: 2.5; .5 SOLUTION RESPIRATORY (INHALATION) at 15:17

## 2022-11-25 RX ADMIN — HYDROXYZINE HYDROCHLORIDE 25 MG: 25 TABLET ORAL at 20:33

## 2022-11-25 RX ADMIN — IPRATROPIUM BROMIDE AND ALBUTEROL SULFATE 3 ML: 2.5; .5 SOLUTION RESPIRATORY (INHALATION) at 11:25

## 2022-11-25 RX ADMIN — FUROSEMIDE 40 MG: 40 TABLET ORAL at 09:48

## 2022-11-25 RX ADMIN — SODIUM CHLORIDE 75 ML/HR: 9 INJECTION, SOLUTION INTRAVENOUS at 17:25

## 2022-11-26 LAB
ALBUMIN SERPL-MCNC: 3.1 G/DL (ref 3.5–5.2)
ALBUMIN/GLOB SERPL: 1.6 G/DL
ALP SERPL-CCNC: 62 U/L (ref 39–117)
ALT SERPL W P-5'-P-CCNC: 32 U/L (ref 1–33)
ANION GAP SERPL CALCULATED.3IONS-SCNC: 12 MMOL/L (ref 5–15)
AST SERPL-CCNC: 24 U/L (ref 1–32)
BILIRUB SERPL-MCNC: 0.6 MG/DL (ref 0–1.2)
BUN SERPL-MCNC: 37 MG/DL (ref 8–23)
BUN/CREAT SERPL: 45.1 (ref 7–25)
CALCIUM SPEC-SCNC: 8.5 MG/DL (ref 8.6–10.5)
CHLORIDE SERPL-SCNC: 100 MMOL/L (ref 98–107)
CO2 SERPL-SCNC: 26 MMOL/L (ref 22–29)
CREAT SERPL-MCNC: 0.82 MG/DL (ref 0.57–1)
DEPRECATED RDW RBC AUTO: 40.7 FL (ref 37–54)
EGFRCR SERPLBLD CKD-EPI 2021: 74.7 ML/MIN/1.73
ERYTHROCYTE [DISTWIDTH] IN BLOOD BY AUTOMATED COUNT: 12.7 % (ref 12.3–15.4)
GLOBULIN UR ELPH-MCNC: 1.9 GM/DL
GLUCOSE SERPL-MCNC: 120 MG/DL (ref 65–99)
HCT VFR BLD AUTO: 31.9 % (ref 34–46.6)
HGB BLD-MCNC: 10.2 G/DL (ref 12–15.9)
LARGE PLATELETS: ABNORMAL
LYMPHOCYTES # BLD MANUAL: 2.66 10*3/MM3 (ref 0.7–3.1)
LYMPHOCYTES NFR BLD MANUAL: 2 % (ref 5–12)
MAGNESIUM SERPL-MCNC: 1.8 MG/DL (ref 1.6–2.4)
MCH RBC QN AUTO: 29.5 PG (ref 26.6–33)
MCHC RBC AUTO-ENTMCNC: 32 G/DL (ref 31.5–35.7)
MCV RBC AUTO: 92.1 FL (ref 79–97)
METAMYELOCYTES NFR BLD MANUAL: 1 % (ref 0–0)
MONOCYTES # BLD: 0.38 10*3/MM3 (ref 0.1–0.9)
MYELOCYTES NFR BLD MANUAL: 1 % (ref 0–0)
NEUTROPHILS # BLD AUTO: 15.58 10*3/MM3 (ref 1.7–7)
NEUTROPHILS NFR BLD MANUAL: 80 % (ref 42.7–76)
NEUTS BAND NFR BLD MANUAL: 2 % (ref 0–5)
PHOSPHATE SERPL-MCNC: 3.8 MG/DL (ref 2.5–4.5)
PLATELET # BLD AUTO: 230 10*3/MM3 (ref 140–450)
PMV BLD AUTO: 11.2 FL (ref 6–12)
POTASSIUM SERPL-SCNC: 4 MMOL/L (ref 3.5–5.2)
PROT SERPL-MCNC: 5 G/DL (ref 6–8.5)
RBC # BLD AUTO: 3.46 10*6/MM3 (ref 3.77–5.28)
RBC MORPH BLD: NORMAL
SCAN SLIDE: NORMAL
SODIUM SERPL-SCNC: 138 MMOL/L (ref 136–145)
VARIANT LYMPHS NFR BLD MANUAL: 14 % (ref 19.6–45.3)
WBC MORPH BLD: NORMAL
WBC NRBC COR # BLD: 19 10*3/MM3 (ref 3.4–10.8)

## 2022-11-26 PROCEDURE — 25010000002 CEFTRIAXONE PER 250 MG: Performed by: INTERNAL MEDICINE

## 2022-11-26 PROCEDURE — 94799 UNLISTED PULMONARY SVC/PX: CPT

## 2022-11-26 PROCEDURE — 83735 ASSAY OF MAGNESIUM: CPT | Performed by: INTERNAL MEDICINE

## 2022-11-26 PROCEDURE — 25010000002 METHYLPREDNISOLONE PER 40 MG: Performed by: INTERNAL MEDICINE

## 2022-11-26 PROCEDURE — 85007 BL SMEAR W/DIFF WBC COUNT: CPT | Performed by: INTERNAL MEDICINE

## 2022-11-26 PROCEDURE — 80053 COMPREHEN METABOLIC PANEL: CPT | Performed by: INTERNAL MEDICINE

## 2022-11-26 PROCEDURE — 99232 SBSQ HOSP IP/OBS MODERATE 35: CPT | Performed by: INTERNAL MEDICINE

## 2022-11-26 PROCEDURE — 84100 ASSAY OF PHOSPHORUS: CPT | Performed by: INTERNAL MEDICINE

## 2022-11-26 PROCEDURE — 94761 N-INVAS EAR/PLS OXIMETRY MLT: CPT

## 2022-11-26 PROCEDURE — 94664 DEMO&/EVAL PT USE INHALER: CPT

## 2022-11-26 PROCEDURE — 94660 CPAP INITIATION&MGMT: CPT

## 2022-11-26 PROCEDURE — 85025 COMPLETE CBC W/AUTO DIFF WBC: CPT | Performed by: INTERNAL MEDICINE

## 2022-11-26 PROCEDURE — 97530 THERAPEUTIC ACTIVITIES: CPT

## 2022-11-26 RX ORDER — DOCUSATE SODIUM 100 MG/1
100 CAPSULE, LIQUID FILLED ORAL 2 TIMES DAILY
Status: DISCONTINUED | OUTPATIENT
Start: 2022-11-26 | End: 2022-11-30 | Stop reason: HOSPADM

## 2022-11-26 RX ORDER — LISINOPRIL 5 MG/1
5 TABLET ORAL
Status: DISCONTINUED | OUTPATIENT
Start: 2022-11-26 | End: 2022-11-30 | Stop reason: HOSPADM

## 2022-11-26 RX ORDER — POLYETHYLENE GLYCOL 3350 17 G/17G
17 POWDER, FOR SOLUTION ORAL DAILY
Status: DISCONTINUED | OUTPATIENT
Start: 2022-11-26 | End: 2022-11-30 | Stop reason: HOSPADM

## 2022-11-26 RX ADMIN — PRAMIPEXOLE DIHYDROCHLORIDE 0.25 MG: 0.25 TABLET ORAL at 08:26

## 2022-11-26 RX ADMIN — POTASSIUM CHLORIDE 20 MEQ: 1500 TABLET, EXTENDED RELEASE ORAL at 08:26

## 2022-11-26 RX ADMIN — CEFTRIAXONE 2 G: 2 INJECTION, POWDER, FOR SOLUTION INTRAMUSCULAR; INTRAVENOUS at 00:28

## 2022-11-26 RX ADMIN — FUROSEMIDE 40 MG: 40 TABLET ORAL at 08:25

## 2022-11-26 RX ADMIN — IPRATROPIUM BROMIDE AND ALBUTEROL SULFATE 3 ML: 2.5; .5 SOLUTION RESPIRATORY (INHALATION) at 14:43

## 2022-11-26 RX ADMIN — PANTOPRAZOLE SODIUM 40 MG: 40 TABLET, DELAYED RELEASE ORAL at 08:25

## 2022-11-26 RX ADMIN — Medication 10 ML: at 08:26

## 2022-11-26 RX ADMIN — POLYETHYLENE GLYCOL 3350 17 G: 17 POWDER, FOR SOLUTION ORAL at 15:30

## 2022-11-26 RX ADMIN — METHYLPREDNISOLONE SODIUM SUCCINATE 40 MG: 40 INJECTION, POWDER, FOR SOLUTION INTRAMUSCULAR; INTRAVENOUS at 00:28

## 2022-11-26 RX ADMIN — METHYLPREDNISOLONE SODIUM SUCCINATE 40 MG: 40 INJECTION, POWDER, FOR SOLUTION INTRAMUSCULAR; INTRAVENOUS at 08:25

## 2022-11-26 RX ADMIN — METHYLPREDNISOLONE SODIUM SUCCINATE 40 MG: 40 INJECTION, POWDER, FOR SOLUTION INTRAMUSCULAR; INTRAVENOUS at 17:08

## 2022-11-26 RX ADMIN — MONTELUKAST SODIUM 10 MG: 10 TABLET, COATED ORAL at 21:14

## 2022-11-26 RX ADMIN — SODIUM CHLORIDE 75 ML/HR: 9 INJECTION, SOLUTION INTRAVENOUS at 08:42

## 2022-11-26 RX ADMIN — BUDESONIDE 1 MG: 0.5 INHALANT RESPIRATORY (INHALATION) at 07:58

## 2022-11-26 RX ADMIN — ROFLUMILAST 500 MCG: 500 TABLET ORAL at 17:08

## 2022-11-26 RX ADMIN — IPRATROPIUM BROMIDE AND ALBUTEROL SULFATE 3 ML: 2.5; .5 SOLUTION RESPIRATORY (INHALATION) at 11:30

## 2022-11-26 RX ADMIN — DOCUSATE SODIUM 100 MG: 100 CAPSULE, LIQUID FILLED ORAL at 15:30

## 2022-11-26 RX ADMIN — CARVEDILOL 12.5 MG: 6.25 TABLET, FILM COATED ORAL at 08:25

## 2022-11-26 RX ADMIN — CARVEDILOL 12.5 MG: 6.25 TABLET, FILM COATED ORAL at 17:08

## 2022-11-26 RX ADMIN — LISINOPRIL 5 MG: 5 TABLET ORAL at 10:47

## 2022-11-26 RX ADMIN — PAROXETINE HYDROCHLORIDE 20 MG: 20 TABLET, FILM COATED ORAL at 08:26

## 2022-11-26 RX ADMIN — Medication 10 ML: at 21:15

## 2022-11-26 RX ADMIN — ATORVASTATIN CALCIUM 10 MG: 10 TABLET, FILM COATED ORAL at 21:14

## 2022-11-26 RX ADMIN — IPRATROPIUM BROMIDE AND ALBUTEROL SULFATE 3 ML: 2.5; .5 SOLUTION RESPIRATORY (INHALATION) at 07:52

## 2022-11-27 LAB
ALBUMIN SERPL-MCNC: 3.1 G/DL (ref 3.5–5.2)
ALBUMIN/GLOB SERPL: 1.9 G/DL
ALP SERPL-CCNC: 63 U/L (ref 39–117)
ALT SERPL W P-5'-P-CCNC: 31 U/L (ref 1–33)
ANION GAP SERPL CALCULATED.3IONS-SCNC: 10 MMOL/L (ref 5–15)
AST SERPL-CCNC: 26 U/L (ref 1–32)
BILIRUB SERPL-MCNC: 0.7 MG/DL (ref 0–1.2)
BUN SERPL-MCNC: 35 MG/DL (ref 8–23)
BUN/CREAT SERPL: 41.7 (ref 7–25)
CALCIUM SPEC-SCNC: 8.1 MG/DL (ref 8.6–10.5)
CHLORIDE SERPL-SCNC: 100 MMOL/L (ref 98–107)
CO2 SERPL-SCNC: 25 MMOL/L (ref 22–29)
CREAT SERPL-MCNC: 0.84 MG/DL (ref 0.57–1)
DEPRECATED RDW RBC AUTO: 45.5 FL (ref 37–54)
EGFRCR SERPLBLD CKD-EPI 2021: 72.6 ML/MIN/1.73
ERYTHROCYTE [DISTWIDTH] IN BLOOD BY AUTOMATED COUNT: 13.6 % (ref 12.3–15.4)
GLOBULIN UR ELPH-MCNC: 1.6 GM/DL
GLUCOSE SERPL-MCNC: 119 MG/DL (ref 65–99)
HCT VFR BLD AUTO: 29.1 % (ref 34–46.6)
HGB BLD-MCNC: 9.4 G/DL (ref 12–15.9)
LYMPHOCYTES # BLD MANUAL: 2.97 10*3/MM3 (ref 0.7–3.1)
LYMPHOCYTES NFR BLD MANUAL: 1 % (ref 5–12)
MAGNESIUM SERPL-MCNC: 1.7 MG/DL (ref 1.6–2.4)
MCH RBC QN AUTO: 29.8 PG (ref 26.6–33)
MCHC RBC AUTO-ENTMCNC: 32.4 G/DL (ref 31.5–35.7)
MCV RBC AUTO: 92 FL (ref 79–97)
MONOCYTES # BLD: 0.21 10*3/MM3 (ref 0.1–0.9)
NEUTROPHILS # BLD AUTO: 18.02 10*3/MM3 (ref 1.7–7)
NEUTROPHILS NFR BLD MANUAL: 84 % (ref 42.7–76)
NEUTS BAND NFR BLD MANUAL: 1 % (ref 0–5)
PHOSPHATE SERPL-MCNC: 3.9 MG/DL (ref 2.5–4.5)
PLATELET # BLD AUTO: 205 10*3/MM3 (ref 140–450)
PMV BLD AUTO: 10.4 FL (ref 6–12)
POTASSIUM SERPL-SCNC: 4 MMOL/L (ref 3.5–5.2)
PROT SERPL-MCNC: 4.7 G/DL (ref 6–8.5)
RBC # BLD AUTO: 3.17 10*6/MM3 (ref 3.77–5.28)
RBC MORPH BLD: NORMAL
SCAN SLIDE: NORMAL
SMALL PLATELETS BLD QL SMEAR: ADEQUATE
SODIUM SERPL-SCNC: 135 MMOL/L (ref 136–145)
VARIANT LYMPHS NFR BLD MANUAL: 14 % (ref 19.6–45.3)
WBC MORPH BLD: NORMAL
WBC NRBC COR # BLD: 21.2 10*3/MM3 (ref 3.4–10.8)

## 2022-11-27 PROCEDURE — 94799 UNLISTED PULMONARY SVC/PX: CPT

## 2022-11-27 PROCEDURE — 85025 COMPLETE CBC W/AUTO DIFF WBC: CPT | Performed by: INTERNAL MEDICINE

## 2022-11-27 PROCEDURE — 25010000002 ENOXAPARIN PER 10 MG: Performed by: INTERNAL MEDICINE

## 2022-11-27 PROCEDURE — 80053 COMPREHEN METABOLIC PANEL: CPT | Performed by: INTERNAL MEDICINE

## 2022-11-27 PROCEDURE — 99232 SBSQ HOSP IP/OBS MODERATE 35: CPT | Performed by: INTERNAL MEDICINE

## 2022-11-27 PROCEDURE — 85007 BL SMEAR W/DIFF WBC COUNT: CPT | Performed by: INTERNAL MEDICINE

## 2022-11-27 PROCEDURE — 94664 DEMO&/EVAL PT USE INHALER: CPT

## 2022-11-27 PROCEDURE — 25010000002 CEFTRIAXONE PER 250 MG: Performed by: INTERNAL MEDICINE

## 2022-11-27 PROCEDURE — 84100 ASSAY OF PHOSPHORUS: CPT | Performed by: INTERNAL MEDICINE

## 2022-11-27 PROCEDURE — 83735 ASSAY OF MAGNESIUM: CPT | Performed by: INTERNAL MEDICINE

## 2022-11-27 PROCEDURE — 25010000002 METHYLPREDNISOLONE PER 40 MG: Performed by: INTERNAL MEDICINE

## 2022-11-27 PROCEDURE — 94761 N-INVAS EAR/PLS OXIMETRY MLT: CPT

## 2022-11-27 RX ORDER — ENOXAPARIN SODIUM 100 MG/ML
40 INJECTION SUBCUTANEOUS EVERY 24 HOURS
Status: DISCONTINUED | OUTPATIENT
Start: 2022-11-27 | End: 2022-11-30 | Stop reason: HOSPADM

## 2022-11-27 RX ORDER — IPRATROPIUM BROMIDE AND ALBUTEROL SULFATE 2.5; .5 MG/3ML; MG/3ML
3 SOLUTION RESPIRATORY (INHALATION)
Status: DISCONTINUED | OUTPATIENT
Start: 2022-11-27 | End: 2022-11-30 | Stop reason: HOSPADM

## 2022-11-27 RX ADMIN — CARVEDILOL 12.5 MG: 6.25 TABLET, FILM COATED ORAL at 09:14

## 2022-11-27 RX ADMIN — POTASSIUM CHLORIDE 20 MEQ: 1500 TABLET, EXTENDED RELEASE ORAL at 09:14

## 2022-11-27 RX ADMIN — Medication 10 ML: at 20:33

## 2022-11-27 RX ADMIN — FUROSEMIDE 40 MG: 40 TABLET ORAL at 09:14

## 2022-11-27 RX ADMIN — ROFLUMILAST 500 MCG: 500 TABLET ORAL at 09:14

## 2022-11-27 RX ADMIN — BUDESONIDE 1 MG: 0.5 INHALANT RESPIRATORY (INHALATION) at 19:58

## 2022-11-27 RX ADMIN — ENOXAPARIN SODIUM 40 MG: 100 INJECTION SUBCUTANEOUS at 16:59

## 2022-11-27 RX ADMIN — PANTOPRAZOLE SODIUM 40 MG: 40 TABLET, DELAYED RELEASE ORAL at 09:14

## 2022-11-27 RX ADMIN — METHYLPREDNISOLONE SODIUM SUCCINATE 40 MG: 40 INJECTION, POWDER, FOR SOLUTION INTRAMUSCULAR; INTRAVENOUS at 17:00

## 2022-11-27 RX ADMIN — PAROXETINE HYDROCHLORIDE 20 MG: 20 TABLET, FILM COATED ORAL at 09:15

## 2022-11-27 RX ADMIN — ATORVASTATIN CALCIUM 10 MG: 10 TABLET, FILM COATED ORAL at 20:32

## 2022-11-27 RX ADMIN — PRAMIPEXOLE DIHYDROCHLORIDE 0.25 MG: 0.25 TABLET ORAL at 09:14

## 2022-11-27 RX ADMIN — IPRATROPIUM BROMIDE AND ALBUTEROL SULFATE 3 ML: 2.5; .5 SOLUTION RESPIRATORY (INHALATION) at 19:59

## 2022-11-27 RX ADMIN — IPRATROPIUM BROMIDE AND ALBUTEROL SULFATE 3 ML: 2.5; .5 SOLUTION RESPIRATORY (INHALATION) at 06:56

## 2022-11-27 RX ADMIN — IPRATROPIUM BROMIDE AND ALBUTEROL SULFATE 3 ML: 2.5; .5 SOLUTION RESPIRATORY (INHALATION) at 14:25

## 2022-11-27 RX ADMIN — CEFTRIAXONE 2 G: 2 INJECTION, POWDER, FOR SOLUTION INTRAMUSCULAR; INTRAVENOUS at 01:59

## 2022-11-27 RX ADMIN — METHYLPREDNISOLONE SODIUM SUCCINATE 40 MG: 40 INJECTION, POWDER, FOR SOLUTION INTRAMUSCULAR; INTRAVENOUS at 01:59

## 2022-11-27 RX ADMIN — CARVEDILOL 12.5 MG: 6.25 TABLET, FILM COATED ORAL at 17:00

## 2022-11-27 RX ADMIN — Medication 10 ML: at 09:16

## 2022-11-27 RX ADMIN — BUDESONIDE 1 MG: 0.5 INHALANT RESPIRATORY (INHALATION) at 06:51

## 2022-11-27 RX ADMIN — IPRATROPIUM BROMIDE AND ALBUTEROL SULFATE 3 ML: 2.5; .5 SOLUTION RESPIRATORY (INHALATION) at 10:59

## 2022-11-27 RX ADMIN — METHYLPREDNISOLONE SODIUM SUCCINATE 40 MG: 40 INJECTION, POWDER, FOR SOLUTION INTRAMUSCULAR; INTRAVENOUS at 09:14

## 2022-11-27 RX ADMIN — LISINOPRIL 5 MG: 5 TABLET ORAL at 09:15

## 2022-11-27 RX ADMIN — MONTELUKAST SODIUM 10 MG: 10 TABLET, COATED ORAL at 20:32

## 2022-11-28 LAB
APTT HEX PL PPP: 0 SEC
APTT IMM NP PPP: ABNORMAL SEC
APTT PPP 1:1 SALINE: ABNORMAL SEC
APTT PPP: 31.3 SEC
B2 GLYCOPROT1 IGA SER-ACNC: <10 SAU
B2 GLYCOPROT1 IGG SER-ACNC: <10 SGU
B2 GLYCOPROT1 IGM SER-ACNC: <10 SMU
CARDIOLIPIN IGG SER IA-ACNC: <10 GPL
CARDIOLIPIN IGM SER IA-ACNC: <10 MPL
CONFIRM APTT: 0.2 SEC
CONFIRM DRVVT: ABNORMAL SEC
DRVVT SCREEN TO CONFIRM RATIO: ABNORMAL RATIO
INR PPP: 1.3 RATIO
LABORATORY COMMENT REPORT: ABNORMAL
PROTHROMBIN TIME: 13.1 SEC
SCREEN DRVVT: 45 SEC
THROMBIN TIME: 15.1 SEC

## 2022-11-28 PROCEDURE — 25010000002 ENOXAPARIN PER 10 MG: Performed by: INTERNAL MEDICINE

## 2022-11-28 PROCEDURE — C1751 CATH, INF, PER/CENT/MIDLINE: HCPCS

## 2022-11-28 PROCEDURE — 25010000002 METHYLPREDNISOLONE PER 40 MG: Performed by: INTERNAL MEDICINE

## 2022-11-28 PROCEDURE — 94664 DEMO&/EVAL PT USE INHALER: CPT

## 2022-11-28 PROCEDURE — 25010000002 CALCIUM GLUCONATE-NACL 1-0.675 GM/50ML-% SOLUTION: Performed by: INTERNAL MEDICINE

## 2022-11-28 PROCEDURE — 99232 SBSQ HOSP IP/OBS MODERATE 35: CPT | Performed by: INTERNAL MEDICINE

## 2022-11-28 PROCEDURE — 94799 UNLISTED PULMONARY SVC/PX: CPT

## 2022-11-28 PROCEDURE — 25010000002 CEFTRIAXONE PER 250 MG: Performed by: INTERNAL MEDICINE

## 2022-11-28 PROCEDURE — 94761 N-INVAS EAR/PLS OXIMETRY MLT: CPT

## 2022-11-28 PROCEDURE — 25010000002 MAGNESIUM SULFATE IN D5W 1G/100ML (PREMIX) 1-5 GM/100ML-% SOLUTION: Performed by: INTERNAL MEDICINE

## 2022-11-28 PROCEDURE — 36410 VNPNXR 3YR/> PHY/QHP DX/THER: CPT

## 2022-11-28 RX ORDER — SODIUM CHLORIDE 0.9 % (FLUSH) 0.9 %
10 SYRINGE (ML) INJECTION AS NEEDED
Status: DISCONTINUED | OUTPATIENT
Start: 2022-11-28 | End: 2022-11-30 | Stop reason: HOSPADM

## 2022-11-28 RX ORDER — CALCIUM GLUCONATE 20 MG/ML
1 INJECTION, SOLUTION INTRAVENOUS EVERY 12 HOURS
Status: COMPLETED | OUTPATIENT
Start: 2022-11-28 | End: 2022-11-28

## 2022-11-28 RX ORDER — MAGNESIUM SULFATE 1 G/100ML
1 INJECTION INTRAVENOUS EVERY 12 HOURS
Status: COMPLETED | OUTPATIENT
Start: 2022-11-28 | End: 2022-11-28

## 2022-11-28 RX ORDER — SODIUM CHLORIDE 0.9 % (FLUSH) 0.9 %
10 SYRINGE (ML) INJECTION EVERY 12 HOURS SCHEDULED
Status: DISCONTINUED | OUTPATIENT
Start: 2022-11-28 | End: 2022-11-30 | Stop reason: HOSPADM

## 2022-11-28 RX ORDER — METHYLPREDNISOLONE SODIUM SUCCINATE 40 MG/ML
40 INJECTION, POWDER, LYOPHILIZED, FOR SOLUTION INTRAMUSCULAR; INTRAVENOUS EVERY 12 HOURS
Status: DISCONTINUED | OUTPATIENT
Start: 2022-11-28 | End: 2022-11-30

## 2022-11-28 RX ADMIN — CALCIUM GLUCONATE 1 G: 20 INJECTION, SOLUTION INTRAVENOUS at 15:15

## 2022-11-28 RX ADMIN — PAROXETINE HYDROCHLORIDE 20 MG: 20 TABLET, FILM COATED ORAL at 08:26

## 2022-11-28 RX ADMIN — CARVEDILOL 12.5 MG: 6.25 TABLET, FILM COATED ORAL at 08:27

## 2022-11-28 RX ADMIN — MONTELUKAST SODIUM 10 MG: 10 TABLET, COATED ORAL at 20:18

## 2022-11-28 RX ADMIN — LISINOPRIL 5 MG: 5 TABLET ORAL at 08:26

## 2022-11-28 RX ADMIN — ENOXAPARIN SODIUM 40 MG: 100 INJECTION SUBCUTANEOUS at 17:31

## 2022-11-28 RX ADMIN — METHYLPREDNISOLONE SODIUM SUCCINATE 40 MG: 40 INJECTION, POWDER, FOR SOLUTION INTRAMUSCULAR; INTRAVENOUS at 08:27

## 2022-11-28 RX ADMIN — METHYLPREDNISOLONE SODIUM SUCCINATE 40 MG: 40 INJECTION, POWDER, FOR SOLUTION INTRAMUSCULAR; INTRAVENOUS at 00:17

## 2022-11-28 RX ADMIN — POTASSIUM CHLORIDE 20 MEQ: 1500 TABLET, EXTENDED RELEASE ORAL at 08:26

## 2022-11-28 RX ADMIN — CALCIUM GLUCONATE 1 G: 20 INJECTION, SOLUTION INTRAVENOUS at 20:19

## 2022-11-28 RX ADMIN — BUDESONIDE 1 MG: 0.5 INHALANT RESPIRATORY (INHALATION) at 18:31

## 2022-11-28 RX ADMIN — BUDESONIDE 0.5 MG: 0.5 INHALANT RESPIRATORY (INHALATION) at 11:00

## 2022-11-28 RX ADMIN — ATORVASTATIN CALCIUM 10 MG: 10 TABLET, FILM COATED ORAL at 20:19

## 2022-11-28 RX ADMIN — Medication 10 ML: at 20:21

## 2022-11-28 RX ADMIN — ROFLUMILAST 500 MCG: 500 TABLET ORAL at 09:35

## 2022-11-28 RX ADMIN — FUROSEMIDE 40 MG: 40 TABLET ORAL at 09:35

## 2022-11-28 RX ADMIN — MAGNESIUM SULFATE IN DEXTROSE 1 G: 10 INJECTION, SOLUTION INTRAVENOUS at 08:27

## 2022-11-28 RX ADMIN — DOCUSATE SODIUM 100 MG: 100 CAPSULE, LIQUID FILLED ORAL at 08:26

## 2022-11-28 RX ADMIN — METHYLPREDNISOLONE SODIUM SUCCINATE 40 MG: 40 INJECTION, POWDER, FOR SOLUTION INTRAMUSCULAR; INTRAVENOUS at 20:19

## 2022-11-28 RX ADMIN — PRAMIPEXOLE DIHYDROCHLORIDE 0.25 MG: 0.25 TABLET ORAL at 08:27

## 2022-11-28 RX ADMIN — Medication 10 ML: at 20:19

## 2022-11-28 RX ADMIN — IPRATROPIUM BROMIDE AND ALBUTEROL SULFATE 3 ML: 2.5; .5 SOLUTION RESPIRATORY (INHALATION) at 10:57

## 2022-11-28 RX ADMIN — IPRATROPIUM BROMIDE AND ALBUTEROL SULFATE 3 ML: 2.5; .5 SOLUTION RESPIRATORY (INHALATION) at 18:31

## 2022-11-28 RX ADMIN — PANTOPRAZOLE SODIUM 40 MG: 40 TABLET, DELAYED RELEASE ORAL at 08:26

## 2022-11-28 RX ADMIN — CEFTRIAXONE 2 G: 2 INJECTION, POWDER, FOR SOLUTION INTRAMUSCULAR; INTRAVENOUS at 00:17

## 2022-11-28 RX ADMIN — Medication 10 ML: at 08:41

## 2022-11-28 RX ADMIN — MAGNESIUM SULFATE IN DEXTROSE 1 G: 10 INJECTION, SOLUTION INTRAVENOUS at 21:39

## 2022-11-29 LAB
ALBUMIN SERPL-MCNC: 2.9 G/DL (ref 3.5–5.2)
ALBUMIN/GLOB SERPL: 1.7 G/DL
ALP SERPL-CCNC: 62 U/L (ref 39–117)
ALT SERPL W P-5'-P-CCNC: 33 U/L (ref 1–33)
ANION GAP SERPL CALCULATED.3IONS-SCNC: 6 MMOL/L (ref 5–15)
AST SERPL-CCNC: 29 U/L (ref 1–32)
BILIRUB SERPL-MCNC: 0.4 MG/DL (ref 0–1.2)
BUN SERPL-MCNC: 27 MG/DL (ref 8–23)
BUN/CREAT SERPL: 39.1 (ref 7–25)
CALCIUM SPEC-SCNC: 8.3 MG/DL (ref 8.6–10.5)
CHLORIDE SERPL-SCNC: 102 MMOL/L (ref 98–107)
CO2 SERPL-SCNC: 29 MMOL/L (ref 22–29)
CREAT SERPL-MCNC: 0.69 MG/DL (ref 0.57–1)
DEPRECATED RDW RBC AUTO: 45.9 FL (ref 37–54)
EGFRCR SERPLBLD CKD-EPI 2021: 90.6 ML/MIN/1.73
ERYTHROCYTE [DISTWIDTH] IN BLOOD BY AUTOMATED COUNT: 13.7 % (ref 12.3–15.4)
GLOBULIN UR ELPH-MCNC: 1.7 GM/DL
GLUCOSE SERPL-MCNC: 117 MG/DL (ref 65–99)
HCT VFR BLD AUTO: 28.4 % (ref 34–46.6)
HGB BLD-MCNC: 9.2 G/DL (ref 12–15.9)
LYMPHOCYTES # BLD MANUAL: 1.35 10*3/MM3 (ref 0.7–3.1)
LYMPHOCYTES NFR BLD MANUAL: 1 % (ref 5–12)
MAGNESIUM SERPL-MCNC: 2.2 MG/DL (ref 1.6–2.4)
MCH RBC QN AUTO: 29.7 PG (ref 26.6–33)
MCHC RBC AUTO-ENTMCNC: 32.4 G/DL (ref 31.5–35.7)
MCV RBC AUTO: 91.6 FL (ref 79–97)
METAMYELOCYTES NFR BLD MANUAL: 1 % (ref 0–0)
MONOCYTES # BLD: 0.19 10*3/MM3 (ref 0.1–0.9)
NEUTROPHILS # BLD AUTO: 17.56 10*3/MM3 (ref 1.7–7)
NEUTROPHILS NFR BLD MANUAL: 90 % (ref 42.7–76)
NEUTS BAND NFR BLD MANUAL: 1 % (ref 0–5)
PHOSPHATE SERPL-MCNC: 3.6 MG/DL (ref 2.5–4.5)
PLAT MORPH BLD: NORMAL
PLATELET # BLD AUTO: 171 10*3/MM3 (ref 140–450)
PMV BLD AUTO: 10.5 FL (ref 6–12)
POTASSIUM SERPL-SCNC: 4.8 MMOL/L (ref 3.5–5.2)
PROT SERPL-MCNC: 4.6 G/DL (ref 6–8.5)
RBC # BLD AUTO: 3.09 10*6/MM3 (ref 3.77–5.28)
RBC MORPH BLD: NORMAL
SCAN SLIDE: NORMAL
SODIUM SERPL-SCNC: 137 MMOL/L (ref 136–145)
VARIANT LYMPHS NFR BLD MANUAL: 7 % (ref 19.6–45.3)
WBC MORPH BLD: NORMAL
WBC NRBC COR # BLD: 19.3 10*3/MM3 (ref 3.4–10.8)

## 2022-11-29 PROCEDURE — 99232 SBSQ HOSP IP/OBS MODERATE 35: CPT | Performed by: INTERNAL MEDICINE

## 2022-11-29 PROCEDURE — 94799 UNLISTED PULMONARY SVC/PX: CPT

## 2022-11-29 PROCEDURE — 97116 GAIT TRAINING THERAPY: CPT

## 2022-11-29 PROCEDURE — 83735 ASSAY OF MAGNESIUM: CPT | Performed by: INTERNAL MEDICINE

## 2022-11-29 PROCEDURE — 25010000002 ENOXAPARIN PER 10 MG: Performed by: INTERNAL MEDICINE

## 2022-11-29 PROCEDURE — 85007 BL SMEAR W/DIFF WBC COUNT: CPT | Performed by: INTERNAL MEDICINE

## 2022-11-29 PROCEDURE — 97530 THERAPEUTIC ACTIVITIES: CPT

## 2022-11-29 PROCEDURE — 25010000002 CEFTRIAXONE PER 250 MG: Performed by: INTERNAL MEDICINE

## 2022-11-29 PROCEDURE — 25010000002 METHYLPREDNISOLONE PER 40 MG: Performed by: INTERNAL MEDICINE

## 2022-11-29 PROCEDURE — 84100 ASSAY OF PHOSPHORUS: CPT | Performed by: INTERNAL MEDICINE

## 2022-11-29 PROCEDURE — 80053 COMPREHEN METABOLIC PANEL: CPT | Performed by: INTERNAL MEDICINE

## 2022-11-29 PROCEDURE — 85025 COMPLETE CBC W/AUTO DIFF WBC: CPT | Performed by: INTERNAL MEDICINE

## 2022-11-29 RX ADMIN — PANTOPRAZOLE SODIUM 40 MG: 40 TABLET, DELAYED RELEASE ORAL at 08:44

## 2022-11-29 RX ADMIN — MONTELUKAST SODIUM 10 MG: 10 TABLET, COATED ORAL at 21:20

## 2022-11-29 RX ADMIN — Medication 10 ML: at 08:46

## 2022-11-29 RX ADMIN — DOCUSATE SODIUM 100 MG: 100 CAPSULE, LIQUID FILLED ORAL at 08:45

## 2022-11-29 RX ADMIN — Medication 10 ML: at 21:20

## 2022-11-29 RX ADMIN — ENOXAPARIN SODIUM 40 MG: 100 INJECTION SUBCUTANEOUS at 17:19

## 2022-11-29 RX ADMIN — FUROSEMIDE 40 MG: 40 TABLET ORAL at 08:45

## 2022-11-29 RX ADMIN — CEFTRIAXONE 2 G: 2 INJECTION, POWDER, FOR SOLUTION INTRAMUSCULAR; INTRAVENOUS at 00:38

## 2022-11-29 RX ADMIN — ATORVASTATIN CALCIUM 10 MG: 10 TABLET, FILM COATED ORAL at 21:20

## 2022-11-29 RX ADMIN — IPRATROPIUM BROMIDE AND ALBUTEROL SULFATE 3 ML: 2.5; .5 SOLUTION RESPIRATORY (INHALATION) at 18:54

## 2022-11-29 RX ADMIN — DOCUSATE SODIUM 100 MG: 100 CAPSULE, LIQUID FILLED ORAL at 21:20

## 2022-11-29 RX ADMIN — PAROXETINE HYDROCHLORIDE 20 MG: 20 TABLET, FILM COATED ORAL at 08:44

## 2022-11-29 RX ADMIN — METHYLPREDNISOLONE SODIUM SUCCINATE 40 MG: 40 INJECTION, POWDER, FOR SOLUTION INTRAMUSCULAR; INTRAVENOUS at 08:44

## 2022-11-29 RX ADMIN — POTASSIUM CHLORIDE 20 MEQ: 1500 TABLET, EXTENDED RELEASE ORAL at 08:44

## 2022-11-29 RX ADMIN — BUDESONIDE 1 MG: 0.5 INHALANT RESPIRATORY (INHALATION) at 18:55

## 2022-11-29 RX ADMIN — CARVEDILOL 12.5 MG: 6.25 TABLET, FILM COATED ORAL at 17:19

## 2022-11-29 RX ADMIN — METHYLPREDNISOLONE SODIUM SUCCINATE 40 MG: 40 INJECTION, POWDER, FOR SOLUTION INTRAMUSCULAR; INTRAVENOUS at 21:20

## 2022-11-29 RX ADMIN — ROFLUMILAST 500 MCG: 500 TABLET ORAL at 08:44

## 2022-11-29 RX ADMIN — CARVEDILOL 12.5 MG: 6.25 TABLET, FILM COATED ORAL at 08:45

## 2022-11-29 RX ADMIN — PRAMIPEXOLE DIHYDROCHLORIDE 0.25 MG: 0.25 TABLET ORAL at 08:44

## 2022-11-29 RX ADMIN — CEFTRIAXONE 2 G: 2 INJECTION, POWDER, FOR SOLUTION INTRAMUSCULAR; INTRAVENOUS at 23:51

## 2022-11-30 ENCOUNTER — READMISSION MANAGEMENT (OUTPATIENT)
Dept: CALL CENTER | Facility: HOSPITAL | Age: 77
End: 2022-11-30

## 2022-11-30 VITALS
DIASTOLIC BLOOD PRESSURE: 69 MMHG | SYSTOLIC BLOOD PRESSURE: 123 MMHG | HEART RATE: 65 BPM | RESPIRATION RATE: 18 BRPM | OXYGEN SATURATION: 96 % | TEMPERATURE: 97.7 F | WEIGHT: 169.75 LBS | BODY MASS INDEX: 27.28 KG/M2 | HEIGHT: 66 IN

## 2022-11-30 LAB
ALBUMIN SERPL-MCNC: 3 G/DL (ref 3.5–5.2)
ALBUMIN/GLOB SERPL: 2 G/DL
ALP SERPL-CCNC: 67 U/L (ref 39–117)
ALT SERPL W P-5'-P-CCNC: 33 U/L (ref 1–33)
ANION GAP SERPL CALCULATED.3IONS-SCNC: 9 MMOL/L (ref 5–15)
AST SERPL-CCNC: 22 U/L (ref 1–32)
BASOPHILS # BLD AUTO: 0 10*3/MM3 (ref 0–0.2)
BASOPHILS NFR BLD AUTO: 0.2 % (ref 0–1.5)
BILIRUB SERPL-MCNC: 0.5 MG/DL (ref 0–1.2)
BUN SERPL-MCNC: 28 MG/DL (ref 8–23)
BUN/CREAT SERPL: 30.4 (ref 7–25)
CALCIUM SPEC-SCNC: 8.1 MG/DL (ref 8.6–10.5)
CHLORIDE SERPL-SCNC: 101 MMOL/L (ref 98–107)
CO2 SERPL-SCNC: 29 MMOL/L (ref 22–29)
CREAT SERPL-MCNC: 0.92 MG/DL (ref 0.57–1)
DEPRECATED RDW RBC AUTO: 45.9 FL (ref 37–54)
EGFRCR SERPLBLD CKD-EPI 2021: 65.1 ML/MIN/1.73
EOSINOPHIL # BLD AUTO: 0 10*3/MM3 (ref 0–0.4)
EOSINOPHIL NFR BLD AUTO: 0 % (ref 0.3–6.2)
ERYTHROCYTE [DISTWIDTH] IN BLOOD BY AUTOMATED COUNT: 14 % (ref 12.3–15.4)
GLOBULIN UR ELPH-MCNC: 1.5 GM/DL
GLUCOSE SERPL-MCNC: 125 MG/DL (ref 65–99)
HCT VFR BLD AUTO: 28.2 % (ref 34–46.6)
HGB BLD-MCNC: 8.8 G/DL (ref 12–15.9)
LYMPHOCYTES # BLD AUTO: 1.2 10*3/MM3 (ref 0.7–3.1)
LYMPHOCYTES NFR BLD AUTO: 6.5 % (ref 19.6–45.3)
MAGNESIUM SERPL-MCNC: 2.1 MG/DL (ref 1.6–2.4)
MCH RBC QN AUTO: 29.3 PG (ref 26.6–33)
MCHC RBC AUTO-ENTMCNC: 31.1 G/DL (ref 31.5–35.7)
MCV RBC AUTO: 94.3 FL (ref 79–97)
MONOCYTES # BLD AUTO: 0.3 10*3/MM3 (ref 0.1–0.9)
MONOCYTES NFR BLD AUTO: 1.8 % (ref 5–12)
NEUTROPHILS NFR BLD AUTO: 17.1 10*3/MM3 (ref 1.7–7)
NEUTROPHILS NFR BLD AUTO: 91.5 % (ref 42.7–76)
NRBC BLD AUTO-RTO: 0 /100 WBC (ref 0–0.2)
PHOSPHATE SERPL-MCNC: 3.8 MG/DL (ref 2.5–4.5)
PLATELET # BLD AUTO: 170 10*3/MM3 (ref 140–450)
PMV BLD AUTO: 10.7 FL (ref 6–12)
POTASSIUM SERPL-SCNC: 4.3 MMOL/L (ref 3.5–5.2)
PROT SERPL-MCNC: 4.5 G/DL (ref 6–8.5)
RBC # BLD AUTO: 2.99 10*6/MM3 (ref 3.77–5.28)
SODIUM SERPL-SCNC: 139 MMOL/L (ref 136–145)
WBC NRBC COR # BLD: 18.7 10*3/MM3 (ref 3.4–10.8)

## 2022-11-30 PROCEDURE — 94664 DEMO&/EVAL PT USE INHALER: CPT

## 2022-11-30 PROCEDURE — 63710000001 PREDNISONE PER 5 MG: Performed by: INTERNAL MEDICINE

## 2022-11-30 PROCEDURE — 25010000002 METHYLPREDNISOLONE PER 40 MG: Performed by: INTERNAL MEDICINE

## 2022-11-30 PROCEDURE — 84100 ASSAY OF PHOSPHORUS: CPT | Performed by: INTERNAL MEDICINE

## 2022-11-30 PROCEDURE — 80053 COMPREHEN METABOLIC PANEL: CPT | Performed by: INTERNAL MEDICINE

## 2022-11-30 PROCEDURE — 85025 COMPLETE CBC W/AUTO DIFF WBC: CPT | Performed by: INTERNAL MEDICINE

## 2022-11-30 PROCEDURE — 94799 UNLISTED PULMONARY SVC/PX: CPT

## 2022-11-30 PROCEDURE — 99232 SBSQ HOSP IP/OBS MODERATE 35: CPT | Performed by: INTERNAL MEDICINE

## 2022-11-30 PROCEDURE — 83735 ASSAY OF MAGNESIUM: CPT | Performed by: INTERNAL MEDICINE

## 2022-11-30 PROCEDURE — 63710000001 PREDNISONE PER 1 MG: Performed by: INTERNAL MEDICINE

## 2022-11-30 RX ORDER — LISINOPRIL 5 MG/1
5 TABLET ORAL
Qty: 30 TABLET | Refills: 0 | Status: SHIPPED | OUTPATIENT
Start: 2022-11-30 | End: 2023-03-09 | Stop reason: ALTCHOICE

## 2022-11-30 RX ORDER — FUROSEMIDE 40 MG/1
40 TABLET ORAL DAILY
Qty: 30 TABLET | Refills: 0 | Status: SHIPPED | OUTPATIENT
Start: 2022-11-30

## 2022-11-30 RX ORDER — CARVEDILOL 12.5 MG/1
12.5 TABLET ORAL 2 TIMES DAILY WITH MEALS
Qty: 60 TABLET | Refills: 0 | Status: SHIPPED | OUTPATIENT
Start: 2022-11-30

## 2022-11-30 RX ORDER — POTASSIUM CHLORIDE 20 MEQ/1
20 TABLET, EXTENDED RELEASE ORAL DAILY
Qty: 30 TABLET | Refills: 0 | Status: SHIPPED | OUTPATIENT
Start: 2022-11-30 | End: 2022-12-22

## 2022-11-30 RX ORDER — PREDNISONE 10 MG/1
10 TABLET ORAL DAILY
Status: DISCONTINUED | OUTPATIENT
Start: 2022-12-04 | End: 2022-11-30 | Stop reason: HOSPADM

## 2022-11-30 RX ORDER — PREDNISONE 20 MG/1
20 TABLET ORAL DAILY
Qty: 2 TABLET | Refills: 0 | Status: SHIPPED | OUTPATIENT
Start: 2022-12-02 | End: 2022-12-04

## 2022-11-30 RX ORDER — PREDNISONE 20 MG/1
20 TABLET ORAL DAILY
Status: DISCONTINUED | OUTPATIENT
Start: 2022-12-02 | End: 2022-11-30 | Stop reason: HOSPADM

## 2022-11-30 RX ORDER — PREDNISONE 10 MG/1
30 TABLET ORAL DAILY
Qty: 6 TABLET | Refills: 0 | Status: SHIPPED | OUTPATIENT
Start: 2022-11-30 | End: 2022-12-02

## 2022-11-30 RX ORDER — PREDNISONE 10 MG/1
10 TABLET ORAL DAILY
Qty: 2 TABLET | Refills: 0 | Status: SHIPPED | OUTPATIENT
Start: 2022-12-04 | End: 2022-12-06

## 2022-11-30 RX ORDER — HYDROXYZINE HYDROCHLORIDE 25 MG/1
25 TABLET, FILM COATED ORAL 3 TIMES DAILY PRN
Qty: 90 TABLET | Refills: 0 | Status: SHIPPED | OUTPATIENT
Start: 2022-11-30

## 2022-11-30 RX ADMIN — DOCUSATE SODIUM 100 MG: 100 CAPSULE, LIQUID FILLED ORAL at 07:32

## 2022-11-30 RX ADMIN — CARVEDILOL 12.5 MG: 6.25 TABLET, FILM COATED ORAL at 07:32

## 2022-11-30 RX ADMIN — FUROSEMIDE 40 MG: 40 TABLET ORAL at 07:33

## 2022-11-30 RX ADMIN — PANTOPRAZOLE SODIUM 40 MG: 40 TABLET, DELAYED RELEASE ORAL at 07:32

## 2022-11-30 RX ADMIN — PAROXETINE HYDROCHLORIDE 20 MG: 20 TABLET, FILM COATED ORAL at 07:32

## 2022-11-30 RX ADMIN — LISINOPRIL 5 MG: 5 TABLET ORAL at 07:33

## 2022-11-30 RX ADMIN — POLYETHYLENE GLYCOL 3350 17 G: 17 POWDER, FOR SOLUTION ORAL at 07:33

## 2022-11-30 RX ADMIN — Medication 10 ML: at 07:34

## 2022-11-30 RX ADMIN — IPRATROPIUM BROMIDE AND ALBUTEROL SULFATE 3 ML: 2.5; .5 SOLUTION RESPIRATORY (INHALATION) at 07:43

## 2022-11-30 RX ADMIN — METHYLPREDNISOLONE SODIUM SUCCINATE 40 MG: 40 INJECTION, POWDER, FOR SOLUTION INTRAMUSCULAR; INTRAVENOUS at 07:33

## 2022-11-30 RX ADMIN — BUDESONIDE 1 MG: 0.5 INHALANT RESPIRATORY (INHALATION) at 07:46

## 2022-11-30 RX ADMIN — PREDNISONE 30 MG: 20 TABLET ORAL at 11:58

## 2022-11-30 RX ADMIN — ROFLUMILAST 500 MCG: 500 TABLET ORAL at 07:33

## 2022-11-30 RX ADMIN — POTASSIUM CHLORIDE 20 MEQ: 1500 TABLET, EXTENDED RELEASE ORAL at 07:32

## 2022-11-30 RX ADMIN — PRAMIPEXOLE DIHYDROCHLORIDE 0.25 MG: 0.25 TABLET ORAL at 07:32

## 2022-11-30 NOTE — OUTREACH NOTE
Prep Survey    Flowsheet Row Responses   [facility] facility patient discharged from? [facility]   Is LACE score < 7 ? No   Emergency Room discharge w/ pulse ox? No   Eligibility [facility]   Date of Admission 11/19/22   Date of Discharge 11/30/22   Discharge Disposition Home or Self Care   Discharge diagnosis CARDIAC CATHETERIZATION  pneumonia   Does the patient have one of the following disease processes/diagnoses(primary or secondary)? Pneumonia   Does the patient have Home health ordered? No   Is there a DME ordered? Yes   What DME was ordered? home O2 with [facility]   Prep survey completed? Yes          LUIS LUNDBERG - Registered Nurse

## 2022-12-01 ENCOUNTER — TRANSITIONAL CARE MANAGEMENT TELEPHONE ENCOUNTER (OUTPATIENT)
Dept: CALL CENTER | Facility: HOSPITAL | Age: 77
End: 2022-12-01

## 2022-12-01 NOTE — OUTREACH NOTE
Call Center TCM Note    Flowsheet Row Responses   [facility] patient discharged from? [facility]   Does the patient have one of the following disease processes/diagnoses(primary or secondary)? Pneumonia   TCM attempt successful? Yes   Call start time 1139   Call end time 1146   Discharge diagnosis CARDIAC CATHETERIZATION  pneumonia   Person spoke with today (if not patient) and relationship Patient   Meds reviewed with patient/caregiver? Yes   Prescription comments Patient states she has her medications except for one medication- patient was unsure. She states her daughter is calling pulm because pharmacy told her one of the medications an asthmatic is not suppose to take. pending outcome   Comments Patient states her daughter will call and make appt- her daughter makes all of her appt   Does the patient have an appointment with their PCP within 7 days of discharge? No   Nursing Interventions Assisted patient with making appointment per protocol, Patient declined scheduling/rescheduling appointment at this time   Has home health visited the patient within 72 hours of discharge? N/A   Pulse Ox monitoring Intermittent   Pulse Ox device source Patient   O2 Sat comments Patient states she is on 4lNC and is aware of her 02 levels she should be running   O2 Sat: education provided Sat levels   Psychosocial issues? No   Did the patient receive a copy of their discharge instructions? Yes   Nursing interventions Reviewed instructions with patient   What is the patient's perception of their health status since discharge? Improving   Nursing Interventions Nurse provided patient education   Is the patient/caregiver able to teach back the hierarchy of who to call/visit for symptoms/problems? PCP, Specialist, Home health nurse, Urgent Care, ED, 911 Yes   Is the patient able to teach back COPD zones? Yes   Nursing interventions Education provided on various zones   Patient reports what zone on this call? Green Zone   Green Zone  Reports doing well, Slept well last night, Appetite is good   Green Zone interventions: Take daily medications, Use oxygen as prescribed   Is the patient/caregiver able to teach back signs and symptoms of worsening condition: Fever/chills, Shortness of breath, Chest pain   Is the patient/caregiver able to teach back importance of completing antibiotic course of treatment? Yes   TCM call completed? Yes   Wrap up additional comments Patient states she is doing well today with no concerns. her daughter has reached out to [facility] in regards to some medication issues. No other concerns at this time.   Call end time 1146   Would this patient benefit from a Referral to [facility] Social Work? No   Is the patient interested in additional calls from an ambulatory ?  NOTE:  applies to high risk patients requiring additional follow-up. No          Татьяна Ulrich RN    12/1/2022, 11:46 EST

## 2022-12-05 ENCOUNTER — TELEPHONE (OUTPATIENT)
Dept: CARDIAC REHAB | Facility: HOSPITAL | Age: 77
End: 2022-12-05

## 2022-12-05 NOTE — PROGRESS NOTES
"Enter Query Response Below      Query Response:     Sepsis with JUVENCIO due to pneumonia ruled in    Electronically signed by José Rajan MD, 22, 1:21 PM EST.       If applicable, please update the problem list.     Patient: Krystyna Bennett        : 1945  Account: [id number]           Admit Date: 2022        How to Respond to this query:       a. Click New Note     b. Answer query within the yellow box.                c. Update the Problem List, if applicable.      If you have any questions about this query contact me at: [email or web address]    Dr. Rajan    77 year old female with history of CLL  admitted 22 with  \"Possible early sepsis syndrome secondary to bilateral pneumonia\" and  \"JUVENCIO/chronic kidney disease stage IIIb\" per the H&P.  The H&P notes, Oncoclogy and Cardiology progress notes document \"sepsis\".  Labs on admit- troponin 0.812, WBC 31.1, glucose 206 (non-diabetic)  Respiratory rate 24 on admit  Treatments-  IV Rocephin (), IV doxycycline (), IV Rocephin (), po doxycycline ()    The discharge summary notes \"Community acquired pneumonia of both lungs\" and \"Stage 3b chronic kidney disease\".    Please clarify the following:  Sepsis with JUVENCIO due to pneumonia ruled in  Sepsis due to pneumonia ruled in  Sepsis ruled out  other- specify______  unable to determine    By submitting this query, we are merely seeking further clarification of documentation to accurately reflect all conditions that you are monitoring, evaluating, treating or that extend the hospitalization or utilize additional resources of care. Please utilize your independent clinical judgment when addressing the question(s) above.     This query and your response, once completed, will be entered into the legal medical record.    Sincerely,  Joselyn Hall  [employer] Documentation [employer] [facility]  "

## 2022-12-08 ENCOUNTER — TELEPHONE (OUTPATIENT)
Dept: CARDIOLOGY | Facility: CLINIC | Age: 77
End: 2022-12-08

## 2022-12-08 NOTE — TELEPHONE ENCOUNTER
Caller: VEGA    Relationship: DAUGHTER    Best call back number: [phone]    What is the best time to reach you: ANY    What was the call regarding: NEED A TWO/THREE WEEK HOSPITAL FOLLOW UP- FIRST AVAIL WAS SHOWING 1/31/23.    Do you require a callback: YES

## 2022-12-08 NOTE — PROGRESS NOTES
"Enter Query Response Below      Query Response:     Bacterial pneumonia unspecified    Electronically signed by José Rajan MD, 22, 7:53 AM EST.       If applicable, please update the problem list.     Patient: Krystyna Bennett        : 1945  Account: [id number]           Admit Date: 2022        How to Respond to this query:       a. Click New Note     b. Answer query within the yellow box.                c. Update the Problem List, if applicable.      If you have any questions about this query contact me at: [email or web address]    Dr. Rajan    77 year old female with history of COPD, CLL, chronic systolic heart failure admitted 22 with \"Community acquired pneumonia of both lungs\" per the discharge summary. Treatments-  IV Rocephin (), IV doxycycline (), IV Rocephin (), po doxycycline ()    Please clarify the type of pneumonia the patient was treated/monitored for:  Gram negative pneumonia (excluding Haemophilus influenzae)  Bacterial pneumonia unspecified  Other- specify______  Unable to determine    By submitting this query, we are merely seeking further clarification of documentation to accurately reflect all conditions that you are monitoring, evaluating, treating or that extend the hospitalization or utilize additional resources of care. Please utilize your independent clinical judgment when addressing the question(s) above.     This query and your response, once completed, will be entered into the legal medical record.    Sincerely,  Joselyn Hall  Clinical Documentation Integrity Program   "

## 2022-12-16 ENCOUNTER — READMISSION MANAGEMENT (OUTPATIENT)
Dept: CALL CENTER | Facility: HOSPITAL | Age: 77
End: 2022-12-16

## 2022-12-16 NOTE — OUTREACH NOTE
COPD/PN Week 3 Survey    Flowsheet Row Responses   [facility] facility patient discharged from? [facility]   Does the patient have one of the following disease processes/diagnoses(primary or secondary)? Pneumonia   Week 3 attempt successful? No   Unsuccessful attempts Attempt 1  [All numbers attempted-no answer]          TIM H - Registered Nurse

## 2022-12-20 ENCOUNTER — OFFICE VISIT (OUTPATIENT)
Dept: CARDIOLOGY | Facility: CLINIC | Age: 77
End: 2022-12-20

## 2022-12-20 VITALS
HEART RATE: 103 BPM | DIASTOLIC BLOOD PRESSURE: 75 MMHG | HEIGHT: 66 IN | BODY MASS INDEX: 25.71 KG/M2 | SYSTOLIC BLOOD PRESSURE: 113 MMHG | OXYGEN SATURATION: 92 % | WEIGHT: 160 LBS

## 2022-12-20 DIAGNOSIS — J41.0 SIMPLE CHRONIC BRONCHITIS: ICD-10-CM

## 2022-12-20 DIAGNOSIS — I10 PRIMARY HYPERTENSION: ICD-10-CM

## 2022-12-20 DIAGNOSIS — I50.22 CHRONIC SYSTOLIC CONGESTIVE HEART FAILURE: Primary | ICD-10-CM

## 2022-12-20 DIAGNOSIS — E78.00 PURE HYPERCHOLESTEROLEMIA: ICD-10-CM

## 2022-12-20 PROCEDURE — 99214 OFFICE O/P EST MOD 30 MIN: CPT | Performed by: INTERNAL MEDICINE

## 2022-12-20 RX ORDER — METOPROLOL SUCCINATE 25 MG/1
25 TABLET, EXTENDED RELEASE ORAL DAILY
Qty: 90 TABLET | Refills: 3 | Status: SHIPPED | OUTPATIENT
Start: 2022-12-20 | End: 2022-12-22

## 2022-12-20 NOTE — TELEPHONE ENCOUNTER
Rx Refill Note  Requested Prescriptions     Pending Prescriptions Disp Refills   • metoprolol succinate XL (TOPROL-XL) 25 MG 24 hr tablet 90 tablet 3     Sig: Take 1 tablet by mouth Daily.      Last office visit with prescribing clinician: 12/20/2022   Last telemedicine visit with prescribing clinician: Visit date not found   Next office visit with prescribing clinician: Visit date not found                         Would you like a call back once the refill request has been completed: [] Yes [] No    If the office needs to give you a call back, can they leave a voicemail: [] Yes [] No    Peg Gallegos MA  12/20/22, 14:13 EST
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
## 2022-12-21 ENCOUNTER — READMISSION MANAGEMENT (OUTPATIENT)
Dept: CALL CENTER | Facility: HOSPITAL | Age: 77
End: 2022-12-21

## 2022-12-21 NOTE — PROGRESS NOTES
Subjective   Krystyna Bennett is a 77 y.o. female. Presents to [facility]    Krystyna was seen at [facility] . She was admitted on 11/19/2022  For SOB. She was discharged on 11/30/2022. Discharge diagnosis was pneumonia. Labs that were performed during the encounter included: CMP-abnormal and CBC-abnormal. Diagnostic studies that were performed included: X-Ray-pneumonia and CT Scan-stable 10 mm lower lobe nodule. Currently Krystyna receives care at home. Complications from the hospital stay include none. The patient stated that they do not need help with their daily life and activities. The patient stated that they do have emotional support at home.  Current outpatient and discharge medications have been reconciled for the patient.  Reviewed by: Mireya Kapoor MD      Chief Complaint   Patient presents with   • Hospital Follow Up Visit   • Pneumonia       Shortness of Breath  This is a new problem. The current episode started more than 1 year ago. The problem occurs daily. The problem has been unchanged. Associated symptoms include wheezing. Pertinent negatives include no chest pain or headaches. Nothing aggravates the symptoms. She has tried steroid inhalers for the symptoms. The treatment provided mild relief.        I personally reviewed and updated the patient's allergies, medications, problem list, and past medical, surgical, social, and family history. I have reviewed and confirmed the accuracy of the History of Present Illness and Review of Symptoms as documented by the MA/LPN/RN. Mireya Kapoor MD    Allergies:  Allergies   Allergen Reactions   • Latex Rash   • Sulfa Antibiotics Other (See Comments)     Tunnel vision,light headed/ may not be allergic to it any longer        Social History:  Social History     Socioeconomic History   • Marital status:    Tobacco Use   • Smoking status: Former     Packs/day: 0.50     Years: 37.00     Pack years: 18.50     Types: Cigarettes,  Cigars     Start date: 1960     Quit date: 2019     Years since quitting: 3.4     Passive exposure: Past   • Smokeless tobacco: Never   Vaping Use   • Vaping Use: Never used   Substance and Sexual Activity   • Alcohol use: Yes     Alcohol/week: 4.0 standard drinks     Types: 2 Glasses of wine, 2 Shots of liquor per week     Comment: social drinker   • Drug use: No   • Sexual activity: Not Currently     Birth control/protection: Post-menopausal       Family History:  Family History   Problem Relation Age of Onset   • Heart disease Father            • Stroke Father    • Leukemia Paternal Grandmother    • Anemia Paternal Grandmother    • Heart disease Paternal Grandmother    • Alcohol abuse Maternal Aunt    • Cancer Maternal Aunt            • Asthma Maternal Grandmother            • Hyperlipidemia Maternal Grandmother            • Diabetes Paternal Aunt            • Hypertension Son    • Hypertension Daughter        Past Medical History :  Patient Active Problem List   Diagnosis   • Essential hypertension   • CLL (chronic lymphocytic leukemia) (HCC)   • Centrilobular emphysema (HCC)   • GERD (gastroesophageal reflux disease)   • Arthritis   • Systolic CHF, chronic (HCC)   • Anemia   • History of Clostridium difficile colitis   • Melanoma (HCC)   • Mixed hyperlipidemia   • Stage 3b chronic kidney disease (HCC)   • Medicare annual wellness visit, subsequent   • Anxiety   • Restless legs syndrome   • Colon cancer screening   • Eczematous dermatitis of upper and lower eyelids of both eyes   • Positive colorectal cancer screening using Cologuard test   • Mammogram declined   • Flu vaccine need   • Right ear pain   • Non-seasonal allergic rhinitis   • Non-recurrent acute serous otitis media of right ear   • AK (actinic keratosis)   • Cat bite of right wrist   • Tear of skin of right wrist   • Community acquired pneumonia of both lungs   • Hospital discharge follow-up   •  Pulmonary nodule, right       Medication List:    Current Outpatient Medications:   •  albuterol (PROVENTIL) (2.5 MG/3ML) 0.083% nebulizer solution, Take 2.5 mg by nebulization Every 4 (Four) Hours As Needed for Wheezing., Disp: 75 vial, Rfl: 11  •  albuterol sulfate HFA (Ventolin HFA) 108 (90 Base) MCG/ACT inhaler, Inhale 2 puffs Every 4 (Four) Hours As Needed for Wheezing., Disp: 6.7 g, Rfl: 12  •  aspirin 81 MG EC tablet, Take 81 mg by mouth Daily., Disp: , Rfl:   •  Calcium Carbonate-Vit D-Min (CALTRATE 600+D PLUS MINERALS) 600-800 MG-UNIT chewable tablet, Chew 2 tablets Daily., Disp: , Rfl:   •  carvedilol (COREG) 12.5 MG tablet, Take 1 tablet by mouth 2 (Two) Times a Day With Meals., Disp: 60 tablet, Rfl: 0  •  Coenzyme Q10 (COQ10 PO), Take  by mouth Daily., Disp: , Rfl:   •  Cyanocobalamin (B-12) 1000 MCG capsule, Take 1,000 mcg by mouth Daily., Disp: , Rfl:   •  esomeprazole (nexIUM) 40 MG capsule, Take 40 mg by mouth Every Morning Before Breakfast., Disp: , Rfl:   •  Fluticasone-Umeclidin-Vilant (TRELEGY) 100-62.5-25 MCG/INH inhaler, Inhale 1 puff Daily., Disp: 60 each, Rfl: 11  •  furosemide (LASIX) 40 MG tablet, Take 1 tablet by mouth Daily., Disp: 30 tablet, Rfl: 0  •  Glucosamine-Chondroitin 250-200 MG tablet, Take 1 tablet by mouth Daily., Disp: , Rfl:   •  hydrOXYzine (ATARAX) 25 MG tablet, Take 1 tablet by mouth 3 (Three) Times a Day As Needed for Anxiety., Disp: 90 tablet, Rfl: 0  •  ipratropium (ATROVENT) 0.02 % nebulizer solution, Take 2.5 mL by nebulization 3 (Three) Times a Day As Needed for Wheezing or Shortness of Air., Disp: 62.5 mL, Rfl: 11  •  lisinopril (PRINIVIL,ZESTRIL) 5 MG tablet, Take 1 tablet by mouth Daily., Disp: 30 tablet, Rfl: 0  •  montelukast (SINGULAIR) 10 MG tablet, TAKE ONE TABLET BY MOUTH AT BEDTIME, Disp: 90 tablet, Rfl: 3  •  Multiple Vitamins-Minerals (CENTRUM SILVER) tablet, 1 tablet po daily, Disp: , Rfl:   •  Omega-3 Fatty Acids (FISH OIL PO), Take  by mouth., Disp: ,  "Rfl:   •  PARoxetine (PAXIL) 20 MG tablet, TAKE ONE TABLET BY MOUTH DAILY, Disp: 90 tablet, Rfl: 3  •  pramipexole (MIRAPEX) 0.25 MG tablet, TAKE ONE TABLET BY MOUTH DAILY, Disp: 90 tablet, Rfl: 3  •  simvastatin (ZOCOR) 20 MG tablet, TAKE ONE TABLET BY MOUTH DAILY, Disp: 90 tablet, Rfl: 2  •  doxycycline (MONODOX) 100 MG capsule, Take 1 capsule by mouth 2 (Two) Times a Day., Disp: 20 capsule, Rfl: 0    Past Surgical History:  Past Surgical History:   Procedure Laterality Date   • APPENDECTOMY     • CARDIAC CATHETERIZATION  05/2019    [facility].   • CARDIAC CATHETERIZATION Right 11/25/2022    Procedure: Coronary angiography;  Surgeon: Andrea Philippe MD;  Location: [facility] INVASIVE LOCATION;  Service: Cardiovascular;  Laterality: Right;   • HYSTERECTOMY     • TONSILLECTOMY         Review of Systems:  Review of Systems   Respiratory: Positive for shortness of breath and wheezing.    Cardiovascular: Negative for chest pain.       Physical Exam:  Vital Signs:  Vital Signs:   /78 (BP Location: Right arm, Patient Position: Sitting, Cuff Size: Adult)   Pulse 117   Temp 98.6 °F (37 °C) (Temporal)   Resp 19   Ht 167.6 cm (65.98\")   Wt 72.9 kg (160 lb 12.8 oz)   SpO2 95% Comment: 4L oxygen  BMI 25.97 kg/m²     Result Review :   The following data was reviewed by: Mireya Kapoor MD on 12/22/2022:           XR Chest 2 View    Result Date: 12/22/2022  Persistent bibasilar pulmonary interstitial thickening similar to prior study likely interstitial pneumonia superimposed on background of advanced emphysema.  Electronically Signed By-Dusty Maria MD On:12/22/2022 3:33 PM This report was finalized on [id number] by  Dusty Maria MD.     Latest Reference Range & Units 11/30/22 04:20 12/12/22 15:07   Glucose 65 - 99 mg/dL 125 (H)    Sodium 136 - 145 mmol/L 139    Potassium 3.5 - 5.2 mmol/L 4.3    CO2 22.0 - 29.0 mmol/L 29.0    Chloride 98 - 107 mmol/L 101    Anion Gap 5.0 - 15.0 mmol/L 9.0    Creatinine 0.57 - 1.00 " mg/dL 0.92    BUN 8 - 23 mg/dL 28 (H)    BUN/Creatinine Ratio 7.0 - 25.0  30.4 (H)    Calcium 8.6 - 10.5 mg/dL 8.1 (L)    eGFR >60.0 mL/min/1.73 65.1    Alkaline Phosphatase 39 - 117 U/L 67    Total Protein 6.0 - 8.5 g/dL 4.5 (L)    ALT (SGPT) 1 - 33 U/L 33    AST (SGOT) 1 - 32 U/L 22    Total Bilirubin 0.0 - 1.2 mg/dL 0.5    Albumin 3.50 - 5.20 g/dL 3.00 (L)    Globulin gm/dL 1.5    A/G Ratio g/dL 2.0    Phosphorus 2.5 - 4.5 mg/dL 3.8    Ferritin 15 - 204 ng/mL  368.7 (H) (E)   Magnesium 1.6 - 2.4 mg/dL 2.1    WBC 3.40 - 10.80 10*3/mm3 18.70 (H)    RBC 3.77 - 5.28 10*6/mm3 2.99 (L)    Hemoglobin 12.0 - 15.9 g/dL 8.8 (L)    Hematocrit 34.0 - 46.6 % 28.2 (L)    RDW 12.3 - 15.4 % 14.0    MCV 79.0 - 97.0 fL 94.3    MCH 26.6 - 33.0 pg 29.3    MCHC 31.5 - 35.7 g/dL 31.1 (L)    MPV 6.0 - 12.0 fL 10.7    Platelets 140 - 450 10*3/mm3 170    RDW-SD 37.0 - 54.0 fl 45.9    Neutrophil Rel % 42.7 - 76.0 % 91.5 (H)    Lymphocyte Rel % 19.6 - 45.3 % 6.5 (L)    Monocyte Rel % 5.0 - 12.0 % 1.8 (L)    Eosinophil Rel % 0.3 - 6.2 % 0.0 (L)    Basophil Rel % 0.0 - 1.5 % 0.2    Neutrophils Absolute 1.70 - 7.00 10*3/mm3 17.10 (H)    Lymphocytes Absolute 0.70 - 3.10 10*3/mm3 1.20    Monocytes Absolute 0.10 - 0.90 10*3/mm3 0.30    Eosinophils Absolute 0.00 - 0.40 10*3/mm3 0.00    Basophils Absolute 0.00 - 0.20 10*3/mm3 0.00    nRBC 0.0 - 0.2 /100 WBC 0.0    (H): Data is abnormally high  (L): Data is abnormally low  (E): External lab result       Hospital Course      Hospital Course:  Krystyna Bennett is a 77 y.o. female who presented to the hospital with shortness of breath and was found to have community-acquired pneumonia.  Patient was also having COPD exacerbation.  She was also diagnosed with congestive heart failure with low EF.  She was evaluated and found to have normal coronaries.  She was diagnosed with Takotsubo syndrome.  Patient's medications were optimized.  She was diuresed.  She went into renal failure and required nephrology  evaluation.  Eventually stabilized.  Her pneumonia took several weeks to get better.  She was following down to 4 L.  She had several CAT scans with the only findings being a right lower lobe nodule and pneumonia.  Patient was instructed to follow-up for the right lower lobe nodule to make sure it does not grow or turn cancerous.  Once all the above issues resolved the patient was finally discharged home after being cleared by all consultants including nephrology pulmonary and cardiology.  She was instructed to follow-up outpatient as usual.  She completed antibiotics while inpatient.            Physical Exam  Vitals reviewed.   Constitutional:       Appearance: Normal appearance. She is well-developed.   HENT:      Head: Normocephalic and atraumatic.   Eyes:      General:         Right eye: No discharge.         Left eye: No discharge.   Cardiovascular:      Rate and Rhythm: Normal rate and regular rhythm.      Heart sounds: Normal heart sounds. No murmur heard.    No friction rub. No gallop.   Pulmonary:      Effort: Pulmonary effort is normal. No respiratory distress.      Breath sounds: Normal breath sounds. No wheezing or rales.   Skin:     General: Skin is warm and dry.      Findings: No rash.   Neurological:      Mental Status: She is alert and oriented to person, place, and time.      Coordination: Coordination normal.      Gait: Gait normal.   Psychiatric:         Behavior: Behavior is cooperative.         Assessment and Plan:  Problems Addressed this Visit        Cardiac and Vasculature    Essential hypertension     Hypertension is unchanged.  Continue current treatment regimen.  Blood pressure will be reassessed in 3 months.         Relevant Orders    CBC & Differential (Completed)    Systolic CHF, chronic (HCC)    Relevant Orders    Comprehensive Metabolic Panel (Completed)       Health Encounters    Hospital discharge follow-up - Primary       Pulmonary and Pneumonias    Centrilobular emphysema (HCC)      COPD is unchanged.  Continue current medications.    No longer exacerbated. She is back on 4 liters           Relevant Medications    albuterol sulfate HFA (Ventolin HFA) 108 (90 Base) MCG/ACT inhaler    Community acquired pneumonia of both lungs     resolved         Relevant Medications    albuterol sulfate HFA (Ventolin HFA) 108 (90 Base) MCG/ACT inhaler    doxycycline (MONODOX) 100 MG capsule    Other Relevant Orders    XR Chest 2 View (Completed)    Pulmonary nodule, right    Relevant Orders    CT Chest Without Contrast Diagnostic   Diagnoses       Codes Comments    Hospital discharge follow-up    -  Primary ICD-10-CM: Z09  ICD-9-CM: V67.59     Centrilobular emphysema (HCC)     ICD-10-CM: J43.2  ICD-9-CM: 492.8     Community acquired pneumonia of both lungs     ICD-10-CM: J18.9  ICD-9-CM: 486     Pulmonary nodule, right     ICD-10-CM: R91.1  ICD-9-CM: 793.11     Essential hypertension     ICD-10-CM: I10  ICD-9-CM: 401.9     Systolic CHF, chronic (HCC)     ICD-10-CM: I50.22  ICD-9-CM: 428.22, 428.0            An After Visit Summary and PPPS were given to the patient.       I wore protective equipment throughout this patient encounter to include mask. Hand hygiene was performed before donning protective equipment and after removal when leaving the room.

## 2022-12-21 NOTE — OUTREACH NOTE
COPD/PN Week 3 Survey    Flowsheet Row Responses   [facility] facility patient discharged from? [facility]   Does the patient have one of the following disease processes/diagnoses(primary or secondary)? Pneumonia   Week 3 attempt successful? No   Unsuccessful attempts Attempt 2          MARCO LUNDBERG - Registered Nurse

## 2022-12-22 ENCOUNTER — OFFICE VISIT (OUTPATIENT)
Dept: FAMILY MEDICINE CLINIC | Facility: CLINIC | Age: 77
End: 2022-12-22

## 2022-12-22 ENCOUNTER — HOSPITAL ENCOUNTER (OUTPATIENT)
Dept: GENERAL RADIOLOGY | Facility: HOSPITAL | Age: 77
Discharge: HOME | End: 2022-12-22
Admitting: FAMILY MEDICINE

## 2022-12-22 VITALS
HEIGHT: 66 IN | OXYGEN SATURATION: 95 % | BODY MASS INDEX: 25.84 KG/M2 | TEMPERATURE: 98.6 F | HEART RATE: 117 BPM | SYSTOLIC BLOOD PRESSURE: 118 MMHG | RESPIRATION RATE: 19 BRPM | DIASTOLIC BLOOD PRESSURE: 78 MMHG | WEIGHT: 160.8 LBS

## 2022-12-22 DIAGNOSIS — I50.22 SYSTOLIC CHF, CHRONIC: ICD-10-CM

## 2022-12-22 DIAGNOSIS — Z09 HOSPITAL DISCHARGE FOLLOW-UP: Primary | ICD-10-CM

## 2022-12-22 DIAGNOSIS — J18.9 COMMUNITY ACQUIRED PNEUMONIA OF BOTH LUNGS: ICD-10-CM

## 2022-12-22 DIAGNOSIS — J43.2 CENTRILOBULAR EMPHYSEMA: ICD-10-CM

## 2022-12-22 DIAGNOSIS — R91.1 PULMONARY NODULE, RIGHT: ICD-10-CM

## 2022-12-22 DIAGNOSIS — I10 ESSENTIAL HYPERTENSION: ICD-10-CM

## 2022-12-22 PROBLEM — J44.9 COPD (CHRONIC OBSTRUCTIVE PULMONARY DISEASE): Status: RESOLVED | Noted: 2019-07-05 | Resolved: 2022-12-22

## 2022-12-22 PROBLEM — I20.8 ANGINA AT REST: Status: RESOLVED | Noted: 2022-11-19 | Resolved: 2022-12-22

## 2022-12-22 PROBLEM — I20.89 ANGINA AT REST: Status: RESOLVED | Noted: 2022-11-19 | Resolved: 2022-12-22

## 2022-12-22 PROCEDURE — 99214 OFFICE O/P EST MOD 30 MIN: CPT | Performed by: FAMILY MEDICINE

## 2022-12-22 PROCEDURE — 71046 X-RAY EXAM CHEST 2 VIEWS: CPT

## 2022-12-22 RX ORDER — DOXYCYCLINE 100 MG/1
100 CAPSULE ORAL 2 TIMES DAILY
Qty: 20 CAPSULE | Refills: 0 | Status: SHIPPED | OUTPATIENT
Start: 2022-12-22 | End: 2023-01-16

## 2022-12-22 RX ORDER — ALBUTEROL SULFATE 90 UG/1
2 AEROSOL, METERED RESPIRATORY (INHALATION) EVERY 4 HOURS PRN
Qty: 6.7 G | Refills: 12 | Status: SHIPPED | OUTPATIENT
Start: 2022-12-22 | End: 2023-02-14 | Stop reason: SDUPTHER

## 2022-12-22 RX ORDER — ALBUTEROL SULFATE 90 UG/1
2 AEROSOL, METERED RESPIRATORY (INHALATION) EVERY 4 HOURS PRN
COMMUNITY
End: 2022-12-22 | Stop reason: SDUPTHER

## 2022-12-23 LAB
ALBUMIN SERPL-MCNC: 4.1 G/DL (ref 3.7–4.7)
ALBUMIN/GLOB SERPL: 1.9 {RATIO} (ref 1.2–2.2)
ALP SERPL-CCNC: 136 IU/L (ref 44–121)
ALT SERPL-CCNC: 19 IU/L (ref 0–32)
AST SERPL-CCNC: 20 IU/L (ref 0–40)
BASOPHILS # BLD AUTO: 0.1 X10E3/UL (ref 0–0.2)
BASOPHILS NFR BLD AUTO: 1 %
BILIRUB SERPL-MCNC: 0.3 MG/DL (ref 0–1.2)
BUN SERPL-MCNC: 19 MG/DL (ref 8–27)
BUN/CREAT SERPL: 19 (ref 12–28)
CALCIUM SERPL-MCNC: 9.7 MG/DL (ref 8.7–10.3)
CHLORIDE SERPL-SCNC: 94 MMOL/L (ref 96–106)
CO2 SERPL-SCNC: 28 MMOL/L (ref 20–29)
CREAT SERPL-MCNC: 1.01 MG/DL (ref 0.57–1)
EGFRCR SERPLBLD CKD-EPI 2021: 58 ML/MIN/1.73
EOSINOPHIL # BLD AUTO: 0.3 X10E3/UL (ref 0–0.4)
EOSINOPHIL NFR BLD AUTO: 3 %
ERYTHROCYTE [DISTWIDTH] IN BLOOD BY AUTOMATED COUNT: 13.7 % (ref 11.7–15.4)
GLOBULIN SER CALC-MCNC: 2.2 G/DL (ref 1.5–4.5)
GLUCOSE SERPL-MCNC: 105 MG/DL (ref 70–99)
HCT VFR BLD AUTO: 32.5 % (ref 34–46.6)
HGB BLD-MCNC: 10.2 G/DL (ref 11.1–15.9)
IMM GRANULOCYTES # BLD AUTO: 0.1 X10E3/UL (ref 0–0.1)
IMM GRANULOCYTES NFR BLD AUTO: 1 %
LYMPHOCYTES # BLD AUTO: 1.6 X10E3/UL (ref 0.7–3.1)
LYMPHOCYTES NFR BLD AUTO: 17 %
MCH RBC QN AUTO: 28.1 PG (ref 26.6–33)
MCHC RBC AUTO-ENTMCNC: 31.4 G/DL (ref 31.5–35.7)
MCV RBC AUTO: 90 FL (ref 79–97)
MONOCYTES # BLD AUTO: 0.8 X10E3/UL (ref 0.1–0.9)
MONOCYTES NFR BLD AUTO: 8 %
NEUTROPHILS # BLD AUTO: 6.7 X10E3/UL (ref 1.4–7)
NEUTROPHILS NFR BLD AUTO: 70 %
PLATELET # BLD AUTO: 713 X10E3/UL (ref 150–450)
POTASSIUM SERPL-SCNC: 4.3 MMOL/L (ref 3.5–5.2)
PROT SERPL-MCNC: 6.3 G/DL (ref 6–8.5)
RBC # BLD AUTO: 3.63 X10E6/UL (ref 3.77–5.28)
SODIUM SERPL-SCNC: 137 MMOL/L (ref 134–144)
WBC # BLD AUTO: 9.5 X10E3/UL (ref 3.4–10.8)

## 2022-12-27 DIAGNOSIS — I10 ESSENTIAL HYPERTENSION: Primary | ICD-10-CM

## 2022-12-27 DIAGNOSIS — R79.89 ELEVATED PLATELET COUNT: ICD-10-CM

## 2022-12-27 DIAGNOSIS — N18.32 STAGE 3B CHRONIC KIDNEY DISEASE: ICD-10-CM

## 2022-12-29 NOTE — ASSESSMENT & PLAN NOTE
COPD is unchanged.  Continue current medications.    No longer exacerbated. She is back on 4 liters

## 2023-01-11 NOTE — PROGRESS NOTES
Subjective   Krystyna Bennett is a 77 y.o. female. Presents to [facility]    Chief Complaint   Patient presents with   • Shortness of Breath   • Night Sweats       Shortness of Breath  This is a chronic problem. The current episode started more than 1 year ago. The problem occurs daily. The problem has been waxing and waning. The average episode lasts 2 Minutes. Associated symptoms include rhinorrhea, sputum production and wheezing. Pertinent negatives include no abdominal pain, chest pain, claudication, coryza, ear pain, fever, headaches, hemoptysis, leg pain, leg swelling, neck pain, orthopnea, PND, rash, sore throat, swollen glands, syncope or vomiting. The symptoms are aggravated by emotional upset, exercise, odors, URIs, any activity, fumes, pollens and weather changes. The patient has no known risk factors for DVT/PE. Treatments tried: nebulizer, inhaler, oxygen,  The treatment provided mild relief.   Night Sweats  This is a new problem. The current episode started 1 to 4 weeks ago. The problem occurs daily. The problem has been gradually worsening. Pertinent negatives include no abdominal pain, chest pain, fever, headaches, neck pain, rash, sore throat, swollen glands or vomiting. Nothing aggravates the symptoms. She has tried nothing for the symptoms. The treatment provided mild relief.        I personally reviewed and updated the patient's allergies, medications, problem list, and past medical, surgical, social, and family history. I have reviewed and confirmed the accuracy of the History of Present Illness and Review of Symptoms as documented by the MA/LPN/RN. Mireya Kapoor MD    Allergies:  Allergies   Allergen Reactions   • Latex Rash   • Sulfa Antibiotics Other (See Comments)     Tunnel vision,light headed/ may not be allergic to it any longer        Social History:  Social History     Socioeconomic History   • Marital status:    Tobacco Use   • Smoking status: Former      Packs/day: 0.50     Years: 37.00     Pack years: 18.50     Types: Cigarettes, Cigars     Start date: 1960     Quit date: 2019     Years since quitting: 3.5     Passive exposure: Past   • Smokeless tobacco: Never   Vaping Use   • Vaping Use: Never used   Substance and Sexual Activity   • Alcohol use: Yes     Alcohol/week: 4.0 standard drinks     Types: 2 Glasses of wine, 2 Shots of liquor per week     Comment: social drinker   • Drug use: No   • Sexual activity: Not Currently     Birth control/protection: Post-menopausal       Family History:  Family History   Problem Relation Age of Onset   • Heart disease Father            • Stroke Father    • Leukemia Paternal Grandmother    • Anemia Paternal Grandmother    • Heart disease Paternal Grandmother    • Alcohol abuse Maternal Aunt    • Cancer Maternal Aunt            • Asthma Maternal Grandmother            • Hyperlipidemia Maternal Grandmother            • Diabetes Paternal Aunt            • Hypertension Son    • Hypertension Daughter        Past Medical History :  Patient Active Problem List   Diagnosis   • Essential hypertension   • CLL (chronic lymphocytic leukemia) (HCC)   • Centrilobular emphysema (HCC)   • GERD (gastroesophageal reflux disease)   • Arthritis   • Systolic CHF, chronic (HCC)   • Anemia, chronic disease   • History of Clostridium difficile colitis   • Melanoma (HCC)   • Mixed hyperlipidemia   • Medicare annual wellness visit, subsequent   • Anxiety   • Restless legs syndrome   • Colon cancer screening   • Eczematous dermatitis of upper and lower eyelids of both eyes   • Positive colorectal cancer screening using Cologuard test   • Mammogram declined   • Flu vaccine need   • Non-seasonal allergic rhinitis   • AK (actinic keratosis)   • Cat bite of right wrist   • Tear of skin of right wrist   • Community acquired pneumonia of both lungs   • Hospital discharge follow-up   • Pulmonary nodule, right   •  Night sweats       Medication List:    Current Outpatient Medications:   •  Acalabrutinib Maleate (CALQUENCE) 100 MG tablet, Take 100 mg by mouth., Disp: , Rfl:   •  albuterol (PROVENTIL) (2.5 MG/3ML) 0.083% nebulizer solution, Take 2.5 mg by nebulization Every 4 (Four) Hours As Needed for Wheezing., Disp: 75 vial, Rfl: 11  •  albuterol sulfate HFA (Ventolin HFA) 108 (90 Base) MCG/ACT inhaler, Inhale 2 puffs Every 4 (Four) Hours As Needed for Wheezing., Disp: 6.7 g, Rfl: 12  •  aspirin 81 MG EC tablet, Take 81 mg by mouth Daily., Disp: , Rfl:   •  carvedilol (COREG) 12.5 MG tablet, Take 1 tablet by mouth 2 (Two) Times a Day With Meals., Disp: 60 tablet, Rfl: 0  •  Fluticasone-Umeclidin-Vilant (TRELEGY) 100-62.5-25 MCG/INH inhaler, Inhale 1 puff Daily., Disp: 60 each, Rfl: 11  •  furosemide (LASIX) 40 MG tablet, Take 1 tablet by mouth Daily., Disp: 30 tablet, Rfl: 0  •  ipratropium (ATROVENT) 0.02 % nebulizer solution, Take 2.5 mL by nebulization 3 (Three) Times a Day As Needed for Wheezing or Shortness of Air., Disp: 62.5 mL, Rfl: 11  •  lisinopril (PRINIVIL,ZESTRIL) 5 MG tablet, Take 1 tablet by mouth Daily., Disp: 30 tablet, Rfl: 0  •  montelukast (SINGULAIR) 10 MG tablet, TAKE ONE TABLET BY MOUTH AT BEDTIME, Disp: 90 tablet, Rfl: 3  •  Multiple Vitamins-Minerals (CENTRUM SILVER) tablet, 1 tablet po daily, Disp: , Rfl:   •  Omega-3 Fatty Acids (FISH OIL PO), Take  by mouth., Disp: , Rfl:   •  PARoxetine (PAXIL) 20 MG tablet, Take 1 tablet by mouth Daily., Disp: 90 tablet, Rfl: 3  •  pramipexole (MIRAPEX) 0.25 MG tablet, TAKE ONE TABLET BY MOUTH DAILY, Disp: 90 tablet, Rfl: 3  •  simvastatin (ZOCOR) 20 MG tablet, TAKE ONE TABLET BY MOUTH DAILY, Disp: 90 tablet, Rfl: 2  •  Calcium Carbonate-Vit D-Min (CALTRATE 600+D PLUS MINERALS) 600-800 MG-UNIT chewable tablet, Chew 2 tablets Daily., Disp: , Rfl:   •  Coenzyme Q10 (COQ10 PO), Take  by mouth Daily., Disp: , Rfl:   •  Cyanocobalamin (B-12) 1000 MCG capsule, Take  1,000 mcg by mouth Daily., Disp: , Rfl:   •  esomeprazole (nexIUM) 40 MG capsule, Take 40 mg by mouth Every Morning Before Breakfast., Disp: , Rfl:   •  Glucosamine-Chondroitin 250-200 MG tablet, Take 1 tablet by mouth Daily., Disp: , Rfl:   •  hydrOXYzine (ATARAX) 25 MG tablet, Take 1 tablet by mouth 3 (Three) Times a Day As Needed for Anxiety., Disp: 90 tablet, Rfl: 0    Past Surgical History:  Past Surgical History:   Procedure Laterality Date   • APPENDECTOMY     • [facility]  [facility]   • CARDIAC CATHETERIZATION Right 11/25/2022    Procedure: Coronary angiography;  Surgeon: Andrea Philippe MD;  Location: [facility] CATH INVASIVE LOCATION;  Service: Cardiovascular;  Laterality: Right;   • HYSTERECTOMY     • TONSILLECTOMY           Physical Exam:      Vital Signs:    Vitals:    01/16/23 1411   BP: 124/86   Pulse: 81   Resp: 14   Temp: 97.3 °F (36.3 °C)   SpO2: 96%        Wt Readings from Last 3 Encounters:   01/16/23 75.3 kg (166 lb)   12/22/22 72.9 kg (160 lb 12.8 oz)   12/20/22 72.6 kg (160 lb)       Result Review :   The following data was reviewed by: Mireya Kapoor MD on 01/16/2023:            Latest Reference Range & Units 01/09/23 13:16   Glucose 70 - 99 mg/dL 66 (L)   Sodium 134 - 144 mmol/L 141   Potassium 3.5 - 5.2 mmol/L 4.6   CO2 20 - 29 mmol/L 28   Chloride 96 - 106 mmol/L 100   Creatinine 0.57 - 1.00 mg/dL 0.74   BUN 8 - 27 mg/dL 14   BUN/Creatinine Ratio 12 - 28  19   Calcium 8.7 - 10.3 mg/dL 9.4   EGFR Result >59 mL/min/1.73 84   Alkaline Phosphatase 44 - 121 IU/L 99   Total Protein 6.0 - 8.5 g/dL 5.9 (L)   ALT (SGPT) 0 - 32 IU/L 17   AST (SGOT) 0 - 40 IU/L 24   Total Bilirubin 0.0 - 1.2 mg/dL 0.4   Albumin 3.7 - 4.7 g/dL 3.7   A/G Ratio 1.2 - 2.2  1.7   Globulin 1.5 - 4.5 g/dL 2.2   WBC 3.4 - 10.8 x10E3/uL 9.3   RBC 3.77 - 5.28 x10E6/uL 3.73 (L)   Hemoglobin 11.1 - 15.9 g/dL 10.2 (L)   Hematocrit 34.0 - 46.6 % 32.9 (L)   RDW 11.7 - 15.4 % 13.8   MCV 79 - 97 fL 88   MCH 26.6 -  33.0 pg 27.3   MCHC 31.5 - 35.7 g/dL 31.0 (L)   Platelets 150 - 450 x10E3/uL 303   Neutrophil Rel % Not Estab. % 73   Lymphocyte Rel % Not Estab. % 14   Monocyte Rel % Not Estab. % 8   Eosinophil Rel % Not Estab. % 3   Basophil Rel % Not Estab. % 1   Immature Granulocyte Rel % Not Estab. % 1   Neutrophils Absolute 1.4 - 7.0 x10E3/uL 6.9   Lymphocytes Absolute 0.7 - 3.1 x10E3/uL 1.3   Monocytes Absolute 0.1 - 0.9 x10E3/uL 0.7   Eosinophils Absolute 0.0 - 0.4 x10E3/uL 0.2   Basophils Absolute 0.0 - 0.2 x10E3/uL 0.1   Immature Grans, Absolute 0.0 - 0.1 x10E3/uL 0.1   (L): Data is abnormally low    TSH    TSH 3/24/22 1/16/23   TSH 2.300 1.380             Physical Exam  Vitals reviewed.   Constitutional:       Appearance: Normal appearance. She is well-developed.   HENT:      Head: Normocephalic and atraumatic.   Eyes:      General:         Right eye: No discharge.         Left eye: No discharge.   Cardiovascular:      Rate and Rhythm: Normal rate and regular rhythm.      Heart sounds: Normal heart sounds. No murmur heard.    No friction rub. No gallop.   Pulmonary:      Effort: Pulmonary effort is normal. No respiratory distress.      Breath sounds: Normal breath sounds. No wheezing or rales.   Skin:     General: Skin is warm and dry.      Findings: No rash.   Neurological:      Mental Status: She is alert and oriented to person, place, and time.      Coordination: Coordination normal.      Gait: Gait normal.   Psychiatric:         Behavior: Behavior is cooperative.         Assessment and Plan:  Problems Addressed this Visit        Cardiac and Vasculature    Essential hypertension     Hypertension is unchanged.  Continue current treatment regimen.  Dietary sodium restriction.  Weight loss.  Blood pressure will be reassessed in 3 months.         Relevant Orders    TSH (Completed)    Systolic CHF, chronic (HCC)     Congestive heart failure due to hypertension.  Heart failure is unchanged.    Heart failure will be  reassessed in 3 months.    No swelling of legs. Slight weight gain            Hematology and Neoplasia    CLL (chronic lymphocytic leukemia) (HCC)    Relevant Medications    Acalabrutinib Maleate (CALQUENCE) 100 MG tablet    Anemia, chronic disease     Unchanged            Mental Health    Anxiety     Unchanged  Refill Paxil    Diagnosis, treatment and and course discussed. Potential side effects discussed. Return if there is worsening or persistence of symptoms.            Relevant Medications    PARoxetine (PAXIL) 20 MG tablet       Pulmonary and Pneumonias    Centrilobular emphysema (HCC)    Community acquired pneumonia of both lungs - Primary     Resolved  Improved. Exam of lungs revealed no rhonchi            Symptoms and Signs    Night sweats     Will check thyroid function         Relevant Orders    TSH (Completed)   Other Visit Diagnoses     Immunodeficiency (cell-mediated) (HCC)        Chronic respiratory failure with hypoxia (HCC)          Diagnoses       Codes Comments    Community acquired pneumonia of both lungs    -  Primary ICD-10-CM: J18.9  ICD-9-CM: 486     CLL (chronic lymphocytic leukemia) (HCC)     ICD-10-CM: C91.10  ICD-9-CM: 204.10     Immunodeficiency (cell-mediated) (HCC)     ICD-10-CM: D84.89  ICD-9-CM: 279.3     Centrilobular emphysema (HCC)     ICD-10-CM: J43.2  ICD-9-CM: 492.8     Systolic CHF, chronic (HCC)     ICD-10-CM: I50.22  ICD-9-CM: 428.22, 428.0     Chronic respiratory failure with hypoxia (HCC)     ICD-10-CM: J96.11  ICD-9-CM: 518.83, 799.02     Night sweats     ICD-10-CM: R61  ICD-9-CM: 780.8     Essential hypertension     ICD-10-CM: I10  ICD-9-CM: 401.9     Anxiety     ICD-10-CM: F41.9  ICD-9-CM: 300.00     Anemia, chronic disease     ICD-10-CM: D63.8  ICD-9-CM: 285.29            BMI is >= 25 and <30. (Overweight) The following options were offered after discussion;: weight loss educational material (shared in after visit summary), exercise counseling/recommendations and  nutrition counseling/recommendations      An After Visit Summary and PPPS were given to the patient.       I wore protective equipment throughout this patient encounter to include mask and eyewear. Hand hygiene was performed before donning protective equipment and after removal when leaving the room.

## 2023-01-16 ENCOUNTER — OFFICE VISIT (OUTPATIENT)
Dept: FAMILY MEDICINE CLINIC | Facility: CLINIC | Age: 78
End: 2023-01-16
Payer: MEDICARE

## 2023-01-16 VITALS
HEIGHT: 66 IN | HEART RATE: 81 BPM | BODY MASS INDEX: 26.68 KG/M2 | TEMPERATURE: 97.3 F | DIASTOLIC BLOOD PRESSURE: 86 MMHG | OXYGEN SATURATION: 96 % | RESPIRATION RATE: 14 BRPM | SYSTOLIC BLOOD PRESSURE: 124 MMHG | WEIGHT: 166 LBS

## 2023-01-16 DIAGNOSIS — D63.8 ANEMIA, CHRONIC DISEASE: ICD-10-CM

## 2023-01-16 DIAGNOSIS — J43.2 CENTRILOBULAR EMPHYSEMA: ICD-10-CM

## 2023-01-16 DIAGNOSIS — R61 NIGHT SWEATS: ICD-10-CM

## 2023-01-16 DIAGNOSIS — I50.22 SYSTOLIC CHF, CHRONIC: ICD-10-CM

## 2023-01-16 DIAGNOSIS — I10 ESSENTIAL HYPERTENSION: ICD-10-CM

## 2023-01-16 DIAGNOSIS — J96.11 CHRONIC RESPIRATORY FAILURE WITH HYPOXIA: ICD-10-CM

## 2023-01-16 DIAGNOSIS — D84.89 IMMUNODEFICIENCY (CELL-MEDIATED): ICD-10-CM

## 2023-01-16 DIAGNOSIS — J18.9 COMMUNITY ACQUIRED PNEUMONIA OF BOTH LUNGS: Primary | ICD-10-CM

## 2023-01-16 DIAGNOSIS — C91.10 CLL (CHRONIC LYMPHOCYTIC LEUKEMIA): ICD-10-CM

## 2023-01-16 DIAGNOSIS — F41.9 ANXIETY: ICD-10-CM

## 2023-01-16 PROCEDURE — 99214 OFFICE O/P EST MOD 30 MIN: CPT | Performed by: FAMILY MEDICINE

## 2023-01-16 RX ORDER — PAROXETINE HYDROCHLORIDE 20 MG/1
20 TABLET, FILM COATED ORAL DAILY
Qty: 90 TABLET | Refills: 3 | Status: SHIPPED | OUTPATIENT
Start: 2023-01-16

## 2023-01-17 LAB — TSH SERPL DL<=0.005 MIU/L-ACNC: 1.38 UIU/ML (ref 0.45–4.5)

## 2023-01-21 PROBLEM — H65.01 NON-RECURRENT ACUTE SEROUS OTITIS MEDIA OF RIGHT EAR: Status: RESOLVED | Noted: 2022-04-04 | Resolved: 2023-01-21

## 2023-01-21 PROBLEM — N18.32 STAGE 3B CHRONIC KIDNEY DISEASE: Status: RESOLVED | Noted: 2019-12-19 | Resolved: 2023-01-21

## 2023-01-21 PROBLEM — D63.8 ANEMIA, CHRONIC DISEASE: Status: ACTIVE | Noted: 2019-07-05

## 2023-01-21 PROBLEM — H92.01 RIGHT EAR PAIN: Status: RESOLVED | Noted: 2022-03-12 | Resolved: 2023-01-21

## 2023-01-22 NOTE — ASSESSMENT & PLAN NOTE
Unchanged  Refill Paxil    Diagnosis, treatment and and course discussed. Potential side effects discussed. Return if there is worsening or persistence of symptoms.

## 2023-01-22 NOTE — ASSESSMENT & PLAN NOTE
Congestive heart failure due to hypertension.  Heart failure is unchanged.    Heart failure will be reassessed in 3 months.    No swelling of legs. Slight weight gain

## 2023-02-14 ENCOUNTER — OFFICE VISIT (OUTPATIENT)
Dept: PULMONOLOGY | Facility: HOSPITAL | Age: 78
End: 2023-02-14
Payer: MEDICARE

## 2023-02-14 VITALS
HEIGHT: 66 IN | HEART RATE: 58 BPM | DIASTOLIC BLOOD PRESSURE: 73 MMHG | BODY MASS INDEX: 26.36 KG/M2 | RESPIRATION RATE: 14 BRPM | OXYGEN SATURATION: 98 % | WEIGHT: 164 LBS | SYSTOLIC BLOOD PRESSURE: 124 MMHG

## 2023-02-14 DIAGNOSIS — J44.9 CHRONIC OBSTRUCTIVE PULMONARY DISEASE, UNSPECIFIED COPD TYPE: Primary | ICD-10-CM

## 2023-02-14 PROCEDURE — G0463 HOSPITAL OUTPT CLINIC VISIT: HCPCS

## 2023-02-14 RX ORDER — CHLORAL HYDRATE 500 MG
2 CAPSULE ORAL DAILY
COMMUNITY

## 2023-02-14 RX ORDER — ROFLUMILAST 500 UG/1
1 TABLET ORAL DAILY
COMMUNITY
Start: 2023-01-27 | End: 2023-02-14

## 2023-02-14 RX ORDER — ALBUTEROL SULFATE 90 UG/1
2 AEROSOL, METERED RESPIRATORY (INHALATION) EVERY 4 HOURS PRN
Qty: 18 G | Refills: 5 | Status: SHIPPED | OUTPATIENT
Start: 2023-02-14
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
## 2023-02-21 ENCOUNTER — HOSPITAL ENCOUNTER (OUTPATIENT)
Dept: CT IMAGING | Facility: HOSPITAL | Age: 78
Discharge: HOME | End: 2023-02-21
Admitting: FAMILY MEDICINE
Payer: MEDICARE

## 2023-02-21 DIAGNOSIS — R91.1 PULMONARY NODULE, RIGHT: ICD-10-CM

## 2023-02-21 PROCEDURE — 71250 CT THORAX DX C-: CPT

## 2023-03-09 ENCOUNTER — OFFICE VISIT (OUTPATIENT)
Dept: PULMONOLOGY | Facility: HOSPITAL | Age: 78
End: 2023-03-09
Payer: MEDICARE

## 2023-03-09 VITALS
OXYGEN SATURATION: 95 % | WEIGHT: 168 LBS | SYSTOLIC BLOOD PRESSURE: 115 MMHG | HEART RATE: 63 BPM | RESPIRATION RATE: 12 BRPM | DIASTOLIC BLOOD PRESSURE: 61 MMHG | BODY MASS INDEX: 27 KG/M2 | HEIGHT: 66 IN

## 2023-03-09 DIAGNOSIS — J44.9 CHRONIC OBSTRUCTIVE PULMONARY DISEASE, UNSPECIFIED COPD TYPE: Primary | ICD-10-CM

## 2023-03-09 PROCEDURE — G0463 HOSPITAL OUTPT CLINIC VISIT: HCPCS

## 2023-03-09 RX ORDER — LISINOPRIL AND HYDROCHLOROTHIAZIDE 20; 12.5 MG/1; MG/1
1 TABLET ORAL DAILY
COMMUNITY
Start: 2023-02-20
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
## 2023-04-17 DIAGNOSIS — J44.9 CHRONIC OBSTRUCTIVE PULMONARY DISEASE, UNSPECIFIED COPD TYPE: Primary | ICD-10-CM

## 2023-04-17 DIAGNOSIS — J43.2 CENTRILOBULAR EMPHYSEMA: ICD-10-CM

## 2023-04-17 RX ORDER — FLUTICASONE FUROATE, UMECLIDINIUM BROMIDE AND VILANTEROL TRIFENATATE 100; 62.5; 25 UG/1; UG/1; UG/1
1 POWDER RESPIRATORY (INHALATION)
Qty: 60 EACH | Refills: 5 | Status: SHIPPED | OUTPATIENT
Start: 2023-04-17

## 2023-05-05 RX ORDER — PRAMIPEXOLE DIHYDROCHLORIDE 0.25 MG/1
TABLET ORAL
Qty: 90 TABLET | Refills: 3 | Status: SHIPPED | OUTPATIENT
Start: 2023-05-05

## 2023-05-15 ENCOUNTER — OFFICE VISIT (OUTPATIENT)
Dept: FAMILY MEDICINE CLINIC | Facility: CLINIC | Age: 78
End: 2023-05-15
Payer: MEDICARE

## 2023-05-15 VITALS
SYSTOLIC BLOOD PRESSURE: 148 MMHG | RESPIRATION RATE: 16 BRPM | BODY MASS INDEX: 27.61 KG/M2 | TEMPERATURE: 97.5 F | HEART RATE: 56 BPM | DIASTOLIC BLOOD PRESSURE: 76 MMHG | WEIGHT: 171.8 LBS | OXYGEN SATURATION: 97 % | HEIGHT: 66 IN

## 2023-05-15 DIAGNOSIS — J40 BRONCHITIS: Primary | ICD-10-CM

## 2023-05-15 DIAGNOSIS — J06.9 UPPER RESPIRATORY TRACT INFECTION, UNSPECIFIED TYPE: ICD-10-CM

## 2023-05-15 LAB
EXPIRATION DATE: NORMAL
FLUAV AG UPPER RESP QL IA.RAPID: NOT DETECTED
FLUBV AG UPPER RESP QL IA.RAPID: NOT DETECTED
INTERNAL CONTROL: NORMAL
Lab: NORMAL
SARS-COV-2 AG UPPER RESP QL IA.RAPID: NOT DETECTED

## 2023-05-15 PROCEDURE — 3077F SYST BP >= 140 MM HG: CPT | Performed by: FAMILY MEDICINE

## 2023-05-15 PROCEDURE — 3078F DIAST BP <80 MM HG: CPT | Performed by: FAMILY MEDICINE

## 2023-05-15 PROCEDURE — 99213 OFFICE O/P EST LOW 20 MIN: CPT | Performed by: FAMILY MEDICINE

## 2023-05-15 PROCEDURE — 87428 SARSCOV & INF VIR A&B AG IA: CPT | Performed by: FAMILY MEDICINE

## 2023-05-15 RX ORDER — DOXYCYCLINE 100 MG/1
100 CAPSULE ORAL 2 TIMES DAILY
Qty: 20 CAPSULE | Refills: 0 | Status: SHIPPED | OUTPATIENT
Start: 2023-05-15 | End: 2023-05-25

## 2023-05-15 RX ORDER — METOPROLOL SUCCINATE 25 MG/1
1 TABLET, EXTENDED RELEASE ORAL DAILY
COMMUNITY
Start: 2023-03-16

## 2023-05-15 NOTE — PROGRESS NOTES
Subjective   Krystyna Bennett is a 77 y.o. female.   Chief Complaint   Patient presents with   • URI       URI   This is a new problem. The current episode started in the past 7 days. Associated symptoms include congestion, coughing, ear pain, headaches, a plugged ear sensation, rhinorrhea, sinus pain and sneezing. Pertinent negatives include no abdominal pain, chest pain, diarrhea, dysuria, nausea, neck pain, rash, swollen glands, vomiting or wheezing. Associated symptoms comments: Chills  . She has tried decongestant and acetaminophen for the symptoms. The treatment provided no relief.        The following portions of the patient's history were reviewed and updated as appropriate: allergies, current medications, past family history, past medical history, past social history, past surgical history and problem list.    Patient Active Problem List   Diagnosis   • Essential hypertension   • CLL (chronic lymphocytic leukemia) (HCC)   • Centrilobular emphysema   • GERD (gastroesophageal reflux disease)   • Arthritis   • Systolic CHF, chronic (HCC)   • Anemia, chronic disease   • History of Clostridium difficile colitis   • Melanoma   • Mixed hyperlipidemia   • Medicare annual wellness visit, subsequent   • Anxiety   • Restless legs syndrome   • Colon cancer screening   • Eczematous dermatitis of upper and lower eyelids of both eyes   • Positive colorectal cancer screening using Cologuard test   • Mammogram declined   • Flu vaccine need   • Non-seasonal allergic rhinitis   • AK (actinic keratosis)   • Cat bite of right wrist   • Tear of skin of right wrist   • Community acquired pneumonia of both lungs   • Hospital discharge follow-up   • Pulmonary nodule, right   • Night sweats       Current Outpatient Medications on File Prior to Visit   Medication Sig Dispense Refill   • Acalabrutinib Maleate (CALQUENCE) 100 MG tablet Take 1 tablet by mouth.     • albuterol sulfate  (90 Base) MCG/ACT inhaler Inhale 2 puffs Every  4 (Four) Hours As Needed for Wheezing. 18 g 5   • aspirin 81 MG EC tablet Take 1 tablet by mouth Daily.     • Calcium Carbonate-Vit D-Min (CALTRATE 600+D PLUS MINERALS) 600-800 MG-UNIT chewable tablet Chew 2 tablets Daily.     • carvedilol (COREG) 12.5 MG tablet Take 1 tablet by mouth 2 (Two) Times a Day With Meals. 60 tablet 0   • Coenzyme Q10 (COQ10 PO) Take  by mouth Daily.     • Cyanocobalamin (B-12) 1000 MCG capsule Take 1,000 mcg by mouth Daily.     • esomeprazole (nexIUM) 40 MG capsule Take 1 capsule by mouth Every Morning Before Breakfast.     • Fluticasone-Umeclidin-Vilant (Trelegy Ellipta) 100-62.5-25 MCG/ACT inhaler Inhale 1 puff Daily. 60 each 5   • furosemide (LASIX) 40 MG tablet Take 1 tablet by mouth Daily. 30 tablet 0   • Glucosamine-Chondroitin 250-200 MG tablet Take 1 tablet by mouth Daily.     • hydrOXYzine (ATARAX) 25 MG tablet Take 1 tablet by mouth 3 (Three) Times a Day As Needed for Anxiety. 90 tablet 0   • ipratropium (ATROVENT) 0.02 % nebulizer solution Take 2.5 mL by nebulization 3 (Three) Times a Day As Needed for Wheezing or Shortness of Air. 62.5 mL 11   • lisinopril-hydrochlorothiazide (PRINZIDE,ZESTORETIC) 20-12.5 MG per tablet Take 1 tablet by mouth Daily.     • Misc Natural Products (Elderberry Immune Complex) chewable tablet Chew 1 capsule Daily.     • montelukast (SINGULAIR) 10 MG tablet TAKE ONE TABLET BY MOUTH AT BEDTIME 90 tablet 3   • Multiple Vitamins-Minerals (CENTRUM SILVER) tablet 1 tablet po daily     • Omega-3 Fatty Acids (fish oil) 1000 MG capsule capsule Take 2 capsules by mouth Daily.     • PARoxetine (PAXIL) 20 MG tablet Take 1 tablet by mouth Daily. 90 tablet 3   • pramipexole (MIRAPEX) 0.25 MG tablet TAKE ONE TABLET BY MOUTH DAILY 90 tablet 3   • simvastatin (ZOCOR) 20 MG tablet TAKE ONE TABLET BY MOUTH DAILY 90 tablet 2   • metoprolol succinate XL (TOPROL-XL) 25 MG 24 hr tablet Take 1 tablet by mouth Daily.       No current facility-administered medications on file  "prior to visit.     Current outpatient and discharge medications have been reconciled for the patient.  Reviewed by: Kevin Francis MD      Allergies   Allergen Reactions   • Latex Rash   • Sulfa Antibiotics Other (See Comments)     Tunnel vision,light headed/ may not be allergic to it any longer        Review of Systems   Constitutional: Negative for activity change, appetite change, fatigue and fever.   HENT: Positive for congestion, ear pain, rhinorrhea and sneezing. Negative for swollen glands and voice change.    Eyes: Negative for visual disturbance.   Respiratory: Positive for cough. Negative for shortness of breath and wheezing.    Cardiovascular: Negative for chest pain and leg swelling.   Gastrointestinal: Negative for abdominal pain, blood in stool, constipation, diarrhea, nausea and vomiting.   Endocrine: Negative for polydipsia and polyuria.   Genitourinary: Negative for dysuria, frequency and hematuria.   Musculoskeletal: Negative for joint swelling, neck pain and neck stiffness.   Skin: Negative for rash and wound.   Neurological: Negative for weakness, numbness and headache.   Psychiatric/Behavioral: Negative for suicidal ideas and depressed mood.     I have reviewed and confirmed the accuracy of the ROS as documented by the MA/LPN/RN Kevin Francis MD    Objective   Visit Vitals  /76 (BP Location: Right arm, Patient Position: Sitting, Cuff Size: Adult)   Pulse 56   Temp 97.5 °F (36.4 °C)   Resp 16   Ht 167.6 cm (65.98\")   Wt 77.9 kg (171 lb 12.8 oz)   LMP  (LMP Unknown)   SpO2 97% Comment: 4 liters of oxygen   BMI 27.75 kg/m²      **  Physical Exam  Constitutional:       Appearance: She is well-developed.   HENT:      Head: Normocephalic and atraumatic.      Right Ear: External ear normal.      Left Ear: External ear normal.      Nose: Congestion present.   Eyes:      Pupils: Pupils are equal, round, and reactive to light.   Cardiovascular:      Rate and Rhythm: Normal rate and " regular rhythm.      Heart sounds: Normal heart sounds.   Pulmonary:      Effort: Pulmonary effort is normal.      Breath sounds: Rhonchi present.   Abdominal:      General: Bowel sounds are normal.      Palpations: Abdomen is soft.   Musculoskeletal:         General: Normal range of motion.      Cervical back: Normal range of motion and neck supple.   Skin:     General: Skin is warm and dry.   Neurological:      Mental Status: She is alert and oriented to person, place, and time.   Psychiatric:         Behavior: Behavior normal.         Thought Content: Thought content normal.         Judgment: Judgment normal.       Derm Physical Exam    Diagnoses and all orders for this visit:    1. Bronchitis (Primary)  -     doxycycline (MONODOX) 100 MG capsule; Take 1 capsule by mouth 2 (Two) Times a Day for 10 days.  Dispense: 20 capsule; Refill: 0    2. Upper respiratory tract infection, unspecified type  -     POCT SARS-CoV-2 Antigen AXEL    3. BMI 27.0-27.9,adult     Findings discussed. All questions answered.  Medication and medication adverse effects discussed.  Drug education given and explained to patient. Patient verbalized understanding.  Follow-up in 1 week if not better.  Follow-up sooner for worsening symptoms or for any concerns.     BMI is >= 25 and <30. (Overweight) The following options were offered after discussion;: none (medical contraindication)    Expected course, medications, and adverse effects discussed as appropriate.  Call or return if worsening or persistent symptoms.     This document is intended for medical professional use only.

## 2023-05-16 ENCOUNTER — TELEPHONE (OUTPATIENT)
Dept: FAMILY MEDICINE CLINIC | Facility: CLINIC | Age: 78
End: 2023-05-16

## 2023-05-16 ENCOUNTER — CLINICAL SUPPORT (OUTPATIENT)
Dept: FAMILY MEDICINE CLINIC | Facility: CLINIC | Age: 78
End: 2023-05-16
Payer: MEDICARE

## 2023-05-16 VITALS — SYSTOLIC BLOOD PRESSURE: 125 MMHG | DIASTOLIC BLOOD PRESSURE: 53 MMHG

## 2023-05-16 DIAGNOSIS — I10 ESSENTIAL HYPERTENSION: Primary | ICD-10-CM

## 2023-05-16 NOTE — TELEPHONE ENCOUNTER
Caller: Krystyna Bennett    Relationship to patient: Self    Best call back number: [phone]  Patient is needing: PATIENT BLOOD PRESSURE IS RUNNING HIGH AND SHE NEED SOME ADVICE ON IF SHE CAN TAKE A EXTRA PILL

## 2023-05-16 NOTE — TELEPHONE ENCOUNTER
Called pt and let spoke to her she said 139/65  just now  She said yesterday it was High when she seen  Dr. Francis  This morning 197/70 today and she called the office asking what she should do once she got off the phone she checked it again it was 147/50   She started CBD oil yesterday and is wondering if it is affecting her BP

## 2023-05-18 ENCOUNTER — OFFICE VISIT (OUTPATIENT)
Dept: FAMILY MEDICINE CLINIC | Facility: CLINIC | Age: 78
End: 2023-05-18
Payer: MEDICARE

## 2023-05-18 VITALS
RESPIRATION RATE: 19 BRPM | DIASTOLIC BLOOD PRESSURE: 80 MMHG | HEART RATE: 87 BPM | SYSTOLIC BLOOD PRESSURE: 130 MMHG | TEMPERATURE: 97.3 F | OXYGEN SATURATION: 93 % | WEIGHT: 173.4 LBS | BODY MASS INDEX: 27.87 KG/M2 | HEIGHT: 66 IN

## 2023-05-18 DIAGNOSIS — E66.3 OVERWEIGHT WITH BODY MASS INDEX (BMI) OF 28 TO 28.9 IN ADULT: ICD-10-CM

## 2023-05-18 DIAGNOSIS — H83.03 LABYRINTHITIS OF BOTH EARS: ICD-10-CM

## 2023-05-18 DIAGNOSIS — I10 ESSENTIAL HYPERTENSION: Primary | ICD-10-CM

## 2023-05-18 PROBLEM — H25.819 COMBINED FORM OF SENILE CATARACT: Status: ACTIVE | Noted: 2019-04-02

## 2023-05-18 PROCEDURE — 99213 OFFICE O/P EST LOW 20 MIN: CPT | Performed by: FAMILY MEDICINE

## 2023-05-18 PROCEDURE — 3079F DIAST BP 80-89 MM HG: CPT | Performed by: FAMILY MEDICINE

## 2023-05-18 PROCEDURE — 3075F SYST BP GE 130 - 139MM HG: CPT | Performed by: FAMILY MEDICINE

## 2023-05-18 RX ORDER — PREDNISONE 5 MG/1
5 TABLET ORAL DAILY
Qty: 45 TABLET | Refills: 0 | Status: SHIPPED | OUTPATIENT
Start: 2023-05-18

## 2023-05-18 NOTE — PROGRESS NOTES
Subjective   Krystyna Bennett is a 77 y.o. female. Presents to [facility]    Chief Complaint   Patient presents with   • Hypertension       Hypertension  This is a chronic problem. The current episode started more than 1 month ago. The problem is controlled. Associated symptoms include headaches and shortness of breath. Pertinent negatives include no chest pain or malaise/fatigue. Current antihypertension treatment includes beta blockers and ACE inhibitors. The current treatment provides moderate improvement.        I personally reviewed and updated the patient's allergies, medications, problem list, and past medical, surgical, social, and family history. I have reviewed and confirmed the accuracy of the History of Present Illness and Review of Symptoms as documented by the MA/LPN/RN. Mireya Kapoor MD    Allergies:  Allergies   Allergen Reactions   • Latex Rash   • Sulfa Antibiotics Other (See Comments)     Tunnel vision,light headed/ may not be allergic to it any longer        Social History:  Social History     Socioeconomic History   • Marital status:    Tobacco Use   • Smoking status: Former     Packs/day: 0.50     Years: 37.00     Pack years: 18.50     Types: Cigarettes, Cigars     Start date: 1960     Quit date: 2019     Years since quitting: 3.9     Passive exposure: Past   • Smokeless tobacco: Never   Vaping Use   • Vaping Use: Never used   Substance and Sexual Activity   • Alcohol use: Yes     Alcohol/week: 4.0 standard drinks     Types: 2 Glasses of wine, 2 Shots of liquor per week     Comment: social drinker   • Drug use: No   • Sexual activity: Not Currently     Partners: Male     Birth control/protection: Post-menopausal       Family History:  Family History   Problem Relation Age of Onset   • Heart disease Father            • Stroke Father    • Leukemia Paternal Grandmother    • Anemia Paternal Grandmother    • Heart disease Paternal Grandmother    • Cancer  Paternal Grandmother         leukemia   • Alcohol abuse Maternal Aunt    • Cancer Maternal Aunt            • Asthma Maternal Grandmother            • Hyperlipidemia Maternal Grandmother            • Hypertension Maternal Grandmother    • Diabetes Paternal Aunt            • Hypertension Son    • Hypertension Daughter        Past Medical History :  Patient Active Problem List   Diagnosis   • Essential hypertension   • CLL (chronic lymphocytic leukemia) (HCC)   • Centrilobular emphysema   • GERD (gastroesophageal reflux disease)   • Arthritis   • Systolic CHF, chronic (HCC)   • Anemia, chronic disease   • History of Clostridium difficile colitis   • Melanoma   • Mixed hyperlipidemia   • Medicare annual wellness visit, subsequent   • Anxiety   • Restless legs syndrome   • Colon cancer screening   • Eczematous dermatitis of upper and lower eyelids of both eyes   • Positive colorectal cancer screening using Cologuard test   • Mammogram declined   • Flu vaccine need   • Non-seasonal allergic rhinitis   • AK (actinic keratosis)   • Cat bite of right wrist   • Tear of skin of right wrist   • Community acquired pneumonia of both lungs   • Hospital discharge follow-up   • Pulmonary nodule, right   • Night sweats   • Combined form of senile cataract   • Overweight with body mass index (BMI) of 28 to 28.9 in adult   • Labyrinthitis of both ears       Medication List:    Current Outpatient Medications:   •  Acalabrutinib Maleate (CALQUENCE) 100 MG tablet, Take 1 tablet by mouth., Disp: , Rfl:   •  albuterol sulfate  (90 Base) MCG/ACT inhaler, Inhale 2 puffs Every 4 (Four) Hours As Needed for Wheezing., Disp: 18 g, Rfl: 5  •  aspirin 81 MG EC tablet, Take 1 tablet by mouth Daily., Disp: , Rfl:   •  Calcium Carbonate-Vit D-Min (CALTRATE 600+D PLUS MINERALS) 600-800 MG-UNIT chewable tablet, Chew 2 tablets Daily., Disp: , Rfl:   •  carvedilol (COREG) 12.5 MG tablet, Take 1 tablet by mouth 2  (Two) Times a Day With Meals., Disp: 60 tablet, Rfl: 0  •  Coenzyme Q10 (COQ10 PO), Take  by mouth Daily., Disp: , Rfl:   •  Cyanocobalamin (B-12) 1000 MCG capsule, Take 1,000 mcg by mouth Daily., Disp: , Rfl:   •  esomeprazole (nexIUM) 40 MG capsule, Take 1 capsule by mouth Every Morning Before Breakfast., Disp: , Rfl:   •  Fluticasone-Umeclidin-Vilant (Trelegy Ellipta) 100-62.5-25 MCG/ACT inhaler, Inhale 1 puff Daily., Disp: 60 each, Rfl: 5  •  furosemide (LASIX) 40 MG tablet, Take 1 tablet by mouth Daily., Disp: 30 tablet, Rfl: 0  •  Glucosamine-Chondroitin 250-200 MG tablet, Take 1 tablet by mouth Daily., Disp: , Rfl:   •  hydrOXYzine (ATARAX) 25 MG tablet, Take 1 tablet by mouth 3 (Three) Times a Day As Needed for Anxiety., Disp: 90 tablet, Rfl: 0  •  ipratropium (ATROVENT) 0.02 % nebulizer solution, Take 2.5 mL by nebulization 3 (Three) Times a Day As Needed for Wheezing or Shortness of Air., Disp: 62.5 mL, Rfl: 11  •  metoprolol succinate XL (TOPROL-XL) 25 MG 24 hr tablet, Take 1 tablet by mouth Daily., Disp: , Rfl:   •  Misc Natural Products (Elderberry Immune Complex) chewable tablet, Chew 1 capsule Daily., Disp: , Rfl:   •  montelukast (SINGULAIR) 10 MG tablet, TAKE ONE TABLET BY MOUTH AT BEDTIME, Disp: 90 tablet, Rfl: 3  •  Multiple Vitamins-Minerals (CENTRUM SILVER) tablet, 1 tablet po daily, Disp: , Rfl:   •  Omega-3 Fatty Acids (fish oil) 1000 MG capsule capsule, Take 2 capsules by mouth Daily., Disp: , Rfl:   •  PARoxetine (PAXIL) 20 MG tablet, Take 1 tablet by mouth Daily., Disp: 90 tablet, Rfl: 3  •  pramipexole (MIRAPEX) 0.25 MG tablet, TAKE ONE TABLET BY MOUTH DAILY, Disp: 90 tablet, Rfl: 3  •  simvastatin (ZOCOR) 20 MG tablet, TAKE ONE TABLET BY MOUTH DAILY, Disp: 90 tablet, Rfl: 2  •  lisinopril (PRINIVIL,ZESTRIL) 40 MG tablet, Take 1 tablet by mouth Daily., Disp: 30 tablet, Rfl: 12  •  predniSONE (DELTASONE) 5 MG tablet, Take 1 tablet by mouth Daily. 40mg x 3 days, 20mg x 3 days, 10mg x 3 days,  5mg x 3 days, Disp: 45 tablet, Rfl: 0    Past Surgical History:  Past Surgical History:   Procedure Laterality Date   • APPENDECTOMY     • CARDIAC CATHETERIZATION  05/2019    [facility].   • CARDIAC CATHETERIZATION Right 11/25/2022    Procedure: Coronary angiography;  Surgeon: Andrea Philippe MD;  Location: [facility] CATH INVASIVE LOCATION;  Service: Cardiovascular;  Laterality: Right;   • HYSTERECTOMY     • TONSILLECTOMY           Physical Exam:      Vital Signs:    Vitals:    05/18/23 1046   BP: 130/80   Pulse: 87   Resp: 19   Temp: 97.3 °F (36.3 °C)   SpO2: 93%        Wt Readings from Last 3 Encounters:   05/25/23 81.4 kg (179 lb 6.4 oz)   05/18/23 78.7 kg (173 lb 6.4 oz)   05/15/23 77.9 kg (171 lb 12.8 oz)       Result Review :                Physical Exam  Vitals reviewed.   Constitutional:       Appearance: Normal appearance. She is well-developed.   HENT:      Head: Normocephalic and atraumatic.      Right Ear: Tympanic membrane and external ear normal. No decreased hearing noted. No tenderness. No middle ear effusion. Tympanic membrane is not injected, erythematous, retracted or bulging.      Left Ear: Tympanic membrane and external ear normal. No decreased hearing noted. No tenderness.  No middle ear effusion. Tympanic membrane is not injected, erythematous, retracted or bulging.      Nose: Nose normal. No rhinorrhea.      Mouth/Throat:      Mouth: Mucous membranes are moist.      Pharynx: No oropharyngeal exudate or posterior oropharyngeal erythema.   Eyes:      General:         Right eye: No discharge.         Left eye: No discharge.   Cardiovascular:      Rate and Rhythm: Normal rate and regular rhythm.      Heart sounds: Normal heart sounds. No murmur heard.    No friction rub. No gallop.   Pulmonary:      Effort: Pulmonary effort is normal. No respiratory distress.      Breath sounds: Normal breath sounds. No wheezing or rales.   Lymphadenopathy:      Cervical: No cervical adenopathy.   Skin:     General: Skin is  warm and dry.      Findings: No rash.   Neurological:      General: No focal deficit present.      Mental Status: She is alert and oriented to person, place, and time.      Motor: No weakness.      Coordination: Coordination normal.      Gait: Gait normal.      Deep Tendon Reflexes: Reflexes normal.   Psychiatric:         Behavior: Behavior is cooperative.         Assessment and Plan:  Problems Addressed this Visit        Cardiac and Vasculature    Essential hypertension - Primary     Hypertension is improving with treatment.  Continue current treatment regimen.  Blood pressure will be reassessed at the next regular appointment.            ENT    Labyrinthitis of both ears     Diagnosis, treatment and and course discussed. Potential side effects discussed. Return if there is worsening or persistence of symptoms.     Discussed risks of steroids: hyperglycemia, osteoporosis, avascular necrosis, anxiety, insomnia and cataracts. Patient states understanding    increase fluids, tylenol for fever, motrin for pain. Humidifier to help with congestion and to sleep at night. Dicussed OTC meds, gargle with warm salt water. If there is recurrent fever, shortness of breath, lethargy, advised to come in to the office or go to the ER.             Relevant Medications    predniSONE (DELTASONE) 5 MG tablet       Other    Overweight with body mass index (BMI) of 28 to 28.9 in adult     Patient's (Body mass index is 28 kg/m².) indicates that they are overweight with health conditions that include hypertension . Weight is worsening. BMI is is above average; BMI management plan is completed. We discussed portion control and increasing exercise.         Diagnoses       Codes Comments    Essential hypertension    -  Primary ICD-10-CM: I10  ICD-9-CM: 401.9     Overweight with body mass index (BMI) of 28 to 28.9 in adult     ICD-10-CM: E66.3, Z68.28  ICD-9-CM: 278.02, V85.24     Labyrinthitis of both ears     ICD-10-CM: H83.03  ICD-9-CM:  386.30            BMI is >= 25 and <30. (Overweight) The following options were offered after discussion;: weight loss educational material (shared in after visit summary), exercise counseling/recommendations and nutrition counseling/recommendations      An After Visit Summary and PPPS were given to the patient.

## 2023-05-18 NOTE — ASSESSMENT & PLAN NOTE
Patient's (Body mass index is 28 kg/m².) indicates that they are overweight with health conditions that include hypertension . Weight is worsening. BMI is is above average; BMI management plan is completed. We discussed portion control and increasing exercise.

## 2023-05-22 ENCOUNTER — TELEPHONE (OUTPATIENT)
Dept: FAMILY MEDICINE CLINIC | Facility: CLINIC | Age: 78
End: 2023-05-22
Payer: MEDICARE

## 2023-05-22 DIAGNOSIS — R91.1 PULMONARY NODULE, RIGHT: Primary | ICD-10-CM

## 2023-05-22 NOTE — PROGRESS NOTES
Subjective   Krystyna Bennett is a 77 y.o. female. Presents to [facility] MEDICAL [facility]    Chief Complaint   Patient presents with   • Hypertension       Hypertension  This is a chronic problem. The current episode started more than 1 month ago. The problem is controlled. Associated symptoms include headaches and shortness of breath. Pertinent negatives include no chest pain, malaise/fatigue or palpitations. Risk factors for coronary artery disease include post-menopausal state, dyslipidemia and family history. Current antihypertension treatment includes beta blockers and ACE inhibitors. The current treatment provides moderate improvement.        I personally reviewed and updated the patient's allergies, medications, problem list, and past medical, surgical, social, and family history. I have reviewed and confirmed the accuracy of the History of Present Illness and Review of Symptoms as documented by the MA/LPN/RN. Mireya Kapoor MD    Allergies:  Allergies   Allergen Reactions   • Latex Rash   • Sulfa Antibiotics Other (See Comments)     Tunnel vision,light headed/ may not be allergic to it any longer        Social History:  Social History     Socioeconomic History   • Marital status:    Tobacco Use   • Smoking status: Former     Packs/day: 0.50     Years: 37.00     Pack years: 18.50     Types: Cigarettes, Cigars     Start date: 1960     Quit date: 2019     Years since quitting: 3.9     Passive exposure: Past   • Smokeless tobacco: Never   Vaping Use   • Vaping Use: Never used   Substance and Sexual Activity   • Alcohol use: Yes     Alcohol/week: 4.0 standard drinks     Types: 2 Glasses of wine, 2 Shots of liquor per week     Comment: social drinker   • Drug use: No   • Sexual activity: Not Currently     Partners: Male     Birth control/protection: Post-menopausal       Family History:  Family History   Problem Relation Age of Onset   • Heart disease Father            • Stroke Father     • Leukemia Paternal Grandmother    • Anemia Paternal Grandmother    • Heart disease Paternal Grandmother    • Cancer Paternal Grandmother         leukemia   • Alcohol abuse Maternal Aunt    • Cancer Maternal Aunt            • Asthma Maternal Grandmother            • Hyperlipidemia Maternal Grandmother            • Hypertension Maternal Grandmother    • Diabetes Paternal Aunt            • Hypertension Son    • Hypertension Daughter        Past Medical History :  Patient Active Problem List   Diagnosis   • Essential hypertension   • CLL (chronic lymphocytic leukemia) (HCC)   • Centrilobular emphysema   • GERD (gastroesophageal reflux disease)   • Arthritis   • Systolic CHF, chronic (HCC)   • Anemia, chronic disease   • History of Clostridium difficile colitis   • Melanoma   • Mixed hyperlipidemia   • Medicare annual wellness visit, subsequent   • Anxiety   • Restless legs syndrome   • Colon cancer screening   • Eczematous dermatitis of upper and lower eyelids of both eyes   • Positive colorectal cancer screening using Cologuard test   • Mammogram declined   • Flu vaccine need   • Non-seasonal allergic rhinitis   • AK (actinic keratosis)   • Cat bite of right wrist   • Tear of skin of right wrist   • Community acquired pneumonia of both lungs   • Hospital discharge follow-up   • Pulmonary nodule, right   • Night sweats   • Combined form of senile cataract   • Overweight with body mass index (BMI) of 28 to 28.9 in adult   • Labyrinthitis of both ears       Medication List:    Current Outpatient Medications:   •  Acalabrutinib Maleate (CALQUENCE) 100 MG tablet, Take 1 tablet by mouth., Disp: , Rfl:   •  albuterol sulfate  (90 Base) MCG/ACT inhaler, Inhale 2 puffs Every 4 (Four) Hours As Needed for Wheezing., Disp: 18 g, Rfl: 5  •  aspirin 81 MG EC tablet, Take 1 tablet by mouth Daily., Disp: , Rfl:   •  Calcium Carbonate-Vit D-Min (CALTRATE 600+D PLUS MINERALS) 600-800 MG-UNIT  chewable tablet, Chew 2 tablets Daily., Disp: , Rfl:   •  carvedilol (COREG) 12.5 MG tablet, Take 1 tablet by mouth 2 (Two) Times a Day With Meals., Disp: 60 tablet, Rfl: 0  •  Coenzyme Q10 (COQ10 PO), Take  by mouth Daily., Disp: , Rfl:   •  Cyanocobalamin (B-12) 1000 MCG capsule, Take 1,000 mcg by mouth Daily., Disp: , Rfl:   •  esomeprazole (nexIUM) 40 MG capsule, Take 1 capsule by mouth Every Morning Before Breakfast., Disp: , Rfl:   •  Fluticasone-Umeclidin-Vilant (Trelegy Ellipta) 100-62.5-25 MCG/ACT inhaler, Inhale 1 puff Daily., Disp: 60 each, Rfl: 5  •  furosemide (LASIX) 40 MG tablet, Take 1 tablet by mouth Daily., Disp: 30 tablet, Rfl: 0  •  Glucosamine-Chondroitin 250-200 MG tablet, Take 1 tablet by mouth Daily., Disp: , Rfl:   •  hydrOXYzine (ATARAX) 25 MG tablet, Take 1 tablet by mouth 3 (Three) Times a Day As Needed for Anxiety., Disp: 90 tablet, Rfl: 0  •  ipratropium (ATROVENT) 0.02 % nebulizer solution, Take 2.5 mL by nebulization 3 (Three) Times a Day As Needed for Wheezing or Shortness of Air., Disp: 62.5 mL, Rfl: 11  •  metoprolol succinate XL (TOPROL-XL) 25 MG 24 hr tablet, Take 1 tablet by mouth Daily., Disp: , Rfl:   •  Misc Natural Products (Elderberry Immune Complex) chewable tablet, Chew 1 capsule Daily., Disp: , Rfl:   •  montelukast (SINGULAIR) 10 MG tablet, TAKE ONE TABLET BY MOUTH AT BEDTIME, Disp: 90 tablet, Rfl: 3  •  Multiple Vitamins-Minerals (CENTRUM SILVER) tablet, 1 tablet po daily, Disp: , Rfl:   •  Omega-3 Fatty Acids (fish oil) 1000 MG capsule capsule, Take 2 capsules by mouth Daily., Disp: , Rfl:   •  PARoxetine (PAXIL) 20 MG tablet, Take 1 tablet by mouth Daily., Disp: 90 tablet, Rfl: 3  •  pramipexole (MIRAPEX) 0.25 MG tablet, TAKE ONE TABLET BY MOUTH DAILY, Disp: 90 tablet, Rfl: 3  •  predniSONE (DELTASONE) 5 MG tablet, Take 1 tablet by mouth Daily. 40mg x 3 days, 20mg x 3 days, 10mg x 3 days, 5mg x 3 days, Disp: 45 tablet, Rfl: 0  •  simvastatin (ZOCOR) 20 MG tablet,  "TAKE ONE TABLET BY MOUTH DAILY, Disp: 90 tablet, Rfl: 2  •  lisinopril (PRINIVIL,ZESTRIL) 40 MG tablet, Take 1 tablet by mouth Daily., Disp: 30 tablet, Rfl: 12    Past Surgical History:  Past Surgical History:   Procedure Laterality Date   • APPENDECTOMY     • CARDIAC CATHETERIZATION  05/2019    [facility].   • CARDIAC CATHETERIZATION Right 11/25/2022    Procedure: Coronary angiography;  Surgeon: Andrea Philippe MD;  Location: [facility] INVASIVE LOCATION;  Service: Cardiovascular;  Laterality: Right;   • HYSTERECTOMY     • TONSILLECTOMY           Physical Exam:      Vital Signs:    Vitals:    05/25/23 1430   BP: 142/62   Pulse:    Resp:    Temp:    SpO2:       /62 Comment: office  Pulse 82   Temp 97.5 °F (36.4 °C) (Temporal)   Resp 19   Ht 167.6 cm (65.98\")   Wt 81.4 kg (179 lb 6.4 oz)   LMP  (LMP Unknown)   SpO2 94% Comment: 4L oxygen  BMI 28.97 kg/m²     Wt Readings from Last 3 Encounters:   05/25/23 81.4 kg (179 lb 6.4 oz)   05/18/23 78.7 kg (173 lb 6.4 oz)   05/15/23 77.9 kg (171 lb 12.8 oz)       Result Review :                Physical Exam  Vitals reviewed.   Constitutional:       Appearance: Normal appearance. She is well-developed.   HENT:      Head: Normocephalic and atraumatic.   Eyes:      General:         Right eye: No discharge.         Left eye: No discharge.   Cardiovascular:      Rate and Rhythm: Normal rate and regular rhythm.      Heart sounds: Normal heart sounds. No murmur heard.    No friction rub. No gallop.   Pulmonary:      Effort: Pulmonary effort is normal. No respiratory distress.      Breath sounds: Normal breath sounds. No wheezing or rales.   Skin:     General: Skin is warm and dry.      Findings: No rash.   Neurological:      Mental Status: She is alert and oriented to person, place, and time.      Coordination: Coordination normal.      Gait: Gait normal.   Psychiatric:         Behavior: Behavior is cooperative.         Assessment and Plan:  Problems Addressed this Visit     "    Cardiac and Vasculature    Essential hypertension - Primary     Hypertension is worsening.  Dietary sodium restriction.  Weight loss.  Medication changes per orders.  Blood pressure will be reassessed at the next regular appointment.         Relevant Medications    lisinopril (PRINIVIL,ZESTRIL) 40 MG tablet    Mixed hyperlipidemia    Relevant Orders    Comprehensive Metabolic Panel    Lipid Panel With / Chol / HDL Ratio       Other    Overweight with body mass index (BMI) of 28 to 28.9 in adult     Patient's (Body mass index is 28.97 kg/m².) indicates that they are overweight with health conditions that include hypertension and dyslipidemias . Weight is worsening. BMI is is above average; BMI management plan is completed. We discussed portion control and increasing exercise.         Diagnoses       Codes Comments    Essential hypertension    -  Primary ICD-10-CM: I10  ICD-9-CM: 401.9     Overweight with body mass index (BMI) of 28 to 28.9 in adult     ICD-10-CM: E66.3, Z68.28  ICD-9-CM: 278.02, V85.24     Mixed hyperlipidemia     ICD-10-CM: E78.2  ICD-9-CM: 272.2            BMI is >= 25 and <30. (Overweight) The following options were offered after discussion;: weight loss educational material (shared in after visit summary), exercise counseling/recommendations and nutrition counseling/recommendations      An After Visit Summary and PPPS were given to the patient.

## 2023-05-25 ENCOUNTER — OFFICE VISIT (OUTPATIENT)
Dept: FAMILY MEDICINE CLINIC | Facility: CLINIC | Age: 78
End: 2023-05-25

## 2023-05-25 DIAGNOSIS — E66.3 OVERWEIGHT WITH BODY MASS INDEX (BMI) OF 28 TO 28.9 IN ADULT: ICD-10-CM

## 2023-05-25 DIAGNOSIS — I10 ESSENTIAL HYPERTENSION: Primary | ICD-10-CM

## 2023-05-25 DIAGNOSIS — E78.2 MIXED HYPERLIPIDEMIA: ICD-10-CM

## 2023-05-25 PROCEDURE — 1160F RVW MEDS BY RX/DR IN RCRD: CPT | Performed by: FAMILY MEDICINE

## 2023-05-25 PROCEDURE — 1159F MED LIST DOCD IN RCRD: CPT | Performed by: FAMILY MEDICINE

## 2023-05-25 PROCEDURE — 3077F SYST BP >= 140 MM HG: CPT | Performed by: FAMILY MEDICINE

## 2023-05-25 PROCEDURE — 3078F DIAST BP <80 MM HG: CPT | Performed by: FAMILY MEDICINE

## 2023-05-25 PROCEDURE — 99214 OFFICE O/P EST MOD 30 MIN: CPT | Performed by: FAMILY MEDICINE

## 2023-05-25 RX ORDER — LISINOPRIL 40 MG/1
40 TABLET ORAL DAILY
Qty: 30 TABLET | Refills: 12 | Status: SHIPPED | OUTPATIENT
Start: 2023-05-25

## 2023-05-25 NOTE — ASSESSMENT & PLAN NOTE
Patient's (Body mass index is 28.97 kg/m².) indicates that they are overweight with health conditions that include hypertension and dyslipidemias . Weight is worsening. BMI is is above average; BMI management plan is completed. We discussed portion control and increasing exercise.

## 2023-05-28 NOTE — ASSESSMENT & PLAN NOTE
Diagnosis, treatment and and course discussed. Potential side effects discussed. Return if there is worsening or persistence of symptoms.     Discussed risks of steroids: hyperglycemia, osteoporosis, avascular necrosis, anxiety, insomnia and cataracts. Patient states understanding    increase fluids, tylenol for fever, motrin for pain. Humidifier to help with congestion and to sleep at night. Dicussed OTC meds, gargle with warm salt water. If there is recurrent fever, shortness of breath, lethargy, advised to come in to the office or go to the ER.

## 2023-05-30 VITALS
WEIGHT: 179.4 LBS | RESPIRATION RATE: 19 BRPM | HEIGHT: 66 IN | TEMPERATURE: 97.5 F | HEART RATE: 82 BPM | BODY MASS INDEX: 28.83 KG/M2 | DIASTOLIC BLOOD PRESSURE: 62 MMHG | SYSTOLIC BLOOD PRESSURE: 142 MMHG | OXYGEN SATURATION: 94 %

## 2023-06-01 ENCOUNTER — TELEPHONE (OUTPATIENT)
Dept: FAMILY MEDICINE CLINIC | Facility: CLINIC | Age: 78
End: 2023-06-01
Payer: MEDICARE

## 2023-06-01 ENCOUNTER — CLINICAL SUPPORT (OUTPATIENT)
Dept: FAMILY MEDICINE CLINIC | Facility: CLINIC | Age: 78
End: 2023-06-01
Payer: MEDICARE

## 2023-06-01 VITALS — DIASTOLIC BLOOD PRESSURE: 70 MMHG | SYSTOLIC BLOOD PRESSURE: 130 MMHG

## 2023-06-01 DIAGNOSIS — I10 ESSENTIAL HYPERTENSION: Primary | ICD-10-CM

## 2023-06-01 DIAGNOSIS — I10 HYPERTENSION, UNSPECIFIED TYPE: Primary | ICD-10-CM

## 2023-06-01 RX ORDER — HYDROCHLOROTHIAZIDE 25 MG/1
25 TABLET ORAL DAILY
Qty: 30 TABLET | Refills: 12 | Status: SHIPPED | OUTPATIENT
Start: 2023-06-01

## 2023-06-01 NOTE — TELEPHONE ENCOUNTER
Patient's daughter called stating at last visit HCTZ was discontinued. Patient is now in congestive heart failure and Ashley would like a call back.

## 2023-06-02 ENCOUNTER — TELEPHONE (OUTPATIENT)
Dept: FAMILY MEDICINE CLINIC | Facility: CLINIC | Age: 78
End: 2023-06-02

## 2023-06-02 DIAGNOSIS — I50.22 SYSTOLIC CHF, CHRONIC: ICD-10-CM

## 2023-06-02 LAB
ALBUMIN SERPL-MCNC: 3.8 G/DL (ref 3.7–4.7)
ALBUMIN/GLOB SERPL: 2.2 {RATIO} (ref 1.2–2.2)
ALP SERPL-CCNC: 85 IU/L (ref 44–121)
ALT SERPL-CCNC: 18 IU/L (ref 0–32)
AST SERPL-CCNC: 19 IU/L (ref 0–40)
BILIRUB SERPL-MCNC: 0.9 MG/DL (ref 0–1.2)
BUN SERPL-MCNC: 17 MG/DL (ref 8–27)
BUN/CREAT SERPL: 22 (ref 12–28)
CALCIUM SERPL-MCNC: 9.1 MG/DL (ref 8.7–10.3)
CHLORIDE SERPL-SCNC: 102 MMOL/L (ref 96–106)
CHOLEST SERPL-MCNC: 141 MG/DL (ref 100–199)
CHOLEST/HDLC SERPL: 2.7 RATIO (ref 0–4.4)
CO2 SERPL-SCNC: 29 MMOL/L (ref 20–29)
CREAT SERPL-MCNC: 0.76 MG/DL (ref 0.57–1)
EGFRCR SERPLBLD CKD-EPI 2021: 82 ML/MIN/1.73
GLOBULIN SER CALC-MCNC: 1.7 G/DL (ref 1.5–4.5)
GLUCOSE SERPL-MCNC: 130 MG/DL (ref 70–99)
HDLC SERPL-MCNC: 53 MG/DL
LDLC SERPL CALC-MCNC: 65 MG/DL (ref 0–99)
POTASSIUM SERPL-SCNC: 3.4 MMOL/L (ref 3.5–5.2)
PROT SERPL-MCNC: 5.5 G/DL (ref 6–8.5)
SODIUM SERPL-SCNC: 145 MMOL/L (ref 134–144)
TRIGL SERPL-MCNC: 134 MG/DL (ref 0–149)
VLDLC SERPL CALC-MCNC: 23 MG/DL (ref 5–40)

## 2023-06-02 RX ORDER — POTASSIUM CHLORIDE 750 MG/1
10 TABLET, EXTENDED RELEASE ORAL DAILY
Qty: 30 TABLET | Refills: 12 | Status: SHIPPED | OUTPATIENT
Start: 2023-06-02

## 2023-06-02 NOTE — TELEPHONE ENCOUNTER
Caller: Krystyna Bennett    Relationship to patient: Self    Best call back number: [phone]    Patient is needing:     PATIENT CALLED AND SAID SHE WAS OUT OF POTASSIUM TABLETS BUT HER DAUGHTER IS PICKING UP SOME FOR HER

## 2023-06-02 NOTE — TELEPHONE ENCOUNTER
Patient does not have any potassium tablets currently but would like a prescription to be sent in for it.

## 2023-06-15 RX ORDER — SIMVASTATIN 20 MG
TABLET ORAL
Qty: 90 TABLET | Refills: 2 | Status: SHIPPED | OUTPATIENT
Start: 2023-06-15

## 2023-08-02 ENCOUNTER — HOSPITAL ENCOUNTER (OUTPATIENT)
Dept: CT IMAGING | Facility: HOSPITAL | Age: 78
Discharge: HOME | End: 2023-08-02
Admitting: FAMILY MEDICINE
Payer: MEDICARE

## 2023-08-02 DIAGNOSIS — R91.1 PULMONARY NODULE, RIGHT: ICD-10-CM

## 2023-08-02 PROCEDURE — 71250 CT THORAX DX C-: CPT

## 2023-09-05 DIAGNOSIS — J44.9 CHRONIC OBSTRUCTIVE PULMONARY DISEASE, UNSPECIFIED COPD TYPE: ICD-10-CM

## 2023-09-05 DIAGNOSIS — J43.2 CENTRILOBULAR EMPHYSEMA: ICD-10-CM

## 2023-09-05 RX ORDER — FLUTICASONE FUROATE, UMECLIDINIUM BROMIDE AND VILANTEROL TRIFENATATE 100; 62.5; 25 UG/1; UG/1; UG/1
1 POWDER RESPIRATORY (INHALATION)
Qty: 60 EACH | Refills: 5 | Status: SHIPPED | OUTPATIENT
Start: 2023-09-05

## 2023-09-06 ENCOUNTER — TELEPHONE (OUTPATIENT)
Dept: FAMILY MEDICINE CLINIC | Facility: CLINIC | Age: 78
End: 2023-09-06
Payer: MEDICARE

## 2023-09-06 NOTE — TELEPHONE ENCOUNTER
Caller: Krystyna Bennett    Relationship: Self    Best call back number: [phone] (Mobile)     What medication are you requesting:     What are your current symptoms: ECZEMA     How long have you been experiencing symptoms:     Have you had these symptoms before:    [x] Yes  [] No    Have you been treated for these symptoms before:   [x] Yes  [] No    If a prescription is needed, what is your preferred pharmacy and phone number: [facility] [id number] - [place] - [address] [place] [address] - [phone] [facility] [phone] FX     Additional notes:

## 2023-09-07 NOTE — TELEPHONE ENCOUNTER
Patients daughter said that she had not been on anything for this in a long time. I advised her to schedule f/u for this

## 2023-09-12 ENCOUNTER — OFFICE VISIT (OUTPATIENT)
Dept: PULMONOLOGY | Facility: HOSPITAL | Age: 78
End: 2023-09-12
Payer: MEDICARE

## 2023-09-12 VITALS
SYSTOLIC BLOOD PRESSURE: 119 MMHG | DIASTOLIC BLOOD PRESSURE: 72 MMHG | OXYGEN SATURATION: 95 % | HEIGHT: 66 IN | HEART RATE: 53 BPM | WEIGHT: 174 LBS | BODY MASS INDEX: 27.97 KG/M2 | RESPIRATION RATE: 14 BRPM

## 2023-09-12 DIAGNOSIS — J44.9 CHRONIC OBSTRUCTIVE PULMONARY DISEASE, UNSPECIFIED COPD TYPE: Primary | ICD-10-CM

## 2023-09-12 PROCEDURE — G0463 HOSPITAL OUTPT CLINIC VISIT: HCPCS
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
## 2023-09-18 NOTE — PROGRESS NOTES
Subjective   Krystyna Bennett is a 78 y.o. female. Presents to [facility]    Chief Complaint   Patient presents with    Eczema       Eczema  This is a chronic problem. The current episode started more than 1 month ago. The problem has been waxing and waning. Associated symptoms include a rash. Pertinent negatives include no abdominal pain, joint swelling, visual change or weakness. Associated symptoms comments: Rash on forehead . Treatments tried: hydrocortisone cream. The treatment provided no relief.    Its not eczema    I personally reviewed and updated the patient's allergies, medications, problem list, and past medical, surgical, social, and family history. I have reviewed and confirmed the accuracy of the History of Present Illness and Review of Symptoms as documented by the MA/LPN/RN. Mireya Kapoor MD    Allergies:  Allergies   Allergen Reactions    Latex Rash    Sulfa Antibiotics Other (See Comments)     Tunnel vision,light headed/ may not be allergic to it any longer        Social History:  Social History     Socioeconomic History    Marital status:    Tobacco Use    Smoking status: Former     Packs/day: 0.50     Years: 37.00     Pack years: 18.50     Types: Cigarettes, Cigars     Start date: 1960     Quit date: 2019     Years since quittin.2     Passive exposure: Past    Smokeless tobacco: Never   Vaping Use    Vaping Use: Never used   Substance and Sexual Activity    Alcohol use: Yes     Alcohol/week: 4.0 standard drinks     Types: 2 Glasses of wine, 2 Shots of liquor per week     Comment: social drinker    Drug use: No    Sexual activity: Not Currently     Partners: Male     Birth control/protection: Post-menopausal       Family History:  Family History   Problem Relation Age of Onset    Heart disease Father             Stroke Father     Heart failure Father     Leukemia Paternal Grandmother     Anemia Paternal Grandmother     Heart disease Paternal  Grandmother     Cancer Paternal Grandmother         leukemia    Alcohol abuse Maternal Aunt     Cancer Maternal Aunt             Asthma Maternal Grandmother             Hyperlipidemia Maternal Grandmother             Hypertension Maternal Grandmother     Diabetes Paternal Aunt             Hypertension Son     Hypertension Daughter        Past Medical History :  Patient Active Problem List   Diagnosis    Essential hypertension    CLL (chronic lymphocytic leukemia)    Centrilobular emphysema    GERD (gastroesophageal reflux disease)    Arthritis    Systolic CHF, chronic    Anemia, chronic disease    History of Clostridium difficile colitis    Melanoma    Mixed hyperlipidemia    Medicare annual wellness visit, subsequent    Anxiety    Restless legs syndrome    Colon cancer screening    Eczematous dermatitis of upper and lower eyelids of both eyes    Positive colorectal cancer screening using Cologuard test    Mammogram declined    Flu vaccine need    Non-seasonal allergic rhinitis    AK (actinic keratosis)    Cat bite of right wrist    Tear of skin of right wrist    Community acquired pneumonia of both lungs    Hospital discharge follow-up    Pulmonary nodule, right    Night sweats    Combined form of senile cataract    Overweight (BMI 25.0-29.9)    Labyrinthitis of both ears    Eczema    Seborrheic keratoses       Medication List:    Current Outpatient Medications:     Acalabrutinib Maleate (CALQUENCE) 100 MG tablet, Take 1 tablet by mouth., Disp: , Rfl:     albuterol sulfate  (90 Base) MCG/ACT inhaler, Inhale 2 puffs Every 4 (Four) Hours As Needed for Wheezing., Disp: 18 g, Rfl: 5    Calcium Carbonate-Vit D-Min (CALTRATE 600+D PLUS MINERALS) 600-800 MG-UNIT chewable tablet, Chew 2 tablets Daily., Disp: , Rfl:     Coenzyme Q10 (COQ10 PO), Take  by mouth Daily., Disp: , Rfl:     Cyanocobalamin (B-12) 1000 MCG capsule, Take 1,000 mcg by mouth Daily., Disp: , Rfl:     esomeprazole  (nexIUM) 40 MG capsule, Take 1 capsule by mouth Every Morning Before Breakfast., Disp: , Rfl:     Fluticasone-Umeclidin-Vilant (Trelegy Ellipta) 100-62.5-25 MCG/ACT inhaler, Inhale 1 puff Daily., Disp: 60 each, Rfl: 5    Glucosamine-Chondroitin 250-200 MG tablet, Take 1 tablet by mouth Daily., Disp: , Rfl:     hydroCHLOROthiazide (HYDRODIURIL) 25 MG tablet, Take 1 tablet by mouth Daily., Disp: 30 tablet, Rfl: 12    ipratropium (ATROVENT) 0.02 % nebulizer solution, Take 2.5 mL by nebulization 3 (Three) Times a Day As Needed for Wheezing or Shortness of Air., Disp: 62.5 mL, Rfl: 11    lisinopril (PRINIVIL,ZESTRIL) 40 MG tablet, Take 1 tablet by mouth Daily., Disp: 30 tablet, Rfl: 12    metoprolol succinate XL (TOPROL-XL) 25 MG 24 hr tablet, Take 1 tablet by mouth Daily. AT BEDTIME, Disp: , Rfl:     montelukast (SINGULAIR) 10 MG tablet, TAKE ONE TABLET BY MOUTH AT BEDTIME, Disp: 90 tablet, Rfl: 3    Multiple Vitamins-Minerals (CENTRUM SILVER) tablet, 1 tablet po daily, Disp: , Rfl:     Omega-3 Fatty Acids (fish oil) 1000 MG capsule capsule, Take 2 capsules by mouth Daily., Disp: , Rfl:     PARoxetine (PAXIL) 20 MG tablet, Take 1 tablet by mouth Daily., Disp: 90 tablet, Rfl: 3    potassium chloride (K-DUR,KLOR-CON) 10 MEQ CR tablet, Take 1 tablet by mouth Daily., Disp: 30 tablet, Rfl: 12    pramipexole (MIRAPEX) 0.25 MG tablet, TAKE ONE TABLET BY MOUTH DAILY, Disp: 90 tablet, Rfl: 3    simvastatin (ZOCOR) 20 MG tablet, TAKE ONE TABLET BY MOUTH DAILY, Disp: 90 tablet, Rfl: 2    Past Surgical History:  Past Surgical History:   Procedure Laterality Date    APPENDECTOMY      CARDIAC CATHETERIZATION  05/2019    [facility].    CARDIAC CATHETERIZATION Right 11/25/2022    Procedure: Coronary angiography;  Surgeon: Andrea Philippe MD;  Location: [facility] INVASIVE LOCATION;  Service: Cardiovascular;  Laterality: Right;    HYSTERECTOMY      TONSILLECTOMY           Physical Exam:      Vital Signs:    Vitals:    09/19/23 1431   BP:  "112/70   Pulse: 84   Resp: 18   Temp: 97.7 °F (36.5 °C)   SpO2: 96%        /70 (BP Location: Right arm, Patient Position: Sitting, Cuff Size: Adult)   Pulse 84   Temp 97.7 °F (36.5 °C) (Temporal)   Resp 18   Ht 167.6 cm (66\")   Wt 81.8 kg (180 lb 6.4 oz)   LMP  (LMP Unknown)   SpO2 96% Comment: 4L oxygen  BMI 29.12 kg/m²     Wt Readings from Last 3 Encounters:   09/19/23 81.8 kg (180 lb 6.4 oz)   09/12/23 78.9 kg (174 lb)   06/20/23 78.9 kg (174 lb)       Result Review :                Physical Exam  Vitals reviewed.   Constitutional:       Appearance: Normal appearance. She is well-developed.   HENT:      Head: Normocephalic and atraumatic.   Eyes:      General:         Right eye: No discharge.         Left eye: No discharge.   Cardiovascular:      Rate and Rhythm: Normal rate and regular rhythm.      Heart sounds: Normal heart sounds. No murmur heard.    No friction rub. No gallop.   Pulmonary:      Effort: Pulmonary effort is normal. No respiratory distress.      Breath sounds: Normal breath sounds. No wheezing or rales.   Skin:     General: Skin is warm and dry.      Findings: No rash.      Comments: Crusty patches on forehead c/w AK      Waxy hyperkeratotic lesion on arm   Neurological:      Mental Status: She is alert and oriented to person, place, and time.      Coordination: Coordination normal.      Gait: Gait normal.   Psychiatric:         Behavior: Behavior is cooperative.       Assessment and Plan:  Problems Addressed this Visit          Endocrine and Metabolic    Overweight (BMI 25.0-29.9) - Primary     Patient's (Body mass index is 29.12 kg/m².) indicates that they are overweight with health conditions that include hypertension and dyslipidemias . Weight is worsening. BMI is is above average; BMI management plan is completed. We discussed portion control and increasing exercise.             Skin    AK (actinic keratosis)     Cryo applied    Diagnosis, treatment and and course discussed. " Potential side effects discussed. Return if there is worsening or persistence of symptoms.            Seborrheic keratoses     Diagnoses         Codes Comments    Overweight (BMI 25.0-29.9)    -  Primary ICD-10-CM: E66.3  ICD-9-CM: 278.02     AK (actinic keratosis)     ICD-10-CM: L57.0  ICD-9-CM: 702.0     Seborrheic keratoses     ICD-10-CM: L82.1  ICD-9-CM: 702.19                     An After Visit Summary and PPPS were given to the patient.

## 2023-09-19 ENCOUNTER — OFFICE VISIT (OUTPATIENT)
Dept: FAMILY MEDICINE CLINIC | Facility: CLINIC | Age: 78
End: 2023-09-19
Payer: MEDICARE

## 2023-09-19 VITALS
HEART RATE: 84 BPM | DIASTOLIC BLOOD PRESSURE: 70 MMHG | HEIGHT: 66 IN | WEIGHT: 180.4 LBS | BODY MASS INDEX: 28.99 KG/M2 | TEMPERATURE: 97.7 F | SYSTOLIC BLOOD PRESSURE: 112 MMHG | RESPIRATION RATE: 18 BRPM | OXYGEN SATURATION: 96 %

## 2023-09-19 DIAGNOSIS — E66.3 OVERWEIGHT (BMI 25.0-29.9): Primary | ICD-10-CM

## 2023-09-19 DIAGNOSIS — L57.0 AK (ACTINIC KERATOSIS): ICD-10-CM

## 2023-09-19 DIAGNOSIS — L82.1 SEBORRHEIC KERATOSES: ICD-10-CM

## 2023-09-19 PROBLEM — L30.9 ECZEMA: Status: ACTIVE | Noted: 2023-09-19

## 2023-09-19 NOTE — ASSESSMENT & PLAN NOTE
Patient's (Body mass index is 29.12 kg/m².) indicates that they are overweight with health conditions that include hypertension and dyslipidemias . Weight is worsening. BMI is is above average; BMI management plan is completed. We discussed portion control and increasing exercise.

## 2023-09-25 NOTE — ASSESSMENT & PLAN NOTE
Cryo applied    Diagnosis, treatment and and course discussed. Potential side effects discussed. Return if there is worsening or persistence of symptoms.

## 2023-10-10 NOTE — PROGRESS NOTES
"Lesion:   Krystyna Bennett is here today for a lesion. The lesion appeared gradual and has been occurring for years.. The course has been stable. The lesion is characterized as no sign of infection. The lesion is located over forehead. The symptoms have been associated with  none .       Visit Vitals  /76 (BP Location: Right arm, Patient Position: Sitting, Cuff Size: Adult)   Pulse 95   Temp 97.7 °F (36.5 °C) (Temporal)   Resp 18   Ht 167.6 cm (66\")   Wt 82.8 kg (182 lb 9.6 oz)   LMP  (LMP Unknown)   SpO2 95% Comment: room air   BMI 29.47 kg/m²     Physical Exam  Skin:     Comments: Crusty macules on forehead central and left, nose and left cheek         Cryotherapy Procedure Note    Pre-operative Diagnosis: Actinic keratosis    Post-operative Diagnosis: Actinic keratosis    Locations: forehead and cheek, nose    Indications: change in appearance      Procedure Details       Patient informed of risks (permanent scarring, infection, light or dark discoloration, bleeding, infection, weakness, numbness and recurrence of the lesion) and benefits of the procedure and verbal informed consent obtained.    The areas are treated with liquid nitrogen therapy, frozen until ice ball extended 1 mm beyond lesion, allowed to thaw, and treated again. The patient tolerated procedure well.  The patient was instructed on post-op care, warned that there may be blister formation, redness and pain. Recommend OTC analgesia as needed for pain.    Condition:  Stable    Complications:  none.    Plan:  1. Instructed to keep the area dry and covered for 24-48h and clean thereafter.  2. Warning signs of infection were reviewed.    3. Recommended that the patient use OTC acetaminophen and OTC analgesics as needed for pain.   4. Return in 2 weeks.    Problem List Items Addressed This Visit          Endocrine and Metabolic    Overweight (BMI 25.0-29.9)    Current Assessment & Plan     Patient's (Body mass index is 29.47 kg/m².) indicates that " they are overweight with health conditions that include hypertension and dyslipidemias . Weight is worsening. BMI is is above average; BMI management plan is completed. We discussed portion control and increasing exercise.             Skin    Skin lesion of face - Primary       Other    Flu vaccine need    Relevant Orders    Fluzone High-Dose 65+yrs (Completed)

## 2023-10-16 ENCOUNTER — PROCEDURE VISIT (OUTPATIENT)
Dept: FAMILY MEDICINE CLINIC | Facility: CLINIC | Age: 78
End: 2023-10-16
Payer: MEDICARE

## 2023-10-16 VITALS
WEIGHT: 182.6 LBS | DIASTOLIC BLOOD PRESSURE: 76 MMHG | RESPIRATION RATE: 18 BRPM | TEMPERATURE: 97.7 F | SYSTOLIC BLOOD PRESSURE: 140 MMHG | OXYGEN SATURATION: 95 % | HEIGHT: 66 IN | HEART RATE: 95 BPM | BODY MASS INDEX: 29.35 KG/M2

## 2023-10-16 DIAGNOSIS — L57.0 AK (ACTINIC KERATOSIS): ICD-10-CM

## 2023-10-16 DIAGNOSIS — E66.3 OVERWEIGHT (BMI 25.0-29.9): Primary | ICD-10-CM

## 2023-10-16 DIAGNOSIS — L82.1 SEBORRHEIC KERATOSES: ICD-10-CM

## 2023-10-16 DIAGNOSIS — Z23 FLU VACCINE NEED: ICD-10-CM

## 2023-10-16 PROBLEM — L98.9 SKIN LESION OF FACE: Status: ACTIVE | Noted: 2023-10-16

## 2023-10-16 NOTE — ASSESSMENT & PLAN NOTE
Patient's (Body mass index is 29.47 kg/m².) indicates that they are overweight with health conditions that include hypertension and dyslipidemias . Weight is worsening. BMI is is above average; BMI management plan is completed. We discussed portion control and increasing exercise.

## 2023-10-19 PROBLEM — L98.9 SKIN LESION OF FACE: Status: RESOLVED | Noted: 2023-10-16 | Resolved: 2023-10-19

## 2023-11-02 NOTE — PROGRESS NOTES
Subjective   Krystyna Bennett is a 78 y.o. female. Presents to [facility] MEDICAL [facility]    Chief Complaint   Patient presents with    Skin Lesion       Hypertension  This is a chronic problem. The current episode started more than 1 year ago. The problem has been gradually improving since onset. The problem is controlled. Pertinent negatives include no chest pain or shortness of breath. Current antihypertension treatment includes ACE inhibitors. The current treatment provides moderate improvement. There are no compliance problems.         I personally reviewed and updated the patient's allergies, medications, problem list, and past medical, surgical, social, and family history. I have reviewed and confirmed the accuracy of the History of Present Illness and Review of Symptoms as documented by the MA/LPN/RN. Mireya Kapoor MD    Allergies:  Allergies   Allergen Reactions    Latex Rash    Sulfa Antibiotics Other (See Comments)     Tunnel vision,light headed/ may not be allergic to it any longer        Social History:  Social History     Socioeconomic History    Marital status:    Tobacco Use    Smoking status: Former     Packs/day: 0.50     Years: 37.00     Additional pack years: 0.00     Total pack years: 18.50     Types: Cigarettes, Cigars     Start date: 1960     Quit date: 2019     Years since quittin.3     Passive exposure: Past    Smokeless tobacco: Never   Vaping Use    Vaping Use: Never used   Substance and Sexual Activity    Alcohol use: Yes     Alcohol/week: 4.0 standard drinks of alcohol     Types: 2 Glasses of wine, 2 Shots of liquor per week     Comment: social drinker    Drug use: No    Sexual activity: Not Currently     Partners: Male     Birth control/protection: Post-menopausal       Family History:  Family History   Problem Relation Age of Onset    Heart disease Father             Stroke Father     Heart failure Father     Leukemia Paternal Grandmother     Anemia  Paternal Grandmother     Heart disease Paternal Grandmother     Cancer Paternal Grandmother         leukemia    Alcohol abuse Maternal Aunt     Cancer Maternal Aunt             Asthma Maternal Grandmother             Hyperlipidemia Maternal Grandmother             Hypertension Maternal Grandmother     Diabetes Paternal Aunt             Hypertension Son     Hypertension Daughter        Past Medical History :  Patient Active Problem List   Diagnosis    Essential hypertension    CLL (chronic lymphocytic leukemia)    Centrilobular emphysema    GERD (gastroesophageal reflux disease)    Arthritis    Systolic CHF, chronic    Anemia, chronic disease    History of Clostridium difficile colitis    Melanoma    Mixed hyperlipidemia    Medicare annual wellness visit, subsequent    Anxiety    Restless legs syndrome    Colon cancer screening    Eczematous dermatitis of upper and lower eyelids of both eyes    Positive colorectal cancer screening using Cologuard test    Mammogram declined    Flu vaccine need    Non-seasonal allergic rhinitis    AK (actinic keratosis)    Cat bite of right wrist    Tear of skin of right wrist    Community acquired pneumonia of both lungs    Hospital discharge follow-up    Pulmonary nodule, right    Night sweats    Combined form of senile cataract    Overweight (BMI 25.0-29.9)    Labyrinthitis of both ears    Seborrheic keratoses       Medication List:    Current Outpatient Medications:     Acalabrutinib Maleate (CALQUENCE) 100 MG tablet, Take 1 tablet by mouth., Disp: , Rfl:     albuterol sulfate  (90 Base) MCG/ACT inhaler, Inhale 2 puffs Every 4 (Four) Hours As Needed for Wheezing., Disp: 18 g, Rfl: 5    Calcium Carbonate-Vit D-Min (CALTRATE 600+D PLUS MINERALS) 600-800 MG-UNIT chewable tablet, Chew 2 tablets Daily., Disp: , Rfl:     Coenzyme Q10 (COQ10 PO), Take  by mouth Daily., Disp: , Rfl:     Cyanocobalamin (B-12) 1000 MCG capsule, Take 1,000 mcg by mouth  Daily., Disp: , Rfl:     esomeprazole (nexIUM) 40 MG capsule, Take 1 capsule by mouth Every Morning Before Breakfast., Disp: , Rfl:     Fluticasone-Umeclidin-Vilant (Trelegy Ellipta) 100-62.5-25 MCG/ACT inhaler, Inhale 1 puff Daily., Disp: 60 each, Rfl: 5    Glucosamine-Chondroitin 250-200 MG tablet, Take 1 tablet by mouth Daily., Disp: , Rfl:     hydroCHLOROthiazide (HYDRODIURIL) 25 MG tablet, Take 1 tablet by mouth Daily., Disp: 30 tablet, Rfl: 12    ipratropium (ATROVENT) 0.02 % nebulizer solution, Take 2.5 mL by nebulization 3 (Three) Times a Day As Needed for Wheezing or Shortness of Air., Disp: 62.5 mL, Rfl: 11    lisinopril (PRINIVIL,ZESTRIL) 40 MG tablet, Take 1 tablet by mouth Daily., Disp: 30 tablet, Rfl: 12    metoprolol succinate XL (TOPROL-XL) 25 MG 24 hr tablet, Take 1 tablet by mouth Daily. AT BEDTIME, Disp: , Rfl:     Multiple Vitamins-Minerals (CENTRUM SILVER) tablet, 1 tablet po daily, Disp: , Rfl:     Omega-3 Fatty Acids (fish oil) 1000 MG capsule capsule, Take 2 capsules by mouth Daily., Disp: , Rfl:     PARoxetine (PAXIL) 20 MG tablet, Take 1 tablet by mouth Daily., Disp: 90 tablet, Rfl: 3    potassium chloride (K-DUR,KLOR-CON) 10 MEQ CR tablet, Take 1 tablet by mouth Daily., Disp: 30 tablet, Rfl: 12    pramipexole (MIRAPEX) 0.25 MG tablet, TAKE ONE TABLET BY MOUTH DAILY, Disp: 90 tablet, Rfl: 3    simvastatin (ZOCOR) 20 MG tablet, TAKE ONE TABLET BY MOUTH DAILY, Disp: 90 tablet, Rfl: 2    montelukast (SINGULAIR) 10 MG tablet, Take 1 tablet by mouth every night at bedtime., Disp: 90 tablet, Rfl: 1    Past Surgical History:  Past Surgical History:   Procedure Laterality Date    APPENDECTOMY      CARDIAC CATHETERIZATION  05/2019    [facility].    CARDIAC CATHETERIZATION Right 11/25/2022    Procedure: Coronary angiography;  Surgeon: Andrea Philippe MD;  Location: [facility] CATH INVASIVE LOCATION;  Service: Cardiovascular;  Laterality: Right;    HYSTERECTOMY      TONSILLECTOMY           Physical  "Exam:      Vital Signs:    Vitals:    11/06/23 1418   BP: 130/70   Pulse:    Resp:    Temp:    SpO2:         /70 (BP Location: Left arm, Patient Position: Sitting, Cuff Size: Adult)   Pulse 60   Temp 97.5 °F (36.4 °C) (Temporal)   Resp 18   Ht 167.6 cm (66\")   Wt 82.3 kg (181 lb 6.4 oz)   LMP  (LMP Unknown)   SpO2 95%   BMI 29.28 kg/m²     Wt Readings from Last 3 Encounters:   11/06/23 82.3 kg (181 lb 6.4 oz)   10/16/23 82.8 kg (182 lb 9.6 oz)   09/19/23 81.8 kg (180 lb 6.4 oz)       Result Review :          Cryotherapy, Skin Lesion    Date/Time: 11/15/2023 9:00 PM    Performed by: Mireya Kapoor MD  Authorized by: Mireya Kapoor MD  Local anesthesia used: no    Anesthesia:  Local anesthesia used: no    Sedation:  Patient sedated: no    Patient tolerance: patient tolerated the procedure well with no immediate complications               Physical Exam  Vitals reviewed.   Constitutional:       Appearance: Normal appearance. She is well-developed.   HENT:      Head: Normocephalic and atraumatic.   Eyes:      General:         Right eye: No discharge.         Left eye: No discharge.   Cardiovascular:      Rate and Rhythm: Normal rate and regular rhythm.      Heart sounds: Normal heart sounds. No murmur heard.     No friction rub. No gallop.   Pulmonary:      Effort: Pulmonary effort is normal. No respiratory distress.      Breath sounds: Normal breath sounds. No wheezing or rales.   Skin:     General: Skin is warm and dry.      Findings: No rash.      Comments: Forehead, left cheek and tip of nose scaly macule   Neurological:      Mental Status: She is alert and oriented to person, place, and time.      Coordination: Coordination normal.      Gait: Gait normal.   Psychiatric:         Behavior: Behavior is cooperative.         Assessment and Plan:  Problems Addressed this Visit          Cardiac and Vasculature    Essential hypertension     Hypertension is improving with treatment.  Continue current " treatment regimen.  Blood pressure will be reassessed in 3 months.            Endocrine and Metabolic    Overweight (BMI 25.0-29.9) - Primary       Skin    AK (actinic keratosis)    Relevant Orders    Cryotherapy, Skin Lesion     Diagnoses         Codes Comments    Overweight (BMI 25.0-29.9)    -  Primary ICD-10-CM: E66.3  ICD-9-CM: 278.02     Essential hypertension     ICD-10-CM: I10  ICD-9-CM: 401.9     AK (actinic keratosis)     ICD-10-CM: L57.0  ICD-9-CM: 702.0                     An After Visit Summary and PPPS were given to the patient.

## 2023-11-06 ENCOUNTER — OFFICE VISIT (OUTPATIENT)
Dept: FAMILY MEDICINE CLINIC | Facility: CLINIC | Age: 78
End: 2023-11-06
Payer: MEDICARE

## 2023-11-06 VITALS
TEMPERATURE: 97.5 F | RESPIRATION RATE: 18 BRPM | WEIGHT: 181.4 LBS | DIASTOLIC BLOOD PRESSURE: 70 MMHG | HEIGHT: 66 IN | OXYGEN SATURATION: 95 % | BODY MASS INDEX: 29.15 KG/M2 | SYSTOLIC BLOOD PRESSURE: 130 MMHG | HEART RATE: 60 BPM

## 2023-11-06 DIAGNOSIS — J30.89 NON-SEASONAL ALLERGIC RHINITIS, UNSPECIFIED TRIGGER: Primary | ICD-10-CM

## 2023-11-06 DIAGNOSIS — E66.3 OVERWEIGHT (BMI 25.0-29.9): Primary | ICD-10-CM

## 2023-11-06 DIAGNOSIS — L57.0 AK (ACTINIC KERATOSIS): ICD-10-CM

## 2023-11-06 DIAGNOSIS — I10 ESSENTIAL HYPERTENSION: ICD-10-CM

## 2023-11-06 PROBLEM — L30.9 ECZEMA: Status: RESOLVED | Noted: 2023-09-19 | Resolved: 2023-11-06

## 2023-11-06 RX ORDER — MONTELUKAST SODIUM 10 MG/1
10 TABLET ORAL
Qty: 90 TABLET | Refills: 1 | Status: SHIPPED | OUTPATIENT
Start: 2023-11-06

## 2023-12-17 DIAGNOSIS — F41.9 ANXIETY: ICD-10-CM

## 2023-12-17 RX ORDER — PAROXETINE HYDROCHLORIDE 20 MG/1
20 TABLET, FILM COATED ORAL DAILY
Qty: 90 TABLET | Refills: 3 | Status: SHIPPED | OUTPATIENT
Start: 2023-12-17

## 2023-12-18 RX ORDER — METOPROLOL SUCCINATE 25 MG/1
25 TABLET, EXTENDED RELEASE ORAL DAILY
Qty: 90 TABLET | Refills: 1 | Status: ON HOLD | OUTPATIENT
Start: 2023-12-18

## 2023-12-18 NOTE — TELEPHONE ENCOUNTER
Rx Refill Note  Requested Prescriptions     Signed Prescriptions Disp Refills    metoprolol succinate XL (TOPROL-XL) 25 MG 24 hr tablet 90 tablet 1     Sig: TAKE ONE TABLET BY MOUTH DAILY     Authorizing Provider: SHALA CONTRERAS     Ordering User: KYM GAMBLE      Last office visit with prescribing clinician: 6/20/2023   Last telemedicine visit with prescribing clinician: Visit date not found   Next office visit with prescribing clinician: 12/19/2023                         Would you like a call back once the refill request has been completed: [] Yes [] No    If the office needs to give you a call back, can they leave a voicemail: [] Yes [] No    Kym Gamble MA  12/18/23, 07:37 EST     Per phone note 6/21/2023 patient switched to metoprolol from coreg.

## 2023-12-19 ENCOUNTER — OFFICE VISIT (OUTPATIENT)
Dept: CARDIOLOGY | Facility: CLINIC | Age: 78
End: 2023-12-19
Payer: MEDICARE

## 2023-12-19 VITALS
WEIGHT: 183.5 LBS | DIASTOLIC BLOOD PRESSURE: 66 MMHG | BODY MASS INDEX: 29.49 KG/M2 | SYSTOLIC BLOOD PRESSURE: 131 MMHG | HEIGHT: 66 IN | HEART RATE: 79 BPM | OXYGEN SATURATION: 94 %

## 2023-12-19 DIAGNOSIS — J44.9 CHRONIC OBSTRUCTIVE PULMONARY DISEASE, UNSPECIFIED COPD TYPE: ICD-10-CM

## 2023-12-19 DIAGNOSIS — I10 PRIMARY HYPERTENSION: ICD-10-CM

## 2023-12-19 DIAGNOSIS — E78.00 PURE HYPERCHOLESTEROLEMIA: ICD-10-CM

## 2023-12-19 DIAGNOSIS — I50.22 CHRONIC SYSTOLIC CONGESTIVE HEART FAILURE: Primary | ICD-10-CM

## 2023-12-19 NOTE — PROGRESS NOTES
Subjective:     Encounter Date:12/19/2023      Patient ID: Krystyna Bennett is a 78 y.o. female.    Chief Complaint:  Congestive Heart Failure  Associated symptoms include shortness of breath. Pertinent negatives include no abdominal pain, chest pain or palpitations.     78-year-old white female with history of COPD hypertension hyperlipidemia and and history of congestive heart with LV systolic function presents to office for a follow-up.  Patient is currently stable without isms of chest pain but still has shortness of breath from her COPD.  No complaint of any PND orthopnea.  No palpitation dizziness syncope or swelling of the feet.  She is taking her medicines regular.  She uses oxygen all the time.      The following portions of the patient's history were reviewed and updated as appropriate: allergies, current medications, past family history, past medical history, past social history, past surgical history, and problem list.  Past Medical History:   Diagnosis Date    Acute bacterial bronchitis 07/05/2019    Anemia 07/05/2019    Arthritis 07/05/2019    Asthma     Breast injury     right side bruised after running into truck mirror    Chronic obstructive pulmonary disease 06/04/2019    CLL (chronic lymphocytic leukemia) 07/05/2019    GERD (gastroesophageal reflux disease) 07/05/2019    History of Clostridium difficile colitis 01/08/2018    Hypertension     Hypokalemia 07/05/2019    Lymphocytosis 01/08/2018    Melanoma 01/08/2018    Moderate persistent asthma without complication 07/05/2019    Panlobular emphysema 07/05/2019    Pneumonia 11/21/22    Stage 3b chronic kidney disease 12/19/2019    Dr Silva    Systolic CHF, chronic 07/05/2019     Past Surgical History:   Procedure Laterality Date    APPENDECTOMY      CARDIAC CATHETERIZATION  05/2019    [facility].    CARDIAC CATHETERIZATION Right 11/25/2022    Procedure: Coronary angiography;  Surgeon: Andrea Philippe MD;  Location: [facility] CATH INVASIVE LOCATION;   "Service: Cardiovascular;  Laterality: Right;    HYSTERECTOMY      TONSILLECTOMY       /66   Pulse 79   Ht 167.6 cm (66\")   Wt 83.2 kg (183 lb 8 oz)   LMP  (LMP Unknown)   SpO2 94%   BMI 29.62 kg/m²   Family History   Problem Relation Age of Onset    Heart disease Father             Stroke Father     Heart failure Father     Leukemia Paternal Grandmother     Anemia Paternal Grandmother     Heart disease Paternal Grandmother     Cancer Paternal Grandmother         leukemia    Alcohol abuse Maternal Aunt     Cancer Maternal Aunt             Asthma Maternal Grandmother             Hyperlipidemia Maternal Grandmother             Hypertension Maternal Grandmother     Diabetes Paternal Aunt             Hypertension Son     Hypertension Daughter        Current Outpatient Medications:     Acalabrutinib Maleate (CALQUENCE) 100 MG tablet, Take 1 tablet by mouth., Disp: , Rfl:     albuterol sulfate  (90 Base) MCG/ACT inhaler, Inhale 2 puffs Every 4 (Four) Hours As Needed for Wheezing., Disp: 18 g, Rfl: 5    Calcium Carbonate-Vit D-Min (CALTRATE 600+D PLUS MINERALS) 600-800 MG-UNIT chewable tablet, Chew 2 tablets Daily., Disp: , Rfl:     Coenzyme Q10 (COQ10 PO), Take  by mouth Daily., Disp: , Rfl:     Cyanocobalamin (B-12) 1000 MCG capsule, Take 1,000 mcg by mouth Daily., Disp: , Rfl:     esomeprazole (nexIUM) 40 MG capsule, Take 1 capsule by mouth Every Morning Before Breakfast., Disp: , Rfl:     Fluticasone-Umeclidin-Vilant (Trelegy Ellipta) 100-62.5-25 MCG/ACT inhaler, Inhale 1 puff Daily., Disp: 60 each, Rfl: 5    Glucosamine-Chondroitin 250-200 MG tablet, Take 1 tablet by mouth Daily., Disp: , Rfl:     hydroCHLOROthiazide (HYDRODIURIL) 25 MG tablet, Take 1 tablet by mouth Daily., Disp: 30 tablet, Rfl: 12    ipratropium (ATROVENT) 0.02 % nebulizer solution, Take 2.5 mL by nebulization 3 (Three) Times a Day As Needed for Wheezing or Shortness of Air., Disp: 62.5 mL, " Rfl: 11    lisinopril (PRINIVIL,ZESTRIL) 40 MG tablet, Take 1 tablet by mouth Daily., Disp: 30 tablet, Rfl: 12    metoprolol succinate XL (TOPROL-XL) 25 MG 24 hr tablet, TAKE ONE TABLET BY MOUTH DAILY, Disp: 90 tablet, Rfl: 1    montelukast (SINGULAIR) 10 MG tablet, Take 1 tablet by mouth every night at bedtime., Disp: 90 tablet, Rfl: 1    Multiple Vitamins-Minerals (CENTRUM SILVER) tablet, 1 tablet po daily, Disp: , Rfl:     Omega-3 Fatty Acids (fish oil) 1000 MG capsule capsule, Take 2 capsules by mouth Daily., Disp: , Rfl:     PARoxetine (PAXIL) 20 MG tablet, TAKE ONE TABLET BY MOUTH DAILY, Disp: 90 tablet, Rfl: 3    potassium chloride (K-DUR,KLOR-CON) 10 MEQ CR tablet, Take 1 tablet by mouth Daily., Disp: 30 tablet, Rfl: 12    pramipexole (MIRAPEX) 0.25 MG tablet, TAKE ONE TABLET BY MOUTH DAILY, Disp: 90 tablet, Rfl: 3    simvastatin (ZOCOR) 20 MG tablet, TAKE ONE TABLET BY MOUTH DAILY, Disp: 90 tablet, Rfl: 2  Allergies   Allergen Reactions    Latex Rash    Sulfa Antibiotics Other (See Comments)     Tunnel vision,light headed/ may not be allergic to it any longer      Social History     Socioeconomic History    Marital status:    Tobacco Use    Smoking status: Former     Packs/day: 0.50     Years: 37.00     Additional pack years: 0.00     Total pack years: 18.50     Types: Cigarettes, Cigars     Start date: 1960     Quit date: 10/19/1999     Years since quittin.1     Passive exposure: Past    Smokeless tobacco: Never   Vaping Use    Vaping Use: Never used   Substance and Sexual Activity    Alcohol use: Yes     Alcohol/week: 4.0 standard drinks of alcohol     Types: 2 Glasses of wine, 2 Shots of liquor per week     Comment: social drinker    Drug use: No    Sexual activity: Not Currently     Partners: Male     Birth control/protection: Post-menopausal     Review of Systems   Constitutional: Negative for malaise/fatigue.   Cardiovascular:  Negative for chest pain, dyspnea on exertion, leg  swelling and palpitations.   Respiratory:  Positive for shortness of breath. Negative for cough.    Gastrointestinal:  Negative for abdominal pain, nausea and vomiting.   Neurological:  Negative for dizziness, focal weakness, headaches, light-headedness and numbness.   All other systems reviewed and are negative.             Objective:     Constitutional:       Appearance: Well-developed.   Eyes:      General: No scleral icterus.     Conjunctiva/sclera: Conjunctivae normal.   HENT:      Head: Normocephalic and atraumatic.   Neck:      Vascular: No carotid bruit or JVD.   Pulmonary:      Effort: Pulmonary effort is normal.      Breath sounds: Normal breath sounds. No wheezing. No rales.   Cardiovascular:      Normal rate. Regular rhythm.   Pulses:     Intact distal pulses.   Abdominal:      General: Bowel sounds are normal.      Palpations: Abdomen is soft.   Musculoskeletal:      Cervical back: Normal range of motion and neck supple. Skin:     General: Skin is warm and dry.      Findings: No rash.   Neurological:      Mental Status: Alert.       Procedures    Lab Review:         MDM    #1 congestive heart failure  Patient has LV systolic dysfunction in the past but is currently stable with normal LV function and is currently on medical therapy with metoprolol and lisinopril  2.  Hypertension  Patient blood pressure currently stable on metoprolol and lisinopril  3.  COPD  Patient has history of severe COPD and is using oxygen  4.  Hyperlipidemia  Patient is on fish oil capsules and also on simvastatin.  And the levels are followed by the primary care doctor.    Patient's previous medical records, labs, and EKG were reviewed and discussed with the patient at today's visit.

## 2023-12-23 ENCOUNTER — HOSPITAL ENCOUNTER (INPATIENT)
Facility: HOSPITAL | Age: 78
LOS: 9 days | Discharge: HOME | End: 2024-01-02
Attending: EMERGENCY MEDICINE | Admitting: HOSPITALIST
Payer: MEDICARE

## 2023-12-23 ENCOUNTER — APPOINTMENT (OUTPATIENT)
Dept: GENERAL RADIOLOGY | Facility: HOSPITAL | Age: 78
End: 2023-12-23
Payer: MEDICARE

## 2023-12-23 DIAGNOSIS — I21.4 NON-ST ELEVATED MYOCARDIAL INFARCTION (NON-STEMI): Primary | ICD-10-CM

## 2023-12-23 DIAGNOSIS — C91.10 CLL (CHRONIC LYMPHOCYTIC LEUKEMIA): ICD-10-CM

## 2023-12-23 DIAGNOSIS — R07.9 CHEST PAIN, UNSPECIFIED TYPE: ICD-10-CM

## 2023-12-23 DIAGNOSIS — R04.2 HEMOPTYSIS: ICD-10-CM

## 2023-12-23 DIAGNOSIS — R79.89 ELEVATED TROPONIN: ICD-10-CM

## 2023-12-23 DIAGNOSIS — R91.1 PULMONARY NODULE, RIGHT: ICD-10-CM

## 2023-12-23 LAB
ALBUMIN SERPL-MCNC: 4.1 G/DL (ref 3.5–5.2)
ALBUMIN/GLOB SERPL: 1.6 G/DL
ALP SERPL-CCNC: 93 U/L (ref 39–117)
ALT SERPL W P-5'-P-CCNC: 18 U/L (ref 1–33)
ANION GAP SERPL CALCULATED.3IONS-SCNC: 11 MMOL/L (ref 5–15)
AST SERPL-CCNC: 21 U/L (ref 1–32)
BASOPHILS # BLD AUTO: 0.1 10*3/MM3 (ref 0–0.2)
BASOPHILS NFR BLD AUTO: 1 % (ref 0–1.5)
BILIRUB SERPL-MCNC: 0.3 MG/DL (ref 0–1.2)
BUN SERPL-MCNC: 24 MG/DL (ref 8–23)
BUN/CREAT SERPL: 27.3 (ref 7–25)
CALCIUM SPEC-SCNC: 9.5 MG/DL (ref 8.6–10.5)
CHLORIDE SERPL-SCNC: 101 MMOL/L (ref 98–107)
CO2 SERPL-SCNC: 30 MMOL/L (ref 22–29)
CREAT SERPL-MCNC: 0.88 MG/DL (ref 0.57–1)
D DIMER PPP FEU-MCNC: 0.43 MG/L (FEU) (ref 0–0.76)
DEPRECATED RDW RBC AUTO: 39.4 FL (ref 37–54)
EGFRCR SERPLBLD CKD-EPI 2021: 68.2 ML/MIN/1.73
EOSINOPHIL # BLD AUTO: 0.2 10*3/MM3 (ref 0–0.4)
EOSINOPHIL NFR BLD AUTO: 1.5 % (ref 0.3–6.2)
ERYTHROCYTE [DISTWIDTH] IN BLOOD BY AUTOMATED COUNT: 12.4 % (ref 12.3–15.4)
GLOBULIN UR ELPH-MCNC: 2.6 GM/DL
GLUCOSE SERPL-MCNC: 150 MG/DL (ref 65–99)
HCT VFR BLD AUTO: 37.5 % (ref 34–46.6)
HGB BLD-MCNC: 11.8 G/DL (ref 12–15.9)
HOLD SPECIMEN: NORMAL
LIPASE SERPL-CCNC: 23 U/L (ref 13–60)
LYMPHOCYTES # BLD AUTO: 1.5 10*3/MM3 (ref 0.7–3.1)
LYMPHOCYTES NFR BLD AUTO: 11.9 % (ref 19.6–45.3)
MCH RBC QN AUTO: 28.8 PG (ref 26.6–33)
MCHC RBC AUTO-ENTMCNC: 31.5 G/DL (ref 31.5–35.7)
MCV RBC AUTO: 91.2 FL (ref 79–97)
MONOCYTES # BLD AUTO: 0.8 10*3/MM3 (ref 0.1–0.9)
MONOCYTES NFR BLD AUTO: 6.2 % (ref 5–12)
NEUTROPHILS NFR BLD AUTO: 79.4 % (ref 42.7–76)
NEUTROPHILS NFR BLD AUTO: 9.9 10*3/MM3 (ref 1.7–7)
NRBC BLD AUTO-RTO: 0 /100 WBC (ref 0–0.2)
PLATELET # BLD AUTO: 295 10*3/MM3 (ref 140–450)
PMV BLD AUTO: 9.6 FL (ref 6–12)
POTASSIUM SERPL-SCNC: 3.7 MMOL/L (ref 3.5–5.2)
PROT SERPL-MCNC: 6.7 G/DL (ref 6–8.5)
RBC # BLD AUTO: 4.11 10*6/MM3 (ref 3.77–5.28)
SODIUM SERPL-SCNC: 142 MMOL/L (ref 136–145)
TROPONIN T SERPL HS-MCNC: 76 NG/L
WBC NRBC COR # BLD AUTO: 12.5 10*3/MM3 (ref 3.4–10.8)

## 2023-12-23 PROCEDURE — 83690 ASSAY OF LIPASE: CPT | Performed by: EMERGENCY MEDICINE

## 2023-12-23 PROCEDURE — 71045 X-RAY EXAM CHEST 1 VIEW: CPT

## 2023-12-23 PROCEDURE — 85025 COMPLETE CBC W/AUTO DIFF WBC: CPT | Performed by: EMERGENCY MEDICINE

## 2023-12-23 PROCEDURE — 85379 FIBRIN DEGRADATION QUANT: CPT | Performed by: EMERGENCY MEDICINE

## 2023-12-23 PROCEDURE — 84484 ASSAY OF TROPONIN QUANT: CPT | Performed by: EMERGENCY MEDICINE

## 2023-12-23 PROCEDURE — 93005 ELECTROCARDIOGRAM TRACING: CPT

## 2023-12-23 PROCEDURE — 80053 COMPREHEN METABOLIC PANEL: CPT | Performed by: EMERGENCY MEDICINE

## 2023-12-23 PROCEDURE — 25010000002 ENOXAPARIN PER 10 MG: Performed by: EMERGENCY MEDICINE

## 2023-12-23 PROCEDURE — 25010000002 ONDANSETRON PER 1 MG: Performed by: EMERGENCY MEDICINE

## 2023-12-23 PROCEDURE — G0378 HOSPITAL OBSERVATION PER HR: HCPCS

## 2023-12-23 PROCEDURE — 99285 EMERGENCY DEPT VISIT HI MDM: CPT

## 2023-12-23 PROCEDURE — 25010000002 HYDROMORPHONE 1 MG/ML SOLUTION: Performed by: EMERGENCY MEDICINE

## 2023-12-23 RX ORDER — LANOLIN ALCOHOL/MO/W.PET/CERES
1000 CREAM (GRAM) TOPICAL DAILY
Status: DISCONTINUED | OUTPATIENT
Start: 2023-12-24 | End: 2024-01-02 | Stop reason: HOSPADM

## 2023-12-23 RX ORDER — POLYETHYLENE GLYCOL 3350 17 G/17G
17 POWDER, FOR SOLUTION ORAL DAILY PRN
Status: DISCONTINUED | OUTPATIENT
Start: 2023-12-23 | End: 2024-01-02 | Stop reason: HOSPADM

## 2023-12-23 RX ORDER — ACETAMINOPHEN 160 MG/5ML
650 SOLUTION ORAL EVERY 4 HOURS PRN
Status: DISCONTINUED | OUTPATIENT
Start: 2023-12-23 | End: 2024-01-02 | Stop reason: HOSPADM

## 2023-12-23 RX ORDER — CHOLECALCIFEROL (VITAMIN D3) 125 MCG
5 CAPSULE ORAL NIGHTLY PRN
Status: DISCONTINUED | OUTPATIENT
Start: 2023-12-23 | End: 2024-01-02 | Stop reason: HOSPADM

## 2023-12-23 RX ORDER — MONTELUKAST SODIUM 10 MG/1
10 TABLET ORAL NIGHTLY
Status: DISCONTINUED | OUTPATIENT
Start: 2023-12-24 | End: 2024-01-02 | Stop reason: HOSPADM

## 2023-12-23 RX ORDER — AMOXICILLIN 250 MG
2 CAPSULE ORAL 2 TIMES DAILY PRN
Status: DISCONTINUED | OUTPATIENT
Start: 2023-12-23 | End: 2024-01-02 | Stop reason: HOSPADM

## 2023-12-23 RX ORDER — FAMOTIDINE 10 MG/ML
20 INJECTION, SOLUTION INTRAVENOUS ONCE
Status: COMPLETED | OUTPATIENT
Start: 2023-12-23 | End: 2023-12-23

## 2023-12-23 RX ORDER — ALBUTEROL SULFATE 2.5 MG/3ML
2.5 SOLUTION RESPIRATORY (INHALATION) EVERY 6 HOURS PRN
Status: DISCONTINUED | OUTPATIENT
Start: 2023-12-23 | End: 2024-01-02 | Stop reason: HOSPADM

## 2023-12-23 RX ORDER — SODIUM CHLORIDE 0.9 % (FLUSH) 0.9 %
10 SYRINGE (ML) INJECTION AS NEEDED
Status: DISCONTINUED | OUTPATIENT
Start: 2023-12-23 | End: 2024-01-02 | Stop reason: HOSPADM

## 2023-12-23 RX ORDER — LISINOPRIL 20 MG/1
40 TABLET ORAL DAILY
Status: DISCONTINUED | OUTPATIENT
Start: 2023-12-24 | End: 2024-01-02 | Stop reason: HOSPADM

## 2023-12-23 RX ORDER — SODIUM CHLORIDE 9 MG/ML
40 INJECTION, SOLUTION INTRAVENOUS AS NEEDED
Status: DISCONTINUED | OUTPATIENT
Start: 2023-12-23 | End: 2024-01-02 | Stop reason: HOSPADM

## 2023-12-23 RX ORDER — BUDESONIDE AND FORMOTEROL FUMARATE DIHYDRATE 160; 4.5 UG/1; UG/1
2 AEROSOL RESPIRATORY (INHALATION)
Status: DISCONTINUED | OUTPATIENT
Start: 2023-12-24 | End: 2024-01-02 | Stop reason: HOSPADM

## 2023-12-23 RX ORDER — PANTOPRAZOLE SODIUM 40 MG/1
40 TABLET, DELAYED RELEASE ORAL
Status: DISCONTINUED | OUTPATIENT
Start: 2023-12-24 | End: 2023-12-24

## 2023-12-23 RX ORDER — SODIUM CHLORIDE 0.9 % (FLUSH) 0.9 %
10 SYRINGE (ML) INJECTION EVERY 12 HOURS SCHEDULED
Status: DISCONTINUED | OUTPATIENT
Start: 2023-12-23 | End: 2024-01-02 | Stop reason: HOSPADM

## 2023-12-23 RX ORDER — HYDROCHLOROTHIAZIDE 25 MG/1
25 TABLET ORAL DAILY
Status: DISCONTINUED | OUTPATIENT
Start: 2023-12-24 | End: 2023-12-28

## 2023-12-23 RX ORDER — ONDANSETRON 2 MG/ML
4 INJECTION INTRAMUSCULAR; INTRAVENOUS EVERY 6 HOURS PRN
Status: DISCONTINUED | OUTPATIENT
Start: 2023-12-23 | End: 2024-01-02 | Stop reason: HOSPADM

## 2023-12-23 RX ORDER — ACETAMINOPHEN 325 MG/1
650 TABLET ORAL EVERY 4 HOURS PRN
Status: DISCONTINUED | OUTPATIENT
Start: 2023-12-23 | End: 2024-01-02 | Stop reason: HOSPADM

## 2023-12-23 RX ORDER — NITROGLYCERIN 0.4 MG/1
0.4 TABLET SUBLINGUAL
Status: DISCONTINUED | OUTPATIENT
Start: 2023-12-23 | End: 2024-01-02 | Stop reason: HOSPADM

## 2023-12-23 RX ORDER — PRAMIPEXOLE DIHYDROCHLORIDE 0.5 MG/1
0.25 TABLET ORAL DAILY
Status: DISCONTINUED | OUTPATIENT
Start: 2023-12-24 | End: 2024-01-02 | Stop reason: HOSPADM

## 2023-12-23 RX ORDER — ACETAMINOPHEN 650 MG/1
650 SUPPOSITORY RECTAL EVERY 4 HOURS PRN
Status: DISCONTINUED | OUTPATIENT
Start: 2023-12-23 | End: 2024-01-02 | Stop reason: HOSPADM

## 2023-12-23 RX ORDER — METOPROLOL SUCCINATE 25 MG/1
25 TABLET, EXTENDED RELEASE ORAL DAILY
Status: DISCONTINUED | OUTPATIENT
Start: 2023-12-24 | End: 2024-01-02 | Stop reason: HOSPADM

## 2023-12-23 RX ORDER — ENOXAPARIN SODIUM 100 MG/ML
1 INJECTION SUBCUTANEOUS ONCE
Status: COMPLETED | OUTPATIENT
Start: 2023-12-23 | End: 2023-12-23

## 2023-12-23 RX ORDER — ATORVASTATIN CALCIUM 10 MG/1
10 TABLET, FILM COATED ORAL DAILY
Status: DISCONTINUED | OUTPATIENT
Start: 2023-12-24 | End: 2024-01-02 | Stop reason: HOSPADM

## 2023-12-23 RX ORDER — ONDANSETRON 2 MG/ML
4 INJECTION INTRAMUSCULAR; INTRAVENOUS ONCE
Status: COMPLETED | OUTPATIENT
Start: 2023-12-23 | End: 2023-12-23

## 2023-12-23 RX ORDER — BISACODYL 10 MG
10 SUPPOSITORY, RECTAL RECTAL DAILY PRN
Status: DISCONTINUED | OUTPATIENT
Start: 2023-12-23 | End: 2024-01-02 | Stop reason: HOSPADM

## 2023-12-23 RX ORDER — PAROXETINE HYDROCHLORIDE 20 MG/1
20 TABLET, FILM COATED ORAL DAILY
Status: DISCONTINUED | OUTPATIENT
Start: 2023-12-24 | End: 2024-01-02 | Stop reason: HOSPADM

## 2023-12-23 RX ORDER — MULTIPLE VITAMINS W/ MINERALS TAB 9MG-400MCG
1 TAB ORAL DAILY
Status: DISCONTINUED | OUTPATIENT
Start: 2023-12-24 | End: 2024-01-02 | Stop reason: HOSPADM

## 2023-12-23 RX ORDER — BISACODYL 5 MG/1
5 TABLET, DELAYED RELEASE ORAL DAILY PRN
Status: DISCONTINUED | OUTPATIENT
Start: 2023-12-23 | End: 2024-01-02 | Stop reason: HOSPADM

## 2023-12-23 RX ADMIN — ONDANSETRON 4 MG: 2 INJECTION INTRAMUSCULAR; INTRAVENOUS at 20:44

## 2023-12-23 RX ADMIN — HYDROMORPHONE HYDROCHLORIDE 0.5 MG: 1 INJECTION, SOLUTION INTRAMUSCULAR; INTRAVENOUS; SUBCUTANEOUS at 20:44

## 2023-12-23 RX ADMIN — ENOXAPARIN SODIUM 80 MG: 100 INJECTION SUBCUTANEOUS at 21:52

## 2023-12-23 RX ADMIN — Medication 10 ML: at 23:32

## 2023-12-23 RX ADMIN — FAMOTIDINE 20 MG: 10 INJECTION INTRAVENOUS at 20:44

## 2023-12-24 ENCOUNTER — APPOINTMENT (OUTPATIENT)
Dept: CT IMAGING | Facility: HOSPITAL | Age: 78
End: 2023-12-24
Payer: MEDICARE

## 2023-12-24 LAB
ANION GAP SERPL CALCULATED.3IONS-SCNC: 9 MMOL/L (ref 5–15)
ATMOSPHERIC PRESS: ABNORMAL MM[HG]
BASE EXCESS BLDV CALC-SCNC: 2 MMOL/L (ref -2–2)
BASOPHILS # BLD AUTO: 0 10*3/MM3 (ref 0–0.2)
BASOPHILS # BLD AUTO: 0.1 10*3/MM3 (ref 0–0.2)
BASOPHILS NFR BLD AUTO: 0.3 % (ref 0–1.5)
BASOPHILS NFR BLD AUTO: 0.4 % (ref 0–1.5)
BDY SITE: ABNORMAL
BUN SERPL-MCNC: 24 MG/DL (ref 8–23)
BUN/CREAT SERPL: 25.3 (ref 7–25)
CA-I BLDA-SCNC: 1.27 MMOL/L (ref 1.15–1.33)
CALCIUM SPEC-SCNC: 9.1 MG/DL (ref 8.6–10.5)
CHLORIDE SERPL-SCNC: 103 MMOL/L (ref 98–107)
CO2 SERPL-SCNC: 30 MMOL/L (ref 22–29)
CREAT BLDA-MCNC: 0.94 MG/DL (ref 0.6–1.3)
CREAT SERPL-MCNC: 0.95 MG/DL (ref 0.57–1)
D-LACTATE SERPL-SCNC: 1.1 MMOL/L (ref 0.5–2)
D-LACTATE SERPL-SCNC: 2.4 MMOL/L (ref 0.2–2)
DEPRECATED RDW RBC AUTO: 40.3 FL (ref 37–54)
DEPRECATED RDW RBC AUTO: 40.7 FL (ref 37–54)
EGFRCR SERPLBLD CKD-EPI 2021: 62.2 ML/MIN/1.73
EGFRCR SERPLBLD CKD-EPI 2021: 63 ML/MIN/1.73
EOSINOPHIL # BLD AUTO: 0.1 10*3/MM3 (ref 0–0.4)
EOSINOPHIL # BLD AUTO: 0.1 10*3/MM3 (ref 0–0.4)
EOSINOPHIL NFR BLD AUTO: 0.4 % (ref 0.3–6.2)
EOSINOPHIL NFR BLD AUTO: 0.6 % (ref 0.3–6.2)
ERYTHROCYTE [DISTWIDTH] IN BLOOD BY AUTOMATED COUNT: 12.5 % (ref 12.3–15.4)
ERYTHROCYTE [DISTWIDTH] IN BLOOD BY AUTOMATED COUNT: 12.5 % (ref 12.3–15.4)
GEN 5 2HR TROPONIN T REFLEX: 278 NG/L
GLUCOSE BLDC GLUCOMTR-MCNC: 143 MG/DL (ref 70–105)
GLUCOSE BLDC GLUCOMTR-MCNC: 154 MG/DL (ref 74–100)
GLUCOSE BLDC GLUCOMTR-MCNC: 154 MG/DL (ref 74–100)
GLUCOSE SERPL-MCNC: 113 MG/DL (ref 65–99)
HCO3 BLDV-SCNC: 30 MMOL/L (ref 22–26)
HCT VFR BLD AUTO: 30.4 % (ref 34–46.6)
HCT VFR BLD AUTO: 32.8 % (ref 34–46.6)
HCT VFR BLDA CALC: 35 % (ref 38–51)
HGB BLD-MCNC: 10.6 G/DL (ref 12–15.9)
HGB BLD-MCNC: 9.8 G/DL (ref 12–15.9)
HGB BLDA-MCNC: 11.8 G/DL (ref 12–17)
INHALED O2 CONCENTRATION: 100 %
LYMPHOCYTES # BLD AUTO: 1 10*3/MM3 (ref 0.7–3.1)
LYMPHOCYTES # BLD AUTO: 1.3 10*3/MM3 (ref 0.7–3.1)
LYMPHOCYTES NFR BLD AUTO: 11.2 % (ref 19.6–45.3)
LYMPHOCYTES NFR BLD AUTO: 6.6 % (ref 19.6–45.3)
MCH RBC QN AUTO: 29.4 PG (ref 26.6–33)
MCH RBC QN AUTO: 29.7 PG (ref 26.6–33)
MCHC RBC AUTO-ENTMCNC: 32.1 G/DL (ref 31.5–35.7)
MCHC RBC AUTO-ENTMCNC: 32.2 G/DL (ref 31.5–35.7)
MCV RBC AUTO: 91.6 FL (ref 79–97)
MCV RBC AUTO: 92.1 FL (ref 79–97)
MODALITY: ABNORMAL
MONOCYTES # BLD AUTO: 0.6 10*3/MM3 (ref 0.1–0.9)
MONOCYTES # BLD AUTO: 0.8 10*3/MM3 (ref 0.1–0.9)
MONOCYTES NFR BLD AUTO: 5.2 % (ref 5–12)
MONOCYTES NFR BLD AUTO: 5.2 % (ref 5–12)
NEUTROPHILS NFR BLD AUTO: 12.8 10*3/MM3 (ref 1.7–7)
NEUTROPHILS NFR BLD AUTO: 82.7 % (ref 42.7–76)
NEUTROPHILS NFR BLD AUTO: 87.4 % (ref 42.7–76)
NEUTROPHILS NFR BLD AUTO: 9.4 10*3/MM3 (ref 1.7–7)
NRBC BLD AUTO-RTO: 0 /100 WBC (ref 0–0.2)
NRBC BLD AUTO-RTO: 0.1 /100 WBC (ref 0–0.2)
PCO2 BLDV: 63.1 MM HG (ref 42–51)
PH BLDV: 7.29 PH UNITS (ref 7.32–7.43)
PLATELET # BLD AUTO: 225 10*3/MM3 (ref 140–450)
PLATELET # BLD AUTO: 236 10*3/MM3 (ref 140–450)
PMV BLD AUTO: 10 FL (ref 6–12)
PMV BLD AUTO: 9.4 FL (ref 6–12)
PO2 BLDV: 41.3 MM HG (ref 40–42)
POTASSIUM BLDA-SCNC: 4.2 MMOL/L (ref 3.5–4.5)
POTASSIUM SERPL-SCNC: 4.3 MMOL/L (ref 3.5–5.2)
QT INTERVAL: 403 MS
QTC INTERVAL: 437 MS
RBC # BLD AUTO: 3.3 10*6/MM3 (ref 3.77–5.28)
RBC # BLD AUTO: 3.58 10*6/MM3 (ref 3.77–5.28)
SAO2 % BLDCOV: 68.7 % (ref 45–75)
SODIUM BLD-SCNC: 142 MMOL/L (ref 138–146)
SODIUM SERPL-SCNC: 142 MMOL/L (ref 136–145)
TROPONIN T DELTA: 202 NG/L
TROPONIN T SERPL HS-MCNC: 202 NG/L
WBC NRBC COR # BLD AUTO: 11.4 10*3/MM3 (ref 3.4–10.8)
WBC NRBC COR # BLD AUTO: 14.7 10*3/MM3 (ref 3.4–10.8)

## 2023-12-24 PROCEDURE — 83605 ASSAY OF LACTIC ACID: CPT

## 2023-12-24 PROCEDURE — 84484 ASSAY OF TROPONIN QUANT: CPT

## 2023-12-24 PROCEDURE — 80051 ELECTROLYTE PANEL: CPT

## 2023-12-24 PROCEDURE — 36410 VNPNXR 3YR/> PHY/QHP DX/THER: CPT

## 2023-12-24 PROCEDURE — C1751 CATH, INF, PER/CENT/MIDLINE: HCPCS

## 2023-12-24 PROCEDURE — 82330 ASSAY OF CALCIUM: CPT

## 2023-12-24 PROCEDURE — 25810000003 SODIUM CHLORIDE 0.9 % SOLUTION 250 ML FLEX CONT: Performed by: HOSPITALIST

## 2023-12-24 PROCEDURE — 82803 BLOOD GASES ANY COMBINATION: CPT

## 2023-12-24 PROCEDURE — 99222 1ST HOSP IP/OBS MODERATE 55: CPT | Performed by: INTERNAL MEDICINE

## 2023-12-24 PROCEDURE — 85025 COMPLETE CBC W/AUTO DIFF WBC: CPT

## 2023-12-24 PROCEDURE — 82565 ASSAY OF CREATININE: CPT

## 2023-12-24 PROCEDURE — 94640 AIRWAY INHALATION TREATMENT: CPT

## 2023-12-24 PROCEDURE — 25010000002 PROCHLORPERAZINE 10 MG/2ML SOLUTION

## 2023-12-24 PROCEDURE — 94799 UNLISTED PULMONARY SVC/PX: CPT

## 2023-12-24 PROCEDURE — 84484 ASSAY OF TROPONIN QUANT: CPT | Performed by: EMERGENCY MEDICINE

## 2023-12-24 PROCEDURE — 80048 BASIC METABOLIC PNL TOTAL CA: CPT

## 2023-12-24 PROCEDURE — 36415 COLL VENOUS BLD VENIPUNCTURE: CPT

## 2023-12-24 PROCEDURE — 25010000002 CEFTRIAXONE PER 250 MG: Performed by: HOSPITALIST

## 2023-12-24 PROCEDURE — 25810000003 SODIUM CHLORIDE 0.9 % SOLUTION

## 2023-12-24 PROCEDURE — 82948 REAGENT STRIP/BLOOD GLUCOSE: CPT

## 2023-12-24 PROCEDURE — 25010000002 AZITHROMYCIN PER 500 MG: Performed by: HOSPITALIST

## 2023-12-24 PROCEDURE — 25010000002 ONDANSETRON PER 1 MG

## 2023-12-24 PROCEDURE — 71275 CT ANGIOGRAPHY CHEST: CPT

## 2023-12-24 PROCEDURE — 25510000001 IOPAMIDOL PER 1 ML: Performed by: HOSPITALIST

## 2023-12-24 PROCEDURE — 85018 HEMOGLOBIN: CPT

## 2023-12-24 RX ORDER — SODIUM CHLORIDE 9 MG/ML
40 INJECTION, SOLUTION INTRAVENOUS AS NEEDED
Status: DISCONTINUED | OUTPATIENT
Start: 2023-12-24 | End: 2024-01-02 | Stop reason: HOSPADM

## 2023-12-24 RX ORDER — METOPROLOL TARTRATE 1 MG/ML
2.5 INJECTION, SOLUTION INTRAVENOUS EVERY 4 HOURS PRN
Status: DISCONTINUED | OUTPATIENT
Start: 2023-12-24 | End: 2024-01-02 | Stop reason: HOSPADM

## 2023-12-24 RX ORDER — PROCHLORPERAZINE EDISYLATE 5 MG/ML
10 INJECTION INTRAMUSCULAR; INTRAVENOUS ONCE
Status: COMPLETED | OUTPATIENT
Start: 2023-12-24 | End: 2023-12-24

## 2023-12-24 RX ORDER — SODIUM CHLORIDE 0.9 % (FLUSH) 0.9 %
10 SYRINGE (ML) INJECTION EVERY 12 HOURS SCHEDULED
Status: DISCONTINUED | OUTPATIENT
Start: 2023-12-24 | End: 2024-01-02 | Stop reason: HOSPADM

## 2023-12-24 RX ORDER — SODIUM CHLORIDE 0.9 % (FLUSH) 0.9 %
10 SYRINGE (ML) INJECTION AS NEEDED
Status: DISCONTINUED | OUTPATIENT
Start: 2023-12-24 | End: 2024-01-02 | Stop reason: HOSPADM

## 2023-12-24 RX ORDER — SODIUM CHLORIDE 9 MG/ML
40 INJECTION, SOLUTION INTRAVENOUS AS NEEDED
OUTPATIENT
Start: 2023-12-24

## 2023-12-24 RX ORDER — SODIUM CHLORIDE 0.9 % (FLUSH) 0.9 %
3-10 SYRINGE (ML) INJECTION AS NEEDED
OUTPATIENT
Start: 2023-12-24

## 2023-12-24 RX ORDER — SODIUM CHLORIDE 9 MG/ML
100 INJECTION, SOLUTION INTRAVENOUS CONTINUOUS
OUTPATIENT
Start: 2023-12-25

## 2023-12-24 RX ORDER — PROCHLORPERAZINE EDISYLATE 5 MG/ML
10 INJECTION INTRAMUSCULAR; INTRAVENOUS EVERY 4 HOURS PRN
Status: DISCONTINUED | OUTPATIENT
Start: 2023-12-24 | End: 2024-01-02 | Stop reason: HOSPADM

## 2023-12-24 RX ORDER — PROTAMINE SULFATE 10 MG/ML
INJECTION, SOLUTION INTRAVENOUS ONCE
Status: CANCELLED | OUTPATIENT
Start: 2023-12-24 | End: 2023-12-24

## 2023-12-24 RX ORDER — SODIUM CHLORIDE 0.9 % (FLUSH) 0.9 %
3 SYRINGE (ML) INJECTION EVERY 12 HOURS SCHEDULED
OUTPATIENT
Start: 2023-12-24

## 2023-12-24 RX ORDER — ASPIRIN 81 MG/1
162 TABLET, CHEWABLE ORAL DAILY
Status: DISCONTINUED | OUTPATIENT
Start: 2023-12-24 | End: 2024-01-02 | Stop reason: HOSPADM

## 2023-12-24 RX ORDER — PANTOPRAZOLE SODIUM 40 MG/10ML
40 INJECTION, POWDER, LYOPHILIZED, FOR SOLUTION INTRAVENOUS
Status: DISCONTINUED | OUTPATIENT
Start: 2023-12-24 | End: 2023-12-26

## 2023-12-24 RX ADMIN — MONTELUKAST 10 MG: 10 TABLET, FILM COATED ORAL at 00:09

## 2023-12-24 RX ADMIN — ONDANSETRON 4 MG: 2 INJECTION INTRAMUSCULAR; INTRAVENOUS at 21:23

## 2023-12-24 RX ADMIN — Medication 1 TABLET: at 08:41

## 2023-12-24 RX ADMIN — ATORVASTATIN CALCIUM 10 MG: 10 TABLET, FILM COATED ORAL at 00:09

## 2023-12-24 RX ADMIN — ACETAMINOPHEN 650 MG: 325 TABLET, FILM COATED ORAL at 18:46

## 2023-12-24 RX ADMIN — SODIUM CHLORIDE 500 ML: 9 INJECTION, SOLUTION INTRAVENOUS at 05:29

## 2023-12-24 RX ADMIN — BUDESONIDE AND FORMOTEROL FUMARATE DIHYDRATE 2 PUFF: 160; 4.5 AEROSOL RESPIRATORY (INHALATION) at 19:53

## 2023-12-24 RX ADMIN — Medication 10 ML: at 08:42

## 2023-12-24 RX ADMIN — CEFTRIAXONE 1000 MG: 1 INJECTION, POWDER, FOR SOLUTION INTRAMUSCULAR; INTRAVENOUS at 20:00

## 2023-12-24 RX ADMIN — CYANOCOBALAMIN TAB 1000 MCG 1000 MCG: 1000 TAB at 08:41

## 2023-12-24 RX ADMIN — PANTOPRAZOLE SODIUM 40 MG: 40 INJECTION, POWDER, FOR SOLUTION INTRAVENOUS at 05:46

## 2023-12-24 RX ADMIN — ASPIRIN 81 MG CHEWABLE TABLET 162 MG: 81 TABLET CHEWABLE at 10:22

## 2023-12-24 RX ADMIN — Medication 10 ML: at 20:49

## 2023-12-24 RX ADMIN — IOPAMIDOL 100 ML: 755 INJECTION, SOLUTION INTRAVENOUS at 15:31

## 2023-12-24 RX ADMIN — MONTELUKAST 10 MG: 10 TABLET, FILM COATED ORAL at 20:49

## 2023-12-24 RX ADMIN — ATORVASTATIN CALCIUM 10 MG: 10 TABLET, FILM COATED ORAL at 20:49

## 2023-12-24 RX ADMIN — AZITHROMYCIN MONOHYDRATE 500 MG: 500 INJECTION, POWDER, LYOPHILIZED, FOR SOLUTION INTRAVENOUS at 20:00

## 2023-12-24 RX ADMIN — METOPROLOL TARTRATE 2.5 MG: 1 INJECTION, SOLUTION INTRAVENOUS at 21:15

## 2023-12-24 RX ADMIN — ONDANSETRON 4 MG: 2 INJECTION INTRAMUSCULAR; INTRAVENOUS at 05:28

## 2023-12-24 RX ADMIN — Medication 10 ML: at 20:50

## 2023-12-24 RX ADMIN — PRAMIPEXOLE DIHYDROCHLORIDE 0.25 MG: 0.5 TABLET ORAL at 20:49

## 2023-12-24 RX ADMIN — PRAMIPEXOLE DIHYDROCHLORIDE 0.25 MG: 0.5 TABLET ORAL at 00:09

## 2023-12-24 RX ADMIN — PANTOPRAZOLE SODIUM 40 MG: 40 TABLET, DELAYED RELEASE ORAL at 04:54

## 2023-12-24 RX ADMIN — PROCHLORPERAZINE EDISYLATE 10 MG: 5 INJECTION INTRAMUSCULAR; INTRAVENOUS at 05:29

## 2023-12-24 RX ADMIN — TIOTROPIUM BROMIDE INHALATION SPRAY 2 PUFF: 3.12 SPRAY, METERED RESPIRATORY (INHALATION) at 08:25

## 2023-12-24 RX ADMIN — PAROXETINE HYDROCHLORIDE 20 MG: 20 TABLET, FILM COATED ORAL at 08:42

## 2023-12-24 RX ADMIN — BUDESONIDE AND FORMOTEROL FUMARATE DIHYDRATE 2 PUFF: 160; 4.5 AEROSOL RESPIRATORY (INHALATION) at 08:29

## 2023-12-24 NOTE — NURSING NOTE
Pt troponin came back at 278. Cardiology was called with results and no new orders are placed at this time. Continue to monitor pt.

## 2023-12-24 NOTE — CONSULTS
Referring Provider: Sudhakar Keller MD  Reason for Consultation:  Shortness of breath  Elevated troponin level.      Patient Care Team:  Mireya Kapoor MD as PCP - General  Mireya Kapoor MD as PCP - Family Medicine  Dyana Green MD as Consulting Physician (Nephrology)  Andrea Philippe MD as Consulting Physician (Cardiology)    Chief complaint  Shortness of breath    Subjective .     History of present illness:  Krystyna Bennett is a 78 y.o. female who presents with history of multiple medical problems and cardiac problems was admitted to the hospital with chest pain radiating to her right arm and right shoulder blade area.  Heaviness and tightness associated with some nausea and diaphoresis.  Patient was noted to have mild elevation of troponin.  EKG showed no acute changes.  Patient has history of improved Takotsubo syndrome.  Patient had cardiac cath in November 2022 that showed normal coronary arteries.  Left ventricle function has improved with last echo June 2023 showing ejection fraction of 50 to 62%.  Cardiology consultation was requested.        ROS      The patient has been without any chest discomfort, shortness of breath, palpitations, dizziness or syncope.  Denies having any headache, abdominal pain, nausea, vomiting, diarrhea, constipation, loss of weight or loss of appetite.  Denies having any excessive bruising, hematuria or blood in the stool.    Review of all systems negative except as indicated      History  Past Medical History:   Diagnosis Date    Acute bacterial bronchitis 07/05/2019    Anemia 07/05/2019    Arthritis 07/05/2019    Asthma     Breast injury     right side bruised after running into truck mirror    Chronic obstructive pulmonary disease 06/04/2019    CLL (chronic lymphocytic leukemia) 07/05/2019    GERD (gastroesophageal reflux disease) 07/05/2019    History of Clostridium difficile colitis 01/08/2018    Hypertension     Hypokalemia 07/05/2019    Lymphocytosis  2018    Melanoma 2018    Moderate persistent asthma without complication 2019    Panlobular emphysema 2019    Pneumonia 24    Stage 3b chronic kidney disease 2019    Dr Silva    Systolic CHF, chronic 2019       Past Surgical History:   Procedure Laterality Date    APPENDECTOMY      CARDIAC CATHETERIZATION  2019    [facility].    CARDIAC CATHETERIZATION Right 2022    Procedure: Coronary angiography;  Surgeon: Andrea Philippe MD;  Location: [facility] INVASIVE LOCATION;  Service: Cardiovascular;  Laterality: Right;    HYSTERECTOMY      TONSILLECTOMY         Family History   Problem Relation Age of Onset    Heart disease Father             Stroke Father     Heart failure Father     Leukemia Paternal Grandmother     Anemia Paternal Grandmother     Heart disease Paternal Grandmother     Cancer Paternal Grandmother         leukemia    Alcohol abuse Maternal Aunt     Cancer Maternal Aunt             Asthma Maternal Grandmother             Hyperlipidemia Maternal Grandmother             Hypertension Maternal Grandmother     Diabetes Paternal Aunt             Hypertension Son     Hypertension Daughter        Social History     Tobacco Use    Smoking status: Former     Packs/day: 0.50     Years: 37.00     Additional pack years: 0.00     Total pack years: 18.50     Types: Cigarettes, Cigars     Start date: 1960     Quit date: 10/19/1999     Years since quittin.1     Passive exposure: Past    Smokeless tobacco: Never   Vaping Use    Vaping Use: Never used   Substance Use Topics    Alcohol use: Yes     Alcohol/week: 4.0 standard drinks of alcohol     Types: 2 Glasses of wine, 2 Shots of liquor per week     Comment: social drinker    Drug use: No        Medications Prior to Admission   Medication Sig Dispense Refill Last Dose    Acalabrutinib Maleate (CALQUENCE) 100 MG tablet Take 1 tablet by mouth.       albuterol sulfate   (90 Base) MCG/ACT inhaler Inhale 2 puffs Every 4 (Four) Hours As Needed for Wheezing. 18 g 5     Calcium Carbonate-Vit D-Min (CALTRATE 600+D PLUS MINERALS) 600-800 MG-UNIT chewable tablet Chew 2 tablets Daily.       Coenzyme Q10 (COQ10 PO) Take  by mouth Daily.       Cyanocobalamin (B-12) 1000 MCG capsule Take 1,000 mcg by mouth Daily.       esomeprazole (nexIUM) 40 MG capsule Take 1 capsule by mouth Every Morning Before Breakfast.       Fluticasone-Umeclidin-Vilant (Trelegy Ellipta) 100-62.5-25 MCG/ACT inhaler Inhale 1 puff Daily. 60 each 5     Glucosamine-Chondroitin 250-200 MG tablet Take 1 tablet by mouth Daily.       hydroCHLOROthiazide (HYDRODIURIL) 25 MG tablet Take 1 tablet by mouth Daily. 30 tablet 12     ipratropium (ATROVENT) 0.02 % nebulizer solution Take 2.5 mL by nebulization 3 (Three) Times a Day As Needed for Wheezing or Shortness of Air. 62.5 mL 11     lisinopril (PRINIVIL,ZESTRIL) 40 MG tablet Take 1 tablet by mouth Daily. 30 tablet 12     metoprolol succinate XL (TOPROL-XL) 25 MG 24 hr tablet TAKE ONE TABLET BY MOUTH DAILY 90 tablet 1     montelukast (SINGULAIR) 10 MG tablet Take 1 tablet by mouth every night at bedtime. 90 tablet 1     Multiple Vitamins-Minerals (CENTRUM SILVER) tablet 1 tablet po daily       Omega-3 Fatty Acids (fish oil) 1000 MG capsule capsule Take 2 capsules by mouth Daily.       PARoxetine (PAXIL) 20 MG tablet TAKE ONE TABLET BY MOUTH DAILY 90 tablet 3     potassium chloride (K-DUR,KLOR-CON) 10 MEQ CR tablet Take 1 tablet by mouth Daily. 30 tablet 12     pramipexole (MIRAPEX) 0.25 MG tablet TAKE ONE TABLET BY MOUTH DAILY (Patient taking differently: Take 1 tablet by mouth Every Night.) 90 tablet 3     simvastatin (ZOCOR) 20 MG tablet TAKE ONE TABLET BY MOUTH DAILY (Patient taking differently: Take 1 tablet by mouth Every Night.) 90 tablet 2          Latex and Sulfa antibiotics    Scheduled Meds:atorvastatin, 10 mg, Oral, Daily  budesonide-formoterol, 2 puff, Inhalation, BID  "- RT   And  tiotropium bromide monohydrate, 2 puff, Inhalation, Daily - RT  hydroCHLOROthiazide, 25 mg, Oral, Daily  lisinopril, 40 mg, Oral, Daily  metoprolol succinate XL, 25 mg, Oral, Daily  montelukast, 10 mg, Oral, Nightly  multivitamin with minerals, 1 tablet, Oral, Daily  pantoprazole, 40 mg, Intravenous, Q AM  PARoxetine, 20 mg, Oral, Daily  pramipexole, 0.25 mg, Oral, Daily  sodium chloride, 10 mL, Intravenous, Q12H  vitamin B-12, 1,000 mcg, Oral, Daily      Continuous Infusions:   PRN Meds:.  acetaminophen **OR** acetaminophen **OR** acetaminophen    albuterol    senna-docusate sodium **AND** polyethylene glycol **AND** bisacodyl **AND** bisacodyl    melatonin    nitroglycerin    ondansetron    prochlorperazine    sodium chloride    [COMPLETED] Insert Peripheral IV **AND** sodium chloride    sodium chloride    sodium chloride    Objective     VITAL SIGNS  Vitals:    12/24/23 0532 12/24/23 0533 12/24/23 0534 12/24/23 0600   BP: 96/46  112/44 111/48   BP Location:   Left arm Left arm   Patient Position:    Lying   Pulse:  72 72    Resp:   22    Temp:       TempSrc:       SpO2:  97% 97% 94%   Weight:       Height:           Flowsheet Rows      Flowsheet Row First Filed Value   Admission Height 167.6 cm (66\") Documented at 12/23/2023 2016   Admission Weight 83 kg (183 lb) Documented at 12/23/2023 2016            No intake or output data in the 24 hours ending 12/24/23 0634     TELEMETRY: Sinus rhythm    Physical Exam:  The patient is alert, oriented and in no distress.  Vital signs as noted above.  Head and neck revealed no carotid bruits or jugular venous distention.  No thyromegaly or lymphadenopathy is present  Lungs clear.  No wheezing.  Breath sounds are normal bilaterally.  Heart normal first and second heart sounds. No murmur.  No precordial rub is present.  No gallop is present.  Abdomen soft and nontender.  No organomegaly is present.  Extremities with good peripheral pulses without any pedal " edema.  Skin warm and dry.  Musculoskeletal system is grossly normal  CNS grossly normal      Results Review:   I reviewed the patient's new clinical results.  Lab Results (last 24 hours)       Procedure Component Value Units Date/Time    POC Lactate [[id number]]  (Abnormal) Collected: 12/24/23 0525    Specimen: Venous Blood Updated: 12/24/23 0546     Lactate 2.4 mmol/L      Comment: Serial Number: [id number]:  [id number]       POC Creatinine [[id number]]  (Normal) Collected: 12/24/23 0525    Specimen: Venous Blood Updated: 12/24/23 0531     Creatinine 0.94 mg/dL      Comment: Serial Number: [id number]:  [id number]        eGFR 63.0 mL/min/1.73     Blood Gas, Venous - [[id number]]  (Abnormal) Collected: 12/24/23 0525    Specimen: Venous Blood Updated: 12/24/23 0531     Site Right Brachial     pH, Venous 7.285 pH Units      pCO2, Venous 63.1 mm Hg      pO2, Venous 41.3 mm Hg      HCO3, Venous 30.0 mmol/L      Base Excess, Venous 2.0 mmol/L      Comment: Serial Number: [id number]:  [id number]        O2 Saturation, Venous 68.7 %      Barometric Pressure for Blood Gas --     Comment: N/A        Modality NRB     FIO2 100 %     POCT Electrolytes +HGB +HCT [[id number]]  (Abnormal) Collected: 12/24/23 0525    Specimen: Venous Blood Updated: 12/24/23 0531     Sodium 142 mmol/L      POC Potassium 4.2 mmol/L      Ionized Calcium 1.27 mmol/L      Comment: Serial Number: [id number]:  [id number]        Glucose 154 mg/dL      Hematocrit 35 %      Hemoglobin 11.8 g/dL     POC Glucose Once [[id number]]  (Abnormal) Collected: 12/24/23 0525    Specimen: Venous Blood Updated: 12/24/23 0531     Glucose 154 mg/dL      Comment: Serial Number: [id number]:  [id number]       POC Glucose Once [[id number]]  (Abnormal) Collected: 12/24/23 0505    Specimen: Blood Updated: 12/24/23 0507     Glucose 143 mg/dL      Comment: Serial Number: [id number]:  [id number]       High Sensitivity Troponin T 2Hr [[id number]]  (Abnormal) Collected: 12/24/23 0028     Specimen: Blood Updated: 12/24/23 0126     HS Troponin T 278 ng/L      Troponin T Delta 202 ng/L     Narrative:      High Sensitive Troponin T Reference Range:  <14.0 ng/L- Negative Female for AMI  <22.0 ng/L- Negative Male for AMI  >=14 - Abnormal Female indicating possible myocardial injury.  >=22 - Abnormal Male indicating possible myocardial injury.   Clinicians would have to utilize clinical acumen, EKG, Troponin, and serial changes to determine if it is an Acute Myocardial Infarction or myocardial injury due to an underlying chronic condition.         Basic Metabolic Panel [[id number]]  (Abnormal) Collected: 12/24/23 0028    Specimen: Blood Updated: 12/24/23 0125     Glucose 113 mg/dL      BUN 24 mg/dL      Creatinine 0.95 mg/dL      Sodium 142 mmol/L      Potassium 4.3 mmol/L      Chloride 103 mmol/L      CO2 30.0 mmol/L      Calcium 9.1 mg/dL      BUN/Creatinine Ratio 25.3     Anion Gap 9.0 mmol/L      eGFR 62.2 mL/min/1.73     Narrative:      GFR Normal >60  Chronic Kidney Disease <60  Kidney Failure <15    The GFR formula is only valid for adults with stable renal function between ages 18 and 70.    CBC & Differential [[id number]]  (Abnormal) Collected: 12/24/23 0028    Specimen: Blood Updated: 12/24/23 0112    Narrative:      The following orders were created for panel order CBC & Differential.  Procedure                               Abnormality         Status                     ---------                               -----------         ------                     CBC Auto Differential[[id number]]        Abnormal            Final result                 Please view results for these tests on the individual orders.    CBC Auto Differential [[id number]]  (Abnormal) Collected: 12/24/23 0028    Specimen: Blood Updated: 12/24/23 0112     WBC 11.40 10*3/mm3      RBC 3.58 10*6/mm3      Hemoglobin 10.6 g/dL      Hematocrit 32.8 %      MCV 91.6 fL      MCH 29.4 pg      MCHC 32.1 g/dL      RDW 12.5 %      RDW-SD 40.3  fl      MPV 10.0 fL      Platelets 236 10*3/mm3      Neutrophil % 82.7 %      Lymphocyte % 11.2 %      Monocyte % 5.2 %      Eosinophil % 0.6 %      Basophil % 0.3 %      Neutrophils, Absolute 9.40 10*3/mm3      Lymphocytes, Absolute 1.30 10*3/mm3      Monocytes, Absolute 0.60 10*3/mm3      Eosinophils, Absolute 0.10 10*3/mm3      Basophils, Absolute 0.00 10*3/mm3      nRBC 0.1 /100 WBC     [place] Draw [[id number]] Collected: 12/23/23 2039    Specimen: Blood Updated: 12/23/23 2147    Narrative:      The following orders were created for panel order [place] Draw.  Procedure                               Abnormality         Status                     ---------                               -----------         ------                     Green Top (Gel)[[id number]]                                                             Lavender Top[[id number]]                                                                Gold Top - SST[[id number]]                                   Final result               Light Blue Top[[id number]]                                                                Please view results for these tests on the individual orders.    Gold Top - SST [[id number]] Collected: 12/23/23 2039    Specimen: Blood Updated: 12/23/23 2147     Extra Tube Hold for add-ons.     Comment: Auto resulted.       Single High Sensitivity Troponin T [[id number]]  (Abnormal) Collected: 12/23/23 2039    Specimen: Blood Updated: 12/23/23 2120     HS Troponin T 76 ng/L     Narrative:      High Sensitive Troponin T Reference Range:  <14.0 ng/L- Negative Female for AMI  <22.0 ng/L- Negative Male for AMI  >=14 - Abnormal Female indicating possible myocardial injury.  >=22 - Abnormal Male indicating possible myocardial injury.   Clinicians would have to utilize clinical acumen, EKG, Troponin, and serial changes to determine if it is an Acute Myocardial Infarction or myocardial injury due to an underlying chronic condition.          "Comprehensive Metabolic Panel [[id number]]  (Abnormal) Collected: 12/23/23 2039    Specimen: Blood Updated: 12/23/23 2117     Glucose 150 mg/dL      BUN 24 mg/dL      Creatinine 0.88 mg/dL      Sodium 142 mmol/L      Potassium 3.7 mmol/L      Chloride 101 mmol/L      CO2 30.0 mmol/L      Calcium 9.5 mg/dL      Total Protein 6.7 g/dL      Albumin 4.1 g/dL      ALT (SGPT) 18 U/L      AST (SGOT) 21 U/L      Alkaline Phosphatase 93 U/L      Total Bilirubin 0.3 mg/dL      Globulin 2.6 gm/dL      A/G Ratio 1.6 g/dL      BUN/Creatinine Ratio 27.3     Anion Gap 11.0 mmol/L      eGFR 68.2 mL/min/1.73     Narrative:      GFR Normal >60  Chronic Kidney Disease <60  Kidney Failure <15    The GFR formula is only valid for adults with stable renal function between ages 18 and 70.    Lipase [[id number]]  (Normal) Collected: 12/23/23 2039    Specimen: Blood Updated: 12/23/23 2117     Lipase 23 U/L     D-dimer, Quantitative [[id number]]  (Normal) Collected: 12/23/23 2040    Specimen: Blood Updated: 12/23/23 2059     D-Dimer, Quantitative 0.43 mg/L (FEU)     Narrative:      According to the assay 's published package insert, a normal (<0.50 mg/L (FEU)) D-dimer result in conjunction with a non-high clinical probability assessment, excludes deep vein thrombosis (DVT) and pulmonary embolism (PE) with high sensitivity.    D-dimer values increase with age and this can make VTE exclusion of an older population difficult. To address this, the [employer], based on best available evidence and recent guidelines, recommends that clinicians use age-adjusted D-dimer thresholds in patients greater than 50 years of age with: a) a low probability of PE who do not meet all Pulmonary Embolism Rule Out Criteria, or b) in those with intermediate probability of PE.   The formula for an age-adjusted D-dimer cut-off is \"age/100\".  For example, a 60 year old patient would have an age-adjusted cut-off of 0.60 mg/L (FEU) and an 80 " year old 0.80 mg/L (FEU).    CBC & Differential [[id number]]  (Abnormal) Collected: 12/23/23 2039    Specimen: Blood Updated: 12/23/23 2047    Narrative:      The following orders were created for panel order CBC & Differential.  Procedure                               Abnormality         Status                     ---------                               -----------         ------                     CBC Auto Differential[[id number]]        Abnormal            Final result                 Please view results for these tests on the individual orders.    CBC Auto Differential [[id number]]  (Abnormal) Collected: 12/23/23 2039    Specimen: Blood Updated: 12/23/23 2047     WBC 12.50 10*3/mm3      RBC 4.11 10*6/mm3      Hemoglobin 11.8 g/dL      Hematocrit 37.5 %      MCV 91.2 fL      MCH 28.8 pg      MCHC 31.5 g/dL      RDW 12.4 %      RDW-SD 39.4 fl      MPV 9.6 fL      Platelets 295 10*3/mm3      Neutrophil % 79.4 %      Lymphocyte % 11.9 %      Monocyte % 6.2 %      Eosinophil % 1.5 %      Basophil % 1.0 %      Neutrophils, Absolute 9.90 10*3/mm3      Lymphocytes, Absolute 1.50 10*3/mm3      Monocytes, Absolute 0.80 10*3/mm3      Eosinophils, Absolute 0.20 10*3/mm3      Basophils, Absolute 0.10 10*3/mm3      nRBC 0.0 /100 WBC             Imaging Results (Last 24 Hours)       Procedure Component Value Units Date/Time    XR Chest 1 View [[id number]] Collected: 12/23/23 2045     Updated: 12/23/23 2048    Narrative:      XR CHEST 1 VW    Date of Exam: 12/23/2023 8:43 PM EST    Indication: pain    Comparison: Chest radiograph dated December 22, 2022    Findings:  The heart is enlarged. There is diffuse emphysema. There is prominence of the basilar interstitial markings. There are no focal consolidations to suggest pneumonia.      Impression:      Impression:  Cardiomegaly and emphysema. No active disease.      Electronically Signed: Seng Paniagua MD    12/23/2023 8:46 PM EST    Workstation ID: [id number]        LAB RESULTS (LAST  7 DAYS)    CBC  Results from last 7 days   Lab Units 12/24/23 0525 12/24/23 0028 12/23/23 2039   WBC 10*3/mm3  --  11.40* 12.50*   RBC 10*6/mm3  --  3.58* 4.11   HEMOGLOBIN g/dL  --  10.6* 11.8*   HEMOGLOBIN, POC g/dL 11.8*  --   --    HEMATOCRIT %  --  32.8* 37.5   HEMATOCRIT POC % 35*  --   --    MCV fL  --  91.6 91.2   PLATELETS 10*3/mm3  --  236 295       BMP  Results from last 7 days   Lab Units 12/24/23 0525 12/24/23 0028 12/23/23 2039   SODIUM mmol/L  --  142 142   POTASSIUM mmol/L  --  4.3 3.7   CHLORIDE mmol/L  --  103 101   CO2 mmol/L  --  30.0* 30.0*   BUN mg/dL  --  24* 24*   CREATININE mg/dL 0.94 0.95 0.88   GLUCOSE mg/dL  --  113* 150*       CMP   Results from last 7 days   Lab Units 12/24/23 0525 12/24/23 0028 12/23/23 2039   SODIUM mmol/L  --  142 142   POTASSIUM mmol/L  --  4.3 3.7   CHLORIDE mmol/L  --  103 101   CO2 mmol/L  --  30.0* 30.0*   BUN mg/dL  --  24* 24*   CREATININE mg/dL 0.94 0.95 0.88   GLUCOSE mg/dL  --  113* 150*   ALBUMIN g/dL  --   --  4.1   BILIRUBIN mg/dL  --   --  0.3   ALK PHOS U/L  --   --  93   AST (SGOT) U/L  --   --  21   ALT (SGPT) U/L  --   --  18   LIPASE U/L  --   --  23         BNP        TROPONIN  Results from last 7 days   Lab Units 12/24/23 0028   HSTROP T ng/L 278*       CoAg        Creatinine Clearance  Estimated Creatinine Clearance: 55.5 mL/min (by C-G formula based on SCr of 0.94 mg/dL).    ABG        Radiology  XR Chest 1 View    Result Date: 12/23/2023  Impression: Cardiomegaly and emphysema. No active disease. Electronically Signed: Seng Paniagua MD  12/23/2023 8:46 PM EST  Workstation ID: [id number]       EKG                  I personally viewed and interpreted the patient's EKG/Telemetry data: Sinus rhythm    ECHOCARDIOGRAM:    Results for orders placed during the hospital encounter of 06/20/23    Adult Transthoracic Echo Complete W/ Cont if Necessary Per Protocol    Interpretation Summary    Left ventricular ejection fraction appears to be 56 -  60%.    The right ventricular cavity is mildly dilated.    Estimated right ventricular systolic pressure from tricuspid regurgitation is normal (<35 mmHg).      STRESS TEST        Cardiolite (Tc-99m sestamibi) stress test    HEART CATHETERIZATION  Results for orders placed during the hospital encounter of 11/19/22    Cardiac Catheterization/Vascular Study      OTHER:     Assessment & Plan     Principal Problem:    Chest pain    ]]]]]]]]]]]]]]]]]]]]  Impression  ===========  -Chest discomfort suggestive of angina pectoris.    - Possible non-STEMI.  High-sensitivity troponin level 76-12 23 2023  278 with a delta of 202-12/24/2023    - History of Takotsubo syndrome  Left ventricle function has improved.  Last echocardiogram June 2023 showed ejection fraction of 56 to 60%.    Cardiac cath November 2022 showed normal coronary arteries.    - COPD hypertension dyslipidemia    - History of congestive heart failure.    - Allergic to latex and sulfa    - Non-smoker  ==========  Plan  ==========  Patient presented with symptoms that are concerning for angina pectoris.  Elevated troponin level-trending up.  History of Takotsubo syndrome with improved left ventricular function.  Patient had normal coronary arteries and normal of 2022.  Patient would benefit from cardiac catheterization in view of troponin levels trending up.  Risks and benefits pros and cons of the procedure including infection bleeding blood clot heart attack stroke allergic reaction to the dye etc. were discussed.  Echocardiogram.    Further plan will depend on patient progress.  ]]]]]]]]]]]]]]]]]]]]]]]          Sunny Tyson MD  12/24/23  06:34 EST

## 2023-12-24 NOTE — PROGRESS NOTES
"HOSPITALIST PROGRESS NOTE     12/24/2023      Clinical Summary / Reason for Hospitalization     Krystyna Bennett is a 78 y.o. female admitted to hospital on 12/23/2023 with c/o Chest pain.      Admitted on 12/23/2023. Today is hospital day 0.     Subjective     Krystyna Bennett seen and examined this morning.  Patient complaining of an episode of hemoptysis.  Reports her chest pain is much improved.  Denies any shortness of breath.    Interval / Overnight Issues     Objective     Vital Signs     BP 94/40   Pulse 98   Temp 99.6 °F (37.6 °C) (Oral)   Resp 19   Ht 167.6 cm (66\")   Wt 83.5 kg (184 lb 1.4 oz)   LMP  (LMP Unknown)   SpO2 96%   BMI 29.71 kg/m²      Input / Output     No intake or output data in the 24 hours ending 12/24/23 1829     Physical Exam     General : Patient lying in bed in no acute distress.      HEENT : Normocephalic atraumatic. Neck supple, no JVP   PERRLA, EOM intact. Throat clear, mucous membranes moist   CVS : S1 + S2 regular, No R/M/G     Chest : Clear to auscultation bilaterally    Abdomen : Soft, nontender, bowel sounds are present, no organomegaly   Extremities : No clubbing, cyanosis, edema    Neurologic exam: Patient is alert awake oriented ×3, moving all extremities     Current Medications     Scheduled Meds:aspirin, 162 mg, Oral, Daily  atorvastatin, 10 mg, Oral, Daily  budesonide-formoterol, 2 puff, Inhalation, BID - RT   And  tiotropium bromide monohydrate, 2 puff, Inhalation, Daily - RT  hydroCHLOROthiazide, 25 mg, Oral, Daily  lisinopril, 40 mg, Oral, Daily  metoprolol succinate XL, 25 mg, Oral, Daily  montelukast, 10 mg, Oral, Nightly  multivitamin with minerals, 1 tablet, Oral, Daily  pantoprazole, 40 mg, Intravenous, Q AM  PARoxetine, 20 mg, Oral, Daily  pramipexole, 0.25 mg, Oral, Daily  sodium chloride, 10 mL, Intravenous, Q12H  sodium chloride, 10 mL, Intravenous, Q12H  vitamin B-12, 1,000 mcg, Oral, Daily      Continuous Infusions:   PRN Meds:.  acetaminophen **OR** " acetaminophen **OR** acetaminophen    albuterol    senna-docusate sodium **AND** polyethylene glycol **AND** bisacodyl **AND** bisacodyl    melatonin    nitroglycerin    ondansetron    prochlorperazine    sodium chloride    [COMPLETED] Insert Peripheral IV **AND** sodium chloride    sodium chloride    sodium chloride    sodium chloride    sodium chloride      Labs & Imaging     Results from last 7 days   Lab Units 12/24/23  0810 12/24/23  0525 12/24/23  0028 12/23/23 2039   WBC 10*3/mm3 14.70*  --  11.40* 12.50*   HEMOGLOBIN g/dL 9.8*  --  10.6* 11.8*   HEMOGLOBIN, POC g/dL  --  11.8*  --   --    HEMATOCRIT % 30.4*  --  32.8* 37.5   HEMATOCRIT POC %  --  35*  --   --    PLATELETS 10*3/mm3 225  --  236 295   GLUCOSE mg/dL  --   --  113* 150*   CREATININE mg/dL  --  0.94 0.95 0.88   BUN mg/dL  --   --  24* 24*   SODIUM mmol/L  --   --  142 142   POTASSIUM mmol/L  --   --  4.3 3.7   AST (SGOT) U/L  --   --   --  21   ALT (SGPT) U/L  --   --   --  18   ALK PHOS U/L  --   --   --  93   BILIRUBIN mg/dL  --   --   --  0.3   ANION GAP mmol/L  --   --  9.0 11.0       CT Angiogram Chest    Result Date: 12/24/2023  1.No evidence of pulmonary embolus. 2.Bibasilar pneumonia. 3.Severe emphysema. 4.Moderate size hiatal hernia. 5.Hepatic and renal cysts. Electronically Signed: Florida Wilde MD  12/24/2023 3:45 PM EST  Workstation ID: [id number]    XR Chest 1 View    Result Date: 12/23/2023  Impression: Cardiomegaly and emphysema. No active disease. Electronically Signed: Seng Paniagua MD  12/23/2023 8:46 PM EST  Workstation ID: [id number]     I reviewed the patient's new clinical results.    ASSESSMENT AND PLAN:    Krystyna Bennett is a 78 y.o. female with past medical history of hypertension, GERD, asthma/COPD, chronic hypoxic respiratory failure secondary to COPD on home oxygen 4 L via nasal cannula, mood disorder, hyperlipidemia, past history of systolic congestive heart failure which subsequently improved in subsequent echo with  ejection fraction of 55 to 60% presented to emergency room with complaints of chest pain radiating to her arms and back associated with nausea and diaphoresis.  Patient had a cardiac cath a year ago.  Troponins are elevated.  D-dimer is normal.  Lipase is normal.  CT angiogram of the chest showed No evidence of pulmonary embolus. Bibasilar pneumonia.  Severe emphysema. Moderate size hiatal hernia. Hepatic and renal cysts.    Patient admitted with    Chest pain: Troponins are mildly elevated.  Patient seen by cardiology.  Given patient's normal coronaries a year ago clinical suspicion for cardiac etiology is low.  Will rule out gastrointestinal etiology including biliary colic versus pancreatitis versus GERD versus musculoskeletal pain.  Continue analgesics as needed for pain.  Continue supplemental oxygen.  Given patient's symptoms of chest pain, hemoptysis and hypoxia I have ordered CT angiogram of the chest to rule out pulmonary embolism though the possibility is low.     Acute on chronic hypoxic respiratory failure secondary to COPD exacerbation due to bibasilar pneumonia.  Start patient on ceftriaxone and azithromycin.  Continue Singulair, Symbicort and Spiriva inhaler.  Continue albuterol nebulizer treatment as needed.  Will send urine for strep and Legionella antigen.  Sputum for Gram stain and culture.    Hypertension: Continue hydrochlorothiazide, metoprolol and lisinopril.    Mood disorder: Continue paroxetine    DVT Prophylaxis: Lovenox subcu    Code Status:   Code Status and Medical Interventions:   Ordered at: 12/23/23 2300     Code Status (Patient has no pulse and is not breathing):    CPR (Attempt to Resuscitate)     Medical Interventions (Patient has pulse or is breathing):    Full Support        Disposition (Where, When):  TBD/ When medically ready     Primary Emergency Contact: VEGA WHITE (POA)     Copied text in this note has been reviewed and is accurate as of 12/24/2023.    Sudhakar Keller MD    Hospitalist

## 2023-12-24 NOTE — PLAN OF CARE
Goal Outcome Evaluation:  Plan of Care Reviewed With: patient        Progress: no change  Outcome Evaluation: Patient resting in bed throughout shift. Patient continues on airvos. CT angiogram performed this afternoon. midline placed per picc team.
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
## 2023-12-24 NOTE — ED NOTES
Nursing report ED to floor  Krystyna Bennett  78 y.o.  female    HPI:   Chief Complaint   Patient presents with    Chest Pain       Admitting doctor:   Demarco Holguin DO    Admitting diagnosis:   The primary encounter diagnosis was Chest pain, unspecified type. A diagnosis of Elevated troponin was also pertinent to this visit.    Code status:   Current Code Status       Date Active Code Status Order ID Comments User Context       Prior            Allergies:   Latex and Sulfa antibiotics    Isolation:  No active isolations     Fall Risk:  Fall Risk Assessment was completed, and patient is at low risk for falls.   Predictive Model Details         18 (Low) Factor Value    Calculated 12/23/2023 22:24 Age 78    Risk of Fall Model Musculoskeletal Assessment WDL     Active Peripheral IV Present     Imaging order in this encounter Present     Respiratory Rate 22     Skin Assessment WDL     Magnesium not on file     Diastolic BP 60     Number of Distinct Medication Classes administered 4     Financial Class Medicare     Drug Use No     Tobacco Use Quit     Dustin Scale not on file     Peripheral Vascular Assessment WDL     Cardiac Assessment X     Chloride 101 mmol/L     Number of administrations of Ulcer Drugs 1     Gastrointestinal Assessment WDL     Number of administrations of Analgesic Narcotics 1     Albumin 4.1 g/dL     Number of administrations of Anti-Coagulants 1     Creatinine 0.88 mg/dL     ALT 18 U/L     Calcium 9.5 mg/dL     Days after Admission 0.086     Total Bilirubin 0.3 mg/dL     Potassium 3.7 mmol/L         Weight:       12/23/23 2016   Weight: 83 kg (183 lb)       Intake and Output  No intake or output data in the 24 hours ending 12/23/23 2224    Diet:        Most recent vitals:   Vitals:    12/23/23 2021 12/23/23 2146 12/23/23 2217 12/23/23 2221   BP: 152/92 127/68 125/60    Pulse: 74 67  74   Resp: 22      Temp:       SpO2:  93%  93%   Weight:       Height:           Active LDAs/IV Access:   Lines,  Drains & Airways       Active LDAs       Name Placement date Placement time Site Days    Midline Catheter - Single Lumen 11/28/22 Right Brachial 11/28/22  1430  -- 390    Peripheral IV 12/23/23 2222 Left Antecubital 12/23/23 2222  Antecubital  less than 1    External Urinary Catheter 11/19/22  1601  --  399    External Urinary Catheter 11/29/22 2028  --  389                    Skin Condition:   Skin Assessments (last day)       None             Labs (abnormal labs have a star):   Labs Reviewed   COMPREHENSIVE METABOLIC PANEL - Abnormal; Notable for the following components:       Result Value    Glucose 150 (*)     BUN 24 (*)     CO2 30.0 (*)     BUN/Creatinine Ratio 27.3 (*)     All other components within normal limits    Narrative:     GFR Normal >60  Chronic Kidney Disease <60  Kidney Failure <15    The GFR formula is only valid for adults with stable renal function between ages 18 and 70.   SINGLE HSTROPONIN T - Abnormal; Notable for the following components:    HS Troponin T 76 (*)     All other components within normal limits    Narrative:     High Sensitive Troponin T Reference Range:  <14.0 ng/L- Negative Female for AMI  <22.0 ng/L- Negative Male for AMI  >=14 - Abnormal Female indicating possible myocardial injury.  >=22 - Abnormal Male indicating possible myocardial injury.   Clinicians would have to utilize clinical acumen, EKG, Troponin, and serial changes to determine if it is an Acute Myocardial Infarction or myocardial injury due to an underlying chronic condition.        CBC WITH AUTO DIFFERENTIAL - Abnormal; Notable for the following components:    WBC 12.50 (*)     Hemoglobin 11.8 (*)     Neutrophil % 79.4 (*)     Lymphocyte % 11.9 (*)     Neutrophils, Absolute 9.90 (*)     All other components within normal limits   LIPASE - Normal   D-DIMER, QUANTITATIVE - Normal    Narrative:     According to the assay 's published package insert, a normal (<0.50 mg/L (FEU)) D-dimer result in  "conjunction with a non-high clinical probability assessment, excludes deep vein thrombosis (DVT) and pulmonary embolism (PE) with high sensitivity.    D-dimer values increase with age and this can make VTE exclusion of an older population difficult. To address this, the [employer], based on best available evidence and recent guidelines, recommends that clinicians use age-adjusted D-dimer thresholds in patients greater than 50 years of age with: a) a low probability of PE who do not meet all Pulmonary Embolism Rule Out Criteria, or b) in those with intermediate probability of PE.   The formula for an age-adjusted D-dimer cut-off is \"age/100\".  For example, a 60 year old patient would have an age-adjusted cut-off of 0.60 mg/L (FEU) and an 80 year old 0.80 mg/L (FEU).   RAINBOW DRAW    Narrative:     The following orders were created for panel order [place] Draw.  Procedure                               Abnormality         Status                     ---------                               -----------         ------                     Green Top (Gel)[[id number]]                                                             Lavender Top[[id number]]                                                                Gold Top - SST[[id number]]                                   Final result               Light Blue Top[[id number]]                                                                Please view results for these tests on the individual orders.   HIGH SENSITIVITIY TROPONIN T 2HR   GOLD TOP - SST   CBC AND DIFFERENTIAL    Narrative:     The following orders were created for panel order CBC & Differential.  Procedure                               Abnormality         Status                     ---------                               -----------         ------                     CBC Auto Differential[[id number]]        Abnormal            Final result                 Please view results for these tests on the " individual orders.       LOC: Person, Place, Time, and Situation    Telemetry:  Telemetry    Cardiac Monitoring Ordered: yes    EKG:   ECG 12 Lead Chest Pain   Preliminary Result   HEART RATE= 70  bpm   RR Interval= 852  ms   PA Interval= 182  ms   P Horizontal Axis= -8  deg   P Front Axis= 78  deg   QRSD Interval= 91  ms   QT Interval= 403  ms   QTcB= 437  ms   QRS Axis= 18  deg   T Wave Axis= 82  deg   - ABNORMAL ECG -   Sinus rhythm   Consider anteroseptal infarct   When compared with ECG of 2022 11:54:54,   Significant rate decrease   Significant repolarization change   Significant axis, voltage or hypertrophy change   Electronically Signed By:    Date and Time of Study: 2023 20:47:50      ECG 12 Lead Chest Pain    (Results Pending)       Medications Given in the ED:   Medications   sodium chloride 0.9 % flush 10 mL (has no administration in time range)   sodium chloride 0.9 % flush 10 mL (has no administration in time range)   famotidine (PEPCID) injection 20 mg (20 mg Intravenous Given 23)   HYDROmorphone (DILAUDID) injection 0.5 mg (0.5 mg Intravenous Given 23)   ondansetron (ZOFRAN) injection 4 mg (4 mg Intravenous Given 23)   Enoxaparin Sodium (LOVENOX) syringe 80 mg (80 mg Subcutaneous Given 23)       Imaging results:  XR Chest 1 View    Result Date: 2023  Impression: Cardiomegaly and emphysema. No active disease. Electronically Signed: Seng Paniagua MD  2023 8:46 PM EST  Workstation ID: [id number]     Social issues:   Social History     Socioeconomic History    Marital status:    Tobacco Use    Smoking status: Former     Packs/day: 0.50     Years: 37.00     Additional pack years: 0.00     Total pack years: 18.50     Types: Cigarettes, Cigars     Start date: 1960     Quit date: 10/19/1999     Years since quittin.1     Passive exposure: Past    Smokeless tobacco: Never   Vaping Use    Vaping Use: Never used   Substance and  Sexual Activity    Alcohol use: Yes     Alcohol/week: 4.0 standard drinks of alcohol     Types: 2 Glasses of wine, 2 Shots of liquor per week     Comment: social drinker    Drug use: No    Sexual activity: Not Currently     Partners: Male     Birth control/protection: Post-menopausal       NIH Stroke Scale:  Interval: (not recorded)  1a. Level of Consciousness: (not recorded)  1b. LOC Questions: (not recorded)  1c. LOC Commands: (not recorded)  2. Best Gaze: (not recorded)  3. Visual: (not recorded)  4. Facial Palsy: (not recorded)  5a. Motor Arm, Left: (not recorded)  5b. Motor Arm, Right: (not recorded)  6a. Motor Leg, Left: (not recorded)  6b. Motor Leg, Right: (not recorded)  7. Limb Ataxia: (not recorded)  8. Sensory: (not recorded)  9. Best Language: (not recorded)  10. Dysarthria: (not recorded)  11. Extinction and Inattention (formerly Neglect): (not recorded)    Total (NIH Stroke Scale): (not recorded)     Additional notable assessment information:     Nursing report ED to floor:  Annie Rhodes RN   12/23/23 22:24 EST

## 2023-12-24 NOTE — CONSULTS
Midline Line Insertion Procedure Note    Procedure: Insertion of #20G/20CM Power midline    Indications:  Poor Access    Procedure Details:   Sterile technique was used including antiseptics, cap, gloves, gown, hand hygiene, mask, and sheet.    #20G/20CM PowerMidline inserted to the right brachial vein per hospital protocol by KEEGAN Bradley.     Non-pulsatile blood return: yes    Ultrasound Guidance: Yes    Lot #: [id number]  Expiration date: 12/31/2024    Complications:  No immediate complications noted    Findings:  Catheter inserted to 20 cm, with 0 cm exposed.   Mid upper arm circumference is 34 cm.  Catheter was flushed with 10 cc NS and sterile dressing applied.   Patient tolerated procedure well.    Recommendations:  Verbal and/or written Care/Maintenance instructions provided to patient.   Primary nurse notified that midline is okay to use at this time.     Rickey Bradley RN  12/24/23  14:15 EST

## 2023-12-24 NOTE — CODE DOCUMENTATION
RR called for resp distress and coughing up blood.  Upon arrival patient placed on non-rebreather.  KAPIL Domingo in room

## 2023-12-24 NOTE — PROGRESS NOTES
Patient  continues  on  Airvo  oxygen  at  60 lpm  and  63%  FIO2.  Pt  has  rhonchi  and  crackles  all  throughout  lungs.  Pt  RR  is  at  24  with  o2  saturation  94%

## 2023-12-24 NOTE — ED NOTES
Pt started to have midsternal chest pain that radiated to both arms starting at 1800. Pt stated she was diaphoretic and nauseated. Pt states now the pain is midsternal and radiates to right shoulder and arm

## 2023-12-25 ENCOUNTER — ANESTHESIA EVENT (OUTPATIENT)
Dept: GASTROENTEROLOGY | Facility: HOSPITAL | Age: 78
End: 2023-12-25
Payer: MEDICARE

## 2023-12-25 ENCOUNTER — ANESTHESIA (OUTPATIENT)
Dept: GASTROENTEROLOGY | Facility: HOSPITAL | Age: 78
End: 2023-12-25
Payer: MEDICARE

## 2023-12-25 PROBLEM — R04.2 HEMOPTYSIS: Status: ACTIVE | Noted: 2023-12-23

## 2023-12-25 LAB
ANION GAP SERPL CALCULATED.3IONS-SCNC: 6 MMOL/L (ref 5–15)
B PARAPERT DNA SPEC QL NAA+PROBE: NOT DETECTED
B PERT DNA SPEC QL NAA+PROBE: NOT DETECTED
BASOPHILS # BLD AUTO: 0 10*3/MM3 (ref 0–0.2)
BASOPHILS # BLD AUTO: 0.1 10*3/MM3 (ref 0–0.2)
BASOPHILS NFR BLD AUTO: 0.2 % (ref 0–1.5)
BASOPHILS NFR BLD AUTO: 0.3 % (ref 0–1.5)
BUN SERPL-MCNC: 20 MG/DL (ref 8–23)
BUN/CREAT SERPL: 28.2 (ref 7–25)
C PNEUM DNA NPH QL NAA+NON-PROBE: NOT DETECTED
CALCIUM SPEC-SCNC: 8.9 MG/DL (ref 8.6–10.5)
CHLORIDE SERPL-SCNC: 105 MMOL/L (ref 98–107)
CO2 SERPL-SCNC: 31 MMOL/L (ref 22–29)
CREAT SERPL-MCNC: 0.71 MG/DL (ref 0.57–1)
DEPRECATED RDW RBC AUTO: 39.4 FL (ref 37–54)
DEPRECATED RDW RBC AUTO: 42.4 FL (ref 37–54)
EGFRCR SERPLBLD CKD-EPI 2021: 88.2 ML/MIN/1.73
EOSINOPHIL # BLD AUTO: 0 10*3/MM3 (ref 0–0.4)
EOSINOPHIL # BLD AUTO: 0 10*3/MM3 (ref 0–0.4)
EOSINOPHIL NFR BLD AUTO: 0 % (ref 0.3–6.2)
EOSINOPHIL NFR BLD AUTO: 0.2 % (ref 0.3–6.2)
ERYTHROCYTE [DISTWIDTH] IN BLOOD BY AUTOMATED COUNT: 12.3 % (ref 12.3–15.4)
ERYTHROCYTE [DISTWIDTH] IN BLOOD BY AUTOMATED COUNT: 12.7 % (ref 12.3–15.4)
FLUAV SUBTYP SPEC NAA+PROBE: NOT DETECTED
FLUBV RNA ISLT QL NAA+PROBE: NOT DETECTED
GLUCOSE BLDC GLUCOMTR-MCNC: 222 MG/DL (ref 70–105)
GLUCOSE SERPL-MCNC: 108 MG/DL (ref 65–99)
HADV DNA SPEC NAA+PROBE: NOT DETECTED
HCOV 229E RNA SPEC QL NAA+PROBE: NOT DETECTED
HCOV HKU1 RNA SPEC QL NAA+PROBE: NOT DETECTED
HCOV NL63 RNA SPEC QL NAA+PROBE: NOT DETECTED
HCOV OC43 RNA SPEC QL NAA+PROBE: NOT DETECTED
HCT VFR BLD AUTO: 22.7 % (ref 34–46.6)
HCT VFR BLD AUTO: 25.7 % (ref 34–46.6)
HGB BLD-MCNC: 7.1 G/DL (ref 12–15.9)
HGB BLD-MCNC: 8.5 G/DL (ref 12–15.9)
HMPV RNA NPH QL NAA+NON-PROBE: NOT DETECTED
HPIV1 RNA ISLT QL NAA+PROBE: NOT DETECTED
HPIV2 RNA SPEC QL NAA+PROBE: NOT DETECTED
HPIV3 RNA NPH QL NAA+PROBE: NOT DETECTED
HPIV4 P GENE NPH QL NAA+PROBE: NOT DETECTED
INR PPP: 1.03 (ref 0.93–1.1)
L PNEUMO1 AG UR QL IA: NEGATIVE
LYMPHOCYTES # BLD AUTO: 0.4 10*3/MM3 (ref 0.7–3.1)
LYMPHOCYTES # BLD AUTO: 1.4 10*3/MM3 (ref 0.7–3.1)
LYMPHOCYTES NFR BLD AUTO: 3.3 % (ref 19.6–45.3)
LYMPHOCYTES NFR BLD AUTO: 8.5 % (ref 19.6–45.3)
M PNEUMO IGG SER IA-ACNC: NOT DETECTED
MAGNESIUM SERPL-MCNC: 1.6 MG/DL (ref 1.6–2.4)
MCH RBC QN AUTO: 28.3 PG (ref 26.6–33)
MCH RBC QN AUTO: 30.2 PG (ref 26.6–33)
MCHC RBC AUTO-ENTMCNC: 31.4 G/DL (ref 31.5–35.7)
MCHC RBC AUTO-ENTMCNC: 33.1 G/DL (ref 31.5–35.7)
MCV RBC AUTO: 90.1 FL (ref 79–97)
MCV RBC AUTO: 91.3 FL (ref 79–97)
MONOCYTES # BLD AUTO: 0.1 10*3/MM3 (ref 0.1–0.9)
MONOCYTES # BLD AUTO: 0.9 10*3/MM3 (ref 0.1–0.9)
MONOCYTES NFR BLD AUTO: 0.6 % (ref 5–12)
MONOCYTES NFR BLD AUTO: 5.5 % (ref 5–12)
NEUTROPHILS NFR BLD AUTO: 11.5 10*3/MM3 (ref 1.7–7)
NEUTROPHILS NFR BLD AUTO: 13.7 10*3/MM3 (ref 1.7–7)
NEUTROPHILS NFR BLD AUTO: 85.5 % (ref 42.7–76)
NEUTROPHILS NFR BLD AUTO: 95.9 % (ref 42.7–76)
NRBC BLD AUTO-RTO: 0 /100 WBC (ref 0–0.2)
NRBC BLD AUTO-RTO: 0 /100 WBC (ref 0–0.2)
PLATELET # BLD AUTO: 152 10*3/MM3 (ref 140–450)
PLATELET # BLD AUTO: 196 10*3/MM3 (ref 140–450)
PMV BLD AUTO: 9.3 FL (ref 6–12)
PMV BLD AUTO: 9.7 FL (ref 6–12)
POTASSIUM SERPL-SCNC: 3.8 MMOL/L (ref 3.5–5.2)
PROTHROMBIN TIME: 11.2 SECONDS (ref 9.6–11.7)
RBC # BLD AUTO: 2.52 10*6/MM3 (ref 3.77–5.28)
RBC # BLD AUTO: 2.82 10*6/MM3 (ref 3.77–5.28)
RHINOVIRUS RNA SPEC NAA+PROBE: NOT DETECTED
RSV RNA NPH QL NAA+NON-PROBE: NOT DETECTED
S PNEUM AG SPEC QL LA: NEGATIVE
SARS-COV-2 RNA NPH QL NAA+NON-PROBE: NOT DETECTED
SODIUM SERPL-SCNC: 142 MMOL/L (ref 136–145)
WBC NRBC COR # BLD AUTO: 12 10*3/MM3 (ref 3.4–10.8)
WBC NRBC COR # BLD AUTO: 16.1 10*3/MM3 (ref 3.4–10.8)

## 2023-12-25 PROCEDURE — 83735 ASSAY OF MAGNESIUM: CPT | Performed by: INTERNAL MEDICINE

## 2023-12-25 PROCEDURE — 83735 ASSAY OF MAGNESIUM: CPT | Performed by: HOSPITALIST

## 2023-12-25 PROCEDURE — 86037 ANCA TITER EACH ANTIBODY: CPT | Performed by: INTERNAL MEDICINE

## 2023-12-25 PROCEDURE — 82948 REAGENT STRIP/BLOOD GLUCOSE: CPT

## 2023-12-25 PROCEDURE — 80048 BASIC METABOLIC PNL TOTAL CA: CPT

## 2023-12-25 PROCEDURE — 99232 SBSQ HOSP IP/OBS MODERATE 35: CPT | Performed by: INTERNAL MEDICINE

## 2023-12-25 PROCEDURE — 0BJ08ZZ INSPECTION OF TRACHEOBRONCHIAL TREE, VIA NATURAL OR ARTIFICIAL OPENING ENDOSCOPIC: ICD-10-PCS | Performed by: INTERNAL MEDICINE

## 2023-12-25 PROCEDURE — 87070 CULTURE OTHR SPECIMN AEROBIC: CPT | Performed by: INTERNAL MEDICINE

## 2023-12-25 PROCEDURE — 94799 UNLISTED PULMONARY SVC/PX: CPT

## 2023-12-25 PROCEDURE — 25810000003 SODIUM CHLORIDE 0.9 % SOLUTION 250 ML FLEX CONT: Performed by: HOSPITALIST

## 2023-12-25 PROCEDURE — 25010000002 METHYLPREDNISOLONE PER 125 MG: Performed by: INTERNAL MEDICINE

## 2023-12-25 PROCEDURE — 85610 PROTHROMBIN TIME: CPT | Performed by: INTERNAL MEDICINE

## 2023-12-25 PROCEDURE — 83516 IMMUNOASSAY NONANTIBODY: CPT | Performed by: INTERNAL MEDICINE

## 2023-12-25 PROCEDURE — 87449 NOS EACH ORGANISM AG IA: CPT | Performed by: HOSPITALIST

## 2023-12-25 PROCEDURE — 86038 ANTINUCLEAR ANTIBODIES: CPT | Performed by: INTERNAL MEDICINE

## 2023-12-25 PROCEDURE — 93010 ELECTROCARDIOGRAM REPORT: CPT | Performed by: INTERNAL MEDICINE

## 2023-12-25 PROCEDURE — 25010000002 AMIODARONE IN DEXTROSE 5% 150-4.21 MG/100ML-% SOLUTION: Performed by: INTERNAL MEDICINE

## 2023-12-25 PROCEDURE — 87102 FUNGUS ISOLATION CULTURE: CPT | Performed by: INTERNAL MEDICINE

## 2023-12-25 PROCEDURE — 25010000002 PROPOFOL 200 MG/20ML EMULSION: Performed by: ANESTHESIOLOGY

## 2023-12-25 PROCEDURE — 25010000002 AMIODARONE IN DEXTROSE 5% 360-4.14 MG/200ML-% SOLUTION: Performed by: INTERNAL MEDICINE

## 2023-12-25 PROCEDURE — 87070 CULTURE OTHR SPECIMN AEROBIC: CPT | Performed by: HOSPITALIST

## 2023-12-25 PROCEDURE — 85025 COMPLETE CBC W/AUTO DIFF WBC: CPT

## 2023-12-25 PROCEDURE — 87205 SMEAR GRAM STAIN: CPT | Performed by: INTERNAL MEDICINE

## 2023-12-25 PROCEDURE — 25010000002 AMPICILLIN-SULBACTAM PER 1.5 G: Performed by: INTERNAL MEDICINE

## 2023-12-25 PROCEDURE — 88108 CYTOPATH CONCENTRATE TECH: CPT | Performed by: INTERNAL MEDICINE

## 2023-12-25 PROCEDURE — 0202U NFCT DS 22 TRGT SARS-COV-2: CPT | Performed by: INTERNAL MEDICINE

## 2023-12-25 PROCEDURE — 25010000002 AZITHROMYCIN PER 500 MG: Performed by: HOSPITALIST

## 2023-12-25 PROCEDURE — 87116 MYCOBACTERIA CULTURE: CPT | Performed by: INTERNAL MEDICINE

## 2023-12-25 PROCEDURE — 87206 SMEAR FLUORESCENT/ACID STAI: CPT | Performed by: INTERNAL MEDICINE

## 2023-12-25 PROCEDURE — 36415 COLL VENOUS BLD VENIPUNCTURE: CPT

## 2023-12-25 PROCEDURE — 87205 SMEAR GRAM STAIN: CPT | Performed by: HOSPITALIST

## 2023-12-25 PROCEDURE — 93005 ELECTROCARDIOGRAM TRACING: CPT | Performed by: HOSPITALIST

## 2023-12-25 PROCEDURE — 93005 ELECTROCARDIOGRAM TRACING: CPT | Performed by: INTERNAL MEDICINE

## 2023-12-25 PROCEDURE — 87798 DETECT AGENT NOS DNA AMP: CPT | Performed by: INTERNAL MEDICINE

## 2023-12-25 PROCEDURE — 25810000003 SODIUM CHLORIDE 0.9 % SOLUTION: Performed by: ANESTHESIOLOGY

## 2023-12-25 RX ORDER — SODIUM CHLORIDE FOR INHALATION 0.9 %
3 VIAL, NEBULIZER (ML) INHALATION
Status: DISCONTINUED | OUTPATIENT
Start: 2023-12-25 | End: 2023-12-28

## 2023-12-25 RX ORDER — LIDOCAINE 50 MG/G
OINTMENT TOPICAL AS NEEDED
Status: DISCONTINUED | OUTPATIENT
Start: 2023-12-25 | End: 2023-12-25 | Stop reason: HOSPADM

## 2023-12-25 RX ORDER — SODIUM CHLORIDE 9 MG/ML
INJECTION, SOLUTION INTRAVENOUS CONTINUOUS PRN
Status: DISCONTINUED | OUTPATIENT
Start: 2023-12-25 | End: 2023-12-25 | Stop reason: SURG

## 2023-12-25 RX ORDER — LIDOCAINE HYDROCHLORIDE 20 MG/ML
INJECTION, SOLUTION INFILTRATION; PERINEURAL AS NEEDED
Status: DISCONTINUED | OUTPATIENT
Start: 2023-12-25 | End: 2023-12-25 | Stop reason: HOSPADM

## 2023-12-25 RX ORDER — PHENYLEPHRINE HCL IN 0.9% NACL 1 MG/10 ML
SYRINGE (ML) INTRAVENOUS AS NEEDED
Status: DISCONTINUED | OUTPATIENT
Start: 2023-12-25 | End: 2023-12-25 | Stop reason: SURG

## 2023-12-25 RX ORDER — METHYLPREDNISOLONE SODIUM SUCCINATE 125 MG/2ML
80 INJECTION, POWDER, LYOPHILIZED, FOR SOLUTION INTRAMUSCULAR; INTRAVENOUS EVERY 8 HOURS
Status: DISCONTINUED | OUTPATIENT
Start: 2023-12-25 | End: 2023-12-27

## 2023-12-25 RX ORDER — LIDOCAINE HYDROCHLORIDE 10 MG/ML
INJECTION, SOLUTION EPIDURAL; INFILTRATION; INTRACAUDAL; PERINEURAL AS NEEDED
Status: DISCONTINUED | OUTPATIENT
Start: 2023-12-25 | End: 2023-12-25 | Stop reason: SURG

## 2023-12-25 RX ORDER — PROPOFOL 10 MG/ML
INJECTION, EMULSION INTRAVENOUS AS NEEDED
Status: DISCONTINUED | OUTPATIENT
Start: 2023-12-25 | End: 2023-12-25 | Stop reason: SURG

## 2023-12-25 RX ORDER — TRANEXAMIC ACID 100 MG/ML
500 INJECTION, SOLUTION INTRAVENOUS ONCE
Status: COMPLETED | OUTPATIENT
Start: 2023-12-25 | End: 2023-12-25

## 2023-12-25 RX ORDER — GLYCOPYRROLATE 0.2 MG/ML
INJECTION INTRAMUSCULAR; INTRAVENOUS AS NEEDED
Status: DISCONTINUED | OUTPATIENT
Start: 2023-12-25 | End: 2023-12-25 | Stop reason: SURG

## 2023-12-25 RX ADMIN — RACEPINEPHRINE HYDROCHLORIDE 0.5 ML: 11.25 SOLUTION RESPIRATORY (INHALATION) at 15:04

## 2023-12-25 RX ADMIN — AMIODARONE HYDROCHLORIDE 0.5 MG/MIN: 1.8 INJECTION, SOLUTION INTRAVENOUS at 18:17

## 2023-12-25 RX ADMIN — Medication 3 ML: at 10:06

## 2023-12-25 RX ADMIN — AMPICILLIN SODIUM AND SULBACTAM SODIUM 3 G: 2; 1 INJECTION, POWDER, FOR SOLUTION INTRAMUSCULAR; INTRAVENOUS at 18:17

## 2023-12-25 RX ADMIN — Medication 10 ML: at 09:56

## 2023-12-25 RX ADMIN — RACEPINEPHRINE HYDROCHLORIDE 0.5 ML: 11.25 SOLUTION RESPIRATORY (INHALATION) at 10:02

## 2023-12-25 RX ADMIN — HYDROCHLOROTHIAZIDE 25 MG: 25 TABLET ORAL at 15:28

## 2023-12-25 RX ADMIN — SODIUM CHLORIDE: 9 INJECTION, SOLUTION INTRAVENOUS at 10:53

## 2023-12-25 RX ADMIN — MONTELUKAST 10 MG: 10 TABLET, FILM COATED ORAL at 20:52

## 2023-12-25 RX ADMIN — Medication 100 MCG: at 10:59

## 2023-12-25 RX ADMIN — PROPOFOL 30 MG: 10 INJECTION, EMULSION INTRAVENOUS at 11:01

## 2023-12-25 RX ADMIN — AMIODARONE HYDROCHLORIDE 1 MG/MIN: 1.8 INJECTION, SOLUTION INTRAVENOUS at 12:37

## 2023-12-25 RX ADMIN — PAROXETINE HYDROCHLORIDE 20 MG: 20 TABLET, FILM COATED ORAL at 15:24

## 2023-12-25 RX ADMIN — METHYLPREDNISOLONE SODIUM SUCCINATE 80 MG: 125 INJECTION, POWDER, FOR SOLUTION INTRAMUSCULAR; INTRAVENOUS at 09:56

## 2023-12-25 RX ADMIN — PANTOPRAZOLE SODIUM 40 MG: 40 INJECTION, POWDER, FOR SOLUTION INTRAVENOUS at 05:01

## 2023-12-25 RX ADMIN — BUDESONIDE AND FORMOTEROL FUMARATE DIHYDRATE 2 PUFF: 160; 4.5 AEROSOL RESPIRATORY (INHALATION) at 18:24

## 2023-12-25 RX ADMIN — GLYCOPYRROLATE 0.1 MG: 0.2 INJECTION INTRAMUSCULAR; INTRAVENOUS at 10:57

## 2023-12-25 RX ADMIN — PROPOFOL 100 MG: 10 INJECTION, EMULSION INTRAVENOUS at 10:57

## 2023-12-25 RX ADMIN — Medication 10 ML: at 09:57

## 2023-12-25 RX ADMIN — ATORVASTATIN CALCIUM 10 MG: 10 TABLET, FILM COATED ORAL at 20:53

## 2023-12-25 RX ADMIN — AZITHROMYCIN MONOHYDRATE 500 MG: 500 INJECTION, POWDER, LYOPHILIZED, FOR SOLUTION INTRAVENOUS at 19:23

## 2023-12-25 RX ADMIN — BUDESONIDE AND FORMOTEROL FUMARATE DIHYDRATE 2 PUFF: 160; 4.5 AEROSOL RESPIRATORY (INHALATION) at 08:24

## 2023-12-25 RX ADMIN — TIOTROPIUM BROMIDE INHALATION SPRAY 2 PUFF: 3.12 SPRAY, METERED RESPIRATORY (INHALATION) at 08:33

## 2023-12-25 RX ADMIN — RACEPINEPHRINE HYDROCHLORIDE 0.5 ML: 11.25 SOLUTION RESPIRATORY (INHALATION) at 18:24

## 2023-12-25 RX ADMIN — TRANEXAMIC ACID 500 MG: 1 INJECTION, SOLUTION INTRAVENOUS at 10:17

## 2023-12-25 RX ADMIN — PRAMIPEXOLE DIHYDROCHLORIDE 0.25 MG: 0.5 TABLET ORAL at 20:53

## 2023-12-25 RX ADMIN — Medication 3 ML: at 15:07

## 2023-12-25 RX ADMIN — Medication 10 ML: at 20:52

## 2023-12-25 RX ADMIN — LIDOCAINE HYDROCHLORIDE 50 MG: 10 INJECTION, SOLUTION EPIDURAL; INFILTRATION; INTRACAUDAL; PERINEURAL at 10:57

## 2023-12-25 RX ADMIN — METHYLPREDNISOLONE SODIUM SUCCINATE 80 MG: 125 INJECTION, POWDER, FOR SOLUTION INTRAMUSCULAR; INTRAVENOUS at 18:16

## 2023-12-25 RX ADMIN — AMIODARONE HYDROCHLORIDE 150 MG: 1.5 INJECTION, SOLUTION INTRAVENOUS at 12:35

## 2023-12-25 RX ADMIN — AMPICILLIN SODIUM AND SULBACTAM SODIUM 3 G: 2; 1 INJECTION, POWDER, FOR SOLUTION INTRAMUSCULAR; INTRAVENOUS at 09:56
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
## 2023-12-25 NOTE — NURSING NOTE
Patient continues to cough up bright red blood. Spoke with on-call MD and we placed a pulmonology consult for today. Made NPO incase they do a bronch.
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
## 2023-12-25 NOTE — PROGRESS NOTES
"HOSPITALIST PROGRESS NOTE     12/25/2023      Clinical Summary / Reason for Hospitalization     Krystyna Bennett is a 78 y.o. female admitted to hospital on 12/23/2023 with c/o Chest pain.      Admitted on 12/23/2023. Today is hospital day 1.     Subjective     Krystyna Bennett seen and examined this morning.  Patient with multiple episodes of hemoptysis overnight.  Complaining of generalized weakness and fatigue and intermittent cough.      Interval / Overnight Issues     Objective     Vital Signs     /49   Pulse (!) 125   Temp 98.7 °F (37.1 °C) (Oral)   Resp 21   Ht 167.6 cm (66\")   Wt 83.5 kg (184 lb 1.4 oz)   LMP  (LMP Unknown)   SpO2 98%   BMI 29.71 kg/m²      Input / Output       Intake/Output Summary (Last 24 hours) at 12/25/2023 1245  Last data filed at 12/25/2023 1106  Gross per 24 hour   Intake 100 ml   Output 20 ml   Net 80 ml        Physical Exam     General : Patient lying in bed in no acute distress.      HEENT : Normocephalic atraumatic. Neck supple, no JVP   PERRLA, EOM intact. Throat clear, mucous membranes moist   CVS : S1 + S2 regular, No R/M/G     Chest : Clear to auscultation bilaterally    Abdomen : Soft, nontender, bowel sounds are present, no organomegaly   Extremities : No clubbing, cyanosis, edema    Neurologic exam: Patient is alert awake oriented ×3, moving all extremities     Current Medications     Scheduled Meds:ampicillin-sulbactam, 3 g, Intravenous, Q8H  aspirin, 162 mg, Oral, Daily  atorvastatin, 10 mg, Oral, Daily  azithromycin, 500 mg, Intravenous, Q24H  budesonide-formoterol, 2 puff, Inhalation, BID - RT   And  tiotropium bromide monohydrate, 2 puff, Inhalation, Daily - RT  hydroCHLOROthiazide, 25 mg, Oral, Daily  lisinopril, 40 mg, Oral, Daily  methylPREDNISolone sodium succinate, 80 mg, Intravenous, Q8H  metoprolol succinate XL, 25 mg, Oral, Daily  montelukast, 10 mg, Oral, Nightly  multivitamin with minerals, 1 tablet, Oral, Daily  pantoprazole, 40 mg, Intravenous, Q " AM  PARoxetine, 20 mg, Oral, Daily  pramipexole, 0.25 mg, Oral, Daily  racemic epinephrine, 0.5 mL, Nebulization, TID - RT   And  sodium chloride, 3 mL, Nebulization, TID - RT  sodium chloride, 10 mL, Intravenous, Q12H  sodium chloride, 10 mL, Intravenous, Q12H  vitamin B-12, 1,000 mcg, Oral, Daily      Continuous Infusions:amiodarone, 1 mg/min, Last Rate: 1 mg/min (12/25/23 1237)   Followed by  amiodarone, 0.5 mg/min      PRN Meds:.  acetaminophen **OR** acetaminophen **OR** acetaminophen    albuterol    senna-docusate sodium **AND** polyethylene glycol **AND** bisacodyl **AND** bisacodyl    melatonin    metoprolol tartrate    nitroglycerin    ondansetron    prochlorperazine    sodium chloride    [COMPLETED] Insert Peripheral IV **AND** sodium chloride    sodium chloride    sodium chloride    sodium chloride    sodium chloride      Labs & Imaging     Results from last 7 days   Lab Units 12/25/23  0503 12/24/23  0028 12/23/23 2039   WBC 10*3/mm3 16.10*   < > 12.50*   HEMOGLOBIN g/dL 8.5*   < > 11.8*   HEMOGLOBIN, POC   --    < >  --    HEMATOCRIT % 25.7*   < > 37.5   HEMATOCRIT POC   --    < >  --    PLATELETS 10*3/mm3 196   < > 295   GLUCOSE mg/dL 108*   < > 150*   CREATININE mg/dL 0.71   < > 0.88   BUN mg/dL 20   < > 24*   SODIUM mmol/L 142   < > 142   POTASSIUM mmol/L 3.8   < > 3.7   AST (SGOT) U/L  --   --  21   ALT (SGPT) U/L  --   --  18   ALK PHOS U/L  --   --  93   BILIRUBIN mg/dL  --   --  0.3   ANION GAP mmol/L 6.0   < > 11.0    < > = values in this interval not displayed.       CT Angiogram Chest    Result Date: 12/24/2023  1.No evidence of pulmonary embolus. 2.Bibasilar pneumonia. 3.Severe emphysema. 4.Moderate size hiatal hernia. 5.Hepatic and renal cysts. Electronically Signed: Florida Wilde MD  12/24/2023 3:45 PM EST  Workstation ID: [id number]    XR Chest 1 View    Result Date: 12/23/2023  Impression: Cardiomegaly and emphysema. No active disease. Electronically Signed: Seng Paniagua MD  12/23/2023 8:46  PM EST  Workstation ID: [id number]     I reviewed the patient's new clinical results.    ASSESSMENT AND PLAN:    Krystyna Bennett is a 78 y.o. female with past medical history of hypertension, GERD, asthma/COPD, chronic hypoxic respiratory failure secondary to COPD on home oxygen 4 L via nasal cannula, mood disorder, hyperlipidemia, past history of systolic congestive heart failure which subsequently improved in subsequent echo with ejection fraction of 55 to 60% presented to emergency room with complaints of chest pain radiating to her arms and back associated with nausea and diaphoresis.  Patient had a cardiac cath a year ago.  Troponins are elevated.  D-dimer is normal.  Lipase is normal.  CT angiogram of the chest showed No evidence of pulmonary embolus. Bibasilar pneumonia.  Severe emphysema. Moderate size hiatal hernia. Hepatic and renal cysts.    Patient admitted with    Chest pain: Troponins are mildly elevated.  Patient seen by cardiology.  Given patient's normal coronaries a year ago clinical suspicion for cardiac etiology is low.    Will rule out gastrointestinal etiology including biliary colic versus pancreatitis versus GERD versus musculoskeletal pain.    Continue analgesics as needed for pain.  Continue supplemental oxygen.      Acute on chronic hypoxic respiratory failure secondary to COPD exacerbation due to bibasilar pneumonia likely aspiration pneumonia.  Continue Unasyn.    Continue Singulair, Symbicort and Spiriva inhaler.  Continue albuterol nebulizer treatment as needed.    Atrial fibrillation with rapid ventricular response: Patient given loading dose of amiodarone and started on amiodarone drip     Hemoptysis: Etiology unclear but per pulmonary there is suspicion for aspiration of gastric contents. Patient is scheduled for bronc today.    Pulmonary consult and follow-up appreciated.  Further plan will depend on the findings on bronc and pulmonary recommendations.  Patient is on Acalabrutinib  which can cause hemoptysis.     Hypertension: Continue hydrochlorothiazide, metoprolol and lisinopril.    Mood disorder: Continue paroxetine    DVT Prophylaxis: Lovenox subcu    Code Status:   Code Status and Medical Interventions:   Ordered at: 12/23/23 2300     Code Status (Patient has no pulse and is not breathing):    CPR (Attempt to Resuscitate)     Medical Interventions (Patient has pulse or is breathing):    Full Support        Disposition (Where, When):  TBD/ When medically ready     Primary Emergency Contact: ASHLEY WHITE (POA)   I spoke to patient's daughter Ashley who is the power of , at [phone], answered all her questions and concerns, communicated all test results, diagnosis, treatment and plan.    Copied text in this note has been reviewed and is accurate as of 12/25/2023.    Sudhakar Keller MD   Hospitalist

## 2023-12-25 NOTE — NURSING NOTE
Patient triggered severe sepsis. MD Hayward notified per protocol. Patient is stable at this time. Already receiving atb for pneumonia.

## 2023-12-25 NOTE — PROGRESS NOTES
Transexamic  acid  500 mg  delivered  via  nebulizer  inhalation  as  ordered  by  Dr ANKIT Magallon.

## 2023-12-25 NOTE — OP NOTE
Bronchoscopy Procedure Note    Timeout was done appropriately by staff    Procedure:  Bronchoscopy, Therapeutic removal of blood secretions bilateral lower lobes  Therapeutic washings bilateral lungs    Preoperative Diagnosis: Massive hemoptysis    Postoperative Diagnosis:     Significant bloody secretions noted in the entire bronchial tree and especially bilateral lower lobes status post therapeutic bilateral lung washing  no foreign bodies    Anesthesia: Moderate Sedation    Procedure Details: Patient was consented for the procedure with all risks and benefits of the procedure explained in detail.  Patient was given the opportunity to ask questions and all concerns were answered.  The bronchocope was inserted into the main airway via the oropharynx. An anatomical survey was done of the main airways and the subsegmental bronchus to at least the first subsegmental level of all five lobes of both lungs.  The findings are reported below.  A bronchoalveolar lavage was performed using aliquots of normal saline instilled into the airways then aspirated back.    Findings:  Bronchoscope passed into oral cavity to the level of the vocal cords.  Lidocaine used for local anesthetic over vocal cords.  Bronchoscope was passed between the vocal cords into the trachea.  All airways were visualized to at least the first subsegment level of all 5 lobes of both lungs.      Significant bloody secretions noted in the entire bronchial tree and especially bilateral lower lobes status post therapeutic bilateral lung washing  no foreign bodies    Patient tolerated procedure well        Estimated Blood Loss:  Minimal           Specimens:  Sent serosanguinous fluid                Complications:  None; patient tolerated the procedure well.           Disposition: PACU - hemodynamically stable.      Patient tolerated the procedure well.    Zuly Magallon MD  12/25/2023  11:06 EST

## 2023-12-25 NOTE — PLAN OF CARE
Goal Outcome Evaluation:  Plan of Care Reviewed With: patient        Progress: no change  Outcome Evaluation: Patient continues on 60 liters airvo. Patient did cough up some blood at the begining of the shift. IV atb given. Heart rate was maintaining in the 120's when I came on shift. Got an order for IV Metoprolol which did help. She triggered severe sepsis, MD notified. Patient's heart rate is currently 89. Cardio following. They are wanting to do a heart cath at some point.

## 2023-12-25 NOTE — PROGRESS NOTES
Paitent continues  on  Airvo  at  60 lpm  and  60 % fio2  to  maintain  o2  saturations at or above  94%.  Patient  is  coughing  up  moderate  to  large  amounts  of  bloody  sputum.
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
## 2023-12-26 ENCOUNTER — APPOINTMENT (OUTPATIENT)
Dept: CARDIOLOGY | Facility: HOSPITAL | Age: 78
End: 2023-12-26
Payer: MEDICARE

## 2023-12-26 LAB
ANION GAP SERPL CALCULATED.3IONS-SCNC: 11 MMOL/L (ref 5–15)
BH CV ECHO MEAS - ACS: 2.2 CM
BH CV ECHO MEAS - AO MAX PG: 18.1 MMHG
BH CV ECHO MEAS - AO MEAN PG: 10 MMHG
BH CV ECHO MEAS - AO ROOT DIAM: 3.1 CM
BH CV ECHO MEAS - AO V2 MAX: 213 CM/SEC
BH CV ECHO MEAS - AO V2 VTI: 44.8 CM
BH CV ECHO MEAS - AVA(I,D): 1.38 CM2
BH CV ECHO MEAS - EDV(CUBED): 117.6 ML
BH CV ECHO MEAS - EDV(MOD-SP4): 101 ML
BH CV ECHO MEAS - EF(MOD-BP): 66.3 %
BH CV ECHO MEAS - EF(MOD-SP4): 63.3 %
BH CV ECHO MEAS - ESV(CUBED): 27 ML
BH CV ECHO MEAS - ESV(MOD-SP4): 37.1 ML
BH CV ECHO MEAS - FS: 38.8 %
BH CV ECHO MEAS - IVS/LVPW: 1.1 CM
BH CV ECHO MEAS - IVSD: 1.1 CM
BH CV ECHO MEAS - LA DIMENSION: 4.7 CM
BH CV ECHO MEAS - LAT PEAK E' VEL: 14.6 CM/SEC
BH CV ECHO MEAS - LV DIASTOLIC VOL/BSA (35-75): 52.9 CM2
BH CV ECHO MEAS - LV MASS(C)D: 188.1 GRAMS
BH CV ECHO MEAS - LV MAX PG: 3.1 MMHG
BH CV ECHO MEAS - LV MEAN PG: 2 MMHG
BH CV ECHO MEAS - LV SYSTOLIC VOL/BSA (12-30): 19.4 CM2
BH CV ECHO MEAS - LV V1 MAX: 88 CM/SEC
BH CV ECHO MEAS - LV V1 VTI: 19.7 CM
BH CV ECHO MEAS - LVIDD: 4.9 CM
BH CV ECHO MEAS - LVIDS: 3 CM
BH CV ECHO MEAS - LVOT AREA: 3.1 CM2
BH CV ECHO MEAS - LVOT DIAM: 2 CM
BH CV ECHO MEAS - LVPWD: 1 CM
BH CV ECHO MEAS - MED PEAK E' VEL: 9.6 CM/SEC
BH CV ECHO MEAS - MR MAX PG: 66.6 MMHG
BH CV ECHO MEAS - MR MAX VEL: 408 CM/SEC
BH CV ECHO MEAS - MV A MAX VEL: 120 CM/SEC
BH CV ECHO MEAS - MV DEC SLOPE: 467 CM/SEC2
BH CV ECHO MEAS - MV DEC TIME: 0.26 SEC
BH CV ECHO MEAS - MV E MAX VEL: 104 CM/SEC
BH CV ECHO MEAS - MV E/A: 0.87
BH CV ECHO MEAS - MV MAX PG: 7 MMHG
BH CV ECHO MEAS - MV MEAN PG: 3 MMHG
BH CV ECHO MEAS - MV P1/2T: 77.8 MSEC
BH CV ECHO MEAS - MV V2 VTI: 41.3 CM
BH CV ECHO MEAS - MVA(P1/2T): 2.8 CM2
BH CV ECHO MEAS - MVA(VTI): 1.5 CM2
BH CV ECHO MEAS - PA ACC TIME: 0.15 SEC
BH CV ECHO MEAS - PA V2 MAX: 133 CM/SEC
BH CV ECHO MEAS - PULM A REVS DUR: 0.14 SEC
BH CV ECHO MEAS - PULM A REVS VEL: 39.4 CM/SEC
BH CV ECHO MEAS - PULM DIAS VEL: 54.6 CM/SEC
BH CV ECHO MEAS - PULM S/D: 1.57
BH CV ECHO MEAS - PULM SYS VEL: 85.7 CM/SEC
BH CV ECHO MEAS - RV MAX PG: 1.04 MMHG
BH CV ECHO MEAS - RV V1 MAX: 51.1 CM/SEC
BH CV ECHO MEAS - RV V1 VTI: 11.1 CM
BH CV ECHO MEAS - RVDD: 4 CM
BH CV ECHO MEAS - SI(MOD-SP4): 33.5 ML/M2
BH CV ECHO MEAS - SV(LVOT): 61.9 ML
BH CV ECHO MEAS - SV(MOD-SP4): 63.9 ML
BH CV ECHO MEAS - TAPSE (>1.6): 3.1 CM
BH CV ECHO MEAS - TR MAX PG: 46.2 MMHG
BH CV ECHO MEAS - TR MAX VEL: 340 CM/SEC
BH CV ECHO MEASUREMENTS AVERAGE E/E' RATIO: 8.6
BH CV XLRA - RV BASE: 4.1 CM
BH CV XLRA - RV LENGTH: 8.2 CM
BH CV XLRA - RV MID: 3.5 CM
BH CV XLRA - TDI S': 13.7 CM/SEC
BUN SERPL-MCNC: 17 MG/DL (ref 8–23)
BUN/CREAT SERPL: 26.2 (ref 7–25)
CALCIUM SPEC-SCNC: 8.5 MG/DL (ref 8.6–10.5)
CHLORIDE SERPL-SCNC: 99 MMOL/L (ref 98–107)
CO2 SERPL-SCNC: 28 MMOL/L (ref 22–29)
CREAT SERPL-MCNC: 0.65 MG/DL (ref 0.57–1)
EGFRCR SERPLBLD CKD-EPI 2021: 91.4 ML/MIN/1.73
GLUCOSE SERPL-MCNC: 158 MG/DL (ref 65–99)
LEFT ATRIUM VOLUME INDEX: 28.1 ML/M2
MAGNESIUM SERPL-MCNC: 1.5 MG/DL (ref 1.6–2.4)
MAGNESIUM SERPL-MCNC: 2.5 MG/DL (ref 1.6–2.4)
POTASSIUM SERPL-SCNC: 3.7 MMOL/L (ref 3.5–5.2)
QT INTERVAL: 351 MS
QT INTERVAL: 398 MS
QTC INTERVAL: 491 MS
QTC INTERVAL: 507 MS
QTC INTERVAL: NORMAL MS
SODIUM SERPL-SCNC: 138 MMOL/L (ref 136–145)

## 2023-12-26 PROCEDURE — 97165 OT EVAL LOW COMPLEX 30 MIN: CPT

## 2023-12-26 PROCEDURE — 93306 TTE W/DOPPLER COMPLETE: CPT | Performed by: INTERNAL MEDICINE

## 2023-12-26 PROCEDURE — 93010 ELECTROCARDIOGRAM REPORT: CPT | Performed by: INTERNAL MEDICINE

## 2023-12-26 PROCEDURE — 93005 ELECTROCARDIOGRAM TRACING: CPT | Performed by: INTERNAL MEDICINE

## 2023-12-26 PROCEDURE — 25010000002 MAGNESIUM SULFATE 2 GM/50ML SOLUTION: Performed by: HOSPITALIST

## 2023-12-26 PROCEDURE — 25010000002 AMIODARONE IN DEXTROSE 5% 360-4.14 MG/200ML-% SOLUTION: Performed by: INTERNAL MEDICINE

## 2023-12-26 PROCEDURE — 94799 UNLISTED PULMONARY SVC/PX: CPT

## 2023-12-26 PROCEDURE — 99232 SBSQ HOSP IP/OBS MODERATE 35: CPT | Performed by: INTERNAL MEDICINE

## 2023-12-26 PROCEDURE — 94664 DEMO&/EVAL PT USE INHALER: CPT

## 2023-12-26 PROCEDURE — 93356 MYOCRD STRAIN IMG SPCKL TRCK: CPT

## 2023-12-26 PROCEDURE — 25010000002 AMPICILLIN-SULBACTAM PER 1.5 G: Performed by: INTERNAL MEDICINE

## 2023-12-26 PROCEDURE — 25010000002 METHYLPREDNISOLONE PER 125 MG: Performed by: INTERNAL MEDICINE

## 2023-12-26 PROCEDURE — 83735 ASSAY OF MAGNESIUM: CPT | Performed by: HOSPITALIST

## 2023-12-26 PROCEDURE — 93356 MYOCRD STRAIN IMG SPCKL TRCK: CPT | Performed by: INTERNAL MEDICINE

## 2023-12-26 PROCEDURE — 94761 N-INVAS EAR/PLS OXIMETRY MLT: CPT

## 2023-12-26 PROCEDURE — 97162 PT EVAL MOD COMPLEX 30 MIN: CPT | Performed by: PHYSICAL THERAPIST

## 2023-12-26 PROCEDURE — 93306 TTE W/DOPPLER COMPLETE: CPT

## 2023-12-26 RX ORDER — MAGNESIUM SULFATE HEPTAHYDRATE 40 MG/ML
2 INJECTION, SOLUTION INTRAVENOUS
Qty: 150 ML | Refills: 0 | Status: COMPLETED | OUTPATIENT
Start: 2023-12-26 | End: 2023-12-26

## 2023-12-26 RX ORDER — AMIODARONE HYDROCHLORIDE 200 MG/1
200 TABLET ORAL 2 TIMES DAILY WITH MEALS
Status: DISCONTINUED | OUTPATIENT
Start: 2023-12-26 | End: 2024-01-02 | Stop reason: HOSPADM

## 2023-12-26 RX ORDER — AZITHROMYCIN 250 MG/1
500 TABLET, FILM COATED ORAL ONCE
Status: COMPLETED | OUTPATIENT
Start: 2023-12-26 | End: 2023-12-26

## 2023-12-26 RX ORDER — PANTOPRAZOLE SODIUM 40 MG/1
40 TABLET, DELAYED RELEASE ORAL
Status: DISCONTINUED | OUTPATIENT
Start: 2023-12-27 | End: 2024-01-02 | Stop reason: HOSPADM

## 2023-12-26 RX ADMIN — RACEPINEPHRINE HYDROCHLORIDE 0.5 ML: 11.25 SOLUTION RESPIRATORY (INHALATION) at 15:00

## 2023-12-26 RX ADMIN — AMIODARONE HYDROCHLORIDE 200 MG: 200 TABLET ORAL at 17:38

## 2023-12-26 RX ADMIN — METOPROLOL SUCCINATE 25 MG: 25 TABLET, EXTENDED RELEASE ORAL at 07:48

## 2023-12-26 RX ADMIN — TIOTROPIUM BROMIDE INHALATION SPRAY 2 PUFF: 3.12 SPRAY, METERED RESPIRATORY (INHALATION) at 06:36

## 2023-12-26 RX ADMIN — MONTELUKAST 10 MG: 10 TABLET, FILM COATED ORAL at 21:20

## 2023-12-26 RX ADMIN — HYDROCHLOROTHIAZIDE 25 MG: 25 TABLET ORAL at 07:48

## 2023-12-26 RX ADMIN — AMIODARONE HYDROCHLORIDE 0.5 MG/MIN: 1.8 INJECTION, SOLUTION INTRAVENOUS at 03:02

## 2023-12-26 RX ADMIN — RACEPINEPHRINE HYDROCHLORIDE 0.5 ML: 11.25 SOLUTION RESPIRATORY (INHALATION) at 18:45

## 2023-12-26 RX ADMIN — Medication 10 ML: at 21:21

## 2023-12-26 RX ADMIN — RACEPINEPHRINE HYDROCHLORIDE 0.5 ML: 11.25 SOLUTION RESPIRATORY (INHALATION) at 06:38

## 2023-12-26 RX ADMIN — LISINOPRIL 40 MG: 20 TABLET ORAL at 07:48

## 2023-12-26 RX ADMIN — METHYLPREDNISOLONE SODIUM SUCCINATE 80 MG: 125 INJECTION, POWDER, FOR SOLUTION INTRAMUSCULAR; INTRAVENOUS at 07:47

## 2023-12-26 RX ADMIN — Medication 10 ML: at 08:02

## 2023-12-26 RX ADMIN — AMPICILLIN SODIUM AND SULBACTAM SODIUM 3 G: 2; 1 INJECTION, POWDER, FOR SOLUTION INTRAMUSCULAR; INTRAVENOUS at 09:40

## 2023-12-26 RX ADMIN — MAGNESIUM SULFATE HEPTAHYDRATE 2 G: 40 INJECTION, SOLUTION INTRAVENOUS at 05:37

## 2023-12-26 RX ADMIN — METHYLPREDNISOLONE SODIUM SUCCINATE 80 MG: 125 INJECTION, POWDER, FOR SOLUTION INTRAMUSCULAR; INTRAVENOUS at 02:31

## 2023-12-26 RX ADMIN — ATORVASTATIN CALCIUM 10 MG: 10 TABLET, FILM COATED ORAL at 21:20

## 2023-12-26 RX ADMIN — BUDESONIDE AND FORMOTEROL FUMARATE DIHYDRATE 2 PUFF: 160; 4.5 AEROSOL RESPIRATORY (INHALATION) at 18:49

## 2023-12-26 RX ADMIN — METHYLPREDNISOLONE SODIUM SUCCINATE 80 MG: 125 INJECTION, POWDER, FOR SOLUTION INTRAMUSCULAR; INTRAVENOUS at 17:38

## 2023-12-26 RX ADMIN — CYANOCOBALAMIN TAB 1000 MCG 1000 MCG: 1000 TAB at 07:48

## 2023-12-26 RX ADMIN — PANTOPRAZOLE SODIUM 40 MG: 40 INJECTION, POWDER, FOR SOLUTION INTRAVENOUS at 05:38

## 2023-12-26 RX ADMIN — BUDESONIDE AND FORMOTEROL FUMARATE DIHYDRATE 2 PUFF: 160; 4.5 AEROSOL RESPIRATORY (INHALATION) at 06:35

## 2023-12-26 RX ADMIN — AMPICILLIN SODIUM AND SULBACTAM SODIUM 3 G: 2; 1 INJECTION, POWDER, FOR SOLUTION INTRAMUSCULAR; INTRAVENOUS at 02:32

## 2023-12-26 RX ADMIN — PRAMIPEXOLE DIHYDROCHLORIDE 0.25 MG: 0.5 TABLET ORAL at 21:19

## 2023-12-26 RX ADMIN — AZITHROMYCIN DIHYDRATE 500 MG: 250 TABLET, FILM COATED ORAL at 17:38

## 2023-12-26 RX ADMIN — Medication 1 TABLET: at 07:48

## 2023-12-26 RX ADMIN — PAROXETINE HYDROCHLORIDE 20 MG: 20 TABLET, FILM COATED ORAL at 07:48

## 2023-12-26 RX ADMIN — MAGNESIUM SULFATE HEPTAHYDRATE 2 G: 40 INJECTION, SOLUTION INTRAVENOUS at 00:42

## 2023-12-26 RX ADMIN — AMPICILLIN SODIUM AND SULBACTAM SODIUM 3 G: 2; 1 INJECTION, POWDER, FOR SOLUTION INTRAMUSCULAR; INTRAVENOUS at 17:38

## 2023-12-26 RX ADMIN — AMIODARONE HYDROCHLORIDE 200 MG: 200 TABLET ORAL at 09:40

## 2023-12-26 RX ADMIN — MAGNESIUM SULFATE HEPTAHYDRATE 2 G: 40 INJECTION, SOLUTION INTRAVENOUS at 03:39
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
## 2023-12-26 NOTE — PLAN OF CARE
Goal Outcome Evaluation:            Pt continues to be in afib but rate controlled in the 70s.  Patient had a run of sinus rhythm this am but converted back to afib. Pt switched to po amio. Echo ordered.    Pt coughed up bloody sputum this am. Pt on airvo 50L 50%. Will continue to titrate oxygen as tolerated.     Pt up to the chair this afternoon and did well with physical therapy.       Problem: Adult Inpatient Plan of Care  Goal: Plan of Care Review  Outcome: Ongoing, Progressing  Goal: Patient-Specific Goal (Individualized)  Outcome: Ongoing, Progressing  Goal: Absence of Hospital-Acquired Illness or Injury  Outcome: Ongoing, Progressing  Intervention: Identify and Manage Fall Risk  Recent Flowsheet Documentation  Taken 12/26/2023 1400 by Luisa Vaughn RN  Safety Promotion/Fall Prevention:   activity supervised   clutter free environment maintained   nonskid shoes/slippers when out of bed   safety round/check completed  Taken 12/26/2023 1200 by Luisa Vaughn RN  Safety Promotion/Fall Prevention:   activity supervised   clutter free environment maintained   nonskid shoes/slippers when out of bed   safety round/check completed  Taken 12/26/2023 1000 by Luisa Vaughn RN  Safety Promotion/Fall Prevention:   activity supervised   clutter free environment maintained   nonskid shoes/slippers when out of bed   safety round/check completed  Taken 12/26/2023 0800 by Luisa Vaughn RN  Safety Promotion/Fall Prevention:   activity supervised   clutter free environment maintained   nonskid shoes/slippers when out of bed   safety round/check completed  Intervention: Prevent and Manage VTE (Venous Thromboembolism) Risk  Recent Flowsheet Documentation  Taken 12/26/2023 1400 by Luisa Vaughn RN  Activity Management: up in chair  Taken 12/26/2023 1200 by Luisa Vaughn RN  Activity Management: up in chair  Taken 12/26/2023 0800 by Luisa Vaughn RN  Activity Management: bedrest  Intervention:  Prevent Infection  Recent Flowsheet Documentation  Taken 12/26/2023 1400 by Luisa Vaughn, RN  Infection Prevention:   hand hygiene promoted   personal protective equipment utilized  Taken 12/26/2023 1200 by Luisa Vaughn RN  Infection Prevention:   hand hygiene promoted   personal protective equipment utilized  Taken 12/26/2023 1000 by Luisa Vaughn, RN  Infection Prevention:   hand hygiene promoted   personal protective equipment utilized  Goal: Optimal Comfort and Wellbeing  Outcome: Ongoing, Progressing  Goal: Readiness for Transition of Care  Outcome: Ongoing, Progressing

## 2023-12-26 NOTE — PROGRESS NOTES
"Daily Progress Note        Chest pain    Hemoptysis         Assessment:     Hemoptysis  Bronchoscopy 12/25/2023  Significant bloody secretions noted in the entire bronchial tree and especially bilateral lower lobes status post therapeutic bilateral lung washing  no foreign bodies    Bilateral lower lobe dense alveolar infiltrate, possible alveolar hemorrhage versus aspiration of gastric contents  Moderate hiatal hernia     CT scan of the chest August 2023 there was no alveolar infiltrate however advanced COPD changes noted  No PE     11/2022 was admitted for aspiration pneumonia.  States she \"choked on a vitamin.  Then vomited and choked\".  Reported shortness of breath and and coughing up Bloody sputum and with symptoms of feeling choking on swallowing pills.     Connective tissue work up (MISAEL/ANCA/Lupus) negative in November 2022     Chronic obstructive pulmonary disease, unspecified COPD type (CMS/HCC) (Primary)  Centrilobular emphysema (CMS/HCC)    FEV1 0.68 L which is 28% improved to 34% postbronchodilator  FEV1/FVC ratio 34  Expiratory reserve volume 84%   Residual volume 182%  Total lung capacity 127%  Diffusion capacity 21%.     Pulmonary function test was extremely hard on the patient she passed out 3 times during the test     CT scan of the chest 8/2/2023     1. Previously described new medial left lower lobe lung nodule has resolved in the interval suggesting it represented benign infectious/inflammatory nodule.  2. 10 mm right lower lobe and 6 mm subpleural left lower lobe noncalcified nodules are chronic findings, and are considered benign.  3. There is chronic appearing scarring posteriorly in the right lower lobe at the site of previous pneumonia. No acute airspace disease is seen.  4. Advanced emphysema.     CT scan of the chest February 2023  1. New 8 mm nodule in medial left lower lobe, recommend 3 month CT follow-up.  2. Right lower lobe linear consolidation likely developing scarring in the " setting of previously seen pneumonia, recommend attention on follow-up.  3. Right lower lobe 10 mm nodule stable from multiple prior studies.  3. Advanced emphysema, coronary artery calcifications, and and additional chronic findings above.     Systolic CHF, chronic (CMS/HCC) diastolic dysfunction  Pulmonary hypertension RVSP 47         Chronic respiratory failure with hypoxia (CMS/HCC)  - On home O2 4 Liters,      patient is immune-compromised with CLL treatment in oral chemo     Up-to-date on vaccinations for pneumonia, flu and COVID-19           Recommendations:     Hemoptysis most likely due to gastric aspiration and severe pneumonitis  Similar event happened in November 2022 on Thanksgiving day    Cannot rule out opportunistic infection since patient is immune compromised on oral chemotherapy for CLL     Steroids Solu-Medrol 80 mg IV every 8    Antibiotics changed Rocephin to Unasyn  3 days of. Azithromycin     S/p TXA nebulized  S/p epinephrine nebulized     Repeat MISAEL and ANCA                         LOS: 2 days     Subjective         Objective     Vital signs for last 24 hours:  Vitals:    12/26/23 0638 12/26/23 0642 12/26/23 0747 12/26/23 0900   BP:   152/69 101/42   BP Location:    Right arm   Patient Position:    Lying   Pulse: 69 71 114 83   Resp: 18 18  18   Temp:    98.2 °F (36.8 °C)   TempSrc:    Oral   SpO2: 95% 95% 92% 98%   Weight:       Height:           Intake/Output last 3 shifts:  I/O last 3 completed shifts:  In: 700 [P.O.:600; I.V.:100]  Out: 1395 [Urine:1375; Stool:20]  Intake/Output this shift:  I/O this shift:  In: 120 [P.O.:120]  Out: 225 [Urine:225]      Radiology  Imaging Results (Last 24 Hours)       ** No results found for the last 24 hours. **            Labs:  Results from last 7 days   Lab Units 12/25/23  2335   WBC 10*3/mm3 12.00*   HEMOGLOBIN g/dL 7.1*   HEMATOCRIT % 22.7*   PLATELETS 10*3/mm3 152     Results from last 7 days   Lab Units 12/25/23  2335 12/24/23  0028  12/23/23 2039   SODIUM mmol/L 138   < > 142   POTASSIUM mmol/L 3.7   < > 3.7   CHLORIDE mmol/L 99   < > 101   CO2 mmol/L 28.0   < > 30.0*   BUN mg/dL 17   < > 24*   CREATININE mg/dL 0.65   < > 0.88   CALCIUM mg/dL 8.5*   < > 9.5   BILIRUBIN mg/dL  --   --  0.3   ALK PHOS U/L  --   --  93   ALT (SGPT) U/L  --   --  18   AST (SGOT) U/L  --   --  21   GLUCOSE mg/dL 158*   < > 150*    < > = values in this interval not displayed.         Results from last 7 days   Lab Units 12/23/23 2039   ALBUMIN g/dL 4.1     Results from last 7 days   Lab Units 12/24/23  0810 12/24/23  0028 12/23/23 2039   HSTROP T ng/L 202* 278* 76*         Results from last 7 days   Lab Units 12/25/23  2335   MAGNESIUM mg/dL 1.5*     Results from last 7 days   Lab Units 12/25/23  0503   INR  1.03               Meds:   SCHEDULE  amiodarone, 200 mg, Oral, BID With Meals  ampicillin-sulbactam, 3 g, Intravenous, Q8H  [Held by provider] aspirin, 162 mg, Oral, Daily  atorvastatin, 10 mg, Oral, Daily  azithromycin, 500 mg, Intravenous, Q24H  budesonide-formoterol, 2 puff, Inhalation, BID - RT   And  tiotropium bromide monohydrate, 2 puff, Inhalation, Daily - RT  hydroCHLOROthiazide, 25 mg, Oral, Daily  lisinopril, 40 mg, Oral, Daily  methylPREDNISolone sodium succinate, 80 mg, Intravenous, Q8H  metoprolol succinate XL, 25 mg, Oral, Daily  montelukast, 10 mg, Oral, Nightly  multivitamin with minerals, 1 tablet, Oral, Daily  pantoprazole, 40 mg, Intravenous, Q AM  PARoxetine, 20 mg, Oral, Daily  pramipexole, 0.25 mg, Oral, Daily  racemic epinephrine, 0.5 mL, Nebulization, TID - RT   And  sodium chloride, 3 mL, Nebulization, TID - RT  sodium chloride, 10 mL, Intravenous, Q12H  sodium chloride, 10 mL, Intravenous, Q12H  vitamin B-12, 1,000 mcg, Oral, Daily      Infusions     PRNs    acetaminophen **OR** acetaminophen **OR** acetaminophen    albuterol    senna-docusate sodium **AND** polyethylene glycol **AND** bisacodyl **AND** bisacodyl    Calcium  Replacement - Follow Nurse / BPA Driven Protocol    Magnesium Standard Dose Replacement - Follow Nurse / BPA Driven Protocol    melatonin    metoprolol tartrate    nitroglycerin    ondansetron    Phosphorus Replacement - Follow Nurse / BPA Driven Protocol    Potassium Replacement - Follow Nurse / BPA Driven Protocol    prochlorperazine    sodium chloride    [COMPLETED] Insert Peripheral IV **AND** sodium chloride    sodium chloride    sodium chloride    sodium chloride    sodium chloride    Physical Exam:  Physical Exam  Cardiovascular:      Heart sounds: Murmur heard.   Pulmonary:      Effort: No respiratory distress.      Breath sounds: No stridor. Rhonchi and rales present. No wheezing.   Chest:      Chest wall: No tenderness.         ROS  Review of Systems   Respiratory:  Positive for cough and shortness of breath. Negative for wheezing and stridor.    Cardiovascular:  Positive for palpitations and leg swelling. Negative for chest pain.             Total time spent with patient greater than: 45 Minutes
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
## 2023-12-26 NOTE — PLAN OF CARE
Goal Outcome Evaluation:  Plan of Care Reviewed With: patient           Outcome Evaluation: Patient is a 77 y/o F who was admitted to [facility] on 12/23/23 with c/o midsternal chest pain with radiation of pain initially into BUE, but then was R>L.  Pt also had c/o diaphoresis and nausea.  Pt postive on sepsis screen, and found to have PNA.  Was concern for NSTEMI, and is being followed by cardiology.  RR called on 12/24 as pt was coughing up blood and found to be in more respiratory distress.  She is now on Airvo at 50L. Pt had a bronchoscopy on 12/25. PMHx includes COPD, h/o heart cath, Anemia, Asthma, GERD, HTN, Melanoma, Panlobular Emphysema, CKD III, and Systolic CHF.  At baseline pt reports independence with all mobility.  Cleans her home, but does have someone deep clean her home 1x/month.  She reports her daughter provides transportation to doctors visits and she has her groceries ordered through [employer].  During today's evaluation pt was independent with bed mobility, SBA/CGA with transfers, and CGA with gait without AD.  She did desaturate while on 50L Airvo into mid 80's.  Recovered quickly.  At this time PT will continue to follow pt in acute setting to improve her endurance, strength, and overall mobility.  PT recommends pt d/c home with home health PT at discharge. Pt will likely benefit from 6MWT prior to d/c.      Anticipated Discharge Disposition (PT): home with home health

## 2023-12-26 NOTE — CASE MANAGEMENT/SOCIAL WORK
Continued Stay Note  [facility]     Patient Name: Krystyna Bennett  MRN: [id number]  Today's Date: 12/26/2023    Admit Date: 12/23/2023    Plan: Home alone.  4L O2 with [place].  Watch for increased O2 needs.   Discharge Plan       Row Name 12/26/23 2647       Plan    Plan Home alone.  4L O2 with [place].  Watch for increased O2 needs.    Patient/Family in Agreement with Plan yes    Plan Comments Met  with patient and daughter at bedside.  Lives at home alone. Typically IADL.  Has shower chair, walker, cane at home.  If needed, daughter able to stay with patient.  Watch for increased O2 needs.  Current Coulds 4L.   Barriers to discharge.  Airvo 50L 50%.  DC amio gtt.   IV abx.  Possible future cath.  Pulm and cardio follwing.             Expected Discharge Date and Time       Expected Discharge Date Expected Discharge Time    Dec 27, 2023         Met with patient in room wearing PPE: mask.   Maintained distance greater than six feet and spent less than 15 minutes in the room.     Nicki Otero RN     Office Phone [phone]  Office Cell [phone]

## 2023-12-26 NOTE — PLAN OF CARE
Goal Outcome Evaluation:  Plan of Care Reviewed With: patient        Progress: no change  Outcome Evaluation: Patient alert and oriented, continues on 60 liters airvo, sats's 96%, patient continues Amio gtt@0.5mcg,HR in and out of afib, replaced magnesium, patient continues on IV ABX and IV steroids, patient has rested well this shift.
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
## 2023-12-26 NOTE — PLAN OF CARE
Goal Outcome Evaluation:  Plan of Care Reviewed With: patient        Progress: no change  Outcome Evaluation: Patient is a 79 y/o F who was admitted to [facility] on 12/23/23 with c/o midsternal chest pain with radiation of pain initially into BUE, but then was R>L. Pt also had c/o diaphoresis and nausea. Pt postive on sepsis screen, and found to have PNA. Pt. reuqires 50L airvo this date. Pt. completes bed mobility and SPS transfer EOB to armchair w/ CGA for safety, O2 sats > 90% throughout albeit patient reports fatigue w/ prolonged standing. Min A provided for all LB ADls. Anticipate d/c home w/ family support/assist at d/c w/ titration to baseline O2 needs. Will follow up w/ pt. 1-3x per week at [facility].      Anticipated Discharge Disposition (OT): home with assist

## 2023-12-26 NOTE — PROGRESS NOTES
Hospitalist Progress Note   Krystyna Bennett : 1945 MRN:[id number] LOS:2     Principal Problem: Chest pain     Reason for follow up: All the medical problems listed below    Summary     Krystyna Bennett is a 78 y.o. female with past medical history of hypertension, GERD, asthma/COPD, chronic hypoxic respiratory failure secondary to COPD on home oxygen 4 L via nasal cannula, mood disorder, hyperlipidemia, past history of systolic congestive heart failure which subsequently improved in subsequent echo with ejection fraction of 55 to 60% presented to emergency room with complaints of chest pain radiating to her arms and back associated with nausea and diaphoresis.  Patient had a cardiac cath a year ago.  Troponins are elevated.  D-dimer is normal.  Lipase is normal.  CT angiogram of the chest showed No evidence of pulmonary embolus. Bibasilar pneumonia.  Severe emphysema. Moderate size hiatal hernia. Hepatic and renal cysts.    Assessment / Plan     Hemoptysis status post bronchoscopy s/p bronchoscopy on 23   - Etiology unclear but per pulmonary there is suspicion for aspiration of gastric contents.  -bronchoscopy on 23 - Significant bloody secretions noted in the entire bronchial tree and especially bilateral lower lobes status post therapeutic bilateral lung washing  no foreign bodies  -Pulmonary consult and follow-up appreciated.  Further plan will depend on the findings on bronc and pulmonary recommendations.-  -Patient is on Acalabrutinib which can cause hemoptysis.     Acute on chronic hypoxic respiratory failure secondary to COPD exacerbation due to bibasilar pneumonia likely aspiration pneumonia  -Continue Unasyn.    -Continue Singulair, Symbicort and Spiriva inhaler.  Continue albuterol nebulizer treatment as needed.     Atrial fibrillation with rapid ventricular response: Patient given loading dose of amiodarone and started on amiodarone drip     Chest pain: Troponins are mildly elevated.   Patient seen by cardiology.  Given patient's normal coronaries a year ago clinical suspicion for cardiac etiology is low.  Continue analgesics as needed for pain.  Continue supplemental oxygen.       Hypertension: Continue hydrochlorothiazide, metoprolol and lisinopril.     Mood disorder: Continue paroxetine    Disposition: tbd    Subjective / Review of systems     Review of Systems   Feeling better   Mild hemoptysis +     Objective / Physical Exam   Vital signs:  Temp: 97.8 °F (36.6 °C)  BP: 99/41  Heart Rate: 85  Resp: 15  SpO2: 95 %  Weight: 81.4 kg (179 lb 7.3 oz)    Admission Weight: Weight: 83 kg (183 lb)  Current Weight: Weight: 81.4 kg (179 lb 7.3 oz)    Input/Output in last 24 hours:    Intake/Output Summary (Last 24 hours) at 12/26/2023 1432  Last data filed at 12/26/2023 1300  Gross per 24 hour   Intake 1080 ml   Output 1600 ml   Net -520 ml      Physical Exam   Physical Exam  HENT:      Head: Normocephalic and atraumatic.      Nose: Nose normal.   Eyes:      Extraocular Movements: Extraocular movements intact.      Conjunctivae/sclera: Conjunctivae normal.      Pupils: Pupils are equal, round, and reactive to light.   Cardiovascular:      Rate and Rhythm: normal       Pulses: Normal pulses.      Heart sounds: Normal heart sounds.   Pulmonary:      Effort: weak, on airvo      Breath sounds: reduced BS bilaterally   Abdominal:      General: Abdomen is flat. Bowel sounds are normal.      Palpations: Abdomen is soft.   Musculoskeletal:         General: Normal range of motion.      Cervical back: Normal range of motion and neck supple.   Skin:     General: Skin is dry.   Neurological:      General: No focal deficit present.      Mental Status: alert.          Radiology and Labs     Results from last 7 days   Lab Units 12/25/23  2335 12/25/23  0503 12/24/23  0810 12/24/23  0525 12/24/23  0028 12/23/23  2039   WBC 10*3/mm3 12.00* 16.10* 14.70*  --  11.40* 12.50*   HEMATOCRIT % 22.7* 25.7* 30.4*  --  32.8* 37.5    HEMATOCRIT POC %  --   --   --  35*  --   --    PLATELETS 10*3/mm3 152 196 225  --  236 295      Results from last 7 days   Lab Units 12/25/23  2335 12/25/23  0503 12/24/23  0525 12/24/23  0028 12/23/23  2039   SODIUM mmol/L 138 142  --  142 142   POTASSIUM mmol/L 3.7 3.8  --  4.3 3.7   CHLORIDE mmol/L 99 105  --  103 101   CO2 mmol/L 28.0 31.0*  --  30.0* 30.0*   BUN mg/dL 17 20  --  24* 24*   CREATININE mg/dL 0.65 0.71 0.94 0.95 0.88      Current medications   Scheduled Meds: amiodarone, 200 mg, Oral, BID With Meals  ampicillin-sulbactam, 3 g, Intravenous, Q8H  [Held by provider] aspirin, 162 mg, Oral, Daily  atorvastatin, 10 mg, Oral, Daily  azithromycin, 500 mg, Intravenous, Q24H  budesonide-formoterol, 2 puff, Inhalation, BID - RT   And  tiotropium bromide monohydrate, 2 puff, Inhalation, Daily - RT  hydroCHLOROthiazide, 25 mg, Oral, Daily  lisinopril, 40 mg, Oral, Daily  methylPREDNISolone sodium succinate, 80 mg, Intravenous, Q8H  metoprolol succinate XL, 25 mg, Oral, Daily  montelukast, 10 mg, Oral, Nightly  multivitamin with minerals, 1 tablet, Oral, Daily  pantoprazole, 40 mg, Intravenous, Q AM  PARoxetine, 20 mg, Oral, Daily  pramipexole, 0.25 mg, Oral, Daily  racemic epinephrine, 0.5 mL, Nebulization, TID - RT   And  sodium chloride, 3 mL, Nebulization, TID - RT  sodium chloride, 10 mL, Intravenous, Q12H  sodium chloride, 10 mL, Intravenous, Q12H  vitamin B-12, 1,000 mcg, Oral, Daily      Continuous Infusions:      Reviewed all other data in the last 24 hours, including but not limited to vitals, labs, microbiology, imaging and pertinent notes from other providers.     Mary Mijares MD   [facility]  12/26/23   14:32 EST

## 2023-12-27 LAB
ABO GROUP BLD: NORMAL
ANA SER QL IF: NEGATIVE
ANION GAP SERPL CALCULATED.3IONS-SCNC: 8 MMOL/L (ref 5–15)
BACTERIA SPEC RESP CULT: NORMAL
BACTERIA SPEC RESP CULT: NORMAL
BASOPHILS # BLD AUTO: 0 10*3/MM3 (ref 0–0.2)
BASOPHILS NFR BLD AUTO: 0.1 % (ref 0–1.5)
BILIRUB CONJ SERPL-MCNC: <0.2 MG/DL (ref 0–0.3)
BILIRUB INDIRECT SERPL-MCNC: NORMAL MG/DL
BILIRUB SERPL-MCNC: 0.6 MG/DL (ref 0–1.2)
BLD GP AB SCN SERPL QL: NEGATIVE
BUN SERPL-MCNC: 27 MG/DL (ref 8–23)
BUN/CREAT SERPL: 30.7 (ref 7–25)
C-ANCA TITR SER IF: NORMAL TITER
CALCIUM SPEC-SCNC: 8.6 MG/DL (ref 8.6–10.5)
CHLORIDE SERPL-SCNC: 101 MMOL/L (ref 98–107)
CO2 SERPL-SCNC: 31 MMOL/L (ref 22–29)
CREAT SERPL-MCNC: 0.88 MG/DL (ref 0.57–1)
DEPRECATED RDW RBC AUTO: 41.6 FL (ref 37–54)
EGFRCR SERPLBLD CKD-EPI 2021: 68.2 ML/MIN/1.73
EOSINOPHIL # BLD AUTO: 0 10*3/MM3 (ref 0–0.4)
EOSINOPHIL NFR BLD AUTO: 0 % (ref 0.3–6.2)
ERYTHROCYTE [DISTWIDTH] IN BLOOD BY AUTOMATED COUNT: 12.5 % (ref 12.3–15.4)
GLUCOSE SERPL-MCNC: 128 MG/DL (ref 65–99)
GRAM STN SPEC: NORMAL
HAPTOGLOB SERPL-MCNC: 310 MG/DL (ref 30–200)
HCT VFR BLD AUTO: 19.9 % (ref 34–46.6)
HCT VFR BLD AUTO: 25.8 % (ref 34–46.6)
HGB BLD-MCNC: 6.7 G/DL (ref 12–15.9)
HGB BLD-MCNC: 8.4 G/DL (ref 12–15.9)
LABORATORY COMMENT REPORT: NORMAL
LDH SERPL-CCNC: 203 U/L (ref 135–214)
LYMPHOCYTES # BLD AUTO: 0.5 10*3/MM3 (ref 0.7–3.1)
LYMPHOCYTES NFR BLD AUTO: 4 % (ref 19.6–45.3)
MCH RBC QN AUTO: 30.3 PG (ref 26.6–33)
MCHC RBC AUTO-ENTMCNC: 33.6 G/DL (ref 31.5–35.7)
MCV RBC AUTO: 90.2 FL (ref 79–97)
MONOCYTES # BLD AUTO: 0.3 10*3/MM3 (ref 0.1–0.9)
MONOCYTES NFR BLD AUTO: 2.1 % (ref 5–12)
MYELOPEROXIDASE AB SER IA-ACNC: <0.2 UNITS (ref 0–0.9)
NEUTROPHILS NFR BLD AUTO: 13 10*3/MM3 (ref 1.7–7)
NEUTROPHILS NFR BLD AUTO: 93.8 % (ref 42.7–76)
NRBC BLD AUTO-RTO: 0 /100 WBC (ref 0–0.2)
P-ANCA ATYPICAL TITR SER IF: NORMAL TITER
P-ANCA TITR SER IF: NORMAL TITER
PLATELET # BLD AUTO: 177 10*3/MM3 (ref 140–450)
PMV BLD AUTO: 10 FL (ref 6–12)
POTASSIUM SERPL-SCNC: 3.9 MMOL/L (ref 3.5–5.2)
PROTEINASE3 AB SER IA-ACNC: <0.2 UNITS (ref 0–0.9)
RBC # BLD AUTO: 2.21 10*6/MM3 (ref 3.77–5.28)
RETICS # AUTO: 0.07 10*6/MM3 (ref 0.02–0.13)
RETICS/RBC NFR AUTO: 3.25 % (ref 0.7–1.9)
RH BLD: POSITIVE
SODIUM SERPL-SCNC: 140 MMOL/L (ref 136–145)
T&S EXPIRATION DATE: NORMAL
WBC NRBC COR # BLD AUTO: 13.8 10*3/MM3 (ref 3.4–10.8)

## 2023-12-27 PROCEDURE — 25010000002 METHYLPREDNISOLONE PER 40 MG: Performed by: INTERNAL MEDICINE

## 2023-12-27 PROCEDURE — 99232 SBSQ HOSP IP/OBS MODERATE 35: CPT | Performed by: INTERNAL MEDICINE

## 2023-12-27 PROCEDURE — 94664 DEMO&/EVAL PT USE INHALER: CPT

## 2023-12-27 PROCEDURE — 83010 ASSAY OF HAPTOGLOBIN QUANT: CPT | Performed by: INTERNAL MEDICINE

## 2023-12-27 PROCEDURE — 25010000002 AMPICILLIN-SULBACTAM PER 1.5 G: Performed by: INTERNAL MEDICINE

## 2023-12-27 PROCEDURE — 36430 TRANSFUSION BLD/BLD COMPNT: CPT

## 2023-12-27 PROCEDURE — 25010000002 METHYLPREDNISOLONE PER 125 MG: Performed by: INTERNAL MEDICINE

## 2023-12-27 PROCEDURE — 93010 ELECTROCARDIOGRAM REPORT: CPT | Performed by: INTERNAL MEDICINE

## 2023-12-27 PROCEDURE — 86900 BLOOD TYPING SEROLOGIC ABO: CPT | Performed by: INTERNAL MEDICINE

## 2023-12-27 PROCEDURE — 85045 AUTOMATED RETICULOCYTE COUNT: CPT | Performed by: INTERNAL MEDICINE

## 2023-12-27 PROCEDURE — 83615 LACTATE (LD) (LDH) ENZYME: CPT | Performed by: INTERNAL MEDICINE

## 2023-12-27 PROCEDURE — 86850 RBC ANTIBODY SCREEN: CPT | Performed by: INTERNAL MEDICINE

## 2023-12-27 PROCEDURE — 85014 HEMATOCRIT: CPT | Performed by: INTERNAL MEDICINE

## 2023-12-27 PROCEDURE — 85018 HEMOGLOBIN: CPT | Performed by: INTERNAL MEDICINE

## 2023-12-27 PROCEDURE — 80048 BASIC METABOLIC PNL TOTAL CA: CPT | Performed by: NURSE PRACTITIONER

## 2023-12-27 PROCEDURE — 94799 UNLISTED PULMONARY SVC/PX: CPT

## 2023-12-27 PROCEDURE — P9016 RBC LEUKOCYTES REDUCED: HCPCS

## 2023-12-27 PROCEDURE — 85025 COMPLETE CBC W/AUTO DIFF WBC: CPT | Performed by: NURSE PRACTITIONER

## 2023-12-27 PROCEDURE — 86900 BLOOD TYPING SEROLOGIC ABO: CPT

## 2023-12-27 PROCEDURE — 82248 BILIRUBIN DIRECT: CPT | Performed by: INTERNAL MEDICINE

## 2023-12-27 PROCEDURE — 86901 BLOOD TYPING SEROLOGIC RH(D): CPT | Performed by: INTERNAL MEDICINE

## 2023-12-27 PROCEDURE — 82247 BILIRUBIN TOTAL: CPT | Performed by: INTERNAL MEDICINE

## 2023-12-27 PROCEDURE — 86923 COMPATIBILITY TEST ELECTRIC: CPT

## 2023-12-27 PROCEDURE — 86901 BLOOD TYPING SEROLOGIC RH(D): CPT

## 2023-12-27 PROCEDURE — 97530 THERAPEUTIC ACTIVITIES: CPT

## 2023-12-27 PROCEDURE — 93005 ELECTROCARDIOGRAM TRACING: CPT | Performed by: INTERNAL MEDICINE

## 2023-12-27 PROCEDURE — 94761 N-INVAS EAR/PLS OXIMETRY MLT: CPT

## 2023-12-27 RX ORDER — LOPERAMIDE HYDROCHLORIDE 2 MG/1
2 CAPSULE ORAL 4 TIMES DAILY PRN
Status: DISCONTINUED | OUTPATIENT
Start: 2023-12-27 | End: 2024-01-02 | Stop reason: HOSPADM

## 2023-12-27 RX ORDER — METHYLPREDNISOLONE SODIUM SUCCINATE 40 MG/ML
40 INJECTION, POWDER, LYOPHILIZED, FOR SOLUTION INTRAMUSCULAR; INTRAVENOUS EVERY 8 HOURS
Status: DISCONTINUED | OUTPATIENT
Start: 2023-12-27 | End: 2024-01-01

## 2023-12-27 RX ADMIN — PRAMIPEXOLE DIHYDROCHLORIDE 0.25 MG: 0.5 TABLET ORAL at 21:20

## 2023-12-27 RX ADMIN — Medication 10 ML: at 21:21

## 2023-12-27 RX ADMIN — AMIODARONE HYDROCHLORIDE 200 MG: 200 TABLET ORAL at 17:47

## 2023-12-27 RX ADMIN — METHYLPREDNISOLONE SODIUM SUCCINATE 80 MG: 125 INJECTION, POWDER, FOR SOLUTION INTRAMUSCULAR; INTRAVENOUS at 03:44

## 2023-12-27 RX ADMIN — MONTELUKAST 10 MG: 10 TABLET, FILM COATED ORAL at 21:21

## 2023-12-27 RX ADMIN — METHYLPREDNISOLONE SODIUM SUCCINATE 80 MG: 125 INJECTION, POWDER, FOR SOLUTION INTRAMUSCULAR; INTRAVENOUS at 09:52

## 2023-12-27 RX ADMIN — CYANOCOBALAMIN TAB 1000 MCG 1000 MCG: 1000 TAB at 09:04

## 2023-12-27 RX ADMIN — AMPICILLIN SODIUM AND SULBACTAM SODIUM 3 G: 2; 1 INJECTION, POWDER, FOR SOLUTION INTRAMUSCULAR; INTRAVENOUS at 09:52

## 2023-12-27 RX ADMIN — LISINOPRIL 40 MG: 20 TABLET ORAL at 09:04

## 2023-12-27 RX ADMIN — RACEPINEPHRINE HYDROCHLORIDE 0.5 ML: 11.25 SOLUTION RESPIRATORY (INHALATION) at 06:55

## 2023-12-27 RX ADMIN — Medication 10 ML: at 09:04

## 2023-12-27 RX ADMIN — LOPERAMIDE HYDROCHLORIDE 2 MG: 2 CAPSULE ORAL at 21:23

## 2023-12-27 RX ADMIN — ATORVASTATIN CALCIUM 10 MG: 10 TABLET, FILM COATED ORAL at 21:21

## 2023-12-27 RX ADMIN — METOPROLOL SUCCINATE 25 MG: 25 TABLET, EXTENDED RELEASE ORAL at 09:04

## 2023-12-27 RX ADMIN — TIOTROPIUM BROMIDE INHALATION SPRAY 2 PUFF: 3.12 SPRAY, METERED RESPIRATORY (INHALATION) at 07:00

## 2023-12-27 RX ADMIN — METHYLPREDNISOLONE SODIUM SUCCINATE 40 MG: 40 INJECTION, POWDER, FOR SOLUTION INTRAMUSCULAR; INTRAVENOUS at 17:47

## 2023-12-27 RX ADMIN — PAROXETINE HYDROCHLORIDE 20 MG: 20 TABLET, FILM COATED ORAL at 09:03

## 2023-12-27 RX ADMIN — PANTOPRAZOLE SODIUM 40 MG: 40 TABLET, DELAYED RELEASE ORAL at 06:02

## 2023-12-27 RX ADMIN — AMPICILLIN SODIUM AND SULBACTAM SODIUM 3 G: 2; 1 INJECTION, POWDER, FOR SOLUTION INTRAMUSCULAR; INTRAVENOUS at 17:47

## 2023-12-27 RX ADMIN — RACEPINEPHRINE HYDROCHLORIDE 0.5 ML: 11.25 SOLUTION RESPIRATORY (INHALATION) at 19:01

## 2023-12-27 RX ADMIN — LOPERAMIDE HYDROCHLORIDE 2 MG: 2 CAPSULE ORAL at 11:30

## 2023-12-27 RX ADMIN — Medication 1 TABLET: at 09:03

## 2023-12-27 RX ADMIN — HYDROCHLOROTHIAZIDE 25 MG: 25 TABLET ORAL at 09:04

## 2023-12-27 RX ADMIN — BUDESONIDE AND FORMOTEROL FUMARATE DIHYDRATE 2 PUFF: 160; 4.5 AEROSOL RESPIRATORY (INHALATION) at 19:01

## 2023-12-27 RX ADMIN — BUDESONIDE AND FORMOTEROL FUMARATE DIHYDRATE 2 PUFF: 160; 4.5 AEROSOL RESPIRATORY (INHALATION) at 06:59

## 2023-12-27 RX ADMIN — AMIODARONE HYDROCHLORIDE 200 MG: 200 TABLET ORAL at 09:04

## 2023-12-27 RX ADMIN — AMPICILLIN SODIUM AND SULBACTAM SODIUM 3 G: 2; 1 INJECTION, POWDER, FOR SOLUTION INTRAMUSCULAR; INTRAVENOUS at 03:45

## 2023-12-27 RX ADMIN — RACEPINEPHRINE HYDROCHLORIDE 0.5 ML: 11.25 SOLUTION RESPIRATORY (INHALATION) at 15:00

## 2023-12-27 NOTE — PROGRESS NOTES
Hospitalist Progress Note   Krystyna Bennett : 1945 MRN:[id number] LOS:3     Principal Problem: Chest pain     Reason for follow up: All the medical problems listed below    Summary     Krystyna Bennett is a 78 y.o. female with past medical history of hypertension, GERD, asthma/COPD, chronic hypoxic respiratory failure secondary to COPD on home oxygen 4 L via nasal cannula, mood disorder, hyperlipidemia, past history of systolic congestive heart failure which subsequently improved in subsequent echo with ejection fraction of 55 to 60% presented to emergency room with complaints of chest pain radiating to her arms and back associated with nausea and diaphoresis.  Patient had a cardiac cath a year ago.  Troponins are elevated.  D-dimer is normal.  Lipase is normal.  CT angiogram of the chest showed No evidence of pulmonary embolus. Bibasilar pneumonia.  Severe emphysema. Moderate size hiatal hernia. Hepatic and renal cysts.    Assessment / Plan     Hemoptysis s/p bronchoscopy on 23   - Etiology unclear but per pulmonary there is suspicion for aspiration of gastric contents.  -bronchoscopy on 23 - Significant bloody secretions noted in the entire bronchial tree and especially bilateral lower lobes status post therapeutic bilateral lung washing  no foreign bodies  -Pulmonary consult and follow-up appreciated.-  -Patient is on Acalabrutinib which can cause hemoptysis.   -Patient stated got hemoptysis intermittently.  And is getting epinephrine nebulizer 3 times per day as of now.   -now on Airvo and wean down the oxygen as tolerated.  -Unasyn to continue for aspiration pneumonia.    Anemia secondary to hemoptysis  -Patient has hemoglobin of 6.7 on  and 1 PRBC is ordered.  CBC monitor daily.    Acute on chronic hypoxic respiratory failure secondary to COPD exacerbation due to bibasilar pneumonia likely aspiration pneumonia  -Continue Unasyn.    -Continue Singulair, Symbicort and Spiriva inhaler.   Continue albuterol nebulizer treatment as needed.     Atrial fibrillation with rapid ventricular response: Patient given loading dose of amiodarone and started on amiodarone drip.  Now on amiodarone PO.     Chest pain: Troponins are mildly elevated.  Patient seen by cardiology.  Given patient's normal coronaries a year ago clinical suspicion for cardiac etiology is low.  Continue analgesics as needed for pain.  Continue supplemental oxygen.       Hypertension: Continue hydrochlorothiazide, metoprolol and lisinopril.     Mood disorder: Continue paroxetine    Disposition: tbd    Subjective / Review of systems     Review of Systems   Feeling better   Mild hemoptysis +     Objective / Physical Exam   Vital signs:  Temp: 97.7 °F (36.5 °C)  BP: 145/60  Heart Rate: 69  Resp: 18  SpO2: 91 %  Weight: 81.4 kg (179 lb 7.3 oz)    Admission Weight: Weight: 83 kg (183 lb)  Current Weight: Weight: 81.4 kg (179 lb 7.3 oz)    Input/Output in last 24 hours:    Intake/Output Summary (Last 24 hours) at 12/27/2023 1637  Last data filed at 12/27/2023 1408  Gross per 24 hour   Intake 660 ml   Output --   Net 660 ml      Physical Exam   Physical Exam  HENT:      Head: Normocephalic and atraumatic.      Nose: Nose normal.   Eyes:      Extraocular Movements: Extraocular movements intact.      Conjunctivae/sclera: Conjunctivae normal.      Pupils: Pupils are equal, round, and reactive to light.   Cardiovascular:      Rate and Rhythm: normal       Pulses: Normal pulses.      Heart sounds: Normal heart sounds.   Pulmonary:      Effort: weak, on airvo      Breath sounds: reduced BS bilaterally   Abdominal:      General: Abdomen is flat. Bowel sounds are normal.      Palpations: Abdomen is soft.   Musculoskeletal:         General: Normal range of motion.      Cervical back: Normal range of motion and neck supple.   Skin:     General: Skin is dry.   Neurological:      General: No focal deficit present.      Mental Status: alert.          Radiology  and Labs     Results from last 7 days   Lab Units 12/27/23  0725 12/25/23  2335 12/25/23  0503 12/24/23  0810 12/24/23  0525 12/24/23  0028   WBC 10*3/mm3 13.80* 12.00* 16.10* 14.70*  --  11.40*   HEMATOCRIT % 19.9* 22.7* 25.7* 30.4*  --  32.8*   HEMATOCRIT POC %  --   --   --   --  35*  --    PLATELETS 10*3/mm3 177 152 196 225  --  236      Results from last 7 days   Lab Units 12/27/23  0725 12/25/23  2335 12/25/23  0503 12/24/23  0525 12/24/23  0028 12/23/23  2039   SODIUM mmol/L 140 138 142  --  142 142   POTASSIUM mmol/L 3.9 3.7 3.8  --  4.3 3.7   CHLORIDE mmol/L 101 99 105  --  103 101   CO2 mmol/L 31.0* 28.0 31.0*  --  30.0* 30.0*   BUN mg/dL 27* 17 20  --  24* 24*   CREATININE mg/dL 0.88 0.65 0.71 0.94 0.95 0.88      Current medications   Scheduled Meds: amiodarone, 200 mg, Oral, BID With Meals  ampicillin-sulbactam, 3 g, Intravenous, Q8H  [Held by provider] aspirin, 162 mg, Oral, Daily  atorvastatin, 10 mg, Oral, Daily  budesonide-formoterol, 2 puff, Inhalation, BID - RT   And  tiotropium bromide monohydrate, 2 puff, Inhalation, Daily - RT  hydroCHLOROthiazide, 25 mg, Oral, Daily  lisinopril, 40 mg, Oral, Daily  methylPREDNISolone sodium succinate, 80 mg, Intravenous, Q8H  metoprolol succinate XL, 25 mg, Oral, Daily  montelukast, 10 mg, Oral, Nightly  multivitamin with minerals, 1 tablet, Oral, Daily  pantoprazole, 40 mg, Oral, Q AM  PARoxetine, 20 mg, Oral, Daily  pramipexole, 0.25 mg, Oral, Daily  racemic epinephrine, 0.5 mL, Nebulization, TID - RT   And  sodium chloride, 3 mL, Nebulization, TID - RT  sodium chloride, 10 mL, Intravenous, Q12H  sodium chloride, 10 mL, Intravenous, Q12H  vitamin B-12, 1,000 mcg, Oral, Daily      Continuous Infusions:      Reviewed all other data in the last 24 hours, including but not limited to vitals, labs, microbiology, imaging and pertinent notes from other providers.     Mary Mijares MD   [facility]  12/27/23   16:37 EST

## 2023-12-27 NOTE — PLAN OF CARE
Goal Outcome Evaluation:  Plan of Care Reviewed With: patient        Progress: no change          AOX4. No complaints of pain. Patient still having blood in her sputum. RRT titrated airvo to 45/50.

## 2023-12-27 NOTE — PLAN OF CARE
Goal Outcome Evaluation:      Krystyna Bennett presents with functional mobility impairments which indicate the need for skilled intervention. Pt limited due to pt presenting on airvo upon PT entry. Pt able to transfer to bedside chair with SBA and minimal cuing for proper safety. Pt able to brush teeth seated in bedside chair. Tolerating session today without incident. Will continue to follow and progress as tolerated.

## 2023-12-27 NOTE — THERAPY TREATMENT NOTE
"Subjective: Pt agreeable to therapeutic plan of care.    Objective:     Bed mobility - SBA  Transfers - SBA  Ambulation - 3 feet SBA    Therapeutic Exercise - 15 Reps B LE AROM supported sitting / chair    Vitals: Desaturates    Pain: Pt did not rate pain  Intervention for pain: N/A    Education: Provided education on the importance of mobility in the acute care setting, Verbal/Tactile Cues, Transfer Training, Gait Training, and Energy conservation strategies    Assessment: Krystyna Bennett presents with functional mobility impairments which indicate the need for skilled intervention. Pt limited due to pt presenting on airvo upon PT entry. Pt able to transfer to bedside chair with SBA and minimal cuing for proper safety. Pt able to brush teeth seated in bedside chair. Tolerating session today without incident. Will continue to follow and progress as tolerated.     Plan/Recommendations:   If medically appropriate, Low Intensity Therapy recommended post-acute care - This is recommended as therapy feels this patient would require 2-3 visits per week. OP or HH would be the best option depending on patient's home bound status. Consider, if the patient has other  \"skilled\" needs such as wounds, IV antibiotics, etc. Combined with \"low intensity\" could also equate to a SNF. If patient is medically complex, consider LTAC. Pt requires no DME at discharge.     Pt desires Home with family assist and and Home Health at discharge. Pt cooperative; agreeable to therapeutic recommendations and plan of care.         Basic Mobility 6-click:  Rollin = Total, A lot = 2, A little = 3; 4 = None  Supine>Sit:   1 = Total, A lot = 2, A little = 3; 4 = None   Sit>Stand with arms:  1 = Total, A lot = 2, A little = 3; 4 = None  Bed>Chair:   1 = Total, A lot = 2, A little = 3; 4 = None  Ambulate in room:  1 = Total, A lot = 2, A little = 3; 4 = None  3-5 Steps with railin = Total, A lot = 2, A little = 3; 4 = None  Score: " 19    Modified [place]: N/A = No pre-op stroke/TIA    Post-Tx Position: Up in Chair, Alarms activated, and Call light and personal items within reach  PPE: gloves and surgical mask
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
## 2023-12-27 NOTE — PROGRESS NOTES
"Daily Progress Note        Chest pain    Hemoptysis         Assessment:     Hemoptysis  Bronchoscopy 12/25/2023  Significant bloody secretions noted in the entire bronchial tree and especially bilateral lower lobes status post therapeutic bilateral lung washing  no foreign bodies    Bilateral lower lobe dense alveolar infiltrate, possible alveolar hemorrhage versus aspiration of gastric contents  Moderate hiatal hernia     CT scan of the chest August 2023 there was no alveolar infiltrate however advanced COPD changes noted  No PE     11/2022 was admitted for aspiration pneumonia.  States she \"choked on a vitamin.  Then vomited and choked\".  Reported shortness of breath and and coughing up Bloody sputum and with symptoms of feeling choking on swallowing pills.     Connective tissue work up (MISAEL/ANCA/Lupus) negative in November 2022     Chronic obstructive pulmonary disease, unspecified COPD type (CMS/HCC) (Primary)  Centrilobular emphysema (CMS/HCC)    FEV1 0.68 L which is 28% improved to 34% postbronchodilator  FEV1/FVC ratio 34  Expiratory reserve volume 84%   Residual volume 182%  Total lung capacity 127%  Diffusion capacity 21%.     Pulmonary function test was extremely hard on the patient she passed out 3 times during the test     CT scan of the chest 8/2/2023     1. Previously described new medial left lower lobe lung nodule has resolved in the interval suggesting it represented benign infectious/inflammatory nodule.  2. 10 mm right lower lobe and 6 mm subpleural left lower lobe noncalcified nodules are chronic findings, and are considered benign.  3. There is chronic appearing scarring posteriorly in the right lower lobe at the site of previous pneumonia. No acute airspace disease is seen.  4. Advanced emphysema.     CT scan of the chest February 2023  1. New 8 mm nodule in medial left lower lobe, recommend 3 month CT follow-up.  2. Right lower lobe linear consolidation likely developing scarring in the " setting of previously seen pneumonia, recommend attention on follow-up.  3. Right lower lobe 10 mm nodule stable from multiple prior studies.  3. Advanced emphysema, coronary artery calcifications, and and additional chronic findings above.     Systolic CHF, chronic (CMS/HCC) diastolic dysfunction  Pulmonary hypertension RVSP 47         Chronic respiratory failure with hypoxia (CMS/HCC)  - On home O2 4 Liters,      patient is immune-compromised with CLL treatment in oral chemo     Up-to-date on vaccinations for pneumonia, flu and COVID-19           Recommendations:     Hemoptysis most likely due to gastric aspiration and severe pneumonitis  Similar event happened in November 2022 on Thanksgiving day    Cannot rule out opportunistic infection since patient is immune compromised on oral chemotherapy for CLL     Steroids wean Solu-Medrol 40 mg IV every 8    Antibiotics changed Rocephin to Unasyn  3 days of. Azithromycin     S/p TXA nebulized  S/p epinephrine nebulized     Repeat MISAEL and ANCA                         LOS: 3 days     Subjective         Objective     Vital signs for last 24 hours:  Vitals:    12/27/23 0658 12/27/23 0659 12/27/23 0700 12/27/23 0701   BP:       BP Location:       Patient Position:       Pulse: 62 95 62 72   Resp: 16 16 16 16   Temp:       TempSrc:       SpO2: 94% 94% 93% 92%   Weight:       Height:           Intake/Output last 3 shifts:  I/O last 3 completed shifts:  In: 960 [P.O.:960]  Out: 1600 [Urine:1600]  Intake/Output this shift:  No intake/output data recorded.      Radiology  Imaging Results (Last 24 Hours)       ** No results found for the last 24 hours. **            Labs:  Results from last 7 days   Lab Units 12/27/23  0725   WBC 10*3/mm3 13.80*   HEMOGLOBIN g/dL 6.7*   HEMATOCRIT % 19.9*   PLATELETS 10*3/mm3 177     Results from last 7 days   Lab Units 12/27/23  0725 12/24/23  0028 12/23/23  2039   SODIUM mmol/L 140   < > 142   POTASSIUM mmol/L 3.9   < > 3.7   CHLORIDE mmol/L 101    < > 101   CO2 mmol/L 31.0*   < > 30.0*   BUN mg/dL 27*   < > 24*   CREATININE mg/dL 0.88   < > 0.88   CALCIUM mg/dL 8.6   < > 9.5   BILIRUBIN mg/dL  --   --  0.3   ALK PHOS U/L  --   --  93   ALT (SGPT) U/L  --   --  18   AST (SGOT) U/L  --   --  21   GLUCOSE mg/dL 128*   < > 150*    < > = values in this interval not displayed.         Results from last 7 days   Lab Units 12/23/23 2039   ALBUMIN g/dL 4.1     Results from last 7 days   Lab Units 12/24/23  0810 12/24/23  0028 12/23/23 2039   HSTROP T ng/L 202* 278* 76*         Results from last 7 days   Lab Units 12/26/23  2321   MAGNESIUM mg/dL 2.5*     Results from last 7 days   Lab Units 12/25/23  0503   INR  1.03               Meds:   SCHEDULE  amiodarone, 200 mg, Oral, BID With Meals  ampicillin-sulbactam, 3 g, Intravenous, Q8H  [Held by provider] aspirin, 162 mg, Oral, Daily  atorvastatin, 10 mg, Oral, Daily  budesonide-formoterol, 2 puff, Inhalation, BID - RT   And  tiotropium bromide monohydrate, 2 puff, Inhalation, Daily - RT  hydroCHLOROthiazide, 25 mg, Oral, Daily  lisinopril, 40 mg, Oral, Daily  methylPREDNISolone sodium succinate, 80 mg, Intravenous, Q8H  metoprolol succinate XL, 25 mg, Oral, Daily  montelukast, 10 mg, Oral, Nightly  multivitamin with minerals, 1 tablet, Oral, Daily  pantoprazole, 40 mg, Oral, Q AM  PARoxetine, 20 mg, Oral, Daily  pramipexole, 0.25 mg, Oral, Daily  racemic epinephrine, 0.5 mL, Nebulization, TID - RT   And  sodium chloride, 3 mL, Nebulization, TID - RT  sodium chloride, 10 mL, Intravenous, Q12H  sodium chloride, 10 mL, Intravenous, Q12H  vitamin B-12, 1,000 mcg, Oral, Daily      Infusions     PRNs    acetaminophen **OR** acetaminophen **OR** acetaminophen    albuterol    senna-docusate sodium **AND** polyethylene glycol **AND** bisacodyl **AND** bisacodyl    Calcium Replacement - Follow Nurse / BPA Driven Protocol    Magnesium Standard Dose Replacement - Follow Nurse / BPA Driven Protocol    melatonin    metoprolol  tartrate    nitroglycerin    ondansetron    Phosphorus Replacement - Follow Nurse / BPA Driven Protocol    Potassium Replacement - Follow Nurse / BPA Driven Protocol    prochlorperazine    sodium chloride    [COMPLETED] Insert Peripheral IV **AND** sodium chloride    sodium chloride    sodium chloride    sodium chloride    sodium chloride    Physical Exam:  Physical Exam  Cardiovascular:      Heart sounds: Murmur heard.   Pulmonary:      Effort: No respiratory distress.      Breath sounds: No stridor. Rhonchi and rales present. No wheezing.   Chest:      Chest wall: No tenderness.         ROS  Review of Systems   Respiratory:  Positive for cough and shortness of breath. Negative for wheezing and stridor.    Cardiovascular:  Positive for palpitations and leg swelling. Negative for chest pain.             Total time spent with patient greater than: 45 Minutes

## 2023-12-28 LAB
ALBUMIN SERPL-MCNC: 3.4 G/DL (ref 3.5–5.2)
ALBUMIN/GLOB SERPL: 2 G/DL
ALP SERPL-CCNC: 66 U/L (ref 39–117)
ALT SERPL W P-5'-P-CCNC: 25 U/L (ref 1–33)
ANION GAP SERPL CALCULATED.3IONS-SCNC: 10 MMOL/L (ref 5–15)
AST SERPL-CCNC: 29 U/L (ref 1–32)
BH BB BLOOD EXPIRATION DATE: NORMAL
BH BB BLOOD TYPE BARCODE: 5100
BH BB DISPENSE STATUS: NORMAL
BH BB PRODUCT CODE: NORMAL
BH BB UNIT NUMBER: NORMAL
BILIRUB SERPL-MCNC: 0.7 MG/DL (ref 0–1.2)
BUN SERPL-MCNC: 38 MG/DL (ref 8–23)
BUN/CREAT SERPL: 43.2 (ref 7–25)
CALCIUM SPEC-SCNC: 8.7 MG/DL (ref 8.6–10.5)
CHLORIDE SERPL-SCNC: 103 MMOL/L (ref 98–107)
CO2 SERPL-SCNC: 30 MMOL/L (ref 22–29)
CREAT SERPL-MCNC: 0.88 MG/DL (ref 0.57–1)
CROSSMATCH INTERPRETATION: NORMAL
DEPRECATED RDW RBC AUTO: 42.4 FL (ref 37–54)
EGFRCR SERPLBLD CKD-EPI 2021: 68.2 ML/MIN/1.73
ERYTHROCYTE [DISTWIDTH] IN BLOOD BY AUTOMATED COUNT: 12.7 % (ref 12.3–15.4)
GLOBULIN UR ELPH-MCNC: 1.7 GM/DL
GLUCOSE SERPL-MCNC: 134 MG/DL (ref 65–99)
HCT VFR BLD AUTO: 24.4 % (ref 34–46.6)
HGB BLD-MCNC: 7.7 G/DL (ref 12–15.9)
LYMPHOCYTES # BLD MANUAL: 0.4 10*3/MM3 (ref 0.7–3.1)
LYMPHOCYTES NFR BLD MANUAL: 2 % (ref 5–12)
MAGNESIUM SERPL-MCNC: 2 MG/DL (ref 1.6–2.4)
MCH RBC QN AUTO: 28.7 PG (ref 26.6–33)
MCHC RBC AUTO-ENTMCNC: 31.7 G/DL (ref 31.5–35.7)
MCV RBC AUTO: 90.3 FL (ref 79–97)
MONOCYTES # BLD: 0.27 10*3/MM3 (ref 0.1–0.9)
NEUTROPHILS # BLD AUTO: 12.64 10*3/MM3 (ref 1.7–7)
NEUTROPHILS NFR BLD MANUAL: 94 % (ref 42.7–76)
NEUTS BAND NFR BLD MANUAL: 1 % (ref 0–5)
PHOSPHATE SERPL-MCNC: 3.4 MG/DL (ref 2.5–4.5)
PLAT MORPH BLD: NORMAL
PLATELET # BLD AUTO: 195 10*3/MM3 (ref 140–450)
PMV BLD AUTO: 9.7 FL (ref 6–12)
POTASSIUM SERPL-SCNC: 3.9 MMOL/L (ref 3.5–5.2)
PROT SERPL-MCNC: 5.1 G/DL (ref 6–8.5)
RBC # BLD AUTO: 2.7 10*6/MM3 (ref 3.77–5.28)
RBC MORPH BLD: NORMAL
SCAN SLIDE: NORMAL
SODIUM SERPL-SCNC: 143 MMOL/L (ref 136–145)
UNIT  ABO: NORMAL
UNIT  RH: NORMAL
VARIANT LYMPHS NFR BLD MANUAL: 3 % (ref 19.6–45.3)
WBC MORPH BLD: NORMAL
WBC NRBC COR # BLD AUTO: 13.3 10*3/MM3 (ref 3.4–10.8)

## 2023-12-28 PROCEDURE — 85025 COMPLETE CBC W/AUTO DIFF WBC: CPT | Performed by: INTERNAL MEDICINE

## 2023-12-28 PROCEDURE — 94761 N-INVAS EAR/PLS OXIMETRY MLT: CPT

## 2023-12-28 PROCEDURE — 94664 DEMO&/EVAL PT USE INHALER: CPT

## 2023-12-28 PROCEDURE — 94799 UNLISTED PULMONARY SVC/PX: CPT

## 2023-12-28 PROCEDURE — 99232 SBSQ HOSP IP/OBS MODERATE 35: CPT | Performed by: INTERNAL MEDICINE

## 2023-12-28 PROCEDURE — 25010000002 AMPICILLIN-SULBACTAM PER 1.5 G: Performed by: INTERNAL MEDICINE

## 2023-12-28 PROCEDURE — 83735 ASSAY OF MAGNESIUM: CPT | Performed by: INTERNAL MEDICINE

## 2023-12-28 PROCEDURE — 93005 ELECTROCARDIOGRAM TRACING: CPT | Performed by: INTERNAL MEDICINE

## 2023-12-28 PROCEDURE — 85007 BL SMEAR W/DIFF WBC COUNT: CPT | Performed by: INTERNAL MEDICINE

## 2023-12-28 PROCEDURE — 93010 ELECTROCARDIOGRAM REPORT: CPT | Performed by: INTERNAL MEDICINE

## 2023-12-28 PROCEDURE — 25010000002 METHYLPREDNISOLONE PER 40 MG: Performed by: INTERNAL MEDICINE

## 2023-12-28 PROCEDURE — 84100 ASSAY OF PHOSPHORUS: CPT | Performed by: INTERNAL MEDICINE

## 2023-12-28 PROCEDURE — 97110 THERAPEUTIC EXERCISES: CPT

## 2023-12-28 PROCEDURE — 80053 COMPREHEN METABOLIC PANEL: CPT | Performed by: INTERNAL MEDICINE

## 2023-12-28 PROCEDURE — 99222 1ST HOSP IP/OBS MODERATE 55: CPT | Performed by: INTERNAL MEDICINE

## 2023-12-28 RX ORDER — AMLODIPINE BESYLATE 5 MG/1
5 TABLET ORAL
Status: DISCONTINUED | OUTPATIENT
Start: 2023-12-28 | End: 2024-01-02 | Stop reason: HOSPADM

## 2023-12-28 RX ADMIN — CYANOCOBALAMIN TAB 1000 MCG 1000 MCG: 1000 TAB at 09:49

## 2023-12-28 RX ADMIN — Medication 10 ML: at 20:48

## 2023-12-28 RX ADMIN — AMLODIPINE BESYLATE 5 MG: 5 TABLET ORAL at 09:49

## 2023-12-28 RX ADMIN — PANTOPRAZOLE SODIUM 40 MG: 40 TABLET, DELAYED RELEASE ORAL at 05:32

## 2023-12-28 RX ADMIN — AMPICILLIN SODIUM AND SULBACTAM SODIUM 3 G: 2; 1 INJECTION, POWDER, FOR SOLUTION INTRAMUSCULAR; INTRAVENOUS at 18:06

## 2023-12-28 RX ADMIN — PRAMIPEXOLE DIHYDROCHLORIDE 0.25 MG: 0.5 TABLET ORAL at 20:47

## 2023-12-28 RX ADMIN — Medication 10 ML: at 09:51

## 2023-12-28 RX ADMIN — ATORVASTATIN CALCIUM 10 MG: 10 TABLET, FILM COATED ORAL at 20:47

## 2023-12-28 RX ADMIN — Medication 1 TABLET: at 09:49

## 2023-12-28 RX ADMIN — METHYLPREDNISOLONE SODIUM SUCCINATE 40 MG: 40 INJECTION, POWDER, FOR SOLUTION INTRAMUSCULAR; INTRAVENOUS at 02:28

## 2023-12-28 RX ADMIN — MONTELUKAST 10 MG: 10 TABLET, FILM COATED ORAL at 20:47

## 2023-12-28 RX ADMIN — AMIODARONE HYDROCHLORIDE 200 MG: 200 TABLET ORAL at 09:49

## 2023-12-28 RX ADMIN — METHYLPREDNISOLONE SODIUM SUCCINATE 40 MG: 40 INJECTION, POWDER, FOR SOLUTION INTRAMUSCULAR; INTRAVENOUS at 09:49

## 2023-12-28 RX ADMIN — AMPICILLIN SODIUM AND SULBACTAM SODIUM 3 G: 2; 1 INJECTION, POWDER, FOR SOLUTION INTRAMUSCULAR; INTRAVENOUS at 02:28

## 2023-12-28 RX ADMIN — TIOTROPIUM BROMIDE INHALATION SPRAY 2 PUFF: 3.12 SPRAY, METERED RESPIRATORY (INHALATION) at 06:48

## 2023-12-28 RX ADMIN — BUDESONIDE AND FORMOTEROL FUMARATE DIHYDRATE 2 PUFF: 160; 4.5 AEROSOL RESPIRATORY (INHALATION) at 06:48

## 2023-12-28 RX ADMIN — LOPERAMIDE HYDROCHLORIDE 2 MG: 2 CAPSULE ORAL at 10:20

## 2023-12-28 RX ADMIN — METOPROLOL SUCCINATE 25 MG: 25 TABLET, EXTENDED RELEASE ORAL at 09:49

## 2023-12-28 RX ADMIN — BUDESONIDE AND FORMOTEROL FUMARATE DIHYDRATE 2 PUFF: 160; 4.5 AEROSOL RESPIRATORY (INHALATION) at 18:52

## 2023-12-28 RX ADMIN — AMIODARONE HYDROCHLORIDE 200 MG: 200 TABLET ORAL at 18:06

## 2023-12-28 RX ADMIN — LISINOPRIL 40 MG: 20 TABLET ORAL at 09:49

## 2023-12-28 RX ADMIN — METHYLPREDNISOLONE SODIUM SUCCINATE 40 MG: 40 INJECTION, POWDER, FOR SOLUTION INTRAMUSCULAR; INTRAVENOUS at 18:06

## 2023-12-28 RX ADMIN — RACEPINEPHRINE HYDROCHLORIDE 0.5 ML: 11.25 SOLUTION RESPIRATORY (INHALATION) at 06:48

## 2023-12-28 RX ADMIN — PAROXETINE HYDROCHLORIDE 20 MG: 20 TABLET, FILM COATED ORAL at 09:49

## 2023-12-28 NOTE — PROGRESS NOTES
Hospitalist Progress Note   Krystyna Bennett : 1945 MRN:[id number] LOS:4     Principal Problem: Chest pain     Reason for follow up: All the medical problems listed below    Summary     Krystyna Bennett is a 78 y.o. female with past medical history of hypertension, GERD, asthma/COPD, chronic hypoxic respiratory failure secondary to COPD on home oxygen 4 L via nasal cannula, mood disorder, hyperlipidemia, past history of systolic congestive heart failure which subsequently improved in subsequent echo with ejection fraction of 55 to 60% presented to emergency room with complaints of chest pain radiating to her arms and back associated with nausea and diaphoresis.  Patient had a cardiac cath a year ago.  Troponins are elevated.  D-dimer is normal.  Lipase is normal.  CT angiogram of the chest showed No evidence of pulmonary embolus. Bibasilar pneumonia.  Severe emphysema. Moderate size hiatal hernia. Hepatic and renal cysts.    Assessment / Plan     Hemoptysis s/p bronchoscopy on 23   - Etiology unclear but per pulmonary there is suspicion for aspiration of gastric contents.  -bronchoscopy on 23 - Significant bloody secretions noted in the entire bronchial tree and especially bilateral lower lobes status post therapeutic bilateral lung washing and no foreign bodies  -Pulmonary is following.  -Patient is on Acalabrutinib which can cause hemoptysis.  Tried to reach out to patient primary oncologist Dr. [facility] phone #[phone] #[phone] Voice message is left on oncology personal phone as well on 23.   Waiting callback.  Currently getting in-house oncologist for this case as well.  -Patient stated got hemoptysis intermittently.  And is getting epinephrine nebulizer 3 times per day as of now.   -now on Airvo and wean down the oxygen as tolerated.  -Unasyn to continue for aspiration pneumonia.    Anemia secondary to hemoptysis  -Patient has hemoglobin of 6.7 on  and 1 PRBC is ordered.  CBC  monitor daily.    Acute on chronic hypoxic respiratory failure secondary to COPD exacerbation due to bibasilar pneumonia likely aspiration pneumonia  -Continue Unasyn.    -Continue Singulair, Symbicort and Spiriva inhaler.  Continue albuterol nebulizer treatment as needed.     Atrial fibrillation with rapid ventricular response:   Patient given loading dose of amiodarone and started on amiodarone drip.  Now on amiodarone PO.     Chest pain  -Troponins are mildly elevated.  Patient seen by cardiology.  Given patient's normal coronaries a year ago clinical suspicion for cardiac etiology is low.  Continue analgesics as needed for pain.  Continue supplemental oxygen.       CLL  -Patient is on acalabrutinib -on hold for now given patient intermittent hemoptysis  -Primary oncologist Dr. [facility] phone #[phone] #[phone]  -Try to reach out to primary oncologist when to resume chemo med.  Waiting callback.  -Dr Valerio is covering and reach out from [employer] --> pending     Hypertension: Continue hydrochlorothiazide, metoprolol and lisinopril.     Mood disorder: Continue paroxetine    Disposition: tbd    Subjective / Review of systems     Review of Systems   Feeling ok   Mild hemoptysis +     Objective / Physical Exam   Vital signs:  Temp: 97.7 °F (36.5 °C)  BP: 112/50  Heart Rate: 71  Resp: 19  SpO2: 92 %  Weight: 84.7 kg (186 lb 11.7 oz)    Admission Weight: Weight: 83 kg (183 lb)  Current Weight: Weight: 84.7 kg (186 lb 11.7 oz)    Input/Output in last 24 hours:    Intake/Output Summary (Last 24 hours) at 12/28/2023 1456  Last data filed at 12/28/2023 1307  Gross per 24 hour   Intake 480 ml   Output 200 ml   Net 280 ml      Physical Exam   Physical Exam  HENT:      Head: Normocephalic and atraumatic.      Nose: Nose normal.   Eyes:      Extraocular Movements: Extraocular movements intact.      Conjunctivae/sclera: Conjunctivae normal.      Pupils: Pupils are equal, round, and reactive to light.   Cardiovascular:      Ni murmur  Pulmonary:      Effort: weak, on airvo      Breath sounds: reduced BS bilaterally   Abdominal:      General: Abdomen is flat. Bowel sounds are normal.      Palpations: Abdomen is soft.   Musculoskeletal:         General: Normal range of motion.      Cervical back: Normal range of motion and neck supple.   Skin:     General: Skin is dry.   Neurological:      General: No focal deficit present.      Mental Status: alert.          Radiology and Labs     Results from last 7 days   Lab Units 12/28/23  0235 12/27/23  1647 12/27/23  0725 12/25/23  2335 12/25/23  0503 12/24/23  0810   WBC 10*3/mm3 13.30*  --  13.80* 12.00* 16.10* 14.70*   HEMATOCRIT % 24.4* 25.8* 19.9* 22.7* 25.7* 30.4*   PLATELETS 10*3/mm3 195  --  177 152 196 225      Results from last 7 days   Lab Units 12/28/23  0235 12/27/23  0725 12/25/23  2335 12/25/23  0503 12/24/23  0525 12/24/23  0028   SODIUM mmol/L 143 140 138 142  --  142   POTASSIUM mmol/L 3.9 3.9 3.7 3.8  --  4.3   CHLORIDE mmol/L 103 101 99 105  --  103   CO2 mmol/L 30.0* 31.0* 28.0 31.0*  --  30.0*   BUN mg/dL 38* 27* 17 20  --  24*   CREATININE mg/dL 0.88 0.88 0.65 0.71 0.94 0.95      Current medications   Scheduled Meds: amiodarone, 200 mg, Oral, BID With Meals  amLODIPine, 5 mg, Oral, Q24H  [Held by provider] aspirin, 162 mg, Oral, Daily  atorvastatin, 10 mg, Oral, Daily  budesonide-formoterol, 2 puff, Inhalation, BID - RT   And  tiotropium bromide monohydrate, 2 puff, Inhalation, Daily - RT  lisinopril, 40 mg, Oral, Daily  methylPREDNISolone sodium succinate, 40 mg, Intravenous, Q8H  metoprolol succinate XL, 25 mg, Oral, Daily  montelukast, 10 mg, Oral, Nightly  multivitamin with minerals, 1 tablet, Oral, Daily  pantoprazole, 40 mg, Oral, Q AM  PARoxetine, 20 mg, Oral, Daily  pramipexole, 0.25 mg, Oral, Daily  sodium chloride, 10 mL, Intravenous, Q12H  sodium chloride, 10 mL, Intravenous, Q12H  vitamin B-12, 1,000 mcg, Oral, Daily      Continuous Infusions:      Reviewed all  other data in the last 24 hours, including but not limited to vitals, labs, microbiology, imaging and pertinent notes from other providers.     Mary Mijares MD   [facility]  12/28/23   14:56 EST

## 2023-12-28 NOTE — THERAPY TREATMENT NOTE
Subjective: Pt agreeable to therapeutic plan of care.  Cognition: oriented to Person, Place, and Time    Objective:     Bed Mobility: N/A or Not attempted.   Functional Transfers: CGA       Therapeutic Exercise - 15 Reps B UE AROM supported sitting / chair    Vitals: Desaturates    Pain: 0 VAS  Location: n/a  Interventions for pain: N/A  Education: Provided education on the importance of mobility in the acute care setting      Assessment: Krystyna Bennett presents with ADL impairments affecting function including endurance / activity tolerance. Demonstrated functioning below baseline abilities indicate the need for continued skilled intervention while inpatient. Tolerating session today without incident. Will continue to follow and progress as tolerated.     Plan/Recommendations:   No ongoing therapy recommended post-acute care. No therapy needs.. Pt requires no DME at discharge.     Pt desires Home with family assist at discharge. Pt cooperative; agreeable to therapeutic recommendations and plan of care.     Modified Benedict: N/A = No pre-op stroke/TIA    Post-Tx Position: Up in Chair  PPE: gloves

## 2023-12-28 NOTE — PLAN OF CARE
Goal Outcome Evaluation:  Plan of Care Reviewed With: patient      Pt. Demonstrates progress w/ BUE theraband HEP this date 10-15 reps x 1 set horizontal shoulder abduction and elbow extension w/ rest breaks taken PRN secondary to fatigue. Pt. Continues to require 25L airvo this date, progress limited secondary to impacts to activity tolerance. Recommend d/c home w/ family support/assist.       Anticipated Discharge Disposition (OT): home with assist

## 2023-12-28 NOTE — PLAN OF CARE
Pt continues to require 35L at 55% on Airvo. Pt desaturates with any exertion. Pt up in chair most of shift. Chemo med on hold due to hemoptysis, which again occurred today with the appearance of a blood clot the size of a quarter. Hem/onc consulted to advise. Pt continues to receive IV steroids and abx. Once pt is discharge appropriate, will likely return home w/ family and HH or OPPT.    Problem: Adult Inpatient Plan of Care  Goal: Plan of Care Review  Outcome: Ongoing, Progressing  Goal: Patient-Specific Goal (Individualized)  Outcome: Ongoing, Progressing  Goal: Absence of Hospital-Acquired Illness or Injury  Outcome: Ongoing, Progressing  Intervention: Identify and Manage Fall Risk  Recent Flowsheet Documentation  Taken 12/28/2023 1600 by Brunner, Brittany, RN  Safety Promotion/Fall Prevention:   assistive device/personal items within reach   clutter free environment maintained   fall prevention program maintained   nonskid shoes/slippers when out of bed   room organization consistent   safety round/check completed  Taken 12/28/2023 1400 by Brunner, Brittany, RN  Safety Promotion/Fall Prevention:   assistive device/personal items within reach   clutter free environment maintained   fall prevention program maintained   nonskid shoes/slippers when out of bed   room organization consistent   safety round/check completed  Taken 12/28/2023 1200 by Brunner, Brittany, RN  Safety Promotion/Fall Prevention:   assistive device/personal items within reach   clutter free environment maintained   fall prevention program maintained   nonskid shoes/slippers when out of bed   room organization consistent   safety round/check completed  Taken 12/28/2023 1000 by Brunner, Brittany, RN  Safety Promotion/Fall Prevention:   assistive device/personal items within reach   clutter free environment maintained   fall prevention program maintained   nonskid shoes/slippers when out of bed   room organization consistent   safety round/check  completed  Taken 12/28/2023 0800 by Brunner, Brittany, RN  Safety Promotion/Fall Prevention:   assistive device/personal items within reach   clutter free environment maintained   fall prevention program maintained   nonskid shoes/slippers when out of bed   room organization consistent   safety round/check completed  Intervention: Prevent Skin Injury  Recent Flowsheet Documentation  Taken 12/28/2023 1600 by Brunner, Brittany, RN  Body Position: position changed independently  Taken 12/28/2023 1400 by Brunner, Brittany, RN  Body Position: position changed independently  Taken 12/28/2023 1200 by Brunner, Brittany, RN  Body Position: position changed independently  Taken 12/28/2023 1000 by Brunner, Brittany, RN  Body Position: position changed independently  Taken 12/28/2023 0800 by Brunner, Brittany, RN  Body Position: position changed independently  Intervention: Prevent and Manage VTE (Venous Thromboembolism) Risk  Recent Flowsheet Documentation  Taken 12/28/2023 1600 by Brunner, Brittany, RN  Activity Management:   up in chair   up to bedside commode  Taken 12/28/2023 1400 by Brunner, Brittany, RN  Activity Management: up in chair  Taken 12/28/2023 1200 by Brunner, Brittany, RN  Activity Management: up in chair  Taken 12/28/2023 1000 by Brunner, Brittany, RN  Activity Management: up in chair  Taken 12/28/2023 0800 by Brunner, Brittany, RN  Activity Management: activity encouraged  Goal: Optimal Comfort and Wellbeing  Outcome: Ongoing, Progressing  Goal: Readiness for Transition of Care  Outcome: Ongoing, Progressing     Problem: COPD (Chronic Obstructive Pulmonary Disease) Comorbidity  Goal: Maintenance of COPD Symptom Control  Outcome: Ongoing, Progressing     Problem: Heart Failure Comorbidity  Goal: Maintenance of Heart Failure Symptom Control  Outcome: Ongoing, Progressing     Problem: Hypertension Comorbidity  Goal: Blood Pressure in Desired Range  Outcome: Ongoing, Progressing     Problem: Fall Injury  Risk  Goal: Absence of Fall and Fall-Related Injury  Outcome: Ongoing, Progressing  Intervention: Promote Injury-Free Environment  Recent Flowsheet Documentation  Taken 12/28/2023 1600 by Brunner, Brittany, RN  Safety Promotion/Fall Prevention:   assistive device/personal items within reach   clutter free environment maintained   fall prevention program maintained   nonskid shoes/slippers when out of bed   room organization consistent   safety round/check completed  Taken 12/28/2023 1400 by Brunner, Brittany, RN  Safety Promotion/Fall Prevention:   assistive device/personal items within reach   clutter free environment maintained   fall prevention program maintained   nonskid shoes/slippers when out of bed   room organization consistent   safety round/check completed  Taken 12/28/2023 1200 by Brunner, Brittany, RN  Safety Promotion/Fall Prevention:   assistive device/personal items within reach   clutter free environment maintained   fall prevention program maintained   nonskid shoes/slippers when out of bed   room organization consistent   safety round/check completed  Taken 12/28/2023 1000 by Brunner, Brittany, RN  Safety Promotion/Fall Prevention:   assistive device/personal items within reach   clutter free environment maintained   fall prevention program maintained   nonskid shoes/slippers when out of bed   room organization consistent   safety round/check completed  Taken 12/28/2023 0800 by Brunner, Brittany, RN  Safety Promotion/Fall Prevention:   assistive device/personal items within reach   clutter free environment maintained   fall prevention program maintained   nonskid shoes/slippers when out of bed   room organization consistent   safety round/check completed     Problem: Fluid Imbalance (Pneumonia)  Goal: Fluid Balance  Outcome: Ongoing, Progressing     Problem: Infection (Pneumonia)  Goal: Resolution of Infection Signs and Symptoms  Outcome: Ongoing, Progressing     Problem: Respiratory Compromise  (Pneumonia)  Goal: Effective Oxygenation and Ventilation  Outcome: Ongoing, Progressing     Problem: Adjustment to Illness (Sepsis/Septic Shock)  Goal: Optimal Coping  Outcome: Ongoing, Progressing     Problem: Bleeding (Sepsis/Septic Shock)  Goal: Absence of Bleeding  Outcome: Ongoing, Progressing     Problem: Glycemic Control Impaired (Sepsis/Septic Shock)  Goal: Blood Glucose Level Within Desired Range  Outcome: Ongoing, Progressing     Problem: Infection Progression (Sepsis/Septic Shock)  Goal: Absence of Infection Signs and Symptoms  Outcome: Ongoing, Progressing  Intervention: Promote Recovery  Recent Flowsheet Documentation  Taken 12/28/2023 1600 by Brunner, Brittany, RN  Activity Management:   up in chair   up to bedside commode  Taken 12/28/2023 1400 by Brunner, Brittany, RN  Activity Management: up in chair  Taken 12/28/2023 1200 by Brunner, Brittany, RN  Activity Management: up in chair  Taken 12/28/2023 1000 by Brunner, Brittany, RN  Activity Management: up in chair  Taken 12/28/2023 0800 by Brunner, Brittany, RN  Activity Management: activity encouraged     Problem: Nutrition Impaired (Sepsis/Septic Shock)  Goal: Optimal Nutrition Intake  Outcome: Ongoing, Progressing     Problem: Skin Injury Risk Increased  Goal: Skin Health and Integrity  Outcome: Ongoing, Progressing  Intervention: Optimize Skin Protection  Recent Flowsheet Documentation  Taken 12/28/2023 0800 by Brunner, Brittany, RN  Pressure Reduction Techniques: frequent weight shift encouraged  Pressure Reduction Devices: pressure-redistributing mattress utilized   Goal Outcome Evaluation:

## 2023-12-28 NOTE — PLAN OF CARE
Goal Outcome Evaluation:  Plan of Care Reviewed With: patient        Progress: no change     AOX4.  Complaints of pain. Patient is tolerating getting up to the bedside commode well. Patient remains on airvo at 35/55. Patients heart rate has been in the 50's during the night. VSS
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
## 2023-12-28 NOTE — PLAN OF CARE
Goal Outcome Evaluation:      Krystyna Bennett presents with functional mobility impairments which indicate the need for skilled intervention. Pt continues to be limited due to being on Airvo throughout tx session. Pt ambulated ~3' x2 with SBA. Pt able to perform standing ther ex but required multiple standing rest breaks due to O2 desaturating. Tolerating session today without incident. Will continue to follow and progress as tolerated.

## 2023-12-28 NOTE — THERAPY TREATMENT NOTE
"Subjective: Pt agreeable to therapeutic plan of care.    Objective:     Bed mobility - N/A or Not attempted.  Transfers - SBA  Ambulation - 3 x2 feet SBA    Therapeutic Exercise - 10 Reps B LE AROM supported sitting / chair and standing    Vitals: Desaturates; O2 sats desaturates into mid-low 80s% during standing ther ex    Pain: Pt did not rate pain  Intervention for pain: N/A    Education: Provided education on the importance of mobility in the acute care setting, Verbal/Tactile Cues, Transfer Training, Gait Training, and Energy conservation strategies    Assessment: Krystyna Bennett presents with functional mobility impairments which indicate the need for skilled intervention. Pt continues to be limited due to being on Airvo throughout tx session. Pt ambulated ~3' x2 with SBA. Pt able to perform standing ther ex but required multiple standing rest breaks due to O2 desaturating. Tolerating session today without incident. Will continue to follow and progress as tolerated.     Plan/Recommendations:   If medically appropriate, Low Intensity Therapy recommended post-acute care - This is recommended as therapy feels this patient would require 2-3 visits per week. OP or HH would be the best option depending on patient's home bound status. Consider, if the patient has other  \"skilled\" needs such as wounds, IV antibiotics, etc. Combined with \"low intensity\" could also equate to a SNF. If patient is medically complex, consider LTAC. Pt requires no DME at discharge.     Pt desires Home with family assist and and Home Health at discharge. Pt cooperative; agreeable to therapeutic recommendations and plan of care.         Basic Mobility 6-click:  Rollin = Total, A lot = 2, A little = 3; 4 = None  Supine>Sit:   1 = Total, A lot = 2, A little = 3; 4 = None   Sit>Stand with arms:  1 = Total, A lot = 2, A little = 3; 4 = None  Bed>Chair:   1 = Total, A lot = 2, A little = 3; 4 = None  Ambulate in room:  1 = Total, A lot " = 2, A little = 3; 4 = None  3-5 Steps with railin = Total, A lot = 2, A little = 3; 4 = None  Score: 20    Modified [place]: N/A = No pre-op stroke/TIA    Post-Tx Position: Up in Chair, Alarms activated, and Call light and personal items within reach  PPE: gloves and surgical mask

## 2023-12-28 NOTE — PROGRESS NOTES
"Daily Progress Note        Chest pain    Hemoptysis         Assessment:     Hemoptysis  Bronchoscopy 12/25/2023  Significant bloody secretions noted in the entire bronchial tree and especially bilateral lower lobes status post therapeutic bilateral lung washing  no foreign bodies    Bilateral lower lobe dense alveolar infiltrate, possible alveolar hemorrhage versus aspiration of gastric contents  Moderate hiatal hernia     CT scan of the chest August 2023 there was no alveolar infiltrate however advanced COPD changes noted  No PE     11/2022 was admitted for aspiration pneumonia.  States she \"choked on a vitamin.  Then vomited and choked\".  Reported shortness of breath and and coughing up Bloody sputum and with symptoms of feeling choking on swallowing pills.     Connective tissue work up (MISAEL/ANCA/Lupus) negative in November 2022     Chronic obstructive pulmonary disease, unspecified COPD type (CMS/HCC) (Primary)  Centrilobular emphysema (CMS/HCC)    FEV1 0.68 L which is 28% improved to 34% postbronchodilator  FEV1/FVC ratio 34  Expiratory reserve volume 84%   Residual volume 182%  Total lung capacity 127%  Diffusion capacity 21%.     Pulmonary function test was extremely hard on the patient she passed out 3 times during the test     CT scan of the chest 8/2/2023     1. Previously described new medial left lower lobe lung nodule has resolved in the interval suggesting it represented benign infectious/inflammatory nodule.  2. 10 mm right lower lobe and 6 mm subpleural left lower lobe noncalcified nodules are chronic findings, and are considered benign.  3. There is chronic appearing scarring posteriorly in the right lower lobe at the site of previous pneumonia. No acute airspace disease is seen.  4. Advanced emphysema.     CT scan of the chest February 2023  1. New 8 mm nodule in medial left lower lobe, recommend 3 month CT follow-up.  2. Right lower lobe linear consolidation likely developing scarring in the " setting of previously seen pneumonia, recommend attention on follow-up.  3. Right lower lobe 10 mm nodule stable from multiple prior studies.  3. Advanced emphysema, coronary artery calcifications, and and additional chronic findings above.     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 prednisone 6-day taper    Antibiotics changed Rocephin to Unasyn  3 days of. Azithromycin     S/p TXA nebulized  S/p epinephrine nebulized     Repeat MISAEL and ANCA                         LOS: 4 days     Subjective         Objective     Vital signs for last 24 hours:  Vitals:    12/28/23 0655 12/28/23 0657 12/28/23 0659 12/28/23 0701   BP:       BP Location:       Patient Position:       Pulse:       Resp: 12 16 14 14   Temp:       TempSrc:       SpO2:       Weight:       Height:           Intake/Output last 3 shifts:  I/O last 3 completed shifts:  In: 1280 [P.O.:980; Blood:300]  Out: 600 [Urine:600]  Intake/Output this shift:  No intake/output data recorded.      Radiology  Imaging Results (Last 24 Hours)       ** No results found for the last 24 hours. **            Labs:  Results from last 7 days   Lab Units 12/28/23  0235   WBC 10*3/mm3 13.30*   HEMOGLOBIN g/dL 7.7*   HEMATOCRIT % 24.4*   PLATELETS 10*3/mm3 195     Results from last 7 days   Lab Units 12/28/23  0235   SODIUM mmol/L 143   POTASSIUM mmol/L 3.9   CHLORIDE mmol/L 103   CO2 mmol/L 30.0*   BUN  mg/dL 38*   CREATININE mg/dL 0.88   CALCIUM mg/dL 8.7   BILIRUBIN mg/dL 0.7   ALK PHOS U/L 66   ALT (SGPT) U/L 25   AST (SGOT) U/L 29   GLUCOSE mg/dL 134*         Results from last 7 days   Lab Units 12/28/23  0235 12/23/23 2039   ALBUMIN g/dL 3.4* 4.1     Results from last 7 days   Lab Units 12/24/23  0810 12/24/23  0028 12/23/23 2039   HSTROP T ng/L 202* 278* 76*     Results from last 7 days   Lab Units 12/25/23  1532   MISAEL  Negative     Results from last 7 days   Lab Units 12/28/23  0235   MAGNESIUM mg/dL 2.0     Results from last 7 days   Lab Units 12/25/23  0503   INR  1.03               Meds:   SCHEDULE  amiodarone, 200 mg, Oral, BID With Meals  [Held by provider] aspirin, 162 mg, Oral, Daily  atorvastatin, 10 mg, Oral, Daily  budesonide-formoterol, 2 puff, Inhalation, BID - RT   And  tiotropium bromide monohydrate, 2 puff, Inhalation, Daily - RT  hydroCHLOROthiazide, 25 mg, Oral, Daily  lisinopril, 40 mg, Oral, Daily  methylPREDNISolone sodium succinate, 40 mg, Intravenous, Q8H  metoprolol succinate XL, 25 mg, Oral, Daily  montelukast, 10 mg, Oral, Nightly  multivitamin with minerals, 1 tablet, Oral, Daily  pantoprazole, 40 mg, Oral, Q AM  PARoxetine, 20 mg, Oral, Daily  pramipexole, 0.25 mg, Oral, Daily  racemic epinephrine, 0.5 mL, Nebulization, TID - RT   And  sodium chloride, 3 mL, Nebulization, TID - RT  sodium chloride, 10 mL, Intravenous, Q12H  sodium chloride, 10 mL, Intravenous, Q12H  vitamin B-12, 1,000 mcg, Oral, Daily      Infusions     PRNs    acetaminophen **OR** acetaminophen **OR** acetaminophen    albuterol    senna-docusate sodium **AND** polyethylene glycol **AND** bisacodyl **AND** bisacodyl    Calcium Replacement - Follow Nurse / BPA Driven Protocol    loperamide    Magnesium Standard Dose Replacement - Follow Nurse / BPA Driven Protocol    melatonin    metoprolol tartrate    nitroglycerin    ondansetron    Phosphorus Replacement - Follow Nurse / BPA Driven Protocol    Potassium  Replacement - Follow Nurse / BPA Driven Protocol    prochlorperazine    sodium chloride    [COMPLETED] Insert Peripheral IV **AND** sodium chloride    sodium chloride    sodium chloride    sodium chloride    sodium chloride    Physical Exam:  Physical Exam  Cardiovascular:      Heart sounds: Murmur heard.   Pulmonary:      Effort: No respiratory distress.      Breath sounds: No stridor. Rhonchi and rales present. No wheezing.   Chest:      Chest wall: No tenderness.         ROS  Review of Systems   Respiratory:  Positive for cough and shortness of breath. Negative for wheezing and stridor.    Cardiovascular:  Positive for palpitations and leg swelling. Negative for chest pain.             Total time spent with patient greater than: 45 Minutes
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
## 2023-12-28 NOTE — CASE MANAGEMENT/SOCIAL WORK
Continued Stay Note  [facility]     Patient Name: Krystyna Bennett  MRN: [id number]  Today's Date: 12/28/2023    Admit Date: 12/23/2023    Plan: DC PLAN; Return home alone. Current Home 02 4L thru [facility] (Watch for increased 02 needs)       Discharge Plan       Row Name 12/28/23 1452       Plan    Plan DC PLAN; Return home alone. Current Home 02 4L thru [facility] (Watch for increased 02 needs)    Plan Comments Went and spoke with patient about home health. Patient strongly declines. List left at bedside, Watch for increased needs in home 02. AIRVO 35L 55% Pulmonary and Cardiology following.                      Expected Discharge Date and Time       Expected Discharge Date Expected Discharge Time    Dec 29, 2023             Rafaela Bustos RN

## 2023-12-28 NOTE — NURSING NOTE
Airvo titrated back up to 55% Fio2. Patient destated to 77% on 40%Fio2. Patient sats now 92%. RRT notified

## 2023-12-29 LAB
ALBUMIN SERPL-MCNC: 3.4 G/DL (ref 3.5–5.2)
ALBUMIN/GLOB SERPL: 2.4 G/DL
ALP SERPL-CCNC: 71 U/L (ref 39–117)
ALT SERPL W P-5'-P-CCNC: 30 U/L (ref 1–33)
ANION GAP SERPL CALCULATED.3IONS-SCNC: 9 MMOL/L (ref 5–15)
AST SERPL-CCNC: 30 U/L (ref 1–32)
BILIRUB SERPL-MCNC: 0.7 MG/DL (ref 0–1.2)
BUN SERPL-MCNC: 36 MG/DL (ref 8–23)
BUN/CREAT SERPL: 33.3 (ref 7–25)
CALCIUM SPEC-SCNC: 8.6 MG/DL (ref 8.6–10.5)
CHLORIDE SERPL-SCNC: 103 MMOL/L (ref 98–107)
CO2 SERPL-SCNC: 31 MMOL/L (ref 22–29)
CREAT SERPL-MCNC: 1.08 MG/DL (ref 0.57–1)
DEPRECATED RDW RBC AUTO: 42.9 FL (ref 37–54)
EGFRCR SERPLBLD CKD-EPI 2021: 53.3 ML/MIN/1.73
ERYTHROCYTE [DISTWIDTH] IN BLOOD BY AUTOMATED COUNT: 12.8 % (ref 12.3–15.4)
GLOBULIN UR ELPH-MCNC: 1.4 GM/DL
GLUCOSE SERPL-MCNC: 126 MG/DL (ref 65–99)
HCT VFR BLD AUTO: 24.5 % (ref 34–46.6)
HGB BLD-MCNC: 7.7 G/DL (ref 12–15.9)
LAB AP CASE REPORT: NORMAL
LYMPHOCYTES # BLD MANUAL: 0.62 10*3/MM3 (ref 0.7–3.1)
LYMPHOCYTES NFR BLD MANUAL: 1 % (ref 5–12)
MAGNESIUM SERPL-MCNC: 2.1 MG/DL (ref 1.6–2.4)
MCH RBC QN AUTO: 28.6 PG (ref 26.6–33)
MCHC RBC AUTO-ENTMCNC: 31.6 G/DL (ref 31.5–35.7)
MCV RBC AUTO: 90.5 FL (ref 79–97)
METAMYELOCYTES NFR BLD MANUAL: 1 % (ref 0–0)
MONOCYTES # BLD: 0.12 10*3/MM3 (ref 0.1–0.9)
NEUTROPHILS # BLD AUTO: 11.44 10*3/MM3 (ref 1.7–7)
NEUTROPHILS NFR BLD MANUAL: 92 % (ref 42.7–76)
NEUTS BAND NFR BLD MANUAL: 1 % (ref 0–5)
P JIROVECII DNA L RESP QL NAA+NON-PROBE: NEGATIVE
PATH REPORT.FINAL DX SPEC: NORMAL
PATH REPORT.GROSS SPEC: NORMAL
PHOSPHATE SERPL-MCNC: 3.2 MG/DL (ref 2.5–4.5)
PLATELET # BLD AUTO: 202 10*3/MM3 (ref 140–450)
PMV BLD AUTO: 9.3 FL (ref 6–12)
POTASSIUM SERPL-SCNC: 4 MMOL/L (ref 3.5–5.2)
PROT SERPL-MCNC: 4.8 G/DL (ref 6–8.5)
RBC # BLD AUTO: 2.71 10*6/MM3 (ref 3.77–5.28)
RBC MORPH BLD: NORMAL
REF LAB TEST METHOD: NORMAL
SCAN SLIDE: NORMAL
SMALL PLATELETS BLD QL SMEAR: ADEQUATE
SODIUM SERPL-SCNC: 143 MMOL/L (ref 136–145)
VARIANT LYMPHS NFR BLD MANUAL: 5 % (ref 19.6–45.3)
WBC MORPH BLD: NORMAL
WBC NRBC COR # BLD AUTO: 12.3 10*3/MM3 (ref 3.4–10.8)

## 2023-12-29 PROCEDURE — 94799 UNLISTED PULMONARY SVC/PX: CPT

## 2023-12-29 PROCEDURE — 85025 COMPLETE CBC W/AUTO DIFF WBC: CPT | Performed by: INTERNAL MEDICINE

## 2023-12-29 PROCEDURE — 84100 ASSAY OF PHOSPHORUS: CPT | Performed by: INTERNAL MEDICINE

## 2023-12-29 PROCEDURE — 93010 ELECTROCARDIOGRAM REPORT: CPT | Performed by: INTERNAL MEDICINE

## 2023-12-29 PROCEDURE — 94761 N-INVAS EAR/PLS OXIMETRY MLT: CPT

## 2023-12-29 PROCEDURE — 25010000002 AMPICILLIN-SULBACTAM PER 1.5 G: Performed by: INTERNAL MEDICINE

## 2023-12-29 PROCEDURE — 80053 COMPREHEN METABOLIC PANEL: CPT | Performed by: INTERNAL MEDICINE

## 2023-12-29 PROCEDURE — 93005 ELECTROCARDIOGRAM TRACING: CPT | Performed by: INTERNAL MEDICINE

## 2023-12-29 PROCEDURE — 94664 DEMO&/EVAL PT USE INHALER: CPT

## 2023-12-29 PROCEDURE — 25010000002 METHYLPREDNISOLONE PER 40 MG: Performed by: INTERNAL MEDICINE

## 2023-12-29 PROCEDURE — 99232 SBSQ HOSP IP/OBS MODERATE 35: CPT | Performed by: INTERNAL MEDICINE

## 2023-12-29 PROCEDURE — 83735 ASSAY OF MAGNESIUM: CPT | Performed by: INTERNAL MEDICINE

## 2023-12-29 PROCEDURE — 85007 BL SMEAR W/DIFF WBC COUNT: CPT | Performed by: INTERNAL MEDICINE

## 2023-12-29 RX ADMIN — METHYLPREDNISOLONE SODIUM SUCCINATE 40 MG: 40 INJECTION, POWDER, FOR SOLUTION INTRAMUSCULAR; INTRAVENOUS at 18:05

## 2023-12-29 RX ADMIN — PRAMIPEXOLE DIHYDROCHLORIDE 0.25 MG: 0.5 TABLET ORAL at 22:06

## 2023-12-29 RX ADMIN — AMIODARONE HYDROCHLORIDE 200 MG: 200 TABLET ORAL at 18:05

## 2023-12-29 RX ADMIN — Medication 10 ML: at 09:37

## 2023-12-29 RX ADMIN — AMLODIPINE BESYLATE 5 MG: 5 TABLET ORAL at 09:35

## 2023-12-29 RX ADMIN — AMPICILLIN SODIUM AND SULBACTAM SODIUM 3 G: 2; 1 INJECTION, POWDER, FOR SOLUTION INTRAMUSCULAR; INTRAVENOUS at 09:35

## 2023-12-29 RX ADMIN — PAROXETINE HYDROCHLORIDE 20 MG: 20 TABLET, FILM COATED ORAL at 09:36

## 2023-12-29 RX ADMIN — Medication 1 TABLET: at 09:36

## 2023-12-29 RX ADMIN — MONTELUKAST 10 MG: 10 TABLET, FILM COATED ORAL at 22:06

## 2023-12-29 RX ADMIN — ATORVASTATIN CALCIUM 10 MG: 10 TABLET, FILM COATED ORAL at 22:06

## 2023-12-29 RX ADMIN — TIOTROPIUM BROMIDE INHALATION SPRAY 2 PUFF: 3.12 SPRAY, METERED RESPIRATORY (INHALATION) at 06:49

## 2023-12-29 RX ADMIN — METOPROLOL SUCCINATE 25 MG: 25 TABLET, EXTENDED RELEASE ORAL at 09:36

## 2023-12-29 RX ADMIN — CYANOCOBALAMIN TAB 1000 MCG 1000 MCG: 1000 TAB at 09:35

## 2023-12-29 RX ADMIN — PANTOPRAZOLE SODIUM 40 MG: 40 TABLET, DELAYED RELEASE ORAL at 06:02

## 2023-12-29 RX ADMIN — AMIODARONE HYDROCHLORIDE 200 MG: 200 TABLET ORAL at 09:35

## 2023-12-29 RX ADMIN — AMPICILLIN SODIUM AND SULBACTAM SODIUM 3 G: 2; 1 INJECTION, POWDER, FOR SOLUTION INTRAMUSCULAR; INTRAVENOUS at 02:54

## 2023-12-29 RX ADMIN — METHYLPREDNISOLONE SODIUM SUCCINATE 40 MG: 40 INJECTION, POWDER, FOR SOLUTION INTRAMUSCULAR; INTRAVENOUS at 03:04

## 2023-12-29 RX ADMIN — LISINOPRIL 40 MG: 20 TABLET ORAL at 09:35

## 2023-12-29 RX ADMIN — Medication 10 ML: at 22:06

## 2023-12-29 RX ADMIN — LOPERAMIDE HYDROCHLORIDE 2 MG: 2 CAPSULE ORAL at 09:53

## 2023-12-29 RX ADMIN — METHYLPREDNISOLONE SODIUM SUCCINATE 40 MG: 40 INJECTION, POWDER, FOR SOLUTION INTRAMUSCULAR; INTRAVENOUS at 09:36

## 2023-12-29 RX ADMIN — BUDESONIDE AND FORMOTEROL FUMARATE DIHYDRATE 2 PUFF: 160; 4.5 AEROSOL RESPIRATORY (INHALATION) at 06:55

## 2023-12-29 RX ADMIN — AMPICILLIN SODIUM AND SULBACTAM SODIUM 3 G: 2; 1 INJECTION, POWDER, FOR SOLUTION INTRAMUSCULAR; INTRAVENOUS at 18:05

## 2023-12-29 RX ADMIN — BUDESONIDE AND FORMOTEROL FUMARATE DIHYDRATE 2 PUFF: 160; 4.5 AEROSOL RESPIRATORY (INHALATION) at 19:34

## 2023-12-29 NOTE — PLAN OF CARE
Goal Outcome Evaluation:  Plan of Care Reviewed With: patient        Progress: no change     AOX4. No complaints of pain. Patient still de sats when getting up to the bedside commode, but recovers soon after sitting down. Patient is on airvo 35/55, and bradycardic at night. Patient was concerned about not starting her chemo drugs during her stay, I advised her to ask questions if she was confused about her treatment here or of any concerns to the physicians when they do rounding. VSS

## 2023-12-29 NOTE — PROGRESS NOTES
Hematology/Oncology Inpatient Progress Note    PATIENT NAME: Krystyna Bennett  : 1945  MRN: [id number]    CHIEF COMPLAINT: Hemoptysis     HISTORY OF PRESENT ILLNESS:      2023: I was asked to see Ms. Bennett who was admitted with complaints of chest pains that she thought were related to pyrosis.  This was later complicated by persistent hemoptysis that led to a bronchoscopy.  Diffuse bloody secretions were identified.  The source of the bleed could not be found.  It was theorized that the blood was potentially aspirated blood from the digestive tract.  She had been taking acalabrutinib and the drug was held following the identification of the bleed.  The oncologist who sees her regularly had asked her to resume to treatment as soon as possible.  On exam I found her to be a well-built woman who was well-oriented and conversant.  She seems chronically ill but was not in distress.  Coughing frequently and using oxygen but respirations not labored.  Lungs markedly diminished bilaterally.  Abdomen soft and nontender and no edema.  I reviewed the laboratory exams social history and family history where reviewed.  I discussed with her the fact that acalabrutinib is well-known to result in bleeding and that fatal bleeds have been documented with the use of this drug.  As long as we are not certain that her bleeding has ceased she should continue to hold the medication.  I would suggest that the medication not be started until she has been discharged from the hospital to continue to follow-up with the oncologist who normally follows her.  I will continue to follow her while in the hospital.     Subjective   2023: Feeling somewhat better. No hemoptysis in the last 24 hours.     ROS:  Review of Systems   Constitutional:  Negative for activity change, appetite change, chills, diaphoresis, fatigue, fever and unexpected weight change.   HENT:  Negative for congestion, dental problem, drooling, ear  discharge, ear pain, facial swelling, hearing loss, mouth sores, nosebleeds, postnasal drip, rhinorrhea, sinus pressure, sinus pain, sneezing, sore throat, tinnitus, trouble swallowing and voice change.    Eyes:  Negative for photophobia, pain, discharge, redness, itching and visual disturbance.   Respiratory:  Positive for shortness of breath. Negative for apnea, cough, choking, chest tightness, wheezing and stridor.    Cardiovascular:  Negative for chest pain, palpitations and leg swelling.   Gastrointestinal:  Negative for abdominal distention, abdominal pain, anal bleeding, blood in stool, constipation, diarrhea, nausea, rectal pain and vomiting.   Endocrine: Negative for cold intolerance, heat intolerance, polydipsia and polyuria.   Genitourinary:  Negative for decreased urine volume, difficulty urinating, dysuria, flank pain, frequency, genital sores, hematuria and urgency.   Musculoskeletal:  Negative for arthralgias, back pain, gait problem, joint swelling, myalgias, neck pain and neck stiffness.   Skin:  Negative for color change, pallor and rash.   Neurological:  Negative for dizziness, tremors, seizures, syncope, facial asymmetry, speech difficulty, weakness, light-headedness, numbness and headaches.   Hematological:  Negative for adenopathy. Does not bruise/bleed easily.   Psychiatric/Behavioral:  Negative for agitation, behavioral problems, confusion, decreased concentration, hallucinations, self-injury, sleep disturbance and suicidal ideas. The patient is not nervous/anxious.         MEDICATIONS:    Scheduled Meds:  amiodarone, 200 mg, Oral, BID With Meals  amLODIPine, 5 mg, Oral, Q24H  ampicillin-sulbactam, 3 g, Intravenous, Q8H  [Held by provider] aspirin, 162 mg, Oral, Daily  atorvastatin, 10 mg, Oral, Daily  budesonide-formoterol, 2 puff, Inhalation, BID - RT   And  tiotropium bromide monohydrate, 2 puff, Inhalation, Daily - RT  lisinopril, 40 mg, Oral, Daily  methylPREDNISolone sodium succinate,  "40 mg, Intravenous, Q8H  metoprolol succinate XL, 25 mg, Oral, Daily  montelukast, 10 mg, Oral, Nightly  multivitamin with minerals, 1 tablet, Oral, Daily  pantoprazole, 40 mg, Oral, Q AM  PARoxetine, 20 mg, Oral, Daily  pramipexole, 0.25 mg, Oral, Daily  sodium chloride, 10 mL, Intravenous, Q12H  sodium chloride, 10 mL, Intravenous, Q12H  vitamin B-12, 1,000 mcg, Oral, Daily       Continuous Infusions:      PRN Meds:    acetaminophen **OR** acetaminophen **OR** acetaminophen    albuterol    senna-docusate sodium **AND** polyethylene glycol **AND** bisacodyl **AND** bisacodyl    Calcium Replacement - Follow Nurse / BPA Driven Protocol    loperamide    Magnesium Standard Dose Replacement - Follow Nurse / BPA Driven Protocol    melatonin    metoprolol tartrate    nitroglycerin    ondansetron    Phosphorus Replacement - Follow Nurse / BPA Driven Protocol    Potassium Replacement - Follow Nurse / BPA Driven Protocol    prochlorperazine    sodium chloride    [COMPLETED] Insert Peripheral IV **AND** sodium chloride    sodium chloride    sodium chloride    sodium chloride    sodium chloride     ALLERGIES:    Allergies   Allergen Reactions    Latex Rash    Sulfa Antibiotics Other (See Comments)     Tunnel vision,light headed/ may not be allergic to it any longer        Objective    VITALS:   /59 (BP Location: Right arm, Patient Position: Lying)   Pulse 61   Temp 98.2 °F (36.8 °C) (Oral)   Resp 14   Ht 167.6 cm (66\")   Wt 85.1 kg (187 lb 9.8 oz)   LMP  (LMP Unknown)   SpO2 96%   BMI 30.28 kg/m²     PHYSICAL EXAM: (performed by MD)  Physical Exam  Constitutional:       General: She is not in acute distress.     Appearance: Normal appearance. She is ill-appearing. She is not toxic-appearing or diaphoretic.   HENT:      Head: Normocephalic and atraumatic.      Right Ear: External ear normal.      Left Ear: External ear normal.      Nose: Nose normal.      Mouth/Throat:      Mouth: Mucous membranes are moist.      " Pharynx: Oropharynx is clear. No oropharyngeal exudate or posterior oropharyngeal erythema.   Eyes:      General: No scleral icterus.        Right eye: No discharge.         Left eye: No discharge.      Conjunctiva/sclera: Conjunctivae normal.      Pupils: Pupils are equal, round, and reactive to light.   Cardiovascular:      Rate and Rhythm: Normal rate and regular rhythm.      Pulses: Normal pulses.      Heart sounds: No murmur heard.     No friction rub. No gallop.   Pulmonary:      Effort: No respiratory distress.      Breath sounds: No stridor. No wheezing, rhonchi or rales.      Comments: Breath sounds markedly diminished bilaterally.   Abdominal:      General: Bowel sounds are normal. There is no distension.      Palpations: Abdomen is soft. There is no mass.      Tenderness: There is no abdominal tenderness. There is no right CVA tenderness, left CVA tenderness, guarding or rebound.      Hernia: No hernia is present.      Comments: Protuberant, soft and not tender.    Musculoskeletal:         General: No swelling, tenderness, deformity or signs of injury.      Cervical back: No rigidity.      Right lower leg: No edema.      Left lower leg: No edema.   Lymphadenopathy:      Cervical: No cervical adenopathy.   Skin:     Coloration: Skin is not jaundiced.      Findings: No bruising, lesion or rash.   Neurological:      General: No focal deficit present.      Mental Status: She is alert and oriented to person, place, and time.      Cranial Nerves: No cranial nerve deficit.      Motor: No weakness.      Gait: Gait normal.   Psychiatric:         Mood and Affect: Mood normal.         Behavior: Behavior normal.         Thought Content: Thought content normal.         Judgment: Judgment normal.     GABO Gann MD performed the physical exam on 12/29/2023 as documented above.       RECENT LABS:  Lab Results (last 24 hours)       Procedure Component Value Units Date/Time    Pneumocystis PCR - Wash, Bronchus  "[[id number]] Collected: 12/25/23 1104    Specimen: Wash from Bronchus Updated: 12/29/23 1411     Reference Lab Report See Attached Report     Pneumocystis Negative    Non-gynecologic Cytology [[id number]] Collected: 12/25/23 1104    Specimen: Wash from Bronchus Updated: 12/29/23 0820     Case Report --     Medical Cytology Report                           Case: [id number]                                  Authorizing Provider:  Zuly Magallon MD             Collected:           12/25/2023 11:04 AM          Ordering Location:     [facility]  Received:            12/26/2023 11:20 AM                                 SUITES                                                                       Pathologist:           Yevgeniy Willis MD                                                             Specimen:    Bronchus, bilateral lung wash                                                               Final Diagnosis --     Lung, bilateral, bronch wash, smears and cytospin preparation:    Atypical squamous cells, favor reactive    Benign bronchial cells    Pulmonary macrophages present    Marked acute and chronic inflammation    No fungal organisms identified    Negative for malignant cells    JPR       Gross Description --     1. Bronchus.  Received in carbowax and designated \"bilateral lung wash\" are 50 mL of cloudy, brown fluid. Particulate matter is present. This specimen is processed as per protocol.          CBC & Differential [[id number]]  (Abnormal) Collected: 12/29/23 0304    Specimen: Blood Updated: 12/29/23 0342    Narrative:      The following orders were created for panel order CBC & Differential.  Procedure                               Abnormality         Status                     ---------                               -----------         ------                     CBC Auto Differential[[id number]]        Abnormal            Final result               Scan Slide[[id number]]                                  "      Final result                 Please view results for these tests on the individual orders.    CBC Auto Differential [[id number]]  (Abnormal) Collected: 12/29/23 0304    Specimen: Blood Updated: 12/29/23 0342     WBC 12.30 10*3/mm3      RBC 2.71 10*6/mm3      Hemoglobin 7.7 g/dL      Hematocrit 24.5 %      MCV 90.5 fL      MCH 28.6 pg      MCHC 31.6 g/dL      RDW 12.8 %      RDW-SD 42.9 fl      MPV 9.3 fL      Platelets 202 10*3/mm3     Narrative:      The previously reported component NRBC is no longer being reported. Previous result was 0.1 /100 WBC (Reference Range: 0.0-0.2 /100 WBC) on 12/29/2023 at 0313 EST.    Scan Slide [[id number]] Collected: 12/29/23 0304    Specimen: Blood Updated: 12/29/23 0342     Scan Slide --     Comment: See Manual Differential Results       Manual Differential [[id number]]  (Abnormal) Collected: 12/29/23 0304    Specimen: Blood Updated: 12/29/23 0342     Neutrophil % 92.0 %      Lymphocyte % 5.0 %      Monocyte % 1.0 %      Bands %  1.0 %      Metamyelocyte % 1.0 %      Neutrophils Absolute 11.44 10*3/mm3      Lymphocytes Absolute 0.62 10*3/mm3      Monocytes Absolute 0.12 10*3/mm3      RBC Morphology Normal     WBC Morphology Normal     Platelet Estimate Adequate    Phosphorus [[id number]]  (Normal) Collected: 12/29/23 0304    Specimen: Blood Updated: 12/29/23 0331     Phosphorus 3.2 mg/dL     Magnesium [[id number]]  (Normal) Collected: 12/29/23 0304    Specimen: Blood Updated: 12/29/23 0331     Magnesium 2.1 mg/dL     Comprehensive Metabolic Panel [[id number]]  (Abnormal) Collected: 12/29/23 0304    Specimen: Blood Updated: 12/29/23 0331     Glucose 126 mg/dL      BUN 36 mg/dL      Creatinine 1.08 mg/dL      Sodium 143 mmol/L      Potassium 4.0 mmol/L      Chloride 103 mmol/L      CO2 31.0 mmol/L      Calcium 8.6 mg/dL      Total Protein 4.8 g/dL      Albumin 3.4 g/dL      ALT (SGPT) 30 U/L      AST (SGOT) 30 U/L      Alkaline Phosphatase 71 U/L      Total Bilirubin 0.7 mg/dL       Globulin 1.4 gm/dL      A/G Ratio 2.4 g/dL      BUN/Creatinine Ratio 33.3     Anion Gap 9.0 mmol/L      eGFR 53.3 mL/min/1.73     Narrative:      GFR Normal >60  Chronic Kidney Disease <60  Kidney Failure <15    The GFR formula is only valid for adults with stable renal function between ages 18 and 70.          IMAGING REVIEWED:  No radiology results for the last day    Assessment & Plan   ASSESSMENT:  Hemoptysis: Seems resolved. Continue to hold the aclabrutinib until discharge.   Reviewed the blood counts. Hemoglobin is similar.     PLAN:  As above.     Earnest Gann MD on 12/29/2023 at 17:45

## 2023-12-29 NOTE — PROGRESS NOTES
The patient feels better slowly each day. No nausea or vomiting. The patient states that her breathing continues to improve. Still has some wheezing and cough. No light headedness or dizziness. Patient is able to follow commands without issue. Overall doing well. Bowels are working. She remains on high flow. She does get dizzy with little movement.     Physical Exam Vitals reviewed.   HENT:      Head: Normocephalic and atraumatic.      Nose: Nose normal.   Eyes:      Extraocular Movements: Extraocular movements intact.      Conjunctivae/sclera: Conjunctivae normal.      Pupils: Pupils are equal, round, and reactive to light.   Cardiovascular:     Ni murmur  Pulmonary:      Effort: weak, on airvo      Breath sounds: reduced BS bilaterally   Abdominal:      General: Abdomen is flat. Bowel sounds are normal.      Palpations: Abdomen is soft.   Musculoskeletal:         General: Normal range of motion.      Cervical back: Normal range of motion and neck supple.   Skin:     General: Skin is dry.   Neurological:      General: No focal deficit present.      Mental Status: alert.       Krystyna Bennett is a 78 y.o. female with past medical history of hypertension, GERD, asthma/COPD, chronic hypoxic respiratory failure secondary to COPD on home oxygen 4 L via nasal cannula, mood disorder, hyperlipidemia, past history of systolic congestive heart failure which subsequently improved in subsequent echo with ejection fraction of 55 to 60% presented to emergency room with complaints of chest pain radiating to her arms and back associated with nausea and diaphoresis.  Patient had a cardiac cath a year ago.  Troponins are elevated.  D-dimer is normal.  Lipase is normal.  CT angiogram of the chest showed No evidence of pulmonary embolus. Bibasilar pneumonia.  Severe emphysema. Moderate size hiatal hernia. Hepatic and renal cysts.     Assessment / Plan      Hemoptysis s/p bronchoscopy on 12/25/23   - Etiology unclear but per pulmonary  there is suspicion for aspiration of gastric contents.  -bronchoscopy on 12/25/23 - Significant bloody secretions noted in the entire bronchial tree and especially bilateral lower lobes status post therapeutic bilateral lung washing and no foreign bodies  -Pulmonary is following.  -Patient is on Acalabrutinib which can cause hemoptysis.  Tried to reach out to patient primary oncologist Dr. Henry [facility] phone #[phone] Voice message is left on oncology personal phone as well on 12/28/23.   Waiting callback.  Currently getting in-house oncologist for this case as well.  -Patient stated got hemoptysis intermittently.  And is getting epinephrine nebulizer 3 times per day as of now.   -now on Airvo and wean down the oxygen as tolerated.  -Unasyn to continue for aspiration pneumonia.    12/29/2023 - patient will likely need several more days for improvement.      Anemia secondary to hemoptysis  -Patient has hemoglobin of 6.7 on 12/27 and 1 PRBC is ordered.  CBC monitor daily.    12/29/2023 - patients hemoglobin is stable currently. No need for transfusion. 7.7 today.      Acute on chronic hypoxic respiratory failure secondary to COPD exacerbation due to bibasilar pneumonia likely aspiration pneumonia  -Continue Unasyn.    -Continue Singulair, Symbicort and Spiriva inhaler.  Continue albuterol nebulizer treatment as needed.    Appreciate assistance from pulmonary service. Patient remains on high flow currently.      Atrial fibrillation with rapid ventricular response:   Patient given loading dose of amiodarone and started on amiodarone drip.  Now on amiodarone PO.      Chest pain  -Troponins are mildly elevated.  Patient seen by cardiology.  Given patient's normal coronaries a year ago clinical suspicion for cardiac etiology is low.  Continue analgesics as needed for pain.  Continue supplemental oxygen.       CLL  -Patient is on acalabrutinib -on hold for now given patient intermittent hemoptysis  -Primary  oncologist Dr. Henry [facility] phone #[phone]  -Try to reach out to primary oncologist when to resume chemo med.  Waiting callback.  -Dr Valerio is covering and reach out from [facility] --> pending      Hypertension: Continue hydrochlorothiazide, metoprolol and lisinopril.     12/29/2023 - patients BP remains under good control.    Mood disorder: Continue paroxetine     Disposition: tbd

## 2023-12-29 NOTE — PROGRESS NOTES
"Daily Progress Note        Chest pain    Hemoptysis         Assessment:     Hemoptysis  Bronchoscopy 12/25/2023  Significant bloody secretions noted in the entire bronchial tree and especially bilateral lower lobes status post therapeutic bilateral lung washing  no foreign bodies    Bilateral lower lobe dense alveolar infiltrate, possible alveolar hemorrhage versus aspiration of gastric contents  Moderate hiatal hernia     CT scan of the chest August 2023 there was no alveolar infiltrate however advanced COPD changes noted  No PE     11/2022 was admitted for aspiration pneumonia.  States she \"choked on a vitamin.  Then vomited and choked\".  Reported shortness of breath and and coughing up Bloody sputum and with symptoms of feeling choking on swallowing pills.     Connective tissue work up (MISAEL/ANCA/Lupus) negative in November 2022     Chronic obstructive pulmonary disease, unspecified COPD type (CMS/HCC) (Primary)  Centrilobular emphysema (CMS/HCC)    FEV1 0.68 L which is 28% improved to 34% postbronchodilator  FEV1/FVC ratio 34  Expiratory reserve volume 84%   Residual volume 182%  Total lung capacity 127%  Diffusion capacity 21%.     Pulmonary function test was extremely hard on the patient she passed out 3 times during the test     CT scan of the chest 8/2/2023     1. Previously described new medial left lower lobe lung nodule has resolved in the interval suggesting it represented benign infectious/inflammatory nodule.  2. 10 mm right lower lobe and 6 mm subpleural left lower lobe noncalcified nodules are chronic findings, and are considered benign.  3. There is chronic appearing scarring posteriorly in the right lower lobe at the site of previous pneumonia. No acute airspace disease is seen.  4. Advanced emphysema.     CT scan of the chest February 2023  1. New 8 mm nodule in medial left lower lobe, recommend 3 month CT follow-up.  2. Right lower lobe linear consolidation likely developing scarring in the " setting of previously seen pneumonia, recommend attention on follow-up.  3. Right lower lobe 10 mm nodule stable from multiple prior studies.  3. Advanced emphysema, coronary artery calcifications, and and additional chronic findings above.     Systolic CHF, chronic (CMS/HCC) diastolic dysfunction  Pulmonary hypertension RVSP 47         Chronic respiratory failure with hypoxia (CMS/HCC)  - On home O2 4 Liters,      patient is immune-compromised with CLL treatment in oral chemo     Up-to-date on vaccinations for pneumonia, flu and COVID-19           Recommendations:     Oxygen supplement and titration to maintain saturation 90-95%: Currently on Airvo     hemoptysis  improving  most likely due to gastric aspiration and severe pneumonitis  Similar event happened in November 2022 on Thanksgiving day    Cannot rule out opportunistic infection since patient is immune compromised on oral chemotherapy for CLL     Due to wheezing changed to IV Solu-Medrol    Antibiotics changed Rocephin to Unasyn until 12/30/2023  3 days of. Azithromycin        Repeat MISAEL and ANCA                         LOS: 5 days     Subjective     Hemoptysis have resolved, hemoglobin stable    Objective     Vital signs for last 24 hours:  Vitals:    12/29/23 0649 12/29/23 0652 12/29/23 0655 12/29/23 0658   BP:       BP Location:       Patient Position:       Pulse: 71 64 57 67   Resp: 18 16 17 18   Temp:       TempSrc:       SpO2: 95% 100% 98% 96%   Weight:       Height:           Intake/Output last 3 shifts:  I/O last 3 completed shifts:  In: 480 [P.O.:480]  Out: 100 [Urine:100]  Intake/Output this shift:  No intake/output data recorded.      Radiology  Imaging Results (Last 24 Hours)       ** No results found for the last 24 hours. **            Labs:  Results from last 7 days   Lab Units 12/29/23  0304   WBC 10*3/mm3 12.30*   HEMOGLOBIN g/dL 7.7*   HEMATOCRIT % 24.5*   PLATELETS 10*3/mm3 202     Results from last 7 days   Lab Units 12/29/23 0304    SODIUM mmol/L 143   POTASSIUM mmol/L 4.0   CHLORIDE mmol/L 103   CO2 mmol/L 31.0*   BUN mg/dL 36*   CREATININE mg/dL 1.08*   CALCIUM mg/dL 8.6   BILIRUBIN mg/dL 0.7   ALK PHOS U/L 71   ALT (SGPT) U/L 30   AST (SGOT) U/L 30   GLUCOSE mg/dL 126*         Results from last 7 days   Lab Units 12/29/23  0304 12/28/23  0235 12/23/23  2039   ALBUMIN g/dL 3.4* 3.4* 4.1     Results from last 7 days   Lab Units 12/24/23  0810 12/24/23  0028 12/23/23  2039   HSTROP T ng/L 202* 278* 76*     Results from last 7 days   Lab Units 12/25/23  1532   MISAEL  Negative     Results from last 7 days   Lab Units 12/29/23  0304   MAGNESIUM mg/dL 2.1     Results from last 7 days   Lab Units 12/25/23  0503   INR  1.03               Meds:   SCHEDULE  amiodarone, 200 mg, Oral, BID With Meals  amLODIPine, 5 mg, Oral, Q24H  ampicillin-sulbactam, 3 g, Intravenous, Q8H  [Held by provider] aspirin, 162 mg, Oral, Daily  atorvastatin, 10 mg, Oral, Daily  budesonide-formoterol, 2 puff, Inhalation, BID - RT   And  tiotropium bromide monohydrate, 2 puff, Inhalation, Daily - RT  lisinopril, 40 mg, Oral, Daily  methylPREDNISolone sodium succinate, 40 mg, Intravenous, Q8H  metoprolol succinate XL, 25 mg, Oral, Daily  montelukast, 10 mg, Oral, Nightly  multivitamin with minerals, 1 tablet, Oral, Daily  pantoprazole, 40 mg, Oral, Q AM  PARoxetine, 20 mg, Oral, Daily  pramipexole, 0.25 mg, Oral, Daily  sodium chloride, 10 mL, Intravenous, Q12H  sodium chloride, 10 mL, Intravenous, Q12H  vitamin B-12, 1,000 mcg, Oral, Daily      Infusions     PRNs    acetaminophen **OR** acetaminophen **OR** acetaminophen    albuterol    senna-docusate sodium **AND** polyethylene glycol **AND** bisacodyl **AND** bisacodyl    Calcium Replacement - Follow Nurse / BPA Driven Protocol    loperamide    Magnesium Standard Dose Replacement - Follow Nurse / BPA Driven Protocol    melatonin    metoprolol tartrate    nitroglycerin    ondansetron    Phosphorus Replacement - Follow Nurse /  BPA Driven Protocol    Potassium Replacement - Follow Nurse / BPA Driven Protocol    prochlorperazine    sodium chloride    [COMPLETED] Insert Peripheral IV **AND** sodium chloride    sodium chloride    sodium chloride    sodium chloride    sodium chloride    Physical Exam:  General Appearance:  Alert   HEENT:  Normocephalic, without obvious abnormality, Conjunctiva/corneas clear,.   Nares normal, no drainage     Neck:  Supple, symmetrical, trachea midline. No JVD.  Lungs /Chest wall:   Wheezing bilateral basal rhonchi, respirations unlabored, symmetrical wall movement.     Heart:  Regular rate and rhythm, S1 S2 normal  Abdomen: Soft, non-tender, no masses, no organomegaly.    Extremities: No edema, no clubbing or cyanosis    ROS  Constitutional: Negative for chills, fever and malaise/fatigue.   HENT: Negative.    Eyes: Negative.    Cardiovascular: Negative.    Respiratory: Positive for cough and shortness of breath.    Skin: Negative.    Musculoskeletal: Negative.    Gastrointestinal: Negative.    Genitourinary: Negative.    Neurological: Generalized weakness    I reviewed the recent clinical results    Part of this note may be an electronic transcription/translation of spoken language to printed text using the Dragon Dictation System

## 2023-12-29 NOTE — CASE MANAGEMENT/SOCIAL WORK
Continued Stay Note  [facility]     Patient Name: Krystyna Bennett  MRN: [id number]  Today's Date: 12/29/2023    Admit Date: 12/23/2023    Plan: Rtn home alone. Current home O2 4L thru [facility] (Watch for increase O2 needs)   Discharge Plan       Row Name 12/29/23 1734       Plan    Plan Rtn home alone. Current home O2 4L thru [facility] (Watch for increase O2 needs)    Patient/Family in Agreement with Plan yes    Provided Post Acute Provider List? Yes    Plan Comments CM met with patient again at bedside to discuss SNF and HH. Pt continues to strongly decline both options. Pt states her daughter will assist her with any PT needs. Pt currently on AIRVO 35L 55%. DC Barriers: Pulmonary and Cardio following, O2 demands.             Expected Discharge Date and Time       Expected Discharge Date Expected Discharge Time    Dec 29, 2023         Olinda Hoffman RN    phone [phone]  fax [phone]

## 2023-12-30 LAB
ANION GAP SERPL CALCULATED.3IONS-SCNC: 8 MMOL/L (ref 5–15)
BUN SERPL-MCNC: 29 MG/DL (ref 8–23)
BUN/CREAT SERPL: 32.2 (ref 7–25)
CALCIUM SPEC-SCNC: 8.6 MG/DL (ref 8.6–10.5)
CHLORIDE SERPL-SCNC: 103 MMOL/L (ref 98–107)
CO2 SERPL-SCNC: 33 MMOL/L (ref 22–29)
CREAT SERPL-MCNC: 0.9 MG/DL (ref 0.57–1)
DEPRECATED RDW RBC AUTO: 43.3 FL (ref 37–54)
EGFRCR SERPLBLD CKD-EPI 2021: 66.4 ML/MIN/1.73
EOSINOPHIL # BLD MANUAL: 0.26 10*3/MM3 (ref 0–0.4)
EOSINOPHIL NFR BLD MANUAL: 2 % (ref 0.3–6.2)
ERYTHROCYTE [DISTWIDTH] IN BLOOD BY AUTOMATED COUNT: 12.9 % (ref 12.3–15.4)
GLUCOSE SERPL-MCNC: 126 MG/DL (ref 65–99)
HCT VFR BLD AUTO: 25.4 % (ref 34–46.6)
HGB BLD-MCNC: 8.2 G/DL (ref 12–15.9)
LARGE PLATELETS: ABNORMAL
LYMPHOCYTES # BLD MANUAL: 0.52 10*3/MM3 (ref 0.7–3.1)
LYMPHOCYTES NFR BLD MANUAL: 4 % (ref 5–12)
MCH RBC QN AUTO: 29.3 PG (ref 26.6–33)
MCHC RBC AUTO-ENTMCNC: 32.2 G/DL (ref 31.5–35.7)
MCV RBC AUTO: 91.2 FL (ref 79–97)
MONOCYTES # BLD: 0.52 10*3/MM3 (ref 0.1–0.9)
NEUTROPHILS # BLD AUTO: 11.61 10*3/MM3 (ref 1.7–7)
NEUTROPHILS NFR BLD MANUAL: 88 % (ref 42.7–76)
NEUTS BAND NFR BLD MANUAL: 2 % (ref 0–5)
NRBC SPEC MANUAL: 1 /100 WBC (ref 0–0.2)
PLATELET # BLD AUTO: 232 10*3/MM3 (ref 140–450)
PMV BLD AUTO: 9.4 FL (ref 6–12)
POTASSIUM SERPL-SCNC: 4.1 MMOL/L (ref 3.5–5.2)
QT INTERVAL: 475 MS
QT INTERVAL: 479 MS
QT INTERVAL: 489 MS
QT INTERVAL: 514 MS
QT INTERVAL: 516 MS
QTC INTERVAL: 490 MS
QTC INTERVAL: 495 MS
QTC INTERVAL: 496 MS
QTC INTERVAL: 508 MS
QTC INTERVAL: 533 MS
RBC # BLD AUTO: 2.78 10*6/MM3 (ref 3.77–5.28)
RBC MORPH BLD: NORMAL
SCAN SLIDE: NORMAL
SODIUM SERPL-SCNC: 144 MMOL/L (ref 136–145)
VARIANT LYMPHS NFR BLD MANUAL: 4 % (ref 19.6–45.3)
WBC MORPH BLD: NORMAL
WBC NRBC COR # BLD AUTO: 12.9 10*3/MM3 (ref 3.4–10.8)

## 2023-12-30 PROCEDURE — 25010000002 AMPICILLIN-SULBACTAM PER 1.5 G: Performed by: INTERNAL MEDICINE

## 2023-12-30 PROCEDURE — 85007 BL SMEAR W/DIFF WBC COUNT: CPT | Performed by: INTERNAL MEDICINE

## 2023-12-30 PROCEDURE — 80048 BASIC METABOLIC PNL TOTAL CA: CPT | Performed by: INTERNAL MEDICINE

## 2023-12-30 PROCEDURE — 94664 DEMO&/EVAL PT USE INHALER: CPT

## 2023-12-30 PROCEDURE — 94799 UNLISTED PULMONARY SVC/PX: CPT

## 2023-12-30 PROCEDURE — 25010000002 METHYLPREDNISOLONE PER 40 MG: Performed by: INTERNAL MEDICINE

## 2023-12-30 PROCEDURE — 85025 COMPLETE CBC W/AUTO DIFF WBC: CPT | Performed by: INTERNAL MEDICINE

## 2023-12-30 PROCEDURE — 94761 N-INVAS EAR/PLS OXIMETRY MLT: CPT

## 2023-12-30 PROCEDURE — 25810000003 SODIUM CHLORIDE 0.9 % SOLUTION 250 ML FLEX CONT: Performed by: HOSPITALIST

## 2023-12-30 RX ADMIN — Medication 10 ML: at 08:18

## 2023-12-30 RX ADMIN — Medication 1 TABLET: at 08:06

## 2023-12-30 RX ADMIN — Medication 10 ML: at 21:31

## 2023-12-30 RX ADMIN — METOPROLOL SUCCINATE 25 MG: 25 TABLET, EXTENDED RELEASE ORAL at 08:06

## 2023-12-30 RX ADMIN — LISINOPRIL 40 MG: 20 TABLET ORAL at 08:06

## 2023-12-30 RX ADMIN — CYANOCOBALAMIN TAB 1000 MCG 1000 MCG: 1000 TAB at 08:06

## 2023-12-30 RX ADMIN — SODIUM CHLORIDE 40 ML: 9 INJECTION, SOLUTION INTRAVENOUS at 02:26

## 2023-12-30 RX ADMIN — MONTELUKAST 10 MG: 10 TABLET, FILM COATED ORAL at 21:29

## 2023-12-30 RX ADMIN — AMPICILLIN SODIUM AND SULBACTAM SODIUM 3 G: 2; 1 INJECTION, POWDER, FOR SOLUTION INTRAMUSCULAR; INTRAVENOUS at 10:19

## 2023-12-30 RX ADMIN — METHYLPREDNISOLONE SODIUM SUCCINATE 40 MG: 40 INJECTION, POWDER, FOR SOLUTION INTRAMUSCULAR; INTRAVENOUS at 10:19

## 2023-12-30 RX ADMIN — PAROXETINE HYDROCHLORIDE 20 MG: 20 TABLET, FILM COATED ORAL at 08:06

## 2023-12-30 RX ADMIN — BUDESONIDE AND FORMOTEROL FUMARATE DIHYDRATE 2 PUFF: 160; 4.5 AEROSOL RESPIRATORY (INHALATION) at 20:35

## 2023-12-30 RX ADMIN — AMLODIPINE BESYLATE 5 MG: 5 TABLET ORAL at 08:06

## 2023-12-30 RX ADMIN — METHYLPREDNISOLONE SODIUM SUCCINATE 40 MG: 40 INJECTION, POWDER, FOR SOLUTION INTRAMUSCULAR; INTRAVENOUS at 17:34

## 2023-12-30 RX ADMIN — TIOTROPIUM BROMIDE INHALATION SPRAY 2 PUFF: 3.12 SPRAY, METERED RESPIRATORY (INHALATION) at 10:44

## 2023-12-30 RX ADMIN — ATORVASTATIN CALCIUM 10 MG: 10 TABLET, FILM COATED ORAL at 21:29

## 2023-12-30 RX ADMIN — AMIODARONE HYDROCHLORIDE 200 MG: 200 TABLET ORAL at 17:34

## 2023-12-30 RX ADMIN — AMPICILLIN SODIUM AND SULBACTAM SODIUM 3 G: 2; 1 INJECTION, POWDER, FOR SOLUTION INTRAMUSCULAR; INTRAVENOUS at 02:26

## 2023-12-30 RX ADMIN — METHYLPREDNISOLONE SODIUM SUCCINATE 40 MG: 40 INJECTION, POWDER, FOR SOLUTION INTRAMUSCULAR; INTRAVENOUS at 02:26

## 2023-12-30 RX ADMIN — PANTOPRAZOLE SODIUM 40 MG: 40 TABLET, DELAYED RELEASE ORAL at 05:54

## 2023-12-30 RX ADMIN — AMIODARONE HYDROCHLORIDE 200 MG: 200 TABLET ORAL at 08:05

## 2023-12-30 RX ADMIN — LOPERAMIDE HYDROCHLORIDE 2 MG: 2 CAPSULE ORAL at 08:18

## 2023-12-30 RX ADMIN — BUDESONIDE AND FORMOTEROL FUMARATE DIHYDRATE 2 PUFF: 160; 4.5 AEROSOL RESPIRATORY (INHALATION) at 10:50

## 2023-12-30 RX ADMIN — PRAMIPEXOLE DIHYDROCHLORIDE 0.25 MG: 0.5 TABLET ORAL at 21:29

## 2023-12-30 NOTE — PLAN OF CARE
Problem: Adult Inpatient Plan of Care  Goal: Plan of Care Review  Outcome: Ongoing, Progressing  Goal: Patient-Specific Goal (Individualized)  Outcome: Ongoing, Progressing  Goal: Absence of Hospital-Acquired Illness or Injury  Outcome: Ongoing, Progressing  Intervention: Identify and Manage Fall Risk  Intervention: Prevent Skin Injury  Intervention: Prevent and Manage VTE (Venous Thromboembolism) Risk  Intervention: Prevent Infection  Goal: Optimal Comfort and Wellbeing  Outcome: Ongoing, Progressing  Intervention: Provide Person-Centered Care  Goal: Readiness for Transition of Care  Outcome: Ongoing, Progressing   Goal Outcome Evaluation:  Plan of Care Reviewed With: patient        Progress: no change  Outcome Evaluation: Pt remains on Airvo at 35L, does tend to desat when getting up to bsd commode, takes a minute to recover while sitting up. Pt reports she feels shes getting better, did note some dark brown blood when assisting her to bsd commode, reports some productive coughing throughout the day. Denies any CP at this time. Remains on IV antibiotics and steroids and tolerating well. Aclabrutinib continues to be held by providers. Has been in NSR so far, remains on PO amnio. Call light and personal items within reach. Plan of care ongoing.

## 2023-12-30 NOTE — PROGRESS NOTES
"Daily Progress Note        Chest pain    Hemoptysis         Assessment:     Hemoptysis  Bronchoscopy 12/25/2023  Significant bloody secretions noted in the entire bronchial tree and especially bilateral lower lobes status post therapeutic bilateral lung washing  no foreign bodies    Bilateral lower lobe dense alveolar infiltrate, possible alveolar hemorrhage versus aspiration of gastric contents  Moderate hiatal hernia     CT scan of the chest August 2023 there was no alveolar infiltrate however advanced COPD changes noted  No PE     11/2022 was admitted for aspiration pneumonia.  States she \"choked on a vitamin.  Then vomited and choked\".  Reported shortness of breath and and coughing up Bloody sputum and with symptoms of feeling choking on swallowing pills.     Connective tissue work up (MISAEL/ANCA/Lupus) negative in November 2022     Chronic obstructive pulmonary disease, unspecified COPD type (CMS/HCC) (Primary)  Centrilobular emphysema (CMS/HCC)    FEV1 0.68 L which is 28% improved to 34% postbronchodilator  FEV1/FVC ratio 34  Expiratory reserve volume 84%   Residual volume 182%  Total lung capacity 127%  Diffusion capacity 21%.     Pulmonary function test was extremely hard on the patient she passed out 3 times during the test     CT scan of the chest 8/2/2023     1. Previously described new medial left lower lobe lung nodule has resolved in the interval suggesting it represented benign infectious/inflammatory nodule.  2. 10 mm right lower lobe and 6 mm subpleural left lower lobe noncalcified nodules are chronic findings, and are considered benign.  3. There is chronic appearing scarring posteriorly in the right lower lobe at the site of previous pneumonia. No acute airspace disease is seen.  4. Advanced emphysema.     CT scan of the chest February 2023  1. New 8 mm nodule in medial left lower lobe, recommend 3 month CT follow-up.  2. Right lower lobe linear consolidation likely developing scarring in the " setting of previously seen pneumonia, recommend attention on follow-up.  3. Right lower lobe 10 mm nodule stable from multiple prior studies.  3. Advanced emphysema, coronary artery calcifications, and and additional chronic findings above.     Systolic CHF, chronic (CMS/HCC) diastolic dysfunction  Pulmonary hypertension RVSP 47         Chronic respiratory failure with hypoxia (CMS/HCC)  - On home O2 4 Liters,      patient is immune-compromised with CLL treatment in oral chemo     Up-to-date on vaccinations for pneumonia, flu and COVID-19           Recommendations:     Oxygen supplement and titration to maintain saturation 90-95%: Currently on Airvo  Check chest x-ray in a.m.    hemoptysis improved  most likely due to gastric aspiration and severe pneumonitis  Similar event happened in November 2022 on Thanksgiving day    Monitor the cultures of the BAL to rule out opportunistic infections: Currently cultures are negative     IV Solu-Medrol    Antibiotics: Unasyn finished today  Finished 3 days of. Azithromycin        Repeat MISAEL and ANCA                         LOS: 6 days     Subjective     Hemoptysis have resolved, hemoglobin stable    Objective     Vital signs for last 24 hours:  Vitals:    12/30/23 1047 12/30/23 1050 12/30/23 1053 12/30/23 1144   BP:    150/69   BP Location:       Patient Position:       Pulse: 54 53 69    Resp: 16 8 8    Temp:       TempSrc:       SpO2: 96% 96% 94%    Weight:       Height:           Intake/Output last 3 shifts:  I/O last 3 completed shifts:  In: 840 [P.O.:840]  Out: 100 [Urine:100]  Intake/Output this shift:  I/O this shift:  In: -   Out: 150 [Urine:150]      Radiology  Imaging Results (Last 24 Hours)       ** No results found for the last 24 hours. **            Labs:  Results from last 7 days   Lab Units 12/30/23  0805   WBC 10*3/mm3 12.90*   HEMOGLOBIN g/dL 8.2*   HEMATOCRIT % 25.4*   PLATELETS 10*3/mm3 232     Results from last 7 days   Lab Units 12/30/23  0805  12/29/23  0304   SODIUM mmol/L 144 143   POTASSIUM mmol/L 4.1 4.0   CHLORIDE mmol/L 103 103   CO2 mmol/L 33.0* 31.0*   BUN mg/dL 29* 36*   CREATININE mg/dL 0.90 1.08*   CALCIUM mg/dL 8.6 8.6   BILIRUBIN mg/dL  --  0.7   ALK PHOS U/L  --  71   ALT (SGPT) U/L  --  30   AST (SGOT) U/L  --  30   GLUCOSE mg/dL 126* 126*         Results from last 7 days   Lab Units 12/29/23  0304 12/28/23  0235 12/23/23  2039   ALBUMIN g/dL 3.4* 3.4* 4.1     Results from last 7 days   Lab Units 12/24/23  0810 12/24/23  0028 12/23/23  2039   HSTROP T ng/L 202* 278* 76*     Results from last 7 days   Lab Units 12/25/23  1532   MISAEL  Negative     Results from last 7 days   Lab Units 12/29/23  0304   MAGNESIUM mg/dL 2.1     Results from last 7 days   Lab Units 12/25/23  0503   INR  1.03               Meds:   SCHEDULE  amiodarone, 200 mg, Oral, BID With Meals  amLODIPine, 5 mg, Oral, Q24H  [Held by provider] aspirin, 162 mg, Oral, Daily  atorvastatin, 10 mg, Oral, Daily  budesonide-formoterol, 2 puff, Inhalation, BID - RT   And  tiotropium bromide monohydrate, 2 puff, Inhalation, Daily - RT  lisinopril, 40 mg, Oral, Daily  methylPREDNISolone sodium succinate, 40 mg, Intravenous, Q8H  metoprolol succinate XL, 25 mg, Oral, Daily  montelukast, 10 mg, Oral, Nightly  multivitamin with minerals, 1 tablet, Oral, Daily  pantoprazole, 40 mg, Oral, Q AM  PARoxetine, 20 mg, Oral, Daily  pramipexole, 0.25 mg, Oral, Daily  sodium chloride, 10 mL, Intravenous, Q12H  sodium chloride, 10 mL, Intravenous, Q12H  vitamin B-12, 1,000 mcg, Oral, Daily      Infusions     PRNs    acetaminophen **OR** acetaminophen **OR** acetaminophen    albuterol    senna-docusate sodium **AND** polyethylene glycol **AND** bisacodyl **AND** bisacodyl    Calcium Replacement - Follow Nurse / BPA Driven Protocol    loperamide    Magnesium Standard Dose Replacement - Follow Nurse / BPA Driven Protocol    melatonin    metoprolol tartrate    nitroglycerin    ondansetron    Phosphorus  Replacement - Follow Nurse / BPA Driven Protocol    Potassium Replacement - Follow Nurse / BPA Driven Protocol    prochlorperazine    sodium chloride    [COMPLETED] Insert Peripheral IV **AND** sodium chloride    sodium chloride    sodium chloride    sodium chloride    sodium chloride    Physical Exam:  General Appearance:  Alert   HEENT:  Normocephalic, without obvious abnormality, Conjunctiva/corneas clear,.   Nares normal, no drainage     Neck:  Supple, symmetrical, trachea midline. No JVD.  Lungs /Chest wall:   Wheezing bilateral basal rhonchi, respirations unlabored, symmetrical wall movement.     Heart:  Regular rate and rhythm, S1 S2 normal  Abdomen: Soft, non-tender, no masses, no organomegaly.    Extremities: No edema, no clubbing or cyanosis    ROS  Constitutional: Negative for chills, fever and malaise/fatigue.   HENT: Negative.    Eyes: Negative.    Cardiovascular: Negative.    Respiratory: Positive for cough and shortness of breath.    Skin: Negative.    Musculoskeletal: Negative.    Gastrointestinal: Negative.    Genitourinary: Negative.    Neurological: Generalized weakness    I reviewed the recent clinical results    Part of this note may be an electronic transcription/translation of spoken language to printed text using the Dragon Dictation System

## 2023-12-30 NOTE — NURSING NOTE
Pt c/o not being able to breathe this afternoon with sats mid 90s on 2-3 LNC. This evening around 1800, pt more lethargic, with abdominal muscle use and labored breathing. MD notified. Stat CXR and ABGs ordered. Son, Rolando, notified of pt's change and after discussing with other son, family decided to make pt DNR/DNI. Rad MD called with cxr resulting with a R collapsed lung. On call hospitalist notified and night shift RN took call back.

## 2023-12-30 NOTE — PLAN OF CARE
Goal Outcome Evaluation:              Outcome Evaluation: Pt now on 30 liters at 48% and tolerating well. Sat up in chair for all of shift. No new complaints on shift.

## 2023-12-30 NOTE — PROGRESS NOTES
The patient feels well currently as long as she remains on high flow oxygen. The patient does become SOB and dizzy with minimal movement. The patient denies any pain. No fevers, chills, sweats. No nausea or vomiting. The patients bowels are working well. No chest pain. The patient appears in NAD currently. Overall doing better each day slowly. Nose feels better after high flow oxygen was adjusted. No other issues over the course of the night.     HENT: vitals reviewed.      Head: Normocephalic and atraumatic.      Nose: Nose normal.   Eyes:      Extraocular Movements: Extraocular movements intact.      Conjunctivae/sclera: Conjunctivae normal.      Pupils: Pupils are equal, round, and reactive to light.   Cardiovascular:     Ni murmur  Pulmonary:      Effort: weak, on airvo      Breath sounds: reduced BS bilaterally   Abdominal:      General: Abdomen is flat. Bowel sounds are normal.      Palpations: Abdomen is soft.   Musculoskeletal:         General: Normal range of motion.      Cervical back: Normal range of motion and neck supple.   Skin:     General: Skin is dry.   Neurological:      General: No focal deficit present.      Mental Status: alert.        Krystyna Bennett is a 78 y.o. female with past medical history of hypertension, GERD, asthma/COPD, chronic hypoxic respiratory failure secondary to COPD on home oxygen 4 L via nasal cannula, mood disorder, hyperlipidemia, past history of systolic congestive heart failure which subsequently improved in subsequent echo with ejection fraction of 55 to 60% presented to emergency room with complaints of chest pain radiating to her arms and back associated with nausea and diaphoresis.  Patient had a cardiac cath a year ago.  Troponins are elevated.  D-dimer is normal.  Lipase is normal.  CT angiogram of the chest showed No evidence of pulmonary embolus. Bibasilar pneumonia.  Severe emphysema. Moderate size hiatal hernia. Hepatic and renal cysts.     Assessment / Plan       Hemoptysis s/p bronchoscopy on 12/25/23   - Etiology unclear but per pulmonary there is suspicion for aspiration of gastric contents.  -bronchoscopy on 12/25/23 - Significant bloody secretions noted in the entire bronchial tree and especially bilateral lower lobes status post therapeutic bilateral lung washing and no foreign bodies  -Pulmonary is following.  -Patient is on Acalabrutinib which can cause hemoptysis.  Tried to reach out to patient primary oncologist Dr. Henry [facility] phone #[phone] #[phone] Voice message is left on oncology personal phone as well on 12/28/23.   Waiting callback.  Currently getting in-house oncologist for this case as well.  -Patient stated got hemoptysis intermittently.  And is getting epinephrine nebulizer 3 times per day as of now.   -now on Airvo and wean down the oxygen as tolerated.  -Unasyn to continue for aspiration pneumonia.     12/29/2023 - patient will likely need several more days for improvement.     12/30/2023 - patient remains on high flow oxygen. Gradually getting better each day. Will likely need another 1-2 days at least. Pulmonary is primary management.      Anemia secondary to hemoptysis  -Patient has hemoglobin of 6.7 on 12/27 and 1 PRBC is ordered.  CBC monitor daily.     12/29/2023 - patients hemoglobin is stable currently. No need for transfusion. 7.7 today.     12/30/2023 - no further signs of bleeding.      Acute on chronic hypoxic respiratory failure secondary to COPD exacerbation due to bibasilar pneumonia likely aspiration pneumonia  -Continue Unasyn.    -Continue Singulair, Symbicort and Spiriva inhaler.  Continue albuterol nebulizer treatment as needed.     Appreciate assistance from pulmonary service. Patient remains on high flow currently.      Atrial fibrillation with rapid ventricular response:   Patient given loading dose of amiodarone and started on amiodarone drip.  Now on amiodarone PO.  Rate under better control.      Chest  pain  -Troponins are mildly elevated.  Patient seen by cardiology.  Given patient's normal coronaries a year ago clinical suspicion for cardiac etiology is low.  Continue analgesics as needed for pain.  Continue supplemental oxygen.       CLL  -Patient is on acalabrutinib -on hold for now given patient intermittent hemoptysis  -Primary oncologist Dr. Henry [facility] phone #[phone] #[phone]  -Try to reach out to primary oncologist when to resume chemo med.  Waiting callback.  -Dr Valerio is covering and reach out from [facility] [place] [facility] --> pending      12/30/2023 - Hem/Onc following  Hypertension: Continue hydrochlorothiazide, metoprolol and lisinopril.     12/29/2023 - patients BP remains under good control.     Mood disorder: Continue paroxetine     Disposition: tbd

## 2023-12-31 ENCOUNTER — APPOINTMENT (OUTPATIENT)
Dept: GENERAL RADIOLOGY | Facility: HOSPITAL | Age: 78
End: 2023-12-31
Payer: MEDICARE

## 2023-12-31 PROCEDURE — 25010000002 METHYLPREDNISOLONE PER 40 MG: Performed by: INTERNAL MEDICINE

## 2023-12-31 PROCEDURE — 94799 UNLISTED PULMONARY SVC/PX: CPT

## 2023-12-31 PROCEDURE — 94761 N-INVAS EAR/PLS OXIMETRY MLT: CPT

## 2023-12-31 PROCEDURE — 94664 DEMO&/EVAL PT USE INHALER: CPT

## 2023-12-31 PROCEDURE — 71045 X-RAY EXAM CHEST 1 VIEW: CPT

## 2023-12-31 RX ADMIN — Medication 10 ML: at 09:05

## 2023-12-31 RX ADMIN — PANTOPRAZOLE SODIUM 40 MG: 40 TABLET, DELAYED RELEASE ORAL at 05:40

## 2023-12-31 RX ADMIN — AMLODIPINE BESYLATE 5 MG: 5 TABLET ORAL at 09:05

## 2023-12-31 RX ADMIN — BUDESONIDE AND FORMOTEROL FUMARATE DIHYDRATE 2 PUFF: 160; 4.5 AEROSOL RESPIRATORY (INHALATION) at 07:30

## 2023-12-31 RX ADMIN — BUDESONIDE AND FORMOTEROL FUMARATE DIHYDRATE 2 PUFF: 160; 4.5 AEROSOL RESPIRATORY (INHALATION) at 18:41

## 2023-12-31 RX ADMIN — MONTELUKAST 10 MG: 10 TABLET, FILM COATED ORAL at 22:12

## 2023-12-31 RX ADMIN — PAROXETINE HYDROCHLORIDE 20 MG: 20 TABLET, FILM COATED ORAL at 09:03

## 2023-12-31 RX ADMIN — TIOTROPIUM BROMIDE INHALATION SPRAY 2 PUFF: 3.12 SPRAY, METERED RESPIRATORY (INHALATION) at 07:25

## 2023-12-31 RX ADMIN — METHYLPREDNISOLONE SODIUM SUCCINATE 40 MG: 40 INJECTION, POWDER, FOR SOLUTION INTRAMUSCULAR; INTRAVENOUS at 03:15

## 2023-12-31 RX ADMIN — METOPROLOL SUCCINATE 25 MG: 25 TABLET, EXTENDED RELEASE ORAL at 09:04

## 2023-12-31 RX ADMIN — CYANOCOBALAMIN TAB 1000 MCG 1000 MCG: 1000 TAB at 09:03

## 2023-12-31 RX ADMIN — Medication 10 ML: at 22:12

## 2023-12-31 RX ADMIN — Medication 1 TABLET: at 09:03

## 2023-12-31 RX ADMIN — LISINOPRIL 40 MG: 20 TABLET ORAL at 09:04

## 2023-12-31 RX ADMIN — METHYLPREDNISOLONE SODIUM SUCCINATE 40 MG: 40 INJECTION, POWDER, FOR SOLUTION INTRAMUSCULAR; INTRAVENOUS at 18:08

## 2023-12-31 RX ADMIN — METHYLPREDNISOLONE SODIUM SUCCINATE 40 MG: 40 INJECTION, POWDER, FOR SOLUTION INTRAMUSCULAR; INTRAVENOUS at 11:26

## 2023-12-31 RX ADMIN — AMIODARONE HYDROCHLORIDE 200 MG: 200 TABLET ORAL at 18:08

## 2023-12-31 RX ADMIN — Medication 10 ML: at 22:13

## 2023-12-31 RX ADMIN — PRAMIPEXOLE DIHYDROCHLORIDE 0.25 MG: 0.5 TABLET ORAL at 22:12

## 2023-12-31 RX ADMIN — ATORVASTATIN CALCIUM 10 MG: 10 TABLET, FILM COATED ORAL at 22:12

## 2023-12-31 RX ADMIN — AMIODARONE HYDROCHLORIDE 200 MG: 200 TABLET ORAL at 09:04

## 2023-12-31 NOTE — PLAN OF CARE
Goal Outcome Evaluation: Pt resting in bed throughout night, up to BSC at times, conts to cough up dark tinged mucous that appears to be old blood, no c/o's of pain or discomfort, conts with shortness of breath, resting in bed with resp even at this time, plan of care ongoing

## 2023-12-31 NOTE — PLAN OF CARE
Goal Outcome Evaluation:            Pt is currently on 6 L high flow nasal canula. Continue to monitor

## 2023-12-31 NOTE — PROGRESS NOTES
"Daily Progress Note        Chest pain    Hemoptysis         Assessment:     Hemoptysis  Bronchoscopy 12/25/2023:  Significant bloody secretions noted in the entire bronchial tree and especially bilateral lower lobes status post therapeutic bilateral lung washing  no foreign bodies    Bilateral lower lobe dense alveolar infiltrate, possible alveolar hemorrhage versus aspiration of gastric contents  Moderate hiatal hernia     CT scan of the chest August 2023 there was no alveolar infiltrate however advanced COPD changes noted  No PE     11/2022 was admitted for aspiration pneumonia.  States she \"choked on a vitamin.  Then vomited and choked\".  Reported shortness of breath and and coughing up Bloody sputum and with symptoms of feeling choking on swallowing pills.     Connective tissue work up (MISAEL/ANCA/Lupus) negative in November 2022     Chronic obstructive pulmonary disease, unspecified COPD type (CMS/HCC) (Primary)  Centrilobular emphysema (CMS/HCC)    FEV1 0.68 L which is 28% improved to 34% postbronchodilator  FEV1/FVC ratio 34  Expiratory reserve volume 84%   Residual volume 182%  Total lung capacity 127%  Diffusion capacity 21%.     Pulmonary function test was extremely hard on the patient she passed out 3 times during the test     CT scan of the chest 8/2/2023     1. Previously described new medial left lower lobe lung nodule has resolved in the interval suggesting it represented benign infectious/inflammatory nodule.  2. 10 mm right lower lobe and 6 mm subpleural left lower lobe noncalcified nodules are chronic findings, and are considered benign.  3. There is chronic appearing scarring posteriorly in the right lower lobe at the site of previous pneumonia. No acute airspace disease is seen.  4. Advanced emphysema.     CT scan of the chest February 2023  1. New 8 mm nodule in medial left lower lobe, recommend 3 month CT follow-up.  2. Right lower lobe linear consolidation likely developing scarring in the " setting of previously seen pneumonia, recommend attention on follow-up.  3. Right lower lobe 10 mm nodule stable from multiple prior studies.  3. Advanced emphysema, coronary artery calcifications, and and additional chronic findings above.     Systolic CHF, chronic (CMS/HCC) diastolic dysfunction  Pulmonary hypertension RVSP 47         Chronic respiratory failure with hypoxia (CMS/HCC)  - On home O2 4 Liters,      patient is immune-compromised with CLL treatment in oral chemo     Up-to-date on vaccinations for pneumonia, flu and COVID-19           Recommendations:     Oxygen supplement and titration to maintain saturation 90-95%: Currently on Airvo  Check chest x-ray in a.m.    hemoptysis improved  most likely due to gastric aspiration and severe pneumonitis  Similar event happened in November 2022 on Thanksgiving day    Monitor the cultures of the BAL to rule out opportunistic infections: Currently cultures are negative     IV Solu-Medrol    Antibiotics: Unasyn finished 12/30/2023  Finished 3 days of. Azithromycin        Repeat MISAEL and ANCA is negative  I personally reviewed the radiological images                        LOS: 7 days     Subjective     Hemoptysis have resolved, hemoglobin stable, reports mild cough and shortness of breath    Objective     Vital signs for last 24 hours:  Vitals:    12/31/23 0725 12/31/23 0728 12/31/23 0730 12/31/23 0733   BP:       BP Location:       Patient Position:       Pulse: 62 50 58 84   Resp: 12 13 18 23   Temp:       TempSrc:       SpO2: 98% 99% 96% 95%   Weight:       Height:           Intake/Output last 3 shifts:  I/O last 3 completed shifts:  In: 240 [P.O.:240]  Out: 150 [Urine:150]  Intake/Output this shift:  No intake/output data recorded.      Radiology  Imaging Results (Last 24 Hours)       Procedure Component Value Units Date/Time    XR Chest 1 View [[id number]] Resulted: 12/31/23 0629     Updated: 12/31/23 0629            Labs:  Results from last 7 days   Lab Units  12/30/23  0805   WBC 10*3/mm3 12.90*   HEMOGLOBIN g/dL 8.2*   HEMATOCRIT % 25.4*   PLATELETS 10*3/mm3 232     Results from last 7 days   Lab Units 12/30/23  0805 12/29/23  0304   SODIUM mmol/L 144 143   POTASSIUM mmol/L 4.1 4.0   CHLORIDE mmol/L 103 103   CO2 mmol/L 33.0* 31.0*   BUN mg/dL 29* 36*   CREATININE mg/dL 0.90 1.08*   CALCIUM mg/dL 8.6 8.6   BILIRUBIN mg/dL  --  0.7   ALK PHOS U/L  --  71   ALT (SGPT) U/L  --  30   AST (SGOT) U/L  --  30   GLUCOSE mg/dL 126* 126*         Results from last 7 days   Lab Units 12/29/23  0304 12/28/23  0235   ALBUMIN g/dL 3.4* 3.4*           Results from last 7 days   Lab Units 12/25/23  1532   MISAEL  Negative     Results from last 7 days   Lab Units 12/29/23  0304   MAGNESIUM mg/dL 2.1     Results from last 7 days   Lab Units 12/25/23  0503   INR  1.03               Meds:   SCHEDULE  amiodarone, 200 mg, Oral, BID With Meals  amLODIPine, 5 mg, Oral, Q24H  [Held by provider] aspirin, 162 mg, Oral, Daily  atorvastatin, 10 mg, Oral, Daily  budesonide-formoterol, 2 puff, Inhalation, BID - RT   And  tiotropium bromide monohydrate, 2 puff, Inhalation, Daily - RT  lisinopril, 40 mg, Oral, Daily  methylPREDNISolone sodium succinate, 40 mg, Intravenous, Q8H  metoprolol succinate XL, 25 mg, Oral, Daily  montelukast, 10 mg, Oral, Nightly  multivitamin with minerals, 1 tablet, Oral, Daily  pantoprazole, 40 mg, Oral, Q AM  PARoxetine, 20 mg, Oral, Daily  pramipexole, 0.25 mg, Oral, Daily  sodium chloride, 10 mL, Intravenous, Q12H  sodium chloride, 10 mL, Intravenous, Q12H  vitamin B-12, 1,000 mcg, Oral, Daily      Infusions     PRNs    acetaminophen **OR** acetaminophen **OR** acetaminophen    albuterol    senna-docusate sodium **AND** polyethylene glycol **AND** bisacodyl **AND** bisacodyl    Calcium Replacement - Follow Nurse / BPA Driven Protocol    loperamide    Magnesium Standard Dose Replacement - Follow Nurse / BPA Driven Protocol    melatonin    metoprolol tartrate     nitroglycerin    ondansetron    Phosphorus Replacement - Follow Nurse / BPA Driven Protocol    Potassium Replacement - Follow Nurse / BPA Driven Protocol    prochlorperazine    sodium chloride    [COMPLETED] Insert Peripheral IV **AND** sodium chloride    sodium chloride    sodium chloride    sodium chloride    sodium chloride    Physical Exam:  General Appearance:  Alert   HEENT:  Normocephalic, without obvious abnormality, Conjunctiva/corneas clear,.   Nares normal, no drainage     Neck:  Supple, symmetrical, trachea midline. No JVD.  Lungs /Chest wall:   Wheezing, bilateral basal rhonchi, respirations unlabored, symmetrical wall movement.     Heart:  Regular rate and rhythm, S1 S2 normal  Abdomen: Soft, non-tender, no masses, no organomegaly.    Extremities: No edema, no clubbing or cyanosis    ROS  Constitutional: Negative for chills, fever and malaise/fatigue.   HENT: Negative.    Eyes: Negative.    Cardiovascular: Negative.    Respiratory: Positive for cough and shortness of breath.    Skin: Negative.    Musculoskeletal: Negative.    Gastrointestinal: Negative.    Genitourinary: Negative.    Neurological: Generalized weakness    I reviewed the recent clinical results    Part of this note may be an electronic transcription/translation of spoken language to printed text using the Dragon Dictation System

## 2023-12-31 NOTE — PROGRESS NOTES
The patient feels better today. She is now off of high flow oxygen. The patient is on 10 liters NC. No nausea or vomiting. No light headedness or dizziness. Patient is able to follow commands without issue. Tolerating diet well. The patient has shown gradual improvement each day. Bowels are working well. Patient denies any other complaints    HENT: vitals reviewed.      Head: Normocephalic and atraumatic.      Nose: Nose normal.   Eyes:      Extraocular Movements: Extraocular movements intact.      Conjunctivae/sclera: Conjunctivae normal.      Pupils: Pupils are equal, round, and reactive to light.   Cardiovascular:     Ni murmur  Pulmonary:      Effort: weak, on airvo      Breath sounds: reduced BS bilaterally. Patient has crackles present through her lung fields.   Abdominal:      General: Abdomen is flat. Bowel sounds are normal.      Palpations: Abdomen is soft.   Musculoskeletal:         General: Normal range of motion.      Cervical back: Normal range of motion and neck supple.   Skin:     General: Skin is dry.   Neurological:      General: No focal deficit present.      Mental Status: alert.         Krystyna Benentt is a 78 y.o. female with past medical history of hypertension, GERD, asthma/COPD, chronic hypoxic respiratory failure secondary to COPD on home oxygen 4 L via nasal cannula, mood disorder, hyperlipidemia, past history of systolic congestive heart failure which subsequently improved in subsequent echo with ejection fraction of 55 to 60% presented to emergency room with complaints of chest pain radiating to her arms and back associated with nausea and diaphoresis.  Patient had a cardiac cath a year ago.  Troponins are elevated.  D-dimer is normal.  Lipase is normal.  CT angiogram of the chest showed No evidence of pulmonary embolus. Bibasilar pneumonia.  Severe emphysema. Moderate size hiatal hernia. Hepatic and renal cysts.     Assessment / Plan      Hemoptysis s/p bronchoscopy on 12/25/23   -  Etiology unclear but per pulmonary there is suspicion for aspiration of gastric contents.  -bronchoscopy on 12/25/23 - Significant bloody secretions noted in the entire bronchial tree and especially bilateral lower lobes status post therapeutic bilateral lung washing and no foreign bodies  -Pulmonary is following.  -Patient is on Acalabrutinib which can cause hemoptysis.  Tried to reach out to patient primary oncologist Dr. Henry [facility] phone #[phone] #[phone] Voice message is left on oncology personal phone as well on 12/28/23.   Waiting callback.  Currently getting in-house oncologist for this case as well.  -Patient stated got hemoptysis intermittently.  And is getting epinephrine nebulizer 3 times per day as of now.   -now on Airvo and wean down the oxygen as tolerated.  -Unasyn to continue for aspiration pneumonia.     12/29/2023 - patient will likely need several more days for improvement.      12/30/2023 - patient remains on high flow oxygen. Gradually getting better each day. Will likely need another 1-2 days at least. Pulmonary is primary management.      Anemia secondary to hemoptysis  -Patient has hemoglobin of 6.7 on 12/27 and 1 PRBC is ordered.  CBC monitor daily.     12/29/2023 - patients hemoglobin is stable currently. No need for transfusion. 7.7 today.      12/30/2023 - no further signs of bleeding.     12/31/2023 - patient denies any further bleeding complaints. Labs to be checked in AM.      Acute on chronic hypoxic respiratory failure secondary to COPD exacerbation due to bibasilar pneumonia likely aspiration pneumonia  -Continue Unasyn.    -Continue Singulair, Symbicort and Spiriva inhaler.  Continue albuterol nebulizer treatment as needed.     Appreciate assistance from pulmonary service. Patient remains on high flow currently.     12/31/2023 - patient is doing better. Now on Nasal Cannula at 10 liters. Continues to improve. Hopefully discharge home in the next 1-2 days if her  improvement continues.      Atrial fibrillation with rapid ventricular response:   Patient given loading dose of amiodarone and started on amiodarone drip.  Now on amiodarone PO.  Rate under better control.      Chest pain  -Troponins are mildly elevated.  Patient seen by cardiology.  Given patient's normal coronaries a year ago clinical suspicion for cardiac etiology is low.  Continue analgesics as needed for pain.  Continue supplemental oxygen.      12/31/2023 - no further episodes of chest pain     CLL  -Patient is on acalabrutinib -on hold for now given patient intermittent hemoptysis  -Primary oncologist Dr. Henry [facility] phone #[phone] #[phone]  -Try to reach out to primary oncologist when to resume chemo med.  Waiting callback.  -Dr Valerio is covering and reach out from [facility] --> pending      12/30/2023 - Hem/Onc following  Hypertension: Continue hydrochlorothiazide, metoprolol and lisinopril.     12/29/2023 - patients BP remains under good control.     Mood disorder: Continue paroxetine     Disposition: tbd

## 2023-12-31 NOTE — PLAN OF CARE
Goal Outcome Evaluation:         Pt is currently on 10 high flow nasal canula today. Pt has been up to chair all day and is using incentive spirometer. Continue to monitorf

## 2024-01-01 LAB
ANION GAP SERPL CALCULATED.3IONS-SCNC: 8 MMOL/L (ref 5–15)
BUN SERPL-MCNC: 25 MG/DL (ref 8–23)
BUN/CREAT SERPL: 27.5 (ref 7–25)
CALCIUM SPEC-SCNC: 8.5 MG/DL (ref 8.6–10.5)
CHLORIDE SERPL-SCNC: 101 MMOL/L (ref 98–107)
CO2 SERPL-SCNC: 31 MMOL/L (ref 22–29)
CREAT SERPL-MCNC: 0.91 MG/DL (ref 0.57–1)
DEPRECATED RDW RBC AUTO: 42.9 FL (ref 37–54)
EGFRCR SERPLBLD CKD-EPI 2021: 65.5 ML/MIN/1.73
ERYTHROCYTE [DISTWIDTH] IN BLOOD BY AUTOMATED COUNT: 13.3 % (ref 12.3–15.4)
GLUCOSE SERPL-MCNC: 133 MG/DL (ref 65–99)
HCT VFR BLD AUTO: 28.8 % (ref 34–46.6)
HGB BLD-MCNC: 9.3 G/DL (ref 12–15.9)
LYMPHOCYTES # BLD MANUAL: 0.15 10*3/MM3 (ref 0.7–3.1)
LYMPHOCYTES NFR BLD MANUAL: 2 % (ref 5–12)
MCH RBC QN AUTO: 30.3 PG (ref 26.6–33)
MCHC RBC AUTO-ENTMCNC: 32.3 G/DL (ref 31.5–35.7)
MCV RBC AUTO: 94 FL (ref 79–97)
MONOCYTES # BLD: 0.29 10*3/MM3 (ref 0.1–0.9)
MYELOCYTES NFR BLD MANUAL: 1 % (ref 0–0)
NEUTROPHILS # BLD AUTO: 14.02 10*3/MM3 (ref 1.7–7)
NEUTROPHILS NFR BLD MANUAL: 84 % (ref 42.7–76)
NEUTS BAND NFR BLD MANUAL: 12 % (ref 0–5)
PLAT MORPH BLD: NORMAL
PLATELET # BLD AUTO: 268 10*3/MM3 (ref 140–450)
PMV BLD AUTO: 9.1 FL (ref 6–12)
POTASSIUM SERPL-SCNC: 3.7 MMOL/L (ref 3.5–5.2)
RBC # BLD AUTO: 3.06 10*6/MM3 (ref 3.77–5.28)
RBC MORPH BLD: NORMAL
SCAN SLIDE: NORMAL
SODIUM SERPL-SCNC: 140 MMOL/L (ref 136–145)
TOXIC GRANULATION: ABNORMAL
VARIANT LYMPHS NFR BLD MANUAL: 1 % (ref 19.6–45.3)
WBC NRBC COR # BLD AUTO: 14.6 10*3/MM3 (ref 3.4–10.8)

## 2024-01-01 PROCEDURE — 94799 UNLISTED PULMONARY SVC/PX: CPT

## 2024-01-01 PROCEDURE — 94664 DEMO&/EVAL PT USE INHALER: CPT

## 2024-01-01 PROCEDURE — 25010000002 METHYLPREDNISOLONE PER 40 MG: Performed by: INTERNAL MEDICINE

## 2024-01-01 PROCEDURE — 80048 BASIC METABOLIC PNL TOTAL CA: CPT | Performed by: INTERNAL MEDICINE

## 2024-01-01 PROCEDURE — 94761 N-INVAS EAR/PLS OXIMETRY MLT: CPT

## 2024-01-01 PROCEDURE — 85007 BL SMEAR W/DIFF WBC COUNT: CPT | Performed by: INTERNAL MEDICINE

## 2024-01-01 PROCEDURE — 85025 COMPLETE CBC W/AUTO DIFF WBC: CPT | Performed by: INTERNAL MEDICINE

## 2024-01-01 RX ORDER — PREDNISONE 20 MG/1
20 TABLET ORAL
Status: DISCONTINUED | OUTPATIENT
Start: 2024-01-02 | End: 2024-01-02 | Stop reason: HOSPADM

## 2024-01-01 RX ADMIN — AMLODIPINE BESYLATE 5 MG: 5 TABLET ORAL at 09:05

## 2024-01-01 RX ADMIN — BUDESONIDE AND FORMOTEROL FUMARATE DIHYDRATE 2 PUFF: 160; 4.5 AEROSOL RESPIRATORY (INHALATION) at 18:35

## 2024-01-01 RX ADMIN — AMIODARONE HYDROCHLORIDE 200 MG: 200 TABLET ORAL at 17:33

## 2024-01-01 RX ADMIN — BUDESONIDE AND FORMOTEROL FUMARATE DIHYDRATE 2 PUFF: 160; 4.5 AEROSOL RESPIRATORY (INHALATION) at 07:15

## 2024-01-01 RX ADMIN — Medication 10 ML: at 20:51

## 2024-01-01 RX ADMIN — Medication 10 ML: at 09:04

## 2024-01-01 RX ADMIN — CYANOCOBALAMIN TAB 1000 MCG 1000 MCG: 1000 TAB at 09:04

## 2024-01-01 RX ADMIN — TIOTROPIUM BROMIDE INHALATION SPRAY 2 PUFF: 3.12 SPRAY, METERED RESPIRATORY (INHALATION) at 07:15

## 2024-01-01 RX ADMIN — METHYLPREDNISOLONE SODIUM SUCCINATE 40 MG: 40 INJECTION, POWDER, FOR SOLUTION INTRAMUSCULAR; INTRAVENOUS at 05:48

## 2024-01-01 RX ADMIN — ATORVASTATIN CALCIUM 10 MG: 10 TABLET, FILM COATED ORAL at 20:51

## 2024-01-01 RX ADMIN — METOPROLOL SUCCINATE 25 MG: 25 TABLET, EXTENDED RELEASE ORAL at 09:05

## 2024-01-01 RX ADMIN — AMIODARONE HYDROCHLORIDE 200 MG: 200 TABLET ORAL at 09:05

## 2024-01-01 RX ADMIN — PRAMIPEXOLE DIHYDROCHLORIDE 0.25 MG: 0.5 TABLET ORAL at 20:51

## 2024-01-01 RX ADMIN — PANTOPRAZOLE SODIUM 40 MG: 40 TABLET, DELAYED RELEASE ORAL at 05:48

## 2024-01-01 RX ADMIN — PAROXETINE HYDROCHLORIDE 20 MG: 20 TABLET, FILM COATED ORAL at 09:04

## 2024-01-01 RX ADMIN — Medication 1 TABLET: at 09:04

## 2024-01-01 RX ADMIN — MONTELUKAST 10 MG: 10 TABLET, FILM COATED ORAL at 20:51

## 2024-01-01 RX ADMIN — LISINOPRIL 40 MG: 20 TABLET ORAL at 09:05

## 2024-01-01 NOTE — PLAN OF CARE
Goal Outcome Evaluation:           Progress: improving  Outcome Evaluation: Pt on 4 liters o2, desats with activity but recovers well. Possible dc home tomorrow

## 2024-01-01 NOTE — PROGRESS NOTES
The patient looks and feels better today. She states that her breathing is approaching baseline. No nausea or vomiting. No light headedness or dizziness. Patient is now on 4 liters of oxygen. Sats are being maintained. The patient has no pain. Bowels are working well. Appetite has improved. She had no other issues over the course of the night.     Vitals reviewed and are stable    HENT: vitals reviewed.      Head: Normocephalic and atraumatic.      Nose: Nose normal.   Eyes:      Extraocular Movements: Extraocular movements intact.      Conjunctivae/sclera: Conjunctivae normal.      Pupils: Pupils are equal, round, and reactive to light.   Cardiovascular:     Ni murmur  Pulmonary:      Effort: weak, on airvo      Breath sounds: reduced BS bilaterally. Patient has crackles present through her lung fields.   Abdominal:      General: Abdomen is flat. Bowel sounds are normal.      Palpations: Abdomen is soft.   Musculoskeletal:         General: Normal range of motion.      Cervical back: Normal range of motion and neck supple.   Skin:     General: Skin is dry.   Neurological:      General: No focal deficit present.      Mental Status: alert.         Krystyna Bennett is a 78 y.o. female with past medical history of hypertension, GERD, asthma/COPD, chronic hypoxic respiratory failure secondary to COPD on home oxygen 4 L via nasal cannula, mood disorder, hyperlipidemia, past history of systolic congestive heart failure which subsequently improved in subsequent echo with ejection fraction of 55 to 60% presented to emergency room with complaints of chest pain radiating to her arms and back associated with nausea and diaphoresis.  Patient had a cardiac cath a year ago.  Troponins are elevated.  D-dimer is normal.  Lipase is normal.  CT angiogram of the chest showed No evidence of pulmonary embolus. Bibasilar pneumonia.  Severe emphysema. Moderate size hiatal hernia. Hepatic and renal cysts.     Assessment / Plan      Hemoptysis  s/p bronchoscopy on 12/25/23   - Etiology unclear but per pulmonary there is suspicion for aspiration of gastric contents.  -bronchoscopy on 12/25/23 - Significant bloody secretions noted in the entire bronchial tree and especially bilateral lower lobes status post therapeutic bilateral lung washing and no foreign bodies  -Pulmonary is following.  -Patient is on Acalabrutinib which can cause hemoptysis.  Tried to reach out to patient primary oncologist Dr. Henry [facility] phone #[phone] #[phone] Voice message is left on oncology personal phone as well on 12/28/23.   Waiting callback.  Currently getting in-house oncologist for this case as well.  -Patient stated got hemoptysis intermittently.  And is getting epinephrine nebulizer 3 times per day as of now.   -now on Airvo and wean down the oxygen as tolerated.  -Unasyn to continue for aspiration pneumonia.     12/29/2023 - patient will likely need several more days for improvement.      12/30/2023 - patient remains on high flow oxygen. Gradually getting better each day. Will likely need another 1-2 days at least. Pulmonary is primary management.      Anemia secondary to hemoptysis  -Patient has hemoglobin of 6.7 on 12/27 and 1 PRBC is ordered.  CBC monitor daily.     12/29/2023 - patients hemoglobin is stable currently. No need for transfusion. 7.7 today.      12/30/2023 - no further signs of bleeding.      12/31/2023 - patient denies any further bleeding complaints. Labs to be checked in AM.      Acute on chronic hypoxic respiratory failure secondary to COPD exacerbation due to bibasilar pneumonia likely aspiration pneumonia  -Continue Unasyn.    -Continue Singulair, Symbicort and Spiriva inhaler.  Continue albuterol nebulizer treatment as needed.     Appreciate assistance from pulmonary service. Patient remains on high flow currently.      12/31/2023 - patient is doing better. Now on Nasal Cannula at 10 liters. Continues to improve. Hopefully discharge home in  the next 1-2 days if her improvement continues.     1/1/2024 - patients oxygen levels are better. Close to baseline. Hemoglobin is now stable. Discharge home in the AM if patient continues to improve. All questions have been answered in detail..     Atrial fibrillation with rapid ventricular response:   Patient given loading dose of amiodarone and started on amiodarone drip.  Now on amiodarone PO.  Rate under better control.      Chest pain  -Troponins are mildly elevated.  Patient seen by cardiology.  Given patient's normal coronaries a year ago clinical suspicion for cardiac etiology is low.  Continue analgesics as needed for pain.  Continue supplemental oxygen.       12/31/2023 - no further episodes of chest pain     CLL  -Patient is on acalabrutinib -on hold for now given patient intermittent hemoptysis  -Primary oncologist Dr. Henry [facility] phone #[phone] #[phone]  -Try to reach out to primary oncologist when to resume chemo med.  Waiting callback.  -Dr Valerio is covering and reach out from [facility] --> pending      12/30/2023 - Hem/Onc following  Hypertension: Continue hydrochlorothiazide, metoprolol and lisinopril.     12/29/2023 - patients BP remains under good control.     Mood disorder: Continue paroxetine     Disposition: tbd

## 2024-01-01 NOTE — PROGRESS NOTES
"Daily Progress Note        Chest pain    Hemoptysis         Assessment:     Hemoptysis  Bronchoscopy 12/25/2023:  Significant bloody secretions noted in the entire bronchial tree and especially bilateral lower lobes status post therapeutic bilateral lung washing  no foreign bodies    Bilateral lower lobe dense alveolar infiltrate, possible alveolar hemorrhage versus aspiration of gastric contents  Moderate hiatal hernia     CT scan of the chest August 2023 there was no alveolar infiltrate however advanced COPD changes noted  No PE     11/2022 was admitted for aspiration pneumonia.  States she \"choked on a vitamin.  Then vomited and choked\".  Reported shortness of breath and and coughing up Bloody sputum and with symptoms of feeling choking on swallowing pills.     Connective tissue work up (MISAEL/ANCA/Lupus) negative in November 2022     Chronic obstructive pulmonary disease, unspecified COPD type (CMS/HCC) (Primary)  Centrilobular emphysema (CMS/HCC)    FEV1 0.68 L which is 28% improved to 34% postbronchodilator  FEV1/FVC ratio 34  Expiratory reserve volume 84%   Residual volume 182%  Total lung capacity 127%  Diffusion capacity 21%.     Pulmonary function test was extremely hard on the patient she passed out 3 times during the test     CT scan of the chest 8/2/2023     1. Previously described new medial left lower lobe lung nodule has resolved in the interval suggesting it represented benign infectious/inflammatory nodule.  2. 10 mm right lower lobe and 6 mm subpleural left lower lobe noncalcified nodules are chronic findings, and are considered benign.  3. There is chronic appearing scarring posteriorly in the right lower lobe at the site of previous pneumonia. No acute airspace disease is seen.  4. Advanced emphysema.     CT scan of the chest February 2023  1. New 8 mm nodule in medial left lower lobe, recommend 3 month CT follow-up.  2. Right lower lobe linear consolidation likely developing scarring in the " setting of previously seen pneumonia, recommend attention on follow-up.  3. Right lower lobe 10 mm nodule stable from multiple prior studies.  3. Advanced emphysema, coronary artery calcifications, and and additional chronic findings above.     Systolic CHF, chronic (CMS/HCC) diastolic dysfunction  Pulmonary hypertension RVSP 47         Chronic respiratory failure with hypoxia (CMS/HCC)  - On home O2 4 Liters,      patient is immune-compromised with CLL treatment in oral chemo     Up-to-date on vaccinations for pneumonia, flu and COVID-19           Recommendations:     Dairi status is improving, oxygen supplement and titration to maintain saturation 90-95%: Currently on 4 L nasal cannula, possible discharge in a.m.  hemoptysis resolved, most likely due to gastric aspiration and severe pneumonitis  Similar event happened in November 2022 on Thanksgiving day    Monitor the cultures of the BAL to rule out opportunistic infections: Currently cultures are negative     IV Solu-Medrol changed to prednisone orally    Antibiotics: Unasyn finished 12/30/2023  Finished 3 days of. Azithromycin    Resume aspirin        Repeat MISAEL and ANCA is negative  I personally reviewed the radiological images                        LOS: 8 days     Subjective     Hemoptysis have resolved, hemoglobin stable, reports movement in the mild cough and shortness of breath    Objective     Vital signs for last 24 hours:  Vitals:    01/01/24 0715 01/01/24 0721 01/01/24 0905 01/01/24 0950   BP:   159/57 169/62   BP Location:    Right arm   Patient Position:    Sitting   Pulse: 63 53 63 66   Resp: 12 18  18   Temp:    97.6 °F (36.4 °C)   TempSrc:    Oral   SpO2: 91% 92%  90%   Weight:       Height:           Intake/Output last 3 shifts:  I/O last 3 completed shifts:  In: 240 [P.O.:240]  Out: -   Intake/Output this shift:  I/O this shift:  In: 240 [P.O.:240]  Out: -       Radiology  Imaging Results (Last 24 Hours)       ** No results found for the last  24 hours. **            Labs:  Results from last 7 days   Lab Units 01/01/24  0910   WBC 10*3/mm3 14.60*   HEMOGLOBIN g/dL 9.3*   HEMATOCRIT % 28.8*   PLATELETS 10*3/mm3 268     Results from last 7 days   Lab Units 01/01/24  0910 12/30/23  0805 12/29/23  0304   SODIUM mmol/L 140   < > 143   POTASSIUM mmol/L 3.7   < > 4.0   CHLORIDE mmol/L 101   < > 103   CO2 mmol/L 31.0*   < > 31.0*   BUN mg/dL 25*   < > 36*   CREATININE mg/dL 0.91   < > 1.08*   CALCIUM mg/dL 8.5*   < > 8.6   BILIRUBIN mg/dL  --   --  0.7   ALK PHOS U/L  --   --  71   ALT (SGPT) U/L  --   --  30   AST (SGOT) U/L  --   --  30   GLUCOSE mg/dL 133*   < > 126*    < > = values in this interval not displayed.         Results from last 7 days   Lab Units 12/29/23  0304 12/28/23  0235   ALBUMIN g/dL 3.4* 3.4*           Results from last 7 days   Lab Units 12/25/23  1532   MISAEL  Negative     Results from last 7 days   Lab Units 12/29/23  0304   MAGNESIUM mg/dL 2.1                     Meds:   SCHEDULE  amiodarone, 200 mg, Oral, BID With Meals  amLODIPine, 5 mg, Oral, Q24H  [Held by provider] aspirin, 162 mg, Oral, Daily  atorvastatin, 10 mg, Oral, Daily  budesonide-formoterol, 2 puff, Inhalation, BID - RT   And  tiotropium bromide monohydrate, 2 puff, Inhalation, Daily - RT  lisinopril, 40 mg, Oral, Daily  methylPREDNISolone sodium succinate, 40 mg, Intravenous, Q8H  metoprolol succinate XL, 25 mg, Oral, Daily  montelukast, 10 mg, Oral, Nightly  multivitamin with minerals, 1 tablet, Oral, Daily  pantoprazole, 40 mg, Oral, Q AM  PARoxetine, 20 mg, Oral, Daily  pramipexole, 0.25 mg, Oral, Daily  sodium chloride, 10 mL, Intravenous, Q12H  sodium chloride, 10 mL, Intravenous, Q12H  vitamin B-12, 1,000 mcg, Oral, Daily      Infusions     PRNs    acetaminophen **OR** acetaminophen **OR** acetaminophen    albuterol    senna-docusate sodium **AND** polyethylene glycol **AND** bisacodyl **AND** bisacodyl    Calcium Replacement - Follow Nurse / BPA Driven Protocol     loperamide    Magnesium Standard Dose Replacement - Follow Nurse / BPA Driven Protocol    melatonin    metoprolol tartrate    nitroglycerin    ondansetron    Phosphorus Replacement - Follow Nurse / BPA Driven Protocol    Potassium Replacement - Follow Nurse / BPA Driven Protocol    prochlorperazine    sodium chloride    [COMPLETED] Insert Peripheral IV **AND** sodium chloride    sodium chloride    sodium chloride    sodium chloride    sodium chloride    Physical Exam:  General Appearance:  Alert   HEENT:  Normocephalic, without obvious abnormality, Conjunctiva/corneas clear,.   Nares normal, no drainage     Neck:  Supple, symmetrical, trachea midline. No JVD.  Lungs /Chest wall: Air entry with no wheezing, bilateral basal rhonchi, respirations unlabored, symmetrical wall movement.     Heart:  Regular rate and rhythm, S1 S2 normal  Abdomen: Soft, non-tender, no masses, no organomegaly.    Extremities: No edema, no clubbing or cyanosis    ROS  Constitutional: Negative for chills, fever and malaise/fatigue.   HENT: Negative.    Eyes: Negative.    Cardiovascular: Negative.    Respiratory: Positive for movement in the cough and shortness of breath.    Skin: Negative.    Musculoskeletal: Negative.    Gastrointestinal: Negative.    Genitourinary: Negative.    Neurological: Generalized weakness    I reviewed the recent clinical results    Part of this note may be an electronic transcription/translation of spoken language to printed text using the Dragon Dictation System

## 2024-01-01 NOTE — PLAN OF CARE
Goal Outcome Evaluation:  Pt resting in bed throughout shift, no c/o's of pain or discomfort noted, conts on 6L of high flow O2 and tolerating well, no coughing up old blood noted, pt resting in bed with resp even and unlabored, plan of care ongoing

## 2024-01-02 ENCOUNTER — TELEPHONE (OUTPATIENT)
Dept: CARDIOLOGY | Facility: CLINIC | Age: 79
End: 2024-01-02
Payer: MEDICARE

## 2024-01-02 ENCOUNTER — READMISSION MANAGEMENT (OUTPATIENT)
Dept: CALL CENTER | Facility: HOSPITAL | Age: 79
End: 2024-01-02
Payer: MEDICARE

## 2024-01-02 ENCOUNTER — HOME HEALTH ADMISSION (OUTPATIENT)
Dept: HOME HEALTH SERVICES | Facility: HOME HEALTHCARE | Age: 79
End: 2024-01-02
Payer: MEDICARE

## 2024-01-02 VITALS
HEART RATE: 65 BPM | RESPIRATION RATE: 19 BRPM | TEMPERATURE: 97.5 F | WEIGHT: 191.8 LBS | HEIGHT: 66 IN | OXYGEN SATURATION: 93 % | DIASTOLIC BLOOD PRESSURE: 55 MMHG | BODY MASS INDEX: 30.82 KG/M2 | SYSTOLIC BLOOD PRESSURE: 142 MMHG

## 2024-01-02 LAB
ALBUMIN SERPL-MCNC: 3.3 G/DL (ref 3.5–5.2)
ALBUMIN/GLOB SERPL: 2.4 G/DL
ALP SERPL-CCNC: 64 U/L (ref 39–117)
ALT SERPL W P-5'-P-CCNC: 34 U/L (ref 1–33)
ANION GAP SERPL CALCULATED.3IONS-SCNC: 6 MMOL/L (ref 5–15)
AST SERPL-CCNC: 30 U/L (ref 1–32)
BILIRUB SERPL-MCNC: 0.8 MG/DL (ref 0–1.2)
BUN SERPL-MCNC: 33 MG/DL (ref 8–23)
BUN/CREAT SERPL: 35.1 (ref 7–25)
CALCIUM SPEC-SCNC: 8.5 MG/DL (ref 8.6–10.5)
CHLORIDE SERPL-SCNC: 104 MMOL/L (ref 98–107)
CO2 SERPL-SCNC: 31 MMOL/L (ref 22–29)
CREAT SERPL-MCNC: 0.94 MG/DL (ref 0.57–1)
DEPRECATED RDW RBC AUTO: 43.8 FL (ref 37–54)
EGFRCR SERPLBLD CKD-EPI 2021: 63 ML/MIN/1.73
EOSINOPHIL # BLD MANUAL: 0.3 10*3/MM3 (ref 0–0.4)
EOSINOPHIL NFR BLD MANUAL: 2 % (ref 0.3–6.2)
ERYTHROCYTE [DISTWIDTH] IN BLOOD BY AUTOMATED COUNT: 13.4 % (ref 12.3–15.4)
FUNGUS WND CULT: ABNORMAL
GLOBULIN UR ELPH-MCNC: 1.4 GM/DL
GLUCOSE SERPL-MCNC: 83 MG/DL (ref 65–99)
HCT VFR BLD AUTO: 28.4 % (ref 34–46.6)
HGB BLD-MCNC: 9 G/DL (ref 12–15.9)
LYMPHOCYTES # BLD MANUAL: 1.35 10*3/MM3 (ref 0.7–3.1)
LYMPHOCYTES NFR BLD MANUAL: 12 % (ref 5–12)
MCH RBC QN AUTO: 30 PG (ref 26.6–33)
MCHC RBC AUTO-ENTMCNC: 31.6 G/DL (ref 31.5–35.7)
MCV RBC AUTO: 95.1 FL (ref 79–97)
MONOCYTES # BLD: 1.8 10*3/MM3 (ref 0.1–0.9)
NEUTROPHILS # BLD AUTO: 11.55 10*3/MM3 (ref 1.7–7)
NEUTROPHILS NFR BLD MANUAL: 75 % (ref 42.7–76)
NEUTS BAND NFR BLD MANUAL: 2 % (ref 0–5)
PLAT MORPH BLD: NORMAL
PLATELET # BLD AUTO: 263 10*3/MM3 (ref 140–450)
PMV BLD AUTO: 9 FL (ref 6–12)
POIKILOCYTOSIS BLD QL SMEAR: ABNORMAL
POTASSIUM SERPL-SCNC: 3.8 MMOL/L (ref 3.5–5.2)
PROT SERPL-MCNC: 4.7 G/DL (ref 6–8.5)
RBC # BLD AUTO: 2.99 10*6/MM3 (ref 3.77–5.28)
SCAN SLIDE: NORMAL
SODIUM SERPL-SCNC: 141 MMOL/L (ref 136–145)
VARIANT LYMPHS NFR BLD MANUAL: 1 % (ref 0–5)
VARIANT LYMPHS NFR BLD MANUAL: 8 % (ref 19.6–45.3)
WBC MORPH BLD: NORMAL
WBC NRBC COR # BLD AUTO: 15 10*3/MM3 (ref 3.4–10.8)

## 2024-01-02 PROCEDURE — 85025 COMPLETE CBC W/AUTO DIFF WBC: CPT | Performed by: INTERNAL MEDICINE

## 2024-01-02 PROCEDURE — 94799 UNLISTED PULMONARY SVC/PX: CPT

## 2024-01-02 PROCEDURE — 94664 DEMO&/EVAL PT USE INHALER: CPT

## 2024-01-02 PROCEDURE — 80053 COMPREHEN METABOLIC PANEL: CPT | Performed by: INTERNAL MEDICINE

## 2024-01-02 PROCEDURE — 87305 ASPERGILLUS AG IA: CPT | Performed by: INTERNAL MEDICINE

## 2024-01-02 PROCEDURE — 94761 N-INVAS EAR/PLS OXIMETRY MLT: CPT

## 2024-01-02 PROCEDURE — 63710000001 PREDNISONE PER 1 MG: Performed by: INTERNAL MEDICINE

## 2024-01-02 PROCEDURE — 85007 BL SMEAR W/DIFF WBC COUNT: CPT | Performed by: INTERNAL MEDICINE

## 2024-01-02 RX ORDER — ASPIRIN 81 MG/1
162 TABLET, CHEWABLE ORAL DAILY
Qty: 30 TABLET | Refills: 0 | Status: SHIPPED | OUTPATIENT
Start: 2024-01-03

## 2024-01-02 RX ORDER — AMLODIPINE BESYLATE 5 MG/1
5 TABLET ORAL
Qty: 30 TABLET | Refills: 0 | Status: SHIPPED | OUTPATIENT
Start: 2024-01-03

## 2024-01-02 RX ORDER — ATORVASTATIN CALCIUM 10 MG/1
10 TABLET, FILM COATED ORAL DAILY
Qty: 30 TABLET | Refills: 0 | Status: SHIPPED | OUTPATIENT
Start: 2024-01-02

## 2024-01-02 RX ORDER — AMIODARONE HYDROCHLORIDE 200 MG/1
200 TABLET ORAL 2 TIMES DAILY WITH MEALS
Qty: 60 TABLET | Refills: 0 | Status: SHIPPED | OUTPATIENT
Start: 2024-01-02

## 2024-01-02 RX ORDER — PREDNISONE 20 MG/1
20 TABLET ORAL
Qty: 4 TABLET | Refills: 0 | Status: SHIPPED | OUTPATIENT
Start: 2024-01-03 | End: 2024-01-07

## 2024-01-02 RX ORDER — VORICONAZOLE 200 MG/1
200 TABLET, FILM COATED ORAL EVERY 12 HOURS SCHEDULED
Status: DISCONTINUED | OUTPATIENT
Start: 2024-01-02 | End: 2024-01-02 | Stop reason: SDUPTHER

## 2024-01-02 RX ADMIN — TIOTROPIUM BROMIDE INHALATION SPRAY 2 PUFF: 3.12 SPRAY, METERED RESPIRATORY (INHALATION) at 07:21

## 2024-01-02 RX ADMIN — CYANOCOBALAMIN TAB 1000 MCG 1000 MCG: 1000 TAB at 09:24

## 2024-01-02 RX ADMIN — Medication 10 ML: at 09:24

## 2024-01-02 RX ADMIN — LISINOPRIL 40 MG: 20 TABLET ORAL at 09:23

## 2024-01-02 RX ADMIN — Medication 1 TABLET: at 09:23

## 2024-01-02 RX ADMIN — PREDNISONE 20 MG: 20 TABLET ORAL at 09:23

## 2024-01-02 RX ADMIN — AMIODARONE HYDROCHLORIDE 200 MG: 200 TABLET ORAL at 09:23

## 2024-01-02 RX ADMIN — AMLODIPINE BESYLATE 5 MG: 5 TABLET ORAL at 09:23

## 2024-01-02 RX ADMIN — METOPROLOL SUCCINATE 25 MG: 25 TABLET, EXTENDED RELEASE ORAL at 09:23

## 2024-01-02 RX ADMIN — PAROXETINE HYDROCHLORIDE 20 MG: 20 TABLET, FILM COATED ORAL at 09:24

## 2024-01-02 RX ADMIN — PANTOPRAZOLE SODIUM 40 MG: 40 TABLET, DELAYED RELEASE ORAL at 05:19

## 2024-01-02 RX ADMIN — ASPIRIN 81 MG CHEWABLE TABLET 162 MG: 81 TABLET CHEWABLE at 09:23

## 2024-01-02 RX ADMIN — BUDESONIDE AND FORMOTEROL FUMARATE DIHYDRATE 2 PUFF: 160; 4.5 AEROSOL RESPIRATORY (INHALATION) at 07:27

## 2024-01-02 NOTE — PROGRESS NOTES
"Daily Progress Note        Chest pain    Hemoptysis         Assessment:     Hemoptysis  Bronchoscopy 12/25/2023:  Significant bloody secretions noted in the entire bronchial tree and especially bilateral lower lobes status post therapeutic bilateral lung washing  no foreign bodies    Bilateral lower lobe dense alveolar infiltrate, possible alveolar hemorrhage versus aspiration of gastric contents  Moderate hiatal hernia     CT scan of the chest August 2023 there was no alveolar infiltrate however advanced COPD changes noted  No PE     11/2022 was admitted for aspiration pneumonia.  States she \"choked on a vitamin.  Then vomited and choked\".  Reported shortness of breath and and coughing up Bloody sputum and with symptoms of feeling choking on swallowing pills.     Connective tissue work up (MISAEL/ANCA/Lupus) negative in November 2022     Chronic obstructive pulmonary disease, unspecified COPD type (CMS/HCC) (Primary)  Centrilobular emphysema (CMS/HCC)    FEV1 0.68 L which is 28% improved to 34% postbronchodilator  FEV1/FVC ratio 34  Expiratory reserve volume 84%   Residual volume 182%  Total lung capacity 127%  Diffusion capacity 21%.     Pulmonary function test was extremely hard on the patient she passed out 3 times during the test     CT scan of the chest 8/2/2023     1. Previously described new medial left lower lobe lung nodule has resolved in the interval suggesting it represented benign infectious/inflammatory nodule.  2. 10 mm right lower lobe and 6 mm subpleural left lower lobe noncalcified nodules are chronic findings, and are considered benign.  3. There is chronic appearing scarring posteriorly in the right lower lobe at the site of previous pneumonia. No acute airspace disease is seen.  4. Advanced emphysema.     CT scan of the chest February 2023  1. New 8 mm nodule in medial left lower lobe, recommend 3 month CT follow-up.  2. Right lower lobe linear consolidation likely developing scarring in the " setting of previously seen pneumonia, recommend attention on follow-up.  3. Right lower lobe 10 mm nodule stable from multiple prior studies.  3. Advanced emphysema, coronary artery calcifications, and and additional chronic findings above.     Systolic CHF, chronic (CMS/HCC) diastolic dysfunction  Pulmonary hypertension RVSP 47         Chronic respiratory failure with hypoxia (CMS/HCC)  - On home O2 4 Liters,      patient is immune-compromised with CLL treatment in oral chemo     Up-to-date on vaccinations for pneumonia, flu and COVID-19           Recommendations:     Respiratory status is improving, oxygen supplement and titration to maintain saturation 90-95%: Currently on 4 L nasal cannula, stable from pulm standpoint to be discharged home  hemoptysis resolved, most likely due to gastric aspiration and severe pneumonitis  Similar event happened in November 2022 on Thanksgiving day    Monitor the cultures of the BAL to rule out opportunistic infections: 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 Azithromycin    Resume aspirin        Repeat MISAEL and ANCA is negative  I personally reviewed the radiological images                        LOS: 9 days     Subjective     Hemoptysis have resolved, hemoglobin stable, reports movement in the mild cough and shortness of breath    Objective     Vital signs for last 24 hours:  Vitals:    01/02/24 0727 01/02/24 0730 01/02/24 0900 01/02/24 0923   BP:   152/60 152/60   BP Location:       Patient Position:   Lying    Pulse: 66 68 60 68   Resp: 14 16 17    Temp:   97.4 °F (36.3 °C)    TempSrc:   Oral    SpO2: 91% 91% 94%    Weight:       Height:           Intake/Output last 3 shifts:  I/O last 3 completed  shifts:  In: 1080 [P.O.:1080]  Out: 300 [Urine:300]  Intake/Output this shift:  I/O this shift:  In: 240 [P.O.:240]  Out: -       Radiology  Imaging Results (Last 24 Hours)       ** No results found for the last 24 hours. **            Labs:  Results from last 7 days   Lab Units 01/02/24  0524   WBC 10*3/mm3 15.00*   HEMOGLOBIN g/dL 9.0*   HEMATOCRIT % 28.4*   PLATELETS 10*3/mm3 263     Results from last 7 days   Lab Units 01/02/24  0524   SODIUM mmol/L 141   POTASSIUM mmol/L 3.8   CHLORIDE mmol/L 104   CO2 mmol/L 31.0*   BUN mg/dL 33*   CREATININE mg/dL 0.94   CALCIUM mg/dL 8.5*   BILIRUBIN mg/dL 0.8   ALK PHOS U/L 64   ALT (SGPT) U/L 34*   AST (SGOT) U/L 30   GLUCOSE mg/dL 83         Results from last 7 days   Lab Units 01/02/24  0524 12/29/23  0304 12/28/23  0235   ALBUMIN g/dL 3.3* 3.4* 3.4*                 Results from last 7 days   Lab Units 12/29/23  0304   MAGNESIUM mg/dL 2.1                     Meds:   SCHEDULE  amiodarone, 200 mg, Oral, BID With Meals  amLODIPine, 5 mg, Oral, Q24H  aspirin, 162 mg, Oral, Daily  atorvastatin, 10 mg, Oral, Daily  budesonide-formoterol, 2 puff, Inhalation, BID - RT   And  tiotropium bromide monohydrate, 2 puff, Inhalation, Daily - RT  lisinopril, 40 mg, Oral, Daily  metoprolol succinate XL, 25 mg, Oral, Daily  montelukast, 10 mg, Oral, Nightly  multivitamin with minerals, 1 tablet, Oral, Daily  pantoprazole, 40 mg, Oral, Q AM  PARoxetine, 20 mg, Oral, Daily  pramipexole, 0.25 mg, Oral, Daily  predniSONE, 20 mg, Oral, Daily With Breakfast  sodium chloride, 10 mL, Intravenous, Q12H  sodium chloride, 10 mL, Intravenous, Q12H  vitamin B-12, 1,000 mcg, Oral, Daily      Infusions     PRNs    acetaminophen **OR** acetaminophen **OR** acetaminophen    albuterol    senna-docusate sodium **AND** polyethylene glycol **AND** bisacodyl **AND** bisacodyl    Calcium Replacement - Follow Nurse / BPA Driven Protocol    loperamide    Magnesium Standard Dose Replacement - Follow Nurse / BPA  Driven Protocol    melatonin    metoprolol tartrate    nitroglycerin    ondansetron    Phosphorus Replacement - Follow Nurse / BPA Driven Protocol    Potassium Replacement - Follow Nurse / BPA Driven Protocol    prochlorperazine    sodium chloride    [COMPLETED] Insert Peripheral IV **AND** sodium chloride    sodium chloride    sodium chloride    sodium chloride    sodium chloride    Physical Exam:  General Appearance:  Alert   HEENT:  Normocephalic, without obvious abnormality, Conjunctiva/corneas clear,.   Nares normal, no drainage     Neck:  Supple, symmetrical, trachea midline. No JVD.  Lungs /Chest wall: Air entry with no wheezing, bilateral basal rhonchi, respirations unlabored, symmetrical wall movement.     Heart:  Regular rate and rhythm, S1 S2 normal  Abdomen: Soft, non-tender, no masses, no organomegaly.    Extremities: No edema, no clubbing or cyanosis    ROS  Constitutional: Negative for chills, fever and malaise/fatigue.   HENT: Negative.    Eyes: Negative.    Cardiovascular: Negative.    Respiratory: Positive for movement in the cough and shortness of breath.    Skin: Negative.    Musculoskeletal: Negative.    Gastrointestinal: Negative.    Genitourinary: Negative.    Neurological: Generalized weakness    I reviewed the recent clinical results    Part of this note may be an electronic transcription/translation of spoken language to printed text using the Dragon Dictation System

## 2024-01-02 NOTE — PLAN OF CARE
Problem: Adult Inpatient Plan of Care  Goal: Plan of Care Review  Outcome: Met  Goal: Patient-Specific Goal (Individualized)  Outcome: Met  Goal: Absence of Hospital-Acquired Illness or Injury  Outcome: Met  Intervention: Identify and Manage Fall Risk  Recent Flowsheet Documentation  Taken 1/2/2024 1000 by Barbara Auguste RN  Safety Promotion/Fall Prevention:   safety round/check completed   clutter free environment maintained   assistive device/personal items within reach  Taken 1/2/2024 0800 by Barbara Auguste RN  Safety Promotion/Fall Prevention:   safety round/check completed   clutter free environment maintained   assistive device/personal items within reach  Intervention: Prevent Skin Injury  Recent Flowsheet Documentation  Taken 1/2/2024 0800 by Barbara Auguste RN  Skin Protection: adhesive use limited  Intervention: Prevent Infection  Recent Flowsheet Documentation  Taken 1/2/2024 1000 by Barbara Auguste RN  Infection Prevention: personal protective equipment utilized  Taken 1/2/2024 0800 by Barbara Auguste RN  Infection Prevention: personal protective equipment utilized  Goal: Optimal Comfort and Wellbeing  Outcome: Met  Intervention: Provide Person-Centered Care  Recent Flowsheet Documentation  Taken 1/2/2024 0800 by Barbara Auguste RN  Trust Relationship/Rapport:   care explained   choices provided   questions answered   questions encouraged   reassurance provided  Goal: Readiness for Transition of Care  Outcome: Met   Goal Outcome Evaluation:           Progress: improving

## 2024-01-02 NOTE — DISCHARGE SUMMARY
The patient feels well currently. She is comfortable while at rest. The patient ambulated to the nursing station with myself and the nurse. The patient had to stop at the nursing station after about 100 feet. She was able to return back to the chair. Sats were in the low 80s during the process. The patient sat down and came up for 93 percent after about 2 minutes. She denies any complaints currently. No chest pain. No light headedness or dizziness. Patient is able to follow commands without issue. The patient has been weak for sometime. As per patient and daughter this close to her baseline.     Vitals reviewed and are stable     HENT: vitals reviewed.      Head: Normocephalic and atraumatic.      Nose: Nose normal.   Eyes:      Extraocular Movements: Extraocular movements intact.      Conjunctivae/sclera: Conjunctivae normal.      Pupils: Pupils are equal, round, and reactive to light.   Cardiovascular:     Ni murmur  Pulmonary:      Effort: weak, on airvo      Breath sounds: reduced BS bilaterally. Patient has crackles present through her lung fields.   Abdominal:      General: Abdomen is flat. Bowel sounds are normal.      Palpations: Abdomen is soft.   Musculoskeletal:         General: Normal range of motion.      Cervical back: Normal range of motion and neck supple.   Skin:     General: Skin is dry.   Neurological:      General: No focal deficit present.      Mental Status: alert.         Krystyna Bennett is a 78 y.o. female with past medical history of hypertension, GERD, asthma/COPD, chronic hypoxic respiratory failure secondary to COPD on home oxygen 4 L via nasal cannula, mood disorder, hyperlipidemia, past history of systolic congestive heart failure which subsequently improved in subsequent echo with ejection fraction of 55 to 60% presented to emergency room with complaints of chest pain radiating to her arms and back associated with nausea and diaphoresis.  Patient had a cardiac cath a year  ago.  Troponins are elevated.  D-dimer is normal.  Lipase is normal.  CT angiogram of the chest showed No evidence of pulmonary embolus. Bibasilar pneumonia.  Severe emphysema. Moderate size hiatal hernia. Hepatic and renal cysts.     Assessment / Plan      Hemoptysis s/p bronchoscopy on 12/25/23   - Etiology unclear but per pulmonary there is suspicion for aspiration of gastric contents.  -bronchoscopy on 12/25/23 - Significant bloody secretions noted in the entire bronchial tree and especially bilateral lower lobes status post therapeutic bilateral lung washing and no foreign bodies  -Pulmonary is following.  -Patient is on Acalabrutinib which can cause hemoptysis.  Tried to reach out to patient primary oncologist Dr. Henry [facility] phone #[phone] Voice message is left on oncology personal phone as well on 12/28/23.   Waiting callback.  Currently getting in-house oncologist for this case as well.  -Patient stated got hemoptysis intermittently.  And is getting epinephrine nebulizer 3 times per day as of now.   -now on Airvo and wean down the oxygen as tolerated.  -Unasyn to continue for aspiration pneumonia.     12/29/2023 - patient will likely need several more days for improvement.      12/30/2023 - patient remains on high flow oxygen. Gradually getting better each day. Will likely need another 1-2 days at least. Pulmonary is primary management.      Anemia secondary to hemoptysis  -Patient has hemoglobin of 6.7 on 12/27 and 1 PRBC is ordered.  CBC monitor daily.     12/29/2023 - patients hemoglobin is stable currently. No need for transfusion. 7.7 today.      12/30/2023 - no further signs of bleeding.      12/31/2023 - patient denies any further bleeding complaints. Labs to be checked in AM.      Acute on chronic hypoxic respiratory failure secondary to COPD exacerbation due to bibasilar pneumonia likely aspiration pneumonia  -Continue Unasyn.    -Continue Singulair, Symbicort and Spiriva inhaler.   Continue albuterol nebulizer treatment as needed.     Appreciate assistance from pulmonary service. Patient remains on high flow currently.      12/31/2023 - patient is doing better. Now on Nasal Cannula at 10 liters. Continues to improve. Hopefully discharge home in the next 1-2 days if her improvement continues.      1/1/2024 - patients oxygen levels are better. Close to baseline. Hemoglobin is now stable. Discharge home in the AM if patient continues to improve. All questions have been answered in detail..    1/2/2024 - discussed with patient and family in detail. She would benefit from home physical therapy and pulmonary rehab after this incident. She does not want rehab or SNIF and would prefer to return home. She has plenty of her medications. She will need steroids upon discharge. We will clear this with pulmonary prior to discharge home. Total time spent with discharge is greater than 30 minutes.      Atrial fibrillation with rapid ventricular response:   Patient given loading dose of amiodarone and started on amiodarone drip.  Now on amiodarone PO.  Rate under better control.     1/2/2024 - patients heart rate did not increase with the ambulation.      Chest pain  -Troponins are mildly elevated.  Patient seen by cardiology.  Given patient's normal coronaries a year ago clinical suspicion for cardiac etiology is low.  Continue analgesics as needed for pain.  Continue supplemental oxygen.       12/31/2023 - no further episodes of chest pain     CLL  -Patient is on acalabrutinib -on hold for now given patient intermittent hemoptysis  -Primary oncologist Dr. Henry [facility] phone #[phone] #[phone]  -Try to reach out to primary oncologist when to resume chemo med.  Waiting callback.  -Dr Valerio is covering and reach out from [facility] --> pending      12/30/2023 - Hem/Onc following  Hypertension: Continue hydrochlorothiazide, metoprolol and lisinopril.     12/29/2023 - patients BP remains under good control.      Mood disorder: Continue paroxetine     Disposition: home    The patient was found to have sepsis and was treated for that here during the hospitalization

## 2024-01-02 NOTE — NURSING NOTE
Dr Moreno notified of pt oxygen dropping with activity. Dr Moreno personally walked pt to nurses station and okay with pt being discharged home.    Bronch fungal culture resulted, Dr Chen notified. Ordered antigen blood draw and okay with discharging and following up with Dr Magallon for results. Pt and daughter want to make own follow up appointment.    Hydrochlorothiazide stopped on discharge paperwork, Dr Moreno restarted and pt knows to continue taking at home.

## 2024-01-02 NOTE — PLAN OF CARE
Goal Outcome Evaluation:           Progress: no change  Outcome Evaluation: Pt continues on 4L per high flow NC. Pt has de'sated multiple times throughout the noc. Pt dropped into the 70s during the noc & took several minutes to recover. Pt is observed to de'sat with movement. Pt has been bradycardic throughout the noc. No C/O voiced per pt at this time. Pt resting abed with call light within reach. Plan of care ongoing.         Problem: Adult Inpatient Plan of Care  Goal: Plan of Care Review  Outcome: Ongoing, Progressing  Flowsheets (Taken 1/2/2024 0405)  Progress: no change  Outcome Evaluation: Pt continues on 4L per high flow NC. Pt has de'sated multiple times throughout the noc. Pt dropped into the 70s during the noc & took several minutes to recover. Pt is observed to de'sat with movement. Pt has been bradycardic throughout the noc. No C/O voiced per pt at this time. Pt resting abed with call light within reach. Plan of care ongoing.  Goal: Patient-Specific Goal (Individualized)  Outcome: Ongoing, Progressing  Goal: Absence of Hospital-Acquired Illness or Injury  Outcome: Ongoing, Progressing  Intervention: Identify and Manage Fall Risk  Recent Flowsheet Documentation  Taken 1/2/2024 0200 by Sindy Smith, RN  Safety Promotion/Fall Prevention:   safety round/check completed   fall prevention program maintained  Taken 1/2/2024 0000 by Sindy Smith, RN  Safety Promotion/Fall Prevention:   safety round/check completed   fall prevention program maintained  Taken 1/1/2024 2200 by Sindy Smith, RN  Safety Promotion/Fall Prevention:   safety round/check completed   fall prevention program maintained  Taken 1/1/2024 2000 by Sindy Smith, RN  Safety Promotion/Fall Prevention:   safety round/check completed   nonskid shoes/slippers when out of bed   fall prevention program maintained   clutter free environment maintained   assistive device/personal items within reach  Intervention: Prevent Skin Injury  Recent  Flowsheet Documentation  Taken 1/2/2024 0000 by Sindy Smith RN  Body Position: position changed independently  Skin Protection:   adhesive use limited   tubing/devices free from skin contact  Taken 1/1/2024 2000 by Sindy Smith RN  Body Position: position changed independently  Skin Protection:   adhesive use limited   tubing/devices free from skin contact  Intervention: Prevent and Manage VTE (Venous Thromboembolism) Risk  Recent Flowsheet Documentation  Taken 1/1/2024 2000 by Sindy Smith RN  Activity Management:   activity encouraged   up ad echo  Intervention: Prevent Infection  Recent Flowsheet Documentation  Taken 1/2/2024 0200 by Sindy Smith RN  Infection Prevention:   hand hygiene promoted   personal protective equipment utilized   rest/sleep promoted   single patient room provided  Taken 1/2/2024 0000 by Sindy Smith RN  Infection Prevention:   hand hygiene promoted   personal protective equipment utilized   rest/sleep promoted   single patient room provided  Taken 1/1/2024 2200 by Sindy Smith RN  Infection Prevention:   hand hygiene promoted   personal protective equipment utilized   rest/sleep promoted   single patient room provided  Taken 1/1/2024 2000 by Sindy Smith RN  Infection Prevention:   hand hygiene promoted   personal protective equipment utilized   rest/sleep promoted   single patient room provided  Goal: Optimal Comfort and Wellbeing  Outcome: Ongoing, Progressing  Intervention: Monitor Pain and Promote Comfort  Recent Flowsheet Documentation  Taken 1/1/2024 2000 by Sindy Smith RN  Pain Management Interventions:   care clustered   diversional activity provided   pillow support provided   position adjusted   quiet environment facilitated   relaxation techniques promoted   unnecessary movement minimized  Intervention: Provide Person-Centered Care  Recent Flowsheet Documentation  Taken 1/1/2024 2000 by Sindy Smith RN  Trust Relationship/Rapport:   care  explained   choices provided   questions answered   questions encouraged   reassurance provided   thoughts/feelings acknowledged  Goal: Readiness for Transition of Care  Outcome: Ongoing, Progressing     Problem: COPD (Chronic Obstructive Pulmonary Disease) Comorbidity  Goal: Maintenance of COPD Symptom Control  Outcome: Ongoing, Progressing  Intervention: Maintain COPD-Symptom Control  Recent Flowsheet Documentation  Taken 1/2/2024 0200 by Sindy Smith RN  Medication Review/Management: medications reviewed  Taken 1/2/2024 0000 by Sindy Smith RN  Medication Review/Management: medications reviewed  Taken 1/1/2024 2200 by Sindy Smith RN  Medication Review/Management: medications reviewed  Taken 1/1/2024 2000 by Sindy Smith RN  Supportive Measures:   active listening utilized   verbalization of feelings encouraged  Medication Review/Management:   medications reviewed   high-risk medications identified     Problem: Heart Failure Comorbidity  Goal: Maintenance of Heart Failure Symptom Control  Outcome: Ongoing, Progressing  Intervention: Maintain Heart Failure-Management  Recent Flowsheet Documentation  Taken 1/2/2024 0200 by Sindy Smith RN  Medication Review/Management: medications reviewed  Taken 1/2/2024 0000 by Sindy Smith RN  Medication Review/Management: medications reviewed  Taken 1/1/2024 2200 by Sindy Smith RN  Medication Review/Management: medications reviewed  Taken 1/1/2024 2000 by Sindy Smith RN  Medication Review/Management:   medications reviewed   high-risk medications identified     Problem: Hypertension Comorbidity  Goal: Blood Pressure in Desired Range  Outcome: Ongoing, Progressing  Intervention: Maintain Blood Pressure Management  Recent Flowsheet Documentation  Taken 1/2/2024 0200 by Sindy Smith RN  Medication Review/Management: medications reviewed  Taken 1/2/2024 0000 by Sindy Smith RN  Medication Review/Management: medications reviewed  Taken  1/1/2024 2200 by Sindy Smith RN  Medication Review/Management: medications reviewed  Taken 1/1/2024 2000 by Sindy Smith RN  Medication Review/Management:   medications reviewed   high-risk medications identified     Problem: Fall Injury Risk  Goal: Absence of Fall and Fall-Related Injury  Outcome: Ongoing, Progressing  Intervention: Identify and Manage Contributors  Recent Flowsheet Documentation  Taken 1/2/2024 0200 by Sindy Smith RN  Medication Review/Management: medications reviewed  Taken 1/2/2024 0000 by Sindy Smith RN  Medication Review/Management: medications reviewed  Taken 1/1/2024 2200 by Sindy Smith RN  Medication Review/Management: medications reviewed  Taken 1/1/2024 2000 by Sindy Smith RN  Medication Review/Management:   medications reviewed   high-risk medications identified  Self-Care Promotion:   independence encouraged   BADL personal objects within reach  Intervention: Promote Injury-Free Environment  Recent Flowsheet Documentation  Taken 1/2/2024 0200 by Sindy Smith RN  Safety Promotion/Fall Prevention:   safety round/check completed   fall prevention program maintained  Taken 1/2/2024 0000 by Sindy Smith RN  Safety Promotion/Fall Prevention:   safety round/check completed   fall prevention program maintained  Taken 1/1/2024 2200 by Sindy Smith RN  Safety Promotion/Fall Prevention:   safety round/check completed   fall prevention program maintained  Taken 1/1/2024 2000 by Sindy Smith RN  Safety Promotion/Fall Prevention:   safety round/check completed   nonskid shoes/slippers when out of bed   fall prevention program maintained   clutter free environment maintained   assistive device/personal items within reach     Problem: Fluid Imbalance (Pneumonia)  Goal: Fluid Balance  Outcome: Ongoing, Progressing     Problem: Infection (Pneumonia)  Goal: Resolution of Infection Signs and Symptoms  Outcome: Ongoing, Progressing     Problem: Respiratory  Compromise (Pneumonia)  Goal: Effective Oxygenation and Ventilation  Outcome: Ongoing, Progressing  Intervention: Promote Airway Secretion Clearance  Recent Flowsheet Documentation  Taken 1/2/2024 0000 by Sindy Smith RN  Breathing Techniques/Airway Clearance: deep/controlled cough encouraged  Cough And Deep Breathing: done independently per patient  Taken 1/1/2024 2000 by Sindy Smith RN  Breathing Techniques/Airway Clearance: deep/controlled cough encouraged  Cough And Deep Breathing: done independently per patient  Intervention: Optimize Oxygenation and Ventilation  Recent Flowsheet Documentation  Taken 1/2/2024 0000 by Sindy Smith RN  Airway/Ventilation Management: airway patency maintained  Taken 1/1/2024 2000 by Sindy Smith RN  Head of Bed (HOB) Positioning: HOB elevated  Airway/Ventilation Management: airway patency maintained     Problem: Adjustment to Illness (Sepsis/Septic Shock)  Goal: Optimal Coping  Outcome: Ongoing, Progressing  Intervention: Optimize Psychosocial Adjustment to Illness  Recent Flowsheet Documentation  Taken 1/1/2024 2000 by Sindy Smith RN  Supportive Measures:   active listening utilized   verbalization of feelings encouraged  Family/Support System Care:   self-care encouraged   support provided     Problem: Bleeding (Sepsis/Septic Shock)  Goal: Absence of Bleeding  Outcome: Ongoing, Progressing  Intervention: Monitor and Manage Bleeding  Recent Flowsheet Documentation  Taken 1/2/2024 0000 by Sindy Smith RN  Bleeding Precautions: blood pressure closely monitored  Taken 1/1/2024 2000 by Sindy Smith RN  Bleeding Precautions: blood pressure closely monitored     Problem: Glycemic Control Impaired (Sepsis/Septic Shock)  Goal: Blood Glucose Level Within Desired Range  Outcome: Ongoing, Progressing  Intervention: Optimize Glycemic Control  Recent Flowsheet Documentation  Taken 1/2/2024 0000 by Sindy Smith RN  Glycemic Management: oral hydration  promoted  Taken 1/1/2024 2000 by Sindy Smith RN  Glycemic Management: oral hydration promoted     Problem: Infection Progression (Sepsis/Septic Shock)  Goal: Absence of Infection Signs and Symptoms  Outcome: Ongoing, Progressing  Intervention: Initiate Sepsis Management  Recent Flowsheet Documentation  Taken 1/2/2024 0200 by Sindy Smith RN  Infection Prevention:   hand hygiene promoted   personal protective equipment utilized   rest/sleep promoted   single patient room provided  Taken 1/2/2024 0000 by Sindy Smith RN  Infection Prevention:   hand hygiene promoted   personal protective equipment utilized   rest/sleep promoted   single patient room provided  Taken 1/1/2024 2200 by Sindy Smith RN  Infection Prevention:   hand hygiene promoted   personal protective equipment utilized   rest/sleep promoted   single patient room provided  Taken 1/1/2024 2000 by Sindy Smith RN  Infection Prevention:   hand hygiene promoted   personal protective equipment utilized   rest/sleep promoted   single patient room provided  Intervention: Promote Recovery  Recent Flowsheet Documentation  Taken 1/2/2024 0000 by Sindy Smith RN  Airway/Ventilation Support: pulmonary hygiene promoted  Taken 1/1/2024 2000 by Sindy Smith RN  Activity Management:   activity encouraged   up ad echo  Airway/Ventilation Support: pulmonary hygiene promoted  Sleep/Rest Enhancement:   awakenings minimized   consistent schedule promoted   regular sleep/rest pattern promoted   relaxation techniques promoted   room darkened     Problem: Nutrition Impaired (Sepsis/Septic Shock)  Goal: Optimal Nutrition Intake  Outcome: Ongoing, Progressing     Problem: Skin Injury Risk Increased  Goal: Skin Health and Integrity  Outcome: Ongoing, Progressing  Intervention: Optimize Skin Protection  Recent Flowsheet Documentation  Taken 1/2/2024 0000 by Sindy Smith RN  Pressure Reduction Techniques: frequent weight shift encouraged  Pressure  Reduction Devices: pressure-redistributing mattress utilized  Skin Protection:   adhesive use limited   tubing/devices free from skin contact  Taken 1/1/2024 2000 by Sindy Smith, RN  Pressure Reduction Techniques: frequent weight shift encouraged  Head of Bed (HOB) Positioning: HOB elevated  Pressure Reduction Devices: pressure-redistributing mattress utilized  Skin Protection:   adhesive use limited   tubing/devices free from skin contact
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
## 2024-01-02 NOTE — TELEPHONE ENCOUNTER
Ashley (Daughter) called our office, she is on HIPAA Form.    -She is wanting to call and speak with Dr Philippe about her mothers medications because she spoke with him before they were discharged at the hospital.

## 2024-01-02 NOTE — PROGRESS NOTES
Pt sitting in recliner, daughter at bedside. Pt anxious to 'bust out of this place' (laughter). Pt dx w pneumonia and responding to meds. Pt and daughter are awaiting d/c orders. No needs, but grateful for the conversation and visit. Pt notes indicate pt will d/c today, no follow up indicated at this time. Chp remains available for additional support as needed/ requested.

## 2024-01-03 ENCOUNTER — TRANSITIONAL CARE MANAGEMENT TELEPHONE ENCOUNTER (OUTPATIENT)
Dept: CALL CENTER | Facility: HOSPITAL | Age: 79
End: 2024-01-03
Payer: MEDICARE

## 2024-01-03 NOTE — OUTREACH NOTE
Prep Survey      Flowsheet Row Responses   [facility] facility patient discharged from? [facility]   Is LACE score < 7 ? No   Eligibility [facility]   Hospital [facility]   Date of Admission 12/23/23   Date of Discharge 01/02/24   Discharge Disposition Home-Health Care Svc   Discharge diagnosis chest pain, Hemoptysis s/p bronchoscopy on 12/25/23, Acute on chronic hypoxic respiratory failure secondary to COPD exacerbation due to bibasilar pneumonia likely aspiration pneumonia   Does the patient have one of the following disease processes/diagnoses(primary or secondary)? Pneumonia   Does the patient have Home health ordered? Yes   What is the Home health agency?  [facility]   Is there a DME ordered? No   Prep survey completed? Yes            Ailyn MILLER - Registered Nurse           [FreeTextEntry1] : Bong Beatty returns to the office today.  He is a 74-year-old man with history of prostate cancer status post radical prostatectomy performed in March 2022.  The prostate measured 93 g and weight.  Jin score 3+4 disease was present.  He did have a positive margin noted at the surgery.  Lymph nodes and seminal vesicles were negative.  His initial postoperative PSA level was 0.04 and subsequently 0.21.  He had come into the office last week and had his PSA tested.  He is here today to review the results with me.  The level showed it to be 0.36 ng/mL.\par \par

## 2024-01-03 NOTE — OUTREACH NOTE
Call Center TCM Note      Flowsheet Row Responses   [facility] facility patient discharged from? [facility]   Does the patient have one of the following disease processes/diagnoses(primary or secondary)? Pneumonia   TCM attempt successful? No   Unsuccessful attempts Attempt 2   Call Status Left message            Joseline Aguila MA    1/3/2024, 15:51 EST

## 2024-01-04 ENCOUNTER — HOME CARE VISIT (OUTPATIENT)
Dept: HOME HEALTH SERVICES | Facility: HOME HEALTHCARE | Age: 79
End: 2024-01-04
Payer: MEDICARE

## 2024-01-04 ENCOUNTER — TRANSITIONAL CARE MANAGEMENT TELEPHONE ENCOUNTER (OUTPATIENT)
Dept: CALL CENTER | Facility: HOSPITAL | Age: 79
End: 2024-01-04
Payer: MEDICARE

## 2024-01-04 PROCEDURE — G0299 HHS/HOSPICE OF RN EA 15 MIN: HCPCS

## 2024-01-04 NOTE — OUTREACH NOTE
Call Center TCM Note      Flowsheet Row Responses   [facility] facility patient discharged from? [facility]   Does the patient have one of the following disease processes/diagnoses(primary or secondary)? Pneumonia   TCM attempt successful? No   Unsuccessful attempts Attempt 3   Discharge diagnosis chest pain, Hemoptysis s/p bronchoscopy on 12/25/23, Acute on chronic hypoxic respiratory failure secondary to COPD exacerbation due to bibasilar pneumonia likely aspiration pneumonia   Wrap up additional comments D/C DX: chest pain, Hemoptysis s/p bronchoscopy on 12/25/23, Acute on chronic hypoxic respiratory failure secondary to COPD exacerbation due to bibasilar pneumonia likely aspiration pneumonia - Unable to reach pt/family x 3 attempts for TCM call. Pt is sched with PCP Dr Kapoor on 01/18/2024 for HOSP FOLLOW UP, however this date is two days past applicable TCM APPT time frame, this appt would  need to be completed by 01/16/2024 and at time of this last attempt, PCP has 3 open times for TCM APPT on 01/16/2024.            Joseline Aguila MA    1/4/2024, 11:31 EST

## 2024-01-05 ENCOUNTER — HOME CARE VISIT (OUTPATIENT)
Dept: HOME HEALTH SERVICES | Facility: HOME HEALTHCARE | Age: 79
End: 2024-01-05
Payer: MEDICARE

## 2024-01-05 VITALS
OXYGEN SATURATION: 98 % | DIASTOLIC BLOOD PRESSURE: 66 MMHG | HEART RATE: 77 BPM | TEMPERATURE: 98 F | RESPIRATION RATE: 17 BRPM | SYSTOLIC BLOOD PRESSURE: 110 MMHG

## 2024-01-05 LAB
QT INTERVAL: 529 MS
QTC INTERVAL: 521 MS
REF LAB TEST METHOD: NORMAL

## 2024-01-05 PROCEDURE — G0151 HHCP-SERV OF PT,EA 15 MIN: HCPCS

## 2024-01-05 NOTE — HOME HEALTH
"SOC Note: JOSUE WAS HOSPITALIZED FOR ASPIRATION RESULTING IN ASPIRATION PNEUMONIA. PATIENT HAS A HISTORY OF COPD AND CHF. PATIENT REQUIRES HH FOR DISEASE PROCESS TEACHING, MEDICATION TEACHING, MEDICATION MANAGEMENT, OXYGEN SAFETY TEACHING, AND DISEASE PROCESS MONITORING. PATIENT WILL BE SEEN BY HH SN 2WK2 AND 1WK7    Home Health ordered for: disciplines SN/PT    Reason for Hosp/Primary Dx/Co-morbidities: PNEUMONIA    Focus of Care: CHRONIC OBSTRUCTIVE PULMONARY DISEASE    Patient's goal(s):\" TO GET BETTER\"    Current Functional status/mobility/DME: OXYGEN 4L    HB status/Living Arrangements: PATIENT LIVES ALONE DAUGHTER CHECKS ON DAILY    Skin Integrity/wound status: NA    Code Status: FULL CODE    Fall Risk/Safety concerns: HIGH RISK    Educated on Emergency Plan, steps to take prior to going to the ER and when to Call Home Health First:  PATIENT INSTRUCTED UNLESS RESPIRATORY EMERGENCY TO CONTACT HH BEFORE GOING TO HOSPITAL INCASE THERE IS SOMETHING SN CAN DO IN THE HOME TO PREVENT A VISIT TO THE EMERGENCY DEPARTMENT     Medication issues/Concerns:SEPERATE NOTE    Additional Problems/Concerns: NA    SDOH Barriers (i.e. caregiver concerns, social isolation, transportation, food insecurity, environment, income etc.)/Need for MSW: NA    Plan for next visit: CARDIOPULMONARY ASSESSMENT, GASTROINTESTINAL ASSESSMENT, SKIN ASSESSMENT, SAFETY ASSESSMENT, PAIN ASSESSMENT, MEDICATION ASSESSMENT"

## 2024-01-06 NOTE — HOME HEALTH
PT EVAL  Patient is a 78  year old  female,referred for skilled PT interventions after recent hospital stay due to MI. Patient was independent for bed mobility, independent for transfers, SBA for gait without an AD. Patient states she has been on O2 for approx 10 years now. Patient has had home health/PT in the past and is aware of exercises for B LE. Patient does not feel the need for PT at this time. PT eval only

## 2024-01-08 ENCOUNTER — HOME CARE VISIT (OUTPATIENT)
Dept: HOME HEALTH SERVICES | Facility: HOME HEALTHCARE | Age: 79
End: 2024-01-08
Payer: MEDICARE

## 2024-01-08 VITALS
SYSTOLIC BLOOD PRESSURE: 120 MMHG | HEART RATE: 70 BPM | DIASTOLIC BLOOD PRESSURE: 70 MMHG | TEMPERATURE: 97.7 F | RESPIRATION RATE: 16 BRPM | OXYGEN SATURATION: 96 %

## 2024-01-08 PROCEDURE — G0299 HHS/HOSPICE OF RN EA 15 MIN: HCPCS

## 2024-01-09 ENCOUNTER — TELEPHONE (OUTPATIENT)
Dept: CARDIAC REHAB | Facility: HOSPITAL | Age: 79
End: 2024-01-09
Payer: MEDICARE

## 2024-01-09 NOTE — TELEPHONE ENCOUNTER
Covered @ 100%  36v/36wks, addt'l 36v based on medical necessity  [place] Ref #: [id number]  ~arash

## 2024-01-10 NOTE — ANESTHESIA POSTPROCEDURE EVALUATION
Patient: Krystyna Bennett    Procedure Summary       Date: 12/25/23 Room / Location: [facility] ENDOSCOPY 2 / [facility] ENDOSCOPY    Anesthesia Start: 1053 Anesthesia Stop: 1107    Procedure: BRONCHOSCOPY with bilateral lung wash (Bronchus) Diagnosis:       Hemoptysis      (Hemoptysis [R04.2])    Surgeons: Zuly Magallon MD Provider: Guzman Avila MD    Anesthesia Type: general ASA Status: 4 - Emergent            Anesthesia Type: general    Vitals  Vitals Value Taken Time   /55 12/25/23 1245   Temp     Pulse 98 12/25/23 1245   Resp     SpO2 97 % 12/25/23 1245           Post Anesthesia Care and Evaluation    Patient location during evaluation: PACU  Patient participation: complete - patient participated  Level of consciousness: awake  Pain scale: See nurse's notes for pain score.  Pain management: adequate    Airway patency: patent  Anesthetic complications: No anesthetic complications  PONV Status: none  Cardiovascular status: acceptable  Respiratory status: acceptable and spontaneous ventilation  Hydration status: acceptable    Comments: Patient seen and examined postoperatively; vital signs stable; SpO2 greater than or equal to 90%; cardiopulmonary status stable; nausea/vomiting adequately controlled; pain adequately controlled; no apparent anesthesia complications; patient discharged from anesthesia care when discharge criteria were met

## 2024-01-15 NOTE — PROGRESS NOTES
Subjective   Krystyna Bennett is a 78 y.o. female. Presents to [facility]    Krystyna was seen at [facility] . She was admitted on 12/23/23  for aspiration pneumonia. She was discharged on 01/02/24. Discharge diagnosis was sepsis. Labs that were performed during the encounter included: CMP-abnormal, CBC-abnormal, and troponin-abnormal. Diagnostic studies that were performed included: X-Ray-pneumonia  and CT Scan-pneumonia, emphysema, hiatal hernia, hepatic and renal cysts . Currently Krystyna receives care at home. Complications from the hospital stay include none. The patient stated that they do not need help with their daily life and activities. The patient stated that they do have emotional support at home.  Current outpatient and discharge medications have been reconciled for the patient.  Reviewed by: Mireya Kapoor MD      Chief Complaint   Patient presents with    Hospital Follow Up Visit    Pneumonia       Pneumonia  She complains of cough, difficulty breathing, shortness of breath, sputum production and wheezing. There is no chest tightness, frequent throat clearing or hoarse voice. This is a new problem. The current episode started in the past 7 days. The problem occurs daily. The problem has been unchanged. The cough is productive of sputum. Pertinent negatives include no chest pain or fever. Her symptoms are aggravated by any activity and change in weather. Relieved by: inhaler, oxygen. She reports moderate improvement on treatment.        I personally reviewed and updated the patient's allergies, medications, problem list, and past medical, surgical, social, and family history. I have reviewed and confirmed the accuracy of the History of Present Illness and Review of Symptoms as documented by the MA/LPN/RN. Mireya Kapoor MD    Allergies:  Allergies   Allergen Reactions    Latex Rash    Sulfa Antibiotics Other (See Comments)     Tunnel vision,light headed/ may not be allergic to  it any longer        Social History:  Social History     Socioeconomic History    Marital status:    Tobacco Use    Smoking status: Former     Packs/day: 0.50     Years: 37.00     Additional pack years: 0.00     Total pack years: 18.50     Types: Cigarettes, Cigars     Start date: 1960     Quit date: 10/19/1999     Years since quittin.2     Passive exposure: Past    Smokeless tobacco: Never   Vaping Use    Vaping Use: Never used   Substance and Sexual Activity    Alcohol use: Yes     Alcohol/week: 4.0 standard drinks of alcohol     Types: 2 Glasses of wine, 2 Shots of liquor per week     Comment: social drinker    Drug use: No    Sexual activity: Not Currently     Partners: Male     Birth control/protection: Post-menopausal       Family History:  Family History   Problem Relation Age of Onset    Heart disease Father             Stroke Father     Heart failure Father     Leukemia Paternal Grandmother     Anemia Paternal Grandmother     Heart disease Paternal Grandmother     Cancer Paternal Grandmother         leukemia    Alcohol abuse Maternal Aunt     Cancer Maternal Aunt             Asthma Maternal Grandmother             Hyperlipidemia Maternal Grandmother             Hypertension Maternal Grandmother     Diabetes Paternal Aunt             Hypertension Son     Hypertension Daughter        Past Medical History :  Patient Active Problem List   Diagnosis    Essential hypertension    CLL (chronic lymphocytic leukemia)    Centrilobular emphysema    GERD (gastroesophageal reflux disease)    Arthritis    Systolic CHF, chronic    Anemia, chronic disease    History of Clostridium difficile colitis    Melanoma    Mixed hyperlipidemia    Medicare annual wellness visit, subsequent    Anxiety    Restless legs syndrome    Colon cancer screening    Eczematous dermatitis of upper and lower eyelids of both eyes    Positive colorectal cancer screening using Cologuard test     Mammogram declined    Flu vaccine need    Non-seasonal allergic rhinitis    AK (actinic keratosis)    Cat bite of right wrist    Tear of skin of right wrist    Community acquired pneumonia of both lungs    Hospital discharge follow-up    Pulmonary nodule, right    Night sweats    Combined form of senile cataract    Overweight (BMI 25.0-29.9)    Labyrinthitis of both ears    Seborrheic keratoses    Chest pain    Hemoptysis       Medication List:    Current Outpatient Medications:     albuterol sulfate  (90 Base) MCG/ACT inhaler, Inhale 2 puffs Every 4 (Four) Hours As Needed for Wheezing., Disp: 18 g, Rfl: 5    amiodarone (PACERONE) 200 MG tablet, Take 1 tablet by mouth 2 (Two) Times a Day With Meals., Disp: 60 tablet, Rfl: 0    amLODIPine (NORVASC) 5 MG tablet, Take 1 tablet by mouth Daily., Disp: 30 tablet, Rfl: 0    aspirin 81 MG chewable tablet, Chew 2 tablets Daily., Disp: 30 tablet, Rfl: 0    atorvastatin (Lipitor) 10 MG tablet, Take 1 tablet by mouth Daily., Disp: 30 tablet, Rfl: 0    Calcium Carbonate-Vit D-Min (CALTRATE 600+D PLUS MINERALS) 600-800 MG-UNIT chewable tablet, Chew 2 tablets Daily. Indications: suppliment, Disp: , Rfl:     Coenzyme Q10 (COQ10 PO), Take 1 tablet by mouth Daily. Indications: suppliment , Disp: , Rfl:     Cyanocobalamin (B-12) 1000 MCG capsule, Take 1,000 mcg by mouth Daily. Indications: Inadequate Vitamin B12, Disp: , Rfl:     esomeprazole (nexIUM) 40 MG capsule, Take 1 capsule by mouth Every Morning Before Breakfast. Indications: Heartburn, Disp: , Rfl:     Fluticasone-Umeclidin-Vilant (Trelegy Ellipta) 100-62.5-25 MCG/ACT inhaler, Inhale 1 puff Daily., Disp: 60 each, Rfl: 5    Glucosamine-Chondroitin 250-200 MG tablet, Take 1 tablet by mouth Daily. Indications: suppliment, Disp: , Rfl:     hydroCHLOROthiazide (HYDRODIURIL) 25 MG tablet, Take 1 tablet by mouth Daily., Disp: 30 tablet, Rfl: 12    lisinopril (PRINIVIL,ZESTRIL) 40 MG tablet, Take 1 tablet by mouth Daily.,  "Disp: 30 tablet, Rfl: 12    metoprolol succinate XL (TOPROL-XL) 25 MG 24 hr tablet, TAKE ONE TABLET BY MOUTH DAILY, Disp: 90 tablet, Rfl: 1    montelukast (SINGULAIR) 10 MG tablet, Take 1 tablet by mouth every night at bedtime., Disp: 90 tablet, Rfl: 1    Multiple Vitamins-Minerals (CENTRUM SILVER) tablet, 1 tablet po daily, Disp: , Rfl:     O2 (OXYGEN), Inhale 4 L/min Continuous. Indications: Hypoxia, Disp: , Rfl:     Omega-3 Fatty Acids (fish oil) 1000 MG capsule capsule, Take 2 capsules by mouth Daily. Indications: suppliment, Disp: , Rfl:     PARoxetine (PAXIL) 20 MG tablet, TAKE ONE TABLET BY MOUTH DAILY, Disp: 90 tablet, Rfl: 3    potassium chloride (K-DUR,KLOR-CON) 10 MEQ CR tablet, Take 1 tablet by mouth Daily., Disp: 30 tablet, Rfl: 12    pramipexole (MIRAPEX) 0.25 MG tablet, TAKE ONE TABLET BY MOUTH DAILY, Disp: 90 tablet, Rfl: 3    Past Surgical History:  Past Surgical History:   Procedure Laterality Date    APPENDECTOMY      BRONCHOSCOPY N/A 12/25/2023    Procedure: BRONCHOSCOPY with bilateral lung wash;  Surgeon: Zuly Magallon MD;  Location: [facility] ENDOSCOPY;  Service: Pulmonary;  Laterality: N/A;  Post- hemoptysis    CARDIAC CATHETERIZATION  05/2019    [facility].    CARDIAC CATHETERIZATION Right 11/25/2022    Procedure: Coronary angiography;  Surgeon: Andrea Philippe MD;  Location: [facility] CATH INVASIVE LOCATION;  Service: Cardiovascular;  Laterality: Right;    HYSTERECTOMY      TONSILLECTOMY         Review of Systems:  Review of Systems   Constitutional:  Negative for fever.   HENT:  Negative for hoarse voice.    Respiratory:  Positive for cough, sputum production, shortness of breath and wheezing.    Cardiovascular:  Negative for chest pain.       Physical Exam:  Vital Signs:  Vital Signs:   /70 (BP Location: Right arm, Patient Position: Sitting, Cuff Size: Adult)   Pulse 70   Temp 97.1 °F (36.2 °C) (Temporal)   Resp 20   Ht 167.6 cm (66\")   Wt 82 kg (180 lb 12.8 oz)   SpO2 95% Comment: 5L oxygen  " BMI 29.18 kg/m²     Result Review :   The following data was reviewed by: Mireya Kapoor MD on 01/18/2024:         [facility] . She was admitted on 12/23/23  for aspiration pneumonia. She was discharged on 01/02/24. Discharge diagnosis was sepsis. Labs that were performed during the encounter included: CMP-abnormal, CBC-abnormal, and troponin-abnormal. Diagnostic studies that were performed included: X-Ray-pneumonia  and CT Scan-pneumonia, emphysema, hiatal hernia, hepatic and renal cysts     Latest Reference Range & Units 01/02/24 05:24 01/03/24 15:34   Sodium 136 - 145 mmol/L 141    Potassium 3.5 - 5.2 mmol/L 3.8    Chloride 98 - 107 mmol/L 104    CO2 22.0 - 29.0 mmol/L 31.0 (H)    Anion Gap 5.0 - 15.0 mmol/L 6.0    BUN 8 - 23 mg/dL 33 (H)    Creatinine 0.57 - 1.00 mg/dL 0.94    BUN/Creatinine Ratio 7.0 - 25.0  35.1 (H)    eGFR >60.0 mL/min/1.73 63.0    Glucose 65 - 99 mg/dL 83    Calcium 8.6 - 10.5 mg/dL 8.5 (L)    Alkaline Phosphatase 39 - 117 U/L 64    Total Protein 6.0 - 8.5 g/dL 4.7 (L)    Albumin 3.5 - 5.2 g/dL 3.3 (L)    Globulin gm/dL 1.4    A/G Ratio g/dL 2.4    AST (SGOT) 1 - 32 U/L 30    ALT (SGPT) 1 - 33 U/L 34 (H)    Total Bilirubin 0.0 - 1.2 mg/dL 0.8    Iron 50 - 170 ug/dL  16 (L) (E)   Ferritin 15 - 204 ng/mL  434.9 (H) (E)   Iron Saturation 15 - 55 %  8 (L) (E)   TIBC 265 - 497 ug/dL  195 (L) (E)   UIBC 70 - 310 ug/dL  179 (E)   Vitamin B-12 >180 pg/mL  >1,500 (E)   Folate >5.9 ng/mL  14.1 (E)   WBC 3.40 - 10.80 10*3/mm3 15.00 (H)    RBC 3.77 - 5.28 10*6/mm3 2.99 (L)    Hemoglobin 12.0 - 15.9 g/dL 9.0 (L)    Hematocrit 34.0 - 46.6 % 28.4 (L)    Platelets 140 - 450 10*3/mm3 263    RDW 12.3 - 15.4 % 13.4    MCV 79.0 - 97.0 fL 95.1    MCH 26.6 - 33.0 pg 30.0    MCHC 31.5 - 35.7 g/dL 31.6    MPV 6.0 - 12.0 fL 9.0    RDW-SD 37.0 - 54.0 fl 43.8    Neutrophil Rel % 42.7 - 76.0 % 75.0    Bands Rel %  0.0 - 5.0 % 2.0    Lymphocyte Rel % 19.6 - 45.3 % 8.0 (L)    Atypical Lymphocyte % 0.0 - 5.0 %  1.0    Monocyte Rel % 5.0 - 12.0 % 12.0    Eosinophil Rel % 0.3 - 6.2 % 2.0    Neutrophils Absolute 1.70 - 7.00 10*3/mm3 11.55 (H)    Lymphocytes Absolute 0.70 - 3.10 10*3/mm3 1.35    Monocytes Absolute 0.10 - 0.90 10*3/mm3 1.80 (H)    Eosinophils Absolute 0.00 - 0.40 10*3/mm3 0.30    WBC Morphology Normal  Normal    Poikilocytes None Seen  Slight/1+    Platelet Morphology Normal  Normal    (H): Data is abnormally high  (L): Data is abnormally low  (E): External lab result    XR Chest 1 View    Result Date: 12/31/2023  Impression: Radiographic worsening of pneumonia on the right, and grossly stable left basilar pneumonia on a background of emphysema Electronically Signed: Juancarlos Navarrete  12/31/2023 9:35 AM EST  Workstation ID: [id number]      Physical Exam  Vitals reviewed.   Constitutional:       Appearance: Normal appearance. She is well-developed.   HENT:      Head: Normocephalic and atraumatic.   Eyes:      General:         Right eye: No discharge.         Left eye: No discharge.   Cardiovascular:      Rate and Rhythm: Normal rate and regular rhythm.      Heart sounds: Normal heart sounds. No murmur heard.     No friction rub. No gallop.   Pulmonary:      Effort: Pulmonary effort is normal. No respiratory distress.      Breath sounds: Normal breath sounds. No wheezing or rales.   Skin:     General: Skin is warm and dry.      Findings: No rash.   Neurological:      Mental Status: She is alert and oriented to person, place, and time.      Coordination: Coordination normal.      Gait: Gait normal.   Psychiatric:         Behavior: Behavior is cooperative.       Assessment and Plan:  Problems Addressed this Visit          Endocrine and Metabolic    Overweight (BMI 25.0-29.9)       Health Encounters    Hospital discharge follow-up - Primary       Hematology and Neoplasia    CLL (chronic lymphocytic leukemia)    Anemia, chronic disease     She will be getting a repeat CBC    Noted she is low in iron also. She is seeing  hematology            Pulmonary and Pneumonias    Centrilobular emphysema     COPD is unchanged.  Continue current medications.             Community acquired pneumonia of both lungs     She is feeling better.   She finished her antibiotics. Will have her return around 2/15 to get a repeat CXR.           Diagnoses         Codes Comments    Hospital discharge follow-up    -  Primary ICD-10-CM: Z09  ICD-9-CM: V67.59     Overweight (BMI 25.0-29.9)     ICD-10-CM: E66.3  ICD-9-CM: 278.02     Community acquired pneumonia of both lungs     ICD-10-CM: J18.9  ICD-9-CM: 486     Centrilobular emphysema     ICD-10-CM: J43.2  ICD-9-CM: 492.8     CLL (chronic lymphocytic leukemia)     ICD-10-CM: C91.10  ICD-9-CM: 204.10     Anemia, chronic disease     ICD-10-CM: D63.8  ICD-9-CM: 285.29              An After Visit Summary and PPPS were given to the patient.

## 2024-01-18 ENCOUNTER — OFFICE VISIT (OUTPATIENT)
Dept: FAMILY MEDICINE CLINIC | Facility: CLINIC | Age: 79
End: 2024-01-18
Payer: MEDICARE

## 2024-01-18 VITALS
HEIGHT: 66 IN | BODY MASS INDEX: 29.06 KG/M2 | RESPIRATION RATE: 20 BRPM | SYSTOLIC BLOOD PRESSURE: 132 MMHG | WEIGHT: 180.8 LBS | TEMPERATURE: 97.1 F | DIASTOLIC BLOOD PRESSURE: 70 MMHG | OXYGEN SATURATION: 95 % | HEART RATE: 70 BPM

## 2024-01-18 DIAGNOSIS — E66.3 OVERWEIGHT (BMI 25.0-29.9): ICD-10-CM

## 2024-01-18 DIAGNOSIS — Z09 HOSPITAL DISCHARGE FOLLOW-UP: Primary | ICD-10-CM

## 2024-01-18 DIAGNOSIS — C91.10 CLL (CHRONIC LYMPHOCYTIC LEUKEMIA): ICD-10-CM

## 2024-01-18 DIAGNOSIS — D63.8 ANEMIA, CHRONIC DISEASE: ICD-10-CM

## 2024-01-18 DIAGNOSIS — J18.9 COMMUNITY ACQUIRED PNEUMONIA OF BOTH LUNGS: ICD-10-CM

## 2024-01-18 DIAGNOSIS — J43.2 CENTRILOBULAR EMPHYSEMA: ICD-10-CM

## 2024-01-18 LAB — FUNGUS WND CULT: ABNORMAL

## 2024-01-28 NOTE — ASSESSMENT & PLAN NOTE
She is feeling better.   She finished her antibiotics. Will have her return around 2/15 to get a repeat CXR.

## 2024-02-02 RX ORDER — ALBUTEROL SULFATE 90 UG/1
2 AEROSOL, METERED RESPIRATORY (INHALATION) EVERY 4 HOURS PRN
Qty: 16 G | Refills: 1 | Status: SHIPPED | OUTPATIENT
Start: 2024-02-02

## 2024-02-05 LAB
MYCOBACTERIUM SPEC CULT: NORMAL
NIGHT BLUE STAIN TISS: NORMAL

## 2024-02-06 ENCOUNTER — OFFICE VISIT (OUTPATIENT)
Dept: PULMONOLOGY | Facility: HOSPITAL | Age: 79
End: 2024-02-06
Payer: MEDICARE

## 2024-02-06 ENCOUNTER — PHARMACY IMMUNIZATION REVIEW (OUTPATIENT)
Dept: PHARMACY | Facility: HOSPITAL | Age: 79
End: 2024-02-06
Payer: MEDICARE

## 2024-02-06 VITALS
BODY MASS INDEX: 29.09 KG/M2 | HEIGHT: 66 IN | SYSTOLIC BLOOD PRESSURE: 133 MMHG | RESPIRATION RATE: 14 BRPM | DIASTOLIC BLOOD PRESSURE: 45 MMHG | OXYGEN SATURATION: 97 % | HEART RATE: 51 BPM | WEIGHT: 181 LBS

## 2024-02-06 DIAGNOSIS — J96.11 CHRONIC RESPIRATORY FAILURE WITH HYPOXIA: Primary | ICD-10-CM

## 2024-02-06 PROCEDURE — G0463 HOSPITAL OUTPT CLINIC VISIT: HCPCS

## 2024-02-06 RX ORDER — MONTELUKAST SODIUM 10 MG/1
10 TABLET ORAL NIGHTLY
Qty: 30 TABLET | Refills: 11 | Status: SHIPPED | OUTPATIENT
Start: 2024-02-06

## 2024-02-06 RX ORDER — SIMVASTATIN 20 MG
20 TABLET ORAL DAILY
COMMUNITY
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
## 2024-02-06 NOTE — PROGRESS NOTES
I have reviewed the notes, assessments, and/or procedures performed by .marilyn, I concur with her/his documentation of Krystyna Bennett.

## 2024-02-06 NOTE — PROGRESS NOTES
[facility] Vaccination Assessment    Patient reported to [facility] via referral from [facility]. Initial vaccination needs assessment completed and recommendations discussed  with patient on 2/6/24.     Allergies:    Latex and Sulfa antibiotics    Vaccine History:   Immunization History   Administered Date(s) Administered    COVID-19 (MODERNA) 1st,2nd,3rd Dose Monovalent 01/28/2021, 02/25/2021    FLUAD TRI 65YR+ 09/20/2019    Fluad Quad 65+ 11/13/2020    Fluzone High Dose =>65 Years ([employer] ONLY) 10/19/2017, 10/23/2018, 09/20/2019    Fluzone High-Dose 65+yrs 12/09/2021, 10/04/2022, 10/16/2023    Pneumococcal Conjugate 13-Valent (PCV13) 10/01/2016    Pneumococcal Polysaccharide (PPSV23) 10/01/2015, 10/23/2018       Assessment:    Patient was screened for indications and contraindications to the following vaccinations:     Influenza: Up-to-date.   COVID-19: Up-to-date.   HPV: No indication for this vaccination.   HAV: No indication for this vaccination.   HBV: No indication for this vaccination.   Tdap: Patient has indication to receive, will arrange for patient to receive.  PCV13, PCV15, or PCV20: Up-to-date.   PPSV23: Up-to-date.   MMR: No indication for this vaccination.   Varicella: No indication for this vaccination.   Zoster: Patient has indication to receive, will arrange for patient to receive.  Hib: No indication for this vaccination.   Meningococcal: No indication for this vaccination.   RSV  Currently out of season    Plan:    The following vaccines were administered today:  Zoster  Patient will be scheduled to receive Tdap and Zoster in 3 months.     Estela Hahn, Pharmacy Technician  2/6/2024  13:52 EST

## 2024-02-09 NOTE — PROGRESS NOTES
Subjective   Krystyna Bennett is a 78 y.o. female. Presents to [facility]    Chief Complaint   Patient presents with    Pneumonia    Ear Fullness       Pneumonia  She complains of cough, difficulty breathing, shortness of breath, sputum production and wheezing. There is no chest tightness, frequent throat clearing or hoarse voice. This is a new problem. The current episode started 1 to 4 weeks ago. The problem occurs daily. The problem has been gradually improving. The cough is productive of sputum. Associated symptoms include rhinorrhea. Pertinent negatives include no chest pain, fever or headaches. Her symptoms are aggravated by any activity and change in weather. Relieved by: inhaler, oxygen. She reports moderate improvement on treatment.   Ear Fullness   There is pain in the right ear. The current episode started in the past 7 days. The problem occurs daily. The problem has been unchanged. There has been no fever. Associated symptoms include coughing and rhinorrhea. Pertinent negatives include no headaches or hearing loss. She has tried nothing for the symptoms.      She has a repeat CT of the chest on 2/20 already scheduled for follow up    I personally reviewed and updated the patient's allergies, medications, problem list, and past medical, surgical, social, and family history. I have reviewed and confirmed the accuracy of the History of Present Illness and Review of Symptoms as documented by the MA/LPN/RN. Mireya Kapoor MD    Allergies:  Allergies   Allergen Reactions    Latex Rash    Sulfa Antibiotics Other (See Comments)     Tunnel vision,light headed/ may not be allergic to it any longer        Social History:  Social History     Socioeconomic History    Marital status:    Tobacco Use    Smoking status: Former     Packs/day: 0.50     Years: 37.00     Additional pack years: 0.00     Total pack years: 18.50     Types: Cigarettes, Cigars     Start date: 12/13/1960     Quit date:  2019     Years since quittin.6     Passive exposure: Past    Smokeless tobacco: Never   Vaping Use    Vaping Use: Never used   Substance and Sexual Activity    Alcohol use: Yes     Alcohol/week: 4.0 standard drinks of alcohol     Types: 2 Glasses of wine, 2 Drinks containing 0.5 oz of alcohol per week     Comment: social drinker    Drug use: No    Sexual activity: Not Currently     Partners: Male     Birth control/protection: Post-menopausal       Family History:  Family History   Problem Relation Age of Onset    Heart disease Father             Stroke Father     Heart failure Father     Leukemia Paternal Grandmother     Anemia Paternal Grandmother     Heart disease Paternal Grandmother     Cancer Paternal Grandmother         leukemia    Alcohol abuse Maternal Aunt     Cancer Maternal Aunt             Asthma Maternal Grandmother             Hyperlipidemia Maternal Grandmother             Hypertension Maternal Grandmother     Diabetes Paternal Aunt             Hypertension Son     Hypertension Daughter        Past Medical History :  Patient Active Problem List   Diagnosis    Essential hypertension    CLL (chronic lymphocytic leukemia)    Centrilobular emphysema    GERD (gastroesophageal reflux disease)    Arthritis    Systolic CHF, chronic    Anemia, chronic disease    History of Clostridium difficile colitis    Melanoma    Mixed hyperlipidemia    Medicare annual wellness visit, subsequent    Anxiety    Restless legs syndrome    Colon cancer screening    Eczematous dermatitis of upper and lower eyelids of both eyes    Positive colorectal cancer screening using Cologuard test    Mammogram declined    Flu vaccine need    Non-seasonal allergic rhinitis    AK (actinic keratosis)    Cat bite of right wrist    Tear of skin of right wrist    Community acquired pneumonia of both lungs    Hospital discharge follow-up    Pulmonary nodule, right    Night sweats    Combined form of  senile cataract    Overweight (BMI 25.0-29.9)    Labyrinthitis of both ears    Seborrheic keratoses    Chest pain    Hemoptysis    TMJ pain dysfunction syndrome       Medication List:    Current Outpatient Medications:     albuterol sulfate  (90 Base) MCG/ACT inhaler, Inhale 2 puffs Every 4 (Four) Hours As Needed for Wheezing. Indications: Spasm of Lung Air Passages, Disp: 16 g, Rfl: 1    aspirin 81 MG chewable tablet, Chew 2 tablets Daily., Disp: 30 tablet, Rfl: 0    Calcium Carbonate-Vit D-Min (CALTRATE 600+D PLUS MINERALS) 600-800 MG-UNIT chewable tablet, Chew 2 tablets Daily. Indications: suppliment, Disp: , Rfl:     Coenzyme Q10 (COQ10 PO), Take 1 tablet by mouth Daily. Indications: suppliment , Disp: , Rfl:     Cyanocobalamin (B-12) 1000 MCG capsule, Take 1,000 mcg by mouth Daily. Indications: Inadequate Vitamin B12, Disp: , Rfl:     esomeprazole (nexIUM) 40 MG capsule, Take 1 capsule by mouth Every Morning Before Breakfast. Indications: Heartburn, Disp: , Rfl:     Fluticasone-Umeclidin-Vilant (Trelegy Ellipta) 100-62.5-25 MCG/ACT inhaler, Inhale 1 puff Daily., Disp: 60 each, Rfl: 5    Glucosamine-Chondroitin 250-200 MG tablet, Take 1 tablet by mouth Daily. Indications: suppliment, Disp: , Rfl:     hydroCHLOROthiazide (HYDRODIURIL) 25 MG tablet, Take 1 tablet by mouth Daily., Disp: 30 tablet, Rfl: 12    lisinopril (PRINIVIL,ZESTRIL) 40 MG tablet, Take 1 tablet by mouth Daily., Disp: 30 tablet, Rfl: 12    metoprolol succinate XL (TOPROL-XL) 25 MG 24 hr tablet, TAKE ONE TABLET BY MOUTH DAILY, Disp: 90 tablet, Rfl: 1    montelukast (SINGULAIR) 10 MG tablet, Take 1 tablet by mouth every night at bedtime., Disp: 90 tablet, Rfl: 1    montelukast (SINGULAIR) 10 MG tablet, Take 1 tablet by mouth Every Night., Disp: 30 tablet, Rfl: 11    Multiple Vitamins-Minerals (CENTRUM SILVER) tablet, 1 tablet po daily, Disp: , Rfl:     O2 (OXYGEN), Inhale 4 L/min Continuous. Indications: Hypoxia, Disp: , Rfl:     Omega-3  "Fatty Acids (fish oil) 1000 MG capsule capsule, Take 2 capsules by mouth Daily. Indications: suppliment, Disp: , Rfl:     PARoxetine (PAXIL) 20 MG tablet, TAKE ONE TABLET BY MOUTH DAILY, Disp: 90 tablet, Rfl: 3    potassium chloride (K-DUR,KLOR-CON) 10 MEQ CR tablet, Take 1 tablet by mouth Daily., Disp: 30 tablet, Rfl: 12    pramipexole (MIRAPEX) 0.25 MG tablet, TAKE ONE TABLET BY MOUTH DAILY, Disp: 90 tablet, Rfl: 3    simvastatin (ZOCOR) 20 MG tablet, Take 1 tablet by mouth Daily., Disp: , Rfl:     Past Surgical History:  Past Surgical History:   Procedure Laterality Date    APPENDECTOMY      BRONCHOSCOPY N/A 12/25/2023    Procedure: BRONCHOSCOPY with bilateral lung wash;  Surgeon: Zuly Magallon MD;  Location: [facility] ENDOSCOPY;  Service: Pulmonary;  Laterality: N/A;  Post- hemoptysis    CARDIAC CATHETERIZATION  05/2019    [facility].    CARDIAC CATHETERIZATION Right 11/25/2022    Procedure: Coronary angiography;  Surgeon: Andrea Philippe MD;  Location: [facility] CATH INVASIVE LOCATION;  Service: Cardiovascular;  Laterality: Right;    HYSTERECTOMY      TONSILLECTOMY           Physical Exam:      Vital Signs:    Vitals:    02/15/24 1200   BP: 124/84   Pulse: 60   Resp: 17   Temp: 97.1 °F (36.2 °C)   SpO2: 95%        /84 (BP Location: Right arm, Patient Position: Sitting, Cuff Size: Adult)   Pulse 60   Temp 97.1 °F (36.2 °C) (Temporal)   Resp 17   Ht 167.6 cm (66\")   Wt 83 kg (183 lb)   LMP  (LMP Unknown)   SpO2 95% Comment: 4L oxygen  BMI 29.54 kg/m²     Wt Readings from Last 3 Encounters:   02/15/24 83 kg (183 lb)   02/06/24 82.1 kg (181 lb)   01/18/24 82 kg (180 lb 12.8 oz)       Result Review :                Physical Exam  Vitals reviewed.   Constitutional:       Appearance: Normal appearance. She is well-developed.   HENT:      Head: Normocephalic and atraumatic.        Right Ear: Tympanic membrane and external ear normal. No decreased hearing noted. No tenderness. No middle ear effusion. Tympanic membrane is " not injected, erythematous, retracted or bulging.      Left Ear: Tympanic membrane and external ear normal. No decreased hearing noted. No tenderness.  No middle ear effusion. Tympanic membrane is not injected, erythematous, retracted or bulging.      Nose: Nose normal. No rhinorrhea.      Mouth/Throat:      Pharynx: No oropharyngeal exudate or posterior oropharyngeal erythema.   Eyes:      General:         Right eye: No discharge.         Left eye: No discharge.   Cardiovascular:      Rate and Rhythm: Normal rate and regular rhythm.      Heart sounds: Normal heart sounds. No murmur heard.     No friction rub. No gallop.   Pulmonary:      Effort: Pulmonary effort is normal. No respiratory distress.      Breath sounds: Normal breath sounds. No wheezing or rales.   Lymphadenopathy:      Cervical: No cervical adenopathy.   Skin:     General: Skin is warm and dry.      Findings: No rash.   Neurological:      Mental Status: She is alert and oriented to person, place, and time.      Coordination: Coordination normal.      Gait: Gait normal.   Psychiatric:         Behavior: Behavior is cooperative.         Assessment and Plan:  Problems Addressed this Visit          Cardiac and Vasculature    Systolic CHF, chronic     Congestive heart failure due to hypertension.  Heart failure is stable.    Heart failure will be reassessed in 6 months.                ENT    TMJ pain dysfunction syndrome     Right side    Diagnosis, treatment and and course discussed. Potential side effects discussed. Return if there is worsening or persistence of symptoms.               Endocrine and Metabolic    Overweight (BMI 25.0-29.9)       Hematology and Neoplasia    CLL (chronic lymphocytic leukemia)       Pulmonary and Pneumonias    Community acquired pneumonia of both lungs     Other Visit Diagnoses       Sensation of fullness in right ear    -  Primary    Immunodeficiency (cell-mediated)              Diagnoses         Codes Comments    Sensation  of fullness in right ear    -  Primary ICD-10-CM: H93.8X1  ICD-9-CM: 388.8     Overweight (BMI 25.0-29.9)     ICD-10-CM: E66.3  ICD-9-CM: 278.02     Immunodeficiency (cell-mediated)     ICD-10-CM: D84.89  ICD-9-CM: 279.3     CLL (chronic lymphocytic leukemia)     ICD-10-CM: C91.10  ICD-9-CM: 204.10     Systolic CHF, chronic     ICD-10-CM: I50.22  ICD-9-CM: 428.22, 428.0     TMJ pain dysfunction syndrome     ICD-10-CM: M26.629  ICD-9-CM: 524.69     Community acquired pneumonia of both lungs     ICD-10-CM: J18.9  ICD-9-CM: 486 resolved. She has a CT coming up           This medical care serves as the continuing focal point of all needed health care services.            An After Visit Summary and PPPS were given to the patient.

## 2024-02-15 ENCOUNTER — OFFICE VISIT (OUTPATIENT)
Dept: FAMILY MEDICINE CLINIC | Facility: CLINIC | Age: 79
End: 2024-02-15
Payer: MEDICARE

## 2024-02-15 VITALS
WEIGHT: 183 LBS | DIASTOLIC BLOOD PRESSURE: 84 MMHG | OXYGEN SATURATION: 95 % | TEMPERATURE: 97.1 F | SYSTOLIC BLOOD PRESSURE: 124 MMHG | HEART RATE: 60 BPM | HEIGHT: 66 IN | BODY MASS INDEX: 29.41 KG/M2 | RESPIRATION RATE: 17 BRPM

## 2024-02-15 DIAGNOSIS — D84.89 IMMUNODEFICIENCY (CELL-MEDIATED): ICD-10-CM

## 2024-02-15 DIAGNOSIS — H93.8X1 SENSATION OF FULLNESS IN RIGHT EAR: Primary | ICD-10-CM

## 2024-02-15 DIAGNOSIS — C91.10 CLL (CHRONIC LYMPHOCYTIC LEUKEMIA): ICD-10-CM

## 2024-02-15 DIAGNOSIS — E66.3 OVERWEIGHT (BMI 25.0-29.9): ICD-10-CM

## 2024-02-15 DIAGNOSIS — I50.22 SYSTOLIC CHF, CHRONIC: ICD-10-CM

## 2024-02-15 DIAGNOSIS — J18.9 COMMUNITY ACQUIRED PNEUMONIA OF BOTH LUNGS: ICD-10-CM

## 2024-02-15 DIAGNOSIS — M26.629 TMJ PAIN DYSFUNCTION SYNDROME: ICD-10-CM

## 2024-02-15 NOTE — ASSESSMENT & PLAN NOTE
Right side    Diagnosis, treatment and and course discussed. Potential side effects discussed. Return if there is worsening or persistence of symptoms.

## 2024-02-20 ENCOUNTER — HOSPITAL ENCOUNTER (OUTPATIENT)
Dept: CT IMAGING | Facility: HOSPITAL | Age: 79
Discharge: HOME | End: 2024-02-20
Admitting: INTERNAL MEDICINE
Payer: MEDICARE

## 2024-02-20 DIAGNOSIS — J96.11 CHRONIC RESPIRATORY FAILURE WITH HYPOXIA: ICD-10-CM

## 2024-02-20 PROCEDURE — 71250 CT THORAX DX C-: CPT

## 2024-02-26 NOTE — ASSESSMENT & PLAN NOTE
Congestive heart failure due to hypertension.  Heart failure is stable.    Heart failure will be reassessed in 6 months.

## 2024-03-04 DIAGNOSIS — J43.2 CENTRILOBULAR EMPHYSEMA: ICD-10-CM

## 2024-03-04 DIAGNOSIS — J44.9 CHRONIC OBSTRUCTIVE PULMONARY DISEASE, UNSPECIFIED COPD TYPE: ICD-10-CM

## 2024-03-04 RX ORDER — FLUTICASONE FUROATE, UMECLIDINIUM BROMIDE AND VILANTEROL TRIFENATATE 100; 62.5; 25 UG/1; UG/1; UG/1
1 POWDER RESPIRATORY (INHALATION)
Qty: 60 EACH | Refills: 5 | Status: SHIPPED | OUTPATIENT
Start: 2024-03-04

## 2024-03-06 DIAGNOSIS — J43.2 CENTRILOBULAR EMPHYSEMA: Primary | ICD-10-CM

## 2024-03-14 ENCOUNTER — HOSPITAL ENCOUNTER (OUTPATIENT)
Dept: RESPIRATORY THERAPY | Facility: HOSPITAL | Age: 79
Discharge: HOME | End: 2024-03-14
Admitting: INTERNAL MEDICINE
Payer: MEDICARE

## 2024-03-14 DIAGNOSIS — J43.2 CENTRILOBULAR EMPHYSEMA: ICD-10-CM

## 2024-03-14 PROCEDURE — 94618 PULMONARY STRESS TESTING: CPT

## 2024-03-14 PROCEDURE — 94799 UNLISTED PULMONARY SVC/PX: CPT

## 2024-03-14 NOTE — PROCEDURES
Exercise Oximetry    Patient Name:Krystyna Bennett   MRN: [id number]   Date: 03/14/24             ROOM AIR BASELINE   SpO2%    87   Heart Rate      59   Blood Pressure       EXERCISE ON ROOM AIR SpO2% EXERCISE ON O2 @    4 LPM SpO2%   1 MINUTE  1 MINUTE 93   2 MINUTES  2 MINUTES 92   3 MINUTES  3 MINUTES 88   4 MINUTES  4 MINUTES 94   5 MINUTES  5 MINUTES 93   6 MINUTES  6 MINUTES 92              Distance Walked   Distance Walked     1200  feet   Dyspnea (Mago Scale)   Dyspnea (Mago Scale)    0   Fatigue (Mago Scale)   Fatigue (Mago Scale)   SpO2% Post Exercise   SpO2% Post Exercise          99   HR Post Exercise   HR Post Exercise      56   Time to Recovery   Time to Recovery     Comments: pt  had  room  air   saturation  at  rest  at  87%.  Titrated  pt  to  4  lpm  nc  to  get  o2  saturation  >/= 92%.  Walked pt.  Pt  dropped  again  at  3-4  minutes  to  88%.  Increased  liter  flow  to * 5.(See  asterick at  3  minute  walk  time)   Pt  recovered  to  94%.  Pt  continued  to  walk  and  maintained  at  92%  throughout  remainder  of  walk.   Pt  ideally  needs  oxygen  at  4  lpm  at  rest  but  needs  to  increase  to  5  liters  on  exertion.   Hrs  were  up  to  75  bpm.

## 2024-04-29 RX ORDER — PRAMIPEXOLE DIHYDROCHLORIDE 0.25 MG/1
TABLET ORAL
Qty: 90 TABLET | Refills: 3 | Status: SHIPPED | OUTPATIENT
Start: 2024-04-29

## 2024-04-30 DIAGNOSIS — I50.22 SYSTOLIC CHF, CHRONIC: ICD-10-CM

## 2024-04-30 RX ORDER — POTASSIUM CHLORIDE 750 MG/1
10 TABLET, EXTENDED RELEASE ORAL DAILY
Qty: 30 TABLET | Refills: 12 | Status: SHIPPED | OUTPATIENT
Start: 2024-04-30

## 2024-05-01 ENCOUNTER — APPOINTMENT (OUTPATIENT)
Dept: PHARMACY | Facility: HOSPITAL | Age: 79
End: 2024-05-01
Payer: MEDICARE

## 2024-05-01 ENCOUNTER — OFFICE VISIT (OUTPATIENT)
Dept: PULMONOLOGY | Facility: HOSPITAL | Age: 79
End: 2024-05-01
Payer: MEDICARE

## 2024-05-01 ENCOUNTER — PHARMACY IMMUNIZATION REVIEW (OUTPATIENT)
Dept: PHARMACY | Facility: HOSPITAL | Age: 79
End: 2024-05-01
Payer: MEDICARE

## 2024-05-01 VITALS
DIASTOLIC BLOOD PRESSURE: 64 MMHG | HEIGHT: 66 IN | RESPIRATION RATE: 14 BRPM | SYSTOLIC BLOOD PRESSURE: 120 MMHG | OXYGEN SATURATION: 96 % | WEIGHT: 185 LBS | HEART RATE: 60 BPM | BODY MASS INDEX: 29.73 KG/M2

## 2024-05-01 DIAGNOSIS — J44.9 CHRONIC OBSTRUCTIVE PULMONARY DISEASE, UNSPECIFIED COPD TYPE: Primary | ICD-10-CM

## 2024-05-01 PROCEDURE — G0463 HOSPITAL OUTPT CLINIC VISIT: HCPCS

## 2024-05-01 RX ORDER — ACALABRUTINIB 100 MG/1
100 TABLET, FILM COATED ORAL 2 TIMES DAILY
COMMUNITY
Start: 2024-04-22
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
## 2024-05-01 NOTE — PROGRESS NOTES
I have reviewed the notes, assessments, and/or procedures performed by Adia Hahn, pharmacy technician  , I concur with her/his documentation of Krystyna Bennett.

## 2024-05-01 NOTE — PROGRESS NOTES
[facility] Vaccination Administration    Initial vaccination needs assessment completed and recommendations discussed in-person with patient via referral from [facility] on 2/6/24.     Allergies:    Latex and Sulfa antibiotics    Vaccine History:   Immunization History   Administered Date(s) Administered    COVID-19 (MODERNA) 1st,2nd,3rd Dose Monovalent 01/28/2021, 02/25/2021    FLUAD TRI 65YR+ 09/20/2019    Fluad Quad 65+ 11/13/2020    Fluzone High Dose =>65 Years ([employer] ONLY) 10/19/2017, 10/23/2018, 09/20/2019    Fluzone High-Dose 65+yrs 12/09/2021, 10/04/2022, 10/16/2023    Pneumococcal Conjugate 13-Valent (PCV13) 10/01/2016    Pneumococcal Polysaccharide (PPSV23) 10/01/2015, 10/23/2018    Shingrix 02/06/2024, 05/01/2024    Tdap 05/01/2024       Plan:    The following vaccines were administered today:  Tdap and Zoster dose #2  Patient is currently up-to-date on all recommended vaccinations at this time.    Estela Hahn, Pharmacy Technician  5/1/2024  10:07 EDT

## 2024-05-06 RX ORDER — SIMVASTATIN 20 MG
20 TABLET ORAL DAILY
Qty: 90 TABLET | Refills: 3 | Status: SHIPPED | OUTPATIENT
Start: 2024-05-06

## 2024-05-20 NOTE — PROGRESS NOTES
Subjective   Krystyna Bennett is a 78 y.o. female. Presents to [facility] MED[facility]    Chief Complaint   Patient presents with    Hypertension    Hyperlipidemia    Earache              Hypertension  This is a chronic problem. The current episode started more than 1 month ago. The problem is controlled. Associated symptoms include headaches and shortness of breath. Pertinent negatives include no blurred vision, chest pain, malaise/fatigue, neck pain, orthopnea, palpitations or sweats. Risk factors for coronary artery disease include post-menopausal state, dyslipidemia and family history. Current antihypertension treatment includes beta blockers and ACE inhibitors. The current treatment provides moderate improvement. There are no compliance problems.    Hyperlipidemia  This is a chronic problem. The current episode started more than 1 year ago. She has no history of diabetes or obesity. Associated symptoms include shortness of breath. Pertinent negatives include no chest pain or leg pain. Current antihyperlipidemic treatment includes statins. The current treatment provides moderate improvement of lipids. There are no compliance problems.  Risk factors for coronary artery disease include dyslipidemia and hypertension.   Earache   There is pain in the right ear. This is a new problem. The current episode started 1 to 4 weeks ago. The problem occurs intermittently. The problem has been comes and goes. There has been no fever. Associated symptoms include headaches. Pertinent negatives include no coughing, drainage, hearing loss, neck pain, rash or sore throat. Associated symptoms comments: Swollen lymph node  . She has tried nothing for the symptoms.        I personally reviewed and updated the patient's allergies, medications, problem list, and past medical, surgical, social, and family history. I have reviewed and confirmed the accuracy of the History of Present Illness and Review of Symptoms as documented by the  MA/OFE/RN. Mireya Kapoor MD    Allergies:  Allergies   Allergen Reactions    Latex Rash    Sulfa Antibiotics Other (See Comments)     Tunnel vision,light headed/ may not be allergic to it any longer        Social History:  Social History     Socioeconomic History    Marital status:    Tobacco Use    Smoking status: Former     Current packs/day: 0.00     Average packs/day: 0.5 packs/day for 58.6 years (29.3 ttl pk-yrs)     Types: Cigarettes, Cigars     Start date: 1960     Quit date: 2019     Years since quittin.9     Passive exposure: Past    Smokeless tobacco: Never   Vaping Use    Vaping status: Never Used   Substance and Sexual Activity    Alcohol use: Yes     Alcohol/week: 4.0 standard drinks of alcohol     Types: 2 Glasses of wine, 2 Drinks containing 0.5 oz of alcohol per week     Comment: social drinker    Drug use: No    Sexual activity: Not Currently     Partners: Male     Birth control/protection: Post-menopausal       Family History:  Family History   Problem Relation Age of Onset    Heart disease Father             Stroke Father     Heart failure Father     Leukemia Paternal Grandmother     Anemia Paternal Grandmother     Heart disease Paternal Grandmother     Cancer Paternal Grandmother         leukemia    Alcohol abuse Maternal Aunt     Cancer Maternal Aunt             Asthma Maternal Grandmother             Hyperlipidemia Maternal Grandmother             Hypertension Maternal Grandmother     Diabetes Paternal Aunt             Hypertension Son     Hypertension Daughter        Past Medical History :  Patient Active Problem List   Diagnosis    Essential hypertension    CLL (chronic lymphocytic leukemia)    Centrilobular emphysema    GERD (gastroesophageal reflux disease)    Arthritis    Systolic CHF, chronic    Anemia, chronic disease    History of Clostridium difficile colitis    Melanoma    Mixed hyperlipidemia    Medicare annual wellness  visit, subsequent    Anxiety    Restless legs syndrome    Colon cancer screening    Eczematous dermatitis of upper and lower eyelids of both eyes    Positive colorectal cancer screening using Cologuard test    Mammogram declined    Flu vaccine need    Non-seasonal allergic rhinitis    AK (actinic keratosis)    Cat bite of right wrist    Tear of skin of right wrist    Community acquired pneumonia of both lungs    Hospital discharge follow-up    Pulmonary nodule, right    Night sweats    Combined form of senile cataract    Overweight (BMI 25.0-29.9)    Labyrinthitis of both ears    Seborrheic keratoses    Chest pain    Hemoptysis    TMJ pain dysfunction syndrome       Medication List:    Current Outpatient Medications:     albuterol sulfate  (90 Base) MCG/ACT inhaler, Inhale 2 puffs Every 4 (Four) Hours As Needed for Wheezing. Indications: Spasm of Lung Air Passages, Disp: 16 g, Rfl: 1    Calcium Carbonate-Vit D-Min (CALTRATE 600+D PLUS MINERALS) 600-800 MG-UNIT chewable tablet, Chew 2 tablets Daily. Indications: suppliment, Disp: , Rfl:     Calquence 100 MG tablet, Take 1 tablet by mouth 2 (Two) Times a Day., Disp: , Rfl:     Coenzyme Q10 (COQ10 PO), Take 1 tablet by mouth Daily. Indications: suppliment , Disp: , Rfl:     Cyanocobalamin (B-12) 1000 MCG capsule, Take 1,000 mcg by mouth Daily. Indications: Inadequate Vitamin B12, Disp: , Rfl:     esomeprazole (nexIUM) 40 MG capsule, Take 1 capsule by mouth Every Morning Before Breakfast. Indications: Heartburn, Disp: , Rfl:     Fluticasone-Umeclidin-Vilant (Trelegy Ellipta) 100-62.5-25 MCG/ACT inhaler, Inhale 1 puff Daily. Indications: Chronic Obstructive Lung Disease, Disp: 60 each, Rfl: 5    Glucosamine-Chondroitin 250-200 MG tablet, Take 1 tablet by mouth Daily. Indications: suppliment, Disp: , Rfl:     hydroCHLOROthiazide (HYDRODIURIL) 25 MG tablet, Take 1 tablet by mouth Daily., Disp: 30 tablet, Rfl: 12    lisinopril (PRINIVIL,ZESTRIL) 40 MG tablet, Take 1  "tablet by mouth Daily., Disp: 30 tablet, Rfl: 12    metoprolol succinate XL (TOPROL-XL) 25 MG 24 hr tablet, TAKE ONE TABLET BY MOUTH DAILY, Disp: 90 tablet, Rfl: 1    montelukast (SINGULAIR) 10 MG tablet, Take 1 tablet by mouth Every Night., Disp: 30 tablet, Rfl: 11    Multiple Vitamins-Minerals (CENTRUM SILVER) tablet, 1 tablet po daily, Disp: , Rfl:     O2 (OXYGEN), Inhale 4 L/min Continuous. Indications: Hypoxia, Disp: , Rfl:     Omega-3 Fatty Acids (fish oil) 1000 MG capsule capsule, Take 2 capsules by mouth Daily. Indications: suppliment, Disp: , Rfl:     PARoxetine (PAXIL) 20 MG tablet, TAKE ONE TABLET BY MOUTH DAILY, Disp: 90 tablet, Rfl: 3    potassium chloride (KLOR-CON M10) 10 MEQ CR tablet, Take 1 tablet by mouth Daily. Indications: Low Amount of Potassium in the Blood, Disp: 30 tablet, Rfl: 12    pramipexole (MIRAPEX) 0.25 MG tablet, TAKE ONE TABLET BY MOUTH DAILY, Disp: 90 tablet, Rfl: 3    simvastatin (ZOCOR) 20 MG tablet, Take 1 tablet by mouth Daily., Disp: 90 tablet, Rfl: 3    aspirin 81 MG chewable tablet, Chew 2 tablets Daily. (Patient not taking: Reported on 5/21/2024), Disp: 30 tablet, Rfl: 0    Past Surgical History:  Past Surgical History:   Procedure Laterality Date    APPENDECTOMY      BRONCHOSCOPY N/A 12/25/2023    Procedure: BRONCHOSCOPY with bilateral lung wash;  Surgeon: Zuly Magallon MD;  Location: [facility] ENDOSCOPY;  Service: Pulmonary;  Laterality: N/A;  Post- hemoptysis    CARDIAC CATHETERIZATION  05/2019    [facility].    CARDIAC CATHETERIZATION Right 11/25/2022    Procedure: Coronary angiography;  Surgeon: Andrea Philippe MD;  Location: [facility] CATH INVASIVE LOCATION;  Service: Cardiovascular;  Laterality: Right;    HYSTERECTOMY      TONSILLECTOMY           Physical Exam:      Vital Signs:    Vitals:    05/21/24 1408   BP: 134/82   Pulse: 66   Resp: 18   Temp: 96.9 °F (36.1 °C)   SpO2: 94%        /82   Pulse 66   Temp 96.9 °F (36.1 °C) (Temporal)   Resp 18   Ht 167.6 cm (66\")   Wt " 85.8 kg (189 lb 3.2 oz)   LMP  (LMP Unknown)   SpO2 94%   BMI 30.54 kg/m²     Wt Readings from Last 3 Encounters:   05/21/24 85.8 kg (189 lb 3.2 oz)   05/01/24 83.9 kg (185 lb)   02/15/24 83 kg (183 lb)       Result Review :                Physical Exam  Vitals reviewed.   Constitutional:       Appearance: Normal appearance. She is well-developed.   HENT:      Head: Normocephalic and atraumatic.      Right Ear: Tympanic membrane and external ear normal. No decreased hearing noted. No tenderness. No middle ear effusion. Tympanic membrane is not injected, erythematous, retracted or bulging.      Left Ear: Tympanic membrane and external ear normal. No decreased hearing noted. No tenderness.  No middle ear effusion. Tympanic membrane is not injected, erythematous, retracted or bulging.      Ears:      Comments: Pain on right tmj on palpation     Nose: Nose normal. No rhinorrhea.      Mouth/Throat:      Pharynx: No oropharyngeal exudate or posterior oropharyngeal erythema.   Eyes:      General:         Right eye: No discharge.         Left eye: No discharge.   Cardiovascular:      Rate and Rhythm: Normal rate and regular rhythm.      Heart sounds: Normal heart sounds. No murmur heard.     No friction rub. No gallop.   Pulmonary:      Effort: Pulmonary effort is normal. No respiratory distress.      Breath sounds: Normal breath sounds. No wheezing or rales.   Lymphadenopathy:      Cervical: No cervical adenopathy.   Skin:     General: Skin is warm and dry.      Findings: No rash.   Neurological:      Mental Status: She is alert and oriented to person, place, and time.      Coordination: Coordination normal.      Gait: Gait normal.   Psychiatric:         Behavior: Behavior is cooperative.         Assessment and Plan:  Problems Addressed this Visit          Cardiac and Vasculature    Essential hypertension - Primary     Hypertension is stable and controlled  Continue current treatment regimen.  Weight loss.  Blood  pressure will be reassessed in 3 months.         Systolic CHF, chronic     Congestive heart failure due to coronary artery disease (CAD) and hypertension.  Heart failure is stable.   Continue current treatment regimen.  Heart failure will be reassessed in 3 months.             Mixed hyperlipidemia      She is on atorvastatin  Continue current treatment         Relevant Orders    Lipid Panel With / Chol / HDL Ratio    Comprehensive Metabolic Panel       ENT    TMJ pain dysfunction syndrome     Right side  Cause of ear pain  She has been eating a lot of steak lately.             Endocrine and Metabolic    Overweight (BMI 25.0-29.9)       Hematology and Neoplasia    CLL (chronic lymphocytic leukemia)     Diagnoses         Codes Comments    Essential hypertension    -  Primary ICD-10-CM: I10  ICD-9-CM: 401.9     Mixed hyperlipidemia     ICD-10-CM: E78.2  ICD-9-CM: 272.2     Overweight (BMI 25.0-29.9)     ICD-10-CM: E66.3  ICD-9-CM: 278.02     TMJ pain dysfunction syndrome     ICD-10-CM: M26.629  ICD-9-CM: 524.69     CLL (chronic lymphocytic leukemia)     ICD-10-CM: C91.10  ICD-9-CM: 204.10     Systolic CHF, chronic     ICD-10-CM: I50.22  ICD-9-CM: 428.22, 428.0                          An After Visit Summary and PPPS were given to the patient.

## 2024-05-21 ENCOUNTER — OFFICE VISIT (OUTPATIENT)
Dept: FAMILY MEDICINE CLINIC | Facility: CLINIC | Age: 79
End: 2024-05-21
Payer: MEDICARE

## 2024-05-21 VITALS
BODY MASS INDEX: 30.41 KG/M2 | DIASTOLIC BLOOD PRESSURE: 82 MMHG | SYSTOLIC BLOOD PRESSURE: 134 MMHG | HEART RATE: 66 BPM | HEIGHT: 66 IN | TEMPERATURE: 96.9 F | WEIGHT: 189.2 LBS | RESPIRATION RATE: 18 BRPM | OXYGEN SATURATION: 94 %

## 2024-05-21 DIAGNOSIS — C91.10 CLL (CHRONIC LYMPHOCYTIC LEUKEMIA): ICD-10-CM

## 2024-05-21 DIAGNOSIS — M26.629 TMJ PAIN DYSFUNCTION SYNDROME: ICD-10-CM

## 2024-05-21 DIAGNOSIS — I10 ESSENTIAL HYPERTENSION: Primary | ICD-10-CM

## 2024-05-21 DIAGNOSIS — E78.2 MIXED HYPERLIPIDEMIA: ICD-10-CM

## 2024-05-21 DIAGNOSIS — I50.22 SYSTOLIC CHF, CHRONIC: ICD-10-CM

## 2024-05-21 DIAGNOSIS — E66.3 OVERWEIGHT (BMI 25.0-29.9): ICD-10-CM

## 2024-05-21 PROCEDURE — 3075F SYST BP GE 130 - 139MM HG: CPT | Performed by: FAMILY MEDICINE

## 2024-05-21 PROCEDURE — 99214 OFFICE O/P EST MOD 30 MIN: CPT | Performed by: FAMILY MEDICINE

## 2024-05-21 PROCEDURE — G2211 COMPLEX E/M VISIT ADD ON: HCPCS | Performed by: FAMILY MEDICINE

## 2024-05-21 PROCEDURE — 1126F AMNT PAIN NOTED NONE PRSNT: CPT | Performed by: FAMILY MEDICINE

## 2024-05-21 PROCEDURE — 3079F DIAST BP 80-89 MM HG: CPT | Performed by: FAMILY MEDICINE

## 2024-05-29 NOTE — ASSESSMENT & PLAN NOTE
Congestive heart failure due to coronary artery disease (CAD) and hypertension.  Heart failure is stable.   Continue current treatment regimen.  Heart failure will be reassessed in 3 months.

## 2024-05-29 NOTE — ASSESSMENT & PLAN NOTE
Hypertension is stable and controlled  Continue current treatment regimen.  Weight loss.  Blood pressure will be reassessed in 3 months.

## 2024-05-31 DIAGNOSIS — I10 ESSENTIAL HYPERTENSION: ICD-10-CM

## 2024-05-31 RX ORDER — HYDROCHLOROTHIAZIDE 25 MG/1
25 TABLET ORAL DAILY
Qty: 90 TABLET | Refills: 0 | Status: SHIPPED | OUTPATIENT
Start: 2024-05-31

## 2024-05-31 RX ORDER — LISINOPRIL 40 MG/1
40 TABLET ORAL DAILY
Qty: 90 TABLET | Refills: 0 | Status: SHIPPED | OUTPATIENT
Start: 2024-05-31

## 2024-05-31 RX ORDER — METOPROLOL SUCCINATE 25 MG/1
25 TABLET, EXTENDED RELEASE ORAL DAILY
Qty: 90 TABLET | Refills: 1 | Status: SHIPPED | OUTPATIENT
Start: 2024-05-31

## 2024-05-31 NOTE — TELEPHONE ENCOUNTER
Rx Refill Note  Requested Prescriptions     Pending Prescriptions Disp Refills    metoprolol succinate XL (TOPROL-XL) 25 MG 24 hr tablet [Pharmacy Med Name: METOPROLOL SUCC ER 25 MG TAB] 90 tablet 1     Sig: TAKE 1 TABLET BY MOUTH DAILY      Last office visit with prescribing clinician: 12/19/2023   Last telemedicine visit with prescribing clinician: Visit date not found   Next office visit with prescribing clinician: 6/19/2024                         Would you like a call back once the refill request has been completed: [] Yes [] No    If the office needs to give you a call back, can they leave a voicemail: [] Yes [] No    Demetrio Rojo MA  05/31/24, 14:51 EDT

## 2024-06-05 LAB
ALBUMIN SERPL-MCNC: 4.2 G/DL (ref 3.8–4.8)
ALBUMIN/GLOB SERPL: 2.3 {RATIO} (ref 1.2–2.2)
ALP SERPL-CCNC: 92 IU/L (ref 44–121)
ALT SERPL-CCNC: 16 IU/L (ref 0–32)
AST SERPL-CCNC: 17 IU/L (ref 0–40)
BILIRUB SERPL-MCNC: 0.4 MG/DL (ref 0–1.2)
BUN SERPL-MCNC: 26 MG/DL (ref 8–27)
BUN/CREAT SERPL: 25 (ref 12–28)
CALCIUM SERPL-MCNC: 10.1 MG/DL (ref 8.7–10.3)
CHLORIDE SERPL-SCNC: 100 MMOL/L (ref 96–106)
CHOLEST SERPL-MCNC: 168 MG/DL (ref 100–199)
CHOLEST/HDLC SERPL: 3.3 RATIO (ref 0–4.4)
CO2 SERPL-SCNC: 26 MMOL/L (ref 20–29)
CREAT SERPL-MCNC: 1.05 MG/DL (ref 0.57–1)
EGFRCR SERPLBLD CKD-EPI 2021: 55 ML/MIN/1.73
GLOBULIN SER CALC-MCNC: 1.8 G/DL (ref 1.5–4.5)
GLUCOSE SERPL-MCNC: 91 MG/DL (ref 70–99)
HDLC SERPL-MCNC: 51 MG/DL
LDLC SERPL CALC-MCNC: 91 MG/DL (ref 0–99)
POTASSIUM SERPL-SCNC: 4.1 MMOL/L (ref 3.5–5.2)
PROT SERPL-MCNC: 6 G/DL (ref 6–8.5)
SODIUM SERPL-SCNC: 139 MMOL/L (ref 134–144)
TRIGL SERPL-MCNC: 150 MG/DL (ref 0–149)
VLDLC SERPL CALC-MCNC: 26 MG/DL (ref 5–40)

## 2024-06-14 DIAGNOSIS — I10 ESSENTIAL HYPERTENSION: ICD-10-CM

## 2024-06-14 RX ORDER — LISINOPRIL 40 MG/1
40 TABLET ORAL DAILY
Qty: 90 TABLET | Refills: 3 | Status: SHIPPED | OUTPATIENT
Start: 2024-06-14

## 2024-06-19 ENCOUNTER — OFFICE VISIT (OUTPATIENT)
Dept: CARDIOLOGY | Facility: CLINIC | Age: 79
End: 2024-06-19
Payer: MEDICARE

## 2024-06-19 VITALS
WEIGHT: 188 LBS | HEIGHT: 66 IN | DIASTOLIC BLOOD PRESSURE: 58 MMHG | BODY MASS INDEX: 30.22 KG/M2 | HEART RATE: 57 BPM | OXYGEN SATURATION: 98 % | SYSTOLIC BLOOD PRESSURE: 127 MMHG

## 2024-06-19 DIAGNOSIS — E78.00 PURE HYPERCHOLESTEROLEMIA: ICD-10-CM

## 2024-06-19 DIAGNOSIS — J44.9 CHRONIC OBSTRUCTIVE PULMONARY DISEASE, UNSPECIFIED COPD TYPE: ICD-10-CM

## 2024-06-19 DIAGNOSIS — I50.22 CHRONIC SYSTOLIC CONGESTIVE HEART FAILURE: ICD-10-CM

## 2024-06-19 DIAGNOSIS — I10 PRIMARY HYPERTENSION: Primary | ICD-10-CM
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
## 2024-06-21 DIAGNOSIS — I10 ESSENTIAL HYPERTENSION: ICD-10-CM

## 2024-06-21 RX ORDER — HYDROCHLOROTHIAZIDE 25 MG/1
25 TABLET ORAL DAILY
Qty: 90 TABLET | Refills: 0 | Status: SHIPPED | OUTPATIENT
Start: 2024-06-21

## 2024-07-24 ENCOUNTER — TELEPHONE (OUTPATIENT)
Dept: FAMILY MEDICINE CLINIC | Facility: CLINIC | Age: 79
End: 2024-07-24
Payer: MEDICARE

## 2024-07-24 NOTE — TELEPHONE ENCOUNTER
Caller: VEGA WHITE (POA)    Relationship: Emergency Contact    Best call back number: [phone]     What medication are you requesting: SOMETHING FOR RINGWORM    What are your current symptoms: SPOT ON HER ARM     How long have you been experiencing symptoms: WEEK TO WEEK AND HALF    Have you had these symptoms before:    [] Yes  [x] No    Have you been treated for these symptoms before:   [] Yes  [x] No    If a prescription is needed, what is your preferred pharmacy and phone number: [id number] - [place], [place] - [address] [phone] [facility] [phone]      Additional notes: PATIENT IS USING LOTRIM OVER THE COUNTER MEDICATION TO TRY AND DRY IT UP WITH NO SUCCESS.     PLEASE CALL TO ADVISE IF PATIENT IS NEEDING TO BE SEEN IN OFFICE OR IF A PRESCRIPTION HAS BEEN CALLED IN FOR HER.

## 2024-07-24 NOTE — TELEPHONE ENCOUNTER
Called patient back and she was unable to come in today-  Scheduled appt for tomorrow with Dr. Capps

## 2024-07-25 ENCOUNTER — OFFICE VISIT (OUTPATIENT)
Dept: FAMILY MEDICINE CLINIC | Facility: CLINIC | Age: 79
End: 2024-07-25
Payer: MEDICARE

## 2024-07-25 VITALS
DIASTOLIC BLOOD PRESSURE: 60 MMHG | WEIGHT: 187 LBS | HEIGHT: 66 IN | BODY MASS INDEX: 30.05 KG/M2 | SYSTOLIC BLOOD PRESSURE: 124 MMHG | RESPIRATION RATE: 18 BRPM | TEMPERATURE: 98.6 F | OXYGEN SATURATION: 97 % | HEART RATE: 94 BPM

## 2024-07-25 DIAGNOSIS — B37.2 CANDIDIASIS OF SKIN: ICD-10-CM

## 2024-07-25 DIAGNOSIS — L28.2 PRURITIC RASH: Primary | ICD-10-CM

## 2024-07-25 DIAGNOSIS — B35.4 TINEA CORPORIS: ICD-10-CM

## 2024-07-25 DIAGNOSIS — E66.09 CLASS 1 OBESITY DUE TO EXCESS CALORIES WITH SERIOUS COMORBIDITY AND BODY MASS INDEX (BMI) OF 30.0 TO 30.9 IN ADULT: ICD-10-CM

## 2024-07-25 PROCEDURE — 1126F AMNT PAIN NOTED NONE PRSNT: CPT | Performed by: STUDENT IN AN ORGANIZED HEALTH CARE EDUCATION/TRAINING PROGRAM

## 2024-07-25 PROCEDURE — 99213 OFFICE O/P EST LOW 20 MIN: CPT | Performed by: STUDENT IN AN ORGANIZED HEALTH CARE EDUCATION/TRAINING PROGRAM

## 2024-07-25 PROCEDURE — 3078F DIAST BP <80 MM HG: CPT | Performed by: STUDENT IN AN ORGANIZED HEALTH CARE EDUCATION/TRAINING PROGRAM

## 2024-07-25 PROCEDURE — 1111F DSCHRG MED/CURRENT MED MERGE: CPT | Performed by: STUDENT IN AN ORGANIZED HEALTH CARE EDUCATION/TRAINING PROGRAM

## 2024-07-25 PROCEDURE — 3074F SYST BP LT 130 MM HG: CPT | Performed by: STUDENT IN AN ORGANIZED HEALTH CARE EDUCATION/TRAINING PROGRAM

## 2024-07-25 RX ORDER — NYSTATIN 100000 [USP'U]/G
POWDER TOPICAL 4 TIMES DAILY
Qty: 60 G | Refills: 1 | Status: SHIPPED | OUTPATIENT
Start: 2024-07-25

## 2024-07-25 RX ORDER — HYDROXYZINE HYDROCHLORIDE 10 MG/1
10 TABLET, FILM COATED ORAL 3 TIMES DAILY PRN
Qty: 30 TABLET | Refills: 0 | Status: SHIPPED | OUTPATIENT
Start: 2024-07-25

## 2024-07-25 NOTE — PROGRESS NOTES
Subjective   Krystyna Bennett is a 79 y.o. female.   Chief Complaint   Patient presents with    Rash       History of Present Illness       Ringworm  -First noticed a week ago  - Location:   Right upper arm  -Symptoms:   Red round lesion, raised above the skin, dry, scaling, itching  -Has tried Lotrimin cream, over-the-counter Benadryl with slight improvement.  Symptoms return    Skin rash  -First noticed a month ago  -Located under the breasts, under pannus and in axilla bilaterally.  Sweats at night and tends to have a similar rash at the back of her scalp  -Rash itches, burns and can turn red  -Has been using same treatment as above.  Benadryl works well for about 3 days and the itching returns        The following portions of the patient's history were reviewed and updated as appropriate: allergies, current medications, past family history, past medical history, past social history, past surgical history, and problem list.    Patient Active Problem List   Diagnosis    Essential hypertension    CLL (chronic lymphocytic leukemia)    Centrilobular emphysema    GERD (gastroesophageal reflux disease)    Arthritis    Systolic CHF, chronic    Anemia, chronic disease    History of Clostridium difficile colitis    Melanoma    Mixed hyperlipidemia    Medicare annual wellness visit, subsequent    Anxiety    Restless legs syndrome    Colon cancer screening    Eczematous dermatitis of upper and lower eyelids of both eyes    Positive colorectal cancer screening using Cologuard test    Mammogram declined    Flu vaccine need    Non-seasonal allergic rhinitis    AK (actinic keratosis)    Cat bite of right wrist    Tear of skin of right wrist    Community acquired pneumonia of both lungs    Hospital discharge follow-up    Pulmonary nodule, right    Night sweats    Combined form of senile cataract    Overweight (BMI 25.0-29.9)    Labyrinthitis of both ears    Seborrheic keratoses    Chest pain    Hemoptysis    TMJ pain dysfunction  syndrome       Current Outpatient Medications on File Prior to Visit   Medication Sig Dispense Refill    albuterol sulfate  (90 Base) MCG/ACT inhaler Inhale 2 puffs Every 4 (Four) Hours As Needed for Wheezing. Indications: Spasm of Lung Air Passages 16 g 1    Calcium Carbonate-Vit D-Min (CALTRATE 600+D PLUS MINERALS) 600-800 MG-UNIT chewable tablet Chew 2 tablets Daily. Indications: suppliment      Calquence 100 MG tablet Take 1 tablet by mouth 2 (Two) Times a Day.      Coenzyme Q10 (COQ10 PO) Take 1 tablet by mouth Daily. Indications: suppliment       Cyanocobalamin (B-12) 1000 MCG capsule Take 1,000 mcg by mouth Daily. Indications: Inadequate Vitamin B12      esomeprazole (nexIUM) 40 MG capsule Take 1 capsule by mouth Every Morning Before Breakfast. Indications: Heartburn      Fluticasone-Umeclidin-Vilant (Trelegy Ellipta) 100-62.5-25 MCG/ACT inhaler Inhale 1 puff Daily. Indications: Chronic Obstructive Lung Disease 60 each 5    Glucosamine-Chondroitin 250-200 MG tablet Take 1 tablet by mouth Daily. Indications: suppliment      hydroCHLOROthiazide 25 MG tablet TAKE 1 TABLET BY MOUTH DAILY 90 tablet 0    lisinopril (PRINIVIL,ZESTRIL) 40 MG tablet TAKE 1 TABLET BY MOUTH DAILY 90 tablet 3    metoprolol succinate XL (TOPROL-XL) 25 MG 24 hr tablet TAKE 1 TABLET BY MOUTH DAILY 90 tablet 1    montelukast (SINGULAIR) 10 MG tablet Take 1 tablet by mouth Every Night. 30 tablet 11    Multiple Vitamins-Minerals (CENTRUM SILVER) tablet 1 tablet po daily      O2 (OXYGEN) Inhale 4 L/min Continuous. Indications: Hypoxia      Omega-3 Fatty Acids (fish oil) 1000 MG capsule capsule Take 2 capsules by mouth Daily. Indications: suppliment      PARoxetine (PAXIL) 20 MG tablet TAKE ONE TABLET BY MOUTH DAILY 90 tablet 3    potassium chloride (KLOR-CON M10) 10 MEQ CR tablet Take 1 tablet by mouth Daily. Indications: Low Amount of Potassium in the Blood 30 tablet 12    pramipexole (MIRAPEX) 0.25 MG tablet TAKE ONE TABLET BY MOUTH  "DAILY 90 tablet 3    simvastatin (ZOCOR) 20 MG tablet Take 1 tablet by mouth Daily. 90 tablet 3    aspirin 81 MG chewable tablet Chew 2 tablets Daily. (Patient not taking: Reported on 5/21/2024) 30 tablet 0     No current facility-administered medications on file prior to visit.     Current outpatient and discharge medications have been reconciled for the patient.  Reviewed by: Sena Capps,       Allergies   Allergen Reactions    Latex Rash    Sulfa Antibiotics Other (See Comments)     Tunnel vision,light headed/ may not be allergic to it any longer          Objective   Visit Vitals  /60 (BP Location: Left arm, Patient Position: Sitting, Cuff Size: Large Adult)   Pulse 94   Temp 98.6 °F (37 °C) (Skin)   Resp 18   Ht 167.6 cm (65.98\")   Wt 84.8 kg (187 lb)   LMP  (LMP Unknown)   SpO2 97% Comment: O2 4 LPM   BMI 30.20 kg/m²       Physical Exam  HENT:      Head: Normocephalic and atraumatic.   Eyes:      Conjunctiva/sclera: Conjunctivae normal.   Skin:     Comments: - Very mild erythema noticed under her breast, and right axilla and underneath pannus  -Roughly half centimeter diameter scaling, erythematous ringlike lesion on patient's right upper arm   Neurological:      General: No focal deficit present.      Mental Status: She is alert and oriented to person, place, and time.   Psychiatric:         Mood and Affect: Mood normal.         Behavior: Behavior normal.                  Diagnoses and all orders for this visit:    1. Pruritic rash (Primary)  -Patient asked for something for her itching.  Told her that hydroxyzine is another antihistamine so only take hydroxyzine or Benadryl, not together.  Discussed this could make her sleepy but she can take up to 40 mg at a time if she needs to  -    hydrOXYzine (ATARAX) 10 MG tablet; Take 1 tablet by mouth 3 (Three) Times a Day As Needed for Itching.  Dispense: 30 tablet; Refill: 0    2. Tinea corporis  -     Ketoconazole 2 % gel; Apply to affected and " surrounding area(s) once daily until clinical resolution, typically 1 to 3 weeks  Dispense: 45 g; Refill: 1    3. Candidiasis of skin  -     nystatin (MYCOSTATIN) 725160 UNIT/GM powder; Apply  topically to the appropriate area as directed 4 (Four) Times a Day.  Dispense: 60 g; Refill: 1    4. Class 1 obesity due to excess calories with serious comorbidity and body mass index (BMI) of 30.0 to 30.9 in adult               Follow Up  -As needed    Expected course, medications, and adverse effects discussed as appropriate.  Call or return if worsening or persistent symptoms. Hand hygiene was performed before donning protective equipment and after removal when leaving the room.    This document is intended for medical expert use only. Reading of this document by patients and/or patient's family without participating medical staff guidance may result in misinterpretation and unintended morbidity. Any interpretation of such data is the responsibility of the patient and/or family member responsible for the patient in concert with their primary or specialist providers, not to be left for sources of online searches such as [employer], [employer] or similar queries. Relying on these approaches to knowledge may result in misinterpretation, misguided goals of care and even death should patients or family members try recommendations outside of the realm of professional medical care.

## 2024-08-12 ENCOUNTER — TELEPHONE (OUTPATIENT)
Dept: FAMILY MEDICINE CLINIC | Facility: CLINIC | Age: 79
End: 2024-08-12
Payer: MEDICARE

## 2024-08-12 DIAGNOSIS — B35.4 TINEA CORPORIS: Primary | ICD-10-CM

## 2024-08-12 NOTE — TELEPHONE ENCOUNTER
Caller: VEGA WHITE (POA)    Relationship: Emergency Contact    Best call back number: [phone]    What medication are you requesting: ALTERNATIVE OINTMENT TO Ketoconazole 2 % gel FOR RASH. INSURANCE WILL NOT COVER.    If a prescription is needed, what is your preferred pharmacy and phone number: [id number] - [place], [place] - [address] - [phone] [facility] [phone] FX     Additional notes: PATIENT'S DAUGHTER CALLING STATING THAT THE PATIENT'S INSURANCE WILL NOT COVER THE Ketoconazole 2 % gel AND WILL NEED AN ALTERNATIVE SENT TO HER PHARMACY THAT IS COVERED.

## 2024-08-13 DIAGNOSIS — J44.9 CHRONIC OBSTRUCTIVE PULMONARY DISEASE, UNSPECIFIED COPD TYPE: ICD-10-CM

## 2024-08-13 DIAGNOSIS — J43.2 CENTRILOBULAR EMPHYSEMA: ICD-10-CM

## 2024-08-13 RX ORDER — FLUTICASONE FUROATE, UMECLIDINIUM BROMIDE AND VILANTEROL TRIFENATATE 100; 62.5; 25 UG/1; UG/1; UG/1
1 POWDER RESPIRATORY (INHALATION)
Qty: 60 EACH | Refills: 5 | Status: SHIPPED | OUTPATIENT
Start: 2024-08-13

## 2024-08-27 ENCOUNTER — TRANSCRIBE ORDERS (OUTPATIENT)
Dept: ADMINISTRATIVE | Facility: HOSPITAL | Age: 79
End: 2024-08-27
Payer: MEDICARE

## 2024-08-27 DIAGNOSIS — R91.8 LUNG NODULES: Primary | ICD-10-CM

## 2024-08-29 DIAGNOSIS — I10 ESSENTIAL HYPERTENSION: ICD-10-CM

## 2024-08-29 RX ORDER — HYDROCHLOROTHIAZIDE 25 MG/1
25 TABLET ORAL DAILY
Qty: 90 TABLET | Refills: 3 | Status: SHIPPED | OUTPATIENT
Start: 2024-08-29

## 2024-09-10 ENCOUNTER — OFFICE VISIT (OUTPATIENT)
Dept: FAMILY MEDICINE CLINIC | Facility: CLINIC | Age: 79
End: 2024-09-10
Payer: MEDICARE

## 2024-09-10 VITALS
HEART RATE: 69 BPM | BODY MASS INDEX: 30.02 KG/M2 | DIASTOLIC BLOOD PRESSURE: 74 MMHG | RESPIRATION RATE: 19 BRPM | SYSTOLIC BLOOD PRESSURE: 112 MMHG | HEIGHT: 66 IN | TEMPERATURE: 97.5 F | OXYGEN SATURATION: 95 % | WEIGHT: 186.8 LBS

## 2024-09-10 DIAGNOSIS — B37.2 CANDIDIASIS OF SKIN: Primary | ICD-10-CM

## 2024-09-10 DIAGNOSIS — E66.09 CLASS 1 OBESITY DUE TO EXCESS CALORIES WITH SERIOUS COMORBIDITY AND BODY MASS INDEX (BMI) OF 30.0 TO 30.9 IN ADULT: ICD-10-CM

## 2024-09-10 DIAGNOSIS — L30.9 DERMATITIS: ICD-10-CM

## 2024-09-10 PROCEDURE — 3078F DIAST BP <80 MM HG: CPT | Performed by: FAMILY MEDICINE

## 2024-09-10 PROCEDURE — 3074F SYST BP LT 130 MM HG: CPT | Performed by: FAMILY MEDICINE

## 2024-09-10 PROCEDURE — 1126F AMNT PAIN NOTED NONE PRSNT: CPT | Performed by: FAMILY MEDICINE

## 2024-09-10 PROCEDURE — 99213 OFFICE O/P EST LOW 20 MIN: CPT | Performed by: FAMILY MEDICINE

## 2024-09-10 RX ORDER — FLUCONAZOLE 150 MG/1
TABLET ORAL
Qty: 3 TABLET | Refills: 0 | Status: SHIPPED | OUTPATIENT
Start: 2024-09-10

## 2024-09-10 RX ORDER — NYSTATIN 100000 U/G
1 CREAM TOPICAL 2 TIMES DAILY
Qty: 60 G | Refills: 2 | Status: SHIPPED | OUTPATIENT
Start: 2024-09-10

## 2024-09-10 RX ORDER — NYSTATIN 100000 [USP'U]/G
POWDER TOPICAL 3 TIMES DAILY
Qty: 60 G | Refills: 2 | Status: SHIPPED | OUTPATIENT
Start: 2024-09-10

## 2024-09-10 RX ORDER — TRIAMCINOLONE ACETONIDE 1 MG/G
1 OINTMENT TOPICAL 2 TIMES DAILY
Qty: 30 G | Refills: 0 | Status: SHIPPED | OUTPATIENT
Start: 2024-09-10

## 2024-10-07 NOTE — PROGRESS NOTES
Subjective   Krystyna Bennett is a 79 y.o. female. Presents to [facility]    Chief Complaint   Patient presents with    Rash       Rash  This is a recurrent problem. The current episode started more than 1 month ago. The problem has been improved since onset. The affected locations include the right axilla, left axilla and abdomen. The rash is characterized by burning, redness and itchiness. Pertinent negatives include no fever, shortness of breath or sore throat. Treatments tried: Nystatin cream/powder.      She would like a refill on her nystatin cream as pharmacy did not give her 60 grams.     I personally reviewed and updated the patient's allergies, medications, problem list, and past medical, surgical, social, and family history. I have reviewed and confirmed the accuracy of the History of Present Illness and Review of Symptoms as documented by the MA/LPN/RN. Mireya Kapoor MD    Allergies:  Allergies   Allergen Reactions    Latex Rash    Sulfa Antibiotics Other (See Comments)     Tunnel vision,light headed/ may not be allergic to it any longer        Social History:  Social History     Socioeconomic History    Marital status:    Tobacco Use    Smoking status: Former     Current packs/day: 0.00     Average packs/day: 0.5 packs/day for 58.6 years (29.3 ttl pk-yrs)     Types: Cigarettes, Cigars     Start date: 1960     Quit date: 2019     Years since quittin.3     Passive exposure: Past    Smokeless tobacco: Never   Vaping Use    Vaping status: Never Used   Substance and Sexual Activity    Alcohol use: Yes     Alcohol/week: 4.0 standard drinks of alcohol     Types: 2 Glasses of wine, 2 Drinks containing 0.5 oz of alcohol per week     Comment: social drinker    Drug use: No    Sexual activity: Not Currently     Partners: Male     Birth control/protection: Post-menopausal       Family History:  Family History   Problem Relation Age of Onset    Heart disease Father              Stroke Father     Heart failure Father     Leukemia Paternal Grandmother     Anemia Paternal Grandmother     Heart disease Paternal Grandmother     Cancer Paternal Grandmother         leukemia    Alcohol abuse Maternal Aunt     Cancer Maternal Aunt         stomach    Asthma Maternal Grandmother             Hyperlipidemia Maternal Grandmother             Hypertension Maternal Grandmother     Diabetes Paternal Aunt             Hypertension Son     Hypertension Daughter        Past Medical History :  Patient Active Problem List   Diagnosis    Essential hypertension    CLL (chronic lymphocytic leukemia)    Centrilobular emphysema    GERD (gastroesophageal reflux disease)    Arthritis    Systolic CHF, chronic    Anemia, chronic disease    History of Clostridium difficile colitis    Melanoma    Mixed hyperlipidemia    Medicare annual wellness visit, subsequent    Anxiety    Restless legs syndrome    Colon cancer screening    Eczematous dermatitis of upper and lower eyelids of both eyes    Positive colorectal cancer screening using Cologuard test    Mammogram declined    Flu vaccine need    Non-seasonal allergic rhinitis    AK (actinic keratosis)    Cat bite of right wrist    Tear of skin of right wrist    Community acquired pneumonia of both lungs    Hospital discharge follow-up    Pulmonary nodule, right    Night sweats    Combined form of senile cataract    Overweight (BMI 25.0-29.9)    Labyrinthitis of both ears    Seborrheic keratoses    Chest pain    Hemoptysis    TMJ pain dysfunction syndrome    Dermatitis       Medication List:    Current Outpatient Medications:     albuterol sulfate  (90 Base) MCG/ACT inhaler, Inhale 2 puffs Every 4 (Four) Hours As Needed for Wheezing. Indications: Spasm of Lung Air Passages, Disp: 16 g, Rfl: 1    aspirin 81 MG chewable tablet, Chew 2 tablets Daily., Disp: 30 tablet, Rfl: 0    Calcium Carbonate-Vit D-Min (CALTRATE 600+D PLUS MINERALS)  600-800 MG-UNIT chewable tablet, Chew 2 tablets Daily. Indications: suppliment, Disp: , Rfl:     Calquence 100 MG tablet, Take 1 tablet by mouth 2 (Two) Times a Day., Disp: , Rfl:     Coenzyme Q10 (COQ10 PO), Take 1 tablet by mouth Daily. Indications: suppliment , Disp: , Rfl:     Cyanocobalamin (B-12) 1000 MCG capsule, Take 1,000 mcg by mouth Daily. Indications: Inadequate Vitamin B12, Disp: , Rfl:     esomeprazole (nexIUM) 40 MG capsule, Take 1 capsule by mouth Every Morning Before Breakfast. Indications: Heartburn, Disp: , Rfl:     fluconazole (DIFLUCAN) 150 MG tablet, Take one tablet by mouth every 3 days, Disp: 3 tablet, Rfl: 0    Fluticasone-Umeclidin-Vilant (Trelegy Ellipta) 100-62.5-25 MCG/ACT inhaler, Inhale 1 puff Daily. Indications: Chronic Obstructive Lung Disease, Disp: 60 each, Rfl: 5    Glucosamine-Chondroitin 250-200 MG tablet, Take 1 tablet by mouth Daily. Indications: suppliment, Disp: , Rfl:     hydroCHLOROthiazide 25 MG tablet, TAKE 1 TABLET BY MOUTH DAILY, Disp: 90 tablet, Rfl: 3    hydrOXYzine (ATARAX) 10 MG tablet, Take 1 tablet by mouth 3 (Three) Times a Day As Needed for Itching., Disp: 30 tablet, Rfl: 0    lisinopril (PRINIVIL,ZESTRIL) 40 MG tablet, TAKE 1 TABLET BY MOUTH DAILY, Disp: 90 tablet, Rfl: 3    metoprolol succinate XL (TOPROL-XL) 25 MG 24 hr tablet, TAKE 1 TABLET BY MOUTH DAILY, Disp: 90 tablet, Rfl: 1    montelukast (SINGULAIR) 10 MG tablet, Take 1 tablet by mouth Every Night., Disp: 30 tablet, Rfl: 11    Multiple Vitamins-Minerals (CENTRUM SILVER) tablet, 1 tablet po daily, Disp: , Rfl:     nystatin (MYCOSTATIN) 940049 UNIT/GM cream, Apply 1 Application topically to the appropriate area as directed 2 (Two) Times a Day., Disp: 60 g, Rfl: 3    nystatin (MYCOSTATIN) 537948 UNIT/GM powder, Apply  topically to the appropriate area as directed 3 (Three) Times a Day., Disp: 60 g, Rfl: 2    O2 (OXYGEN), Inhale 4 L/min Continuous. Indications: Hypoxia, Disp: , Rfl:     Omega-3 Fatty  "Acids (fish oil) 1000 MG capsule capsule, Take 2 capsules by mouth Daily. Indications: suppliment, Disp: , Rfl:     PARoxetine (PAXIL) 20 MG tablet, TAKE ONE TABLET BY MOUTH DAILY, Disp: 90 tablet, Rfl: 3    potassium chloride (KLOR-CON M10) 10 MEQ CR tablet, Take 1 tablet by mouth Daily. Indications: Low Amount of Potassium in the Blood, Disp: 30 tablet, Rfl: 12    pramipexole (MIRAPEX) 0.25 MG tablet, TAKE ONE TABLET BY MOUTH DAILY, Disp: 90 tablet, Rfl: 3    simvastatin (ZOCOR) 20 MG tablet, Take 1 tablet by mouth Daily., Disp: 90 tablet, Rfl: 3    triamcinolone (KENALOG) 0.1 % ointment, Apply 1 Application topically to the appropriate area as directed 2 (Two) Times a Day., Disp: 30 g, Rfl: 0    Past Surgical History:  Past Surgical History:   Procedure Laterality Date    APPENDECTOMY      BRONCHOSCOPY N/A 12/25/2023    Procedure: BRONCHOSCOPY with bilateral lung wash;  Surgeon: Zuly Magallon MD;  Location: [facility] ENDOSCOPY;  Service: Pulmonary;  Laterality: N/A;  Post- hemoptysis    CARDIAC CATHETERIZATION  05/2019    [facility].    CARDIAC CATHETERIZATION Right 11/25/2022    Procedure: Coronary angiography;  Surgeon: Andrea Philippe MD;  Location: [facility] CATH INVASIVE LOCATION;  Service: Cardiovascular;  Laterality: Right;    HYSTERECTOMY      TONSILLECTOMY           Physical Exam:      Vital Signs:    Vitals:    10/08/24 1223   BP: 110/68   Pulse: 61   Resp: 18   Temp: 96.4 °F (35.8 °C)   SpO2: 96%        /68 (BP Location: Right arm, Patient Position: Sitting, Cuff Size: Large Adult)   Pulse 61   Temp 96.4 °F (35.8 °C)   Resp 18   Ht 167.6 cm (66\")   Wt 84.4 kg (186 lb)   LMP  (LMP Unknown)   SpO2 96% Comment: 4 L O2  BMI 30.02 kg/m²     Wt Readings from Last 3 Encounters:   10/08/24 84.4 kg (186 lb)   09/10/24 84.7 kg (186 lb 12.8 oz)   07/25/24 84.8 kg (187 lb)       Result Review :                Physical Exam  Vitals reviewed.   Constitutional:       Appearance: Normal appearance. She is " well-developed.   HENT:      Head: Normocephalic and atraumatic.   Eyes:      General:         Right eye: No discharge.         Left eye: No discharge.   Cardiovascular:      Rate and Rhythm: Normal rate and regular rhythm.      Heart sounds: Normal heart sounds. No murmur heard.     No friction rub. No gallop.   Pulmonary:      Effort: Pulmonary effort is normal. No respiratory distress.      Breath sounds: Normal breath sounds. No wheezing or rales.   Skin:     General: Skin is warm and dry.      Findings: Rash (mild under axilla bilaterally and under abdomen) present.   Neurological:      Mental Status: She is alert and oriented to person, place, and time.      Coordination: Coordination normal.      Gait: Gait normal.   Psychiatric:         Behavior: Behavior is cooperative.         Assessment and Plan:  Problems Addressed this Visit    None  Visit Diagnoses       Class 1 obesity due to excess calories with serious comorbidity and body mass index (BMI) of 30.0 to 30.9 in adult    -  Primary    Candidiasis of skin        Start antifungal again    Relevant Medications    nystatin (MYCOSTATIN) 557921 UNIT/GM cream    fluconazole (DIFLUCAN) 150 MG tablet    Need for influenza vaccination        Relevant Orders    Fluzone High-Dose 65+yrs (Completed)          Diagnoses         Codes Comments    Class 1 obesity due to excess calories with serious comorbidity and body mass index (BMI) of 30.0 to 30.9 in adult    -  Primary ICD-10-CM: E66.811, E66.09, Z68.30  ICD-9-CM: 278.00, V85.30     Candidiasis of skin     ICD-10-CM: B37.2  ICD-9-CM: 112.3 Start antifungal again    Need for influenza vaccination     ICD-10-CM: Z23  ICD-9-CM: V04.81                          An After Visit Summary and PPPS were given to the patient.       This document is intended for medical expert use only. Reading of this document by patients and/or patient's family without participating medical staff guidance may result in misinterpretation and  unintended morbidity. Any interpretation of such data is the responsibility of the patient and/or family member responsible for the patient in concert with their primary or specialist providers, not to be left for sources of online searches such as [employer], [employer] or similar queries. Relying on these approaches to knowledge may result in misinterpretation, misguided goals of care and even death should patients or family members try recommendations outside of the realm of professional medical care.

## 2024-10-08 ENCOUNTER — OFFICE VISIT (OUTPATIENT)
Dept: FAMILY MEDICINE CLINIC | Facility: CLINIC | Age: 79
End: 2024-10-08
Payer: MEDICARE

## 2024-10-08 VITALS
RESPIRATION RATE: 18 BRPM | WEIGHT: 186 LBS | HEIGHT: 66 IN | SYSTOLIC BLOOD PRESSURE: 110 MMHG | BODY MASS INDEX: 29.89 KG/M2 | HEART RATE: 61 BPM | OXYGEN SATURATION: 96 % | DIASTOLIC BLOOD PRESSURE: 68 MMHG | TEMPERATURE: 96.4 F

## 2024-10-08 DIAGNOSIS — B37.2 CANDIDIASIS OF SKIN: ICD-10-CM

## 2024-10-08 DIAGNOSIS — E66.09 CLASS 1 OBESITY DUE TO EXCESS CALORIES WITH SERIOUS COMORBIDITY AND BODY MASS INDEX (BMI) OF 30.0 TO 30.9 IN ADULT: Primary | ICD-10-CM

## 2024-10-08 DIAGNOSIS — E66.811 CLASS 1 OBESITY DUE TO EXCESS CALORIES WITH SERIOUS COMORBIDITY AND BODY MASS INDEX (BMI) OF 30.0 TO 30.9 IN ADULT: Primary | ICD-10-CM

## 2024-10-08 DIAGNOSIS — Z23 NEED FOR INFLUENZA VACCINATION: ICD-10-CM

## 2024-10-08 PROCEDURE — 3074F SYST BP LT 130 MM HG: CPT | Performed by: FAMILY MEDICINE

## 2024-10-08 PROCEDURE — 90662 IIV NO PRSV INCREASED AG IM: CPT | Performed by: FAMILY MEDICINE

## 2024-10-08 PROCEDURE — 1126F AMNT PAIN NOTED NONE PRSNT: CPT | Performed by: FAMILY MEDICINE

## 2024-10-08 PROCEDURE — 99213 OFFICE O/P EST LOW 20 MIN: CPT | Performed by: FAMILY MEDICINE

## 2024-10-08 PROCEDURE — G0008 ADMIN INFLUENZA VIRUS VAC: HCPCS | Performed by: FAMILY MEDICINE

## 2024-10-08 PROCEDURE — 3078F DIAST BP <80 MM HG: CPT | Performed by: FAMILY MEDICINE

## 2024-10-08 RX ORDER — FLUCONAZOLE 150 MG/1
TABLET ORAL
Qty: 3 TABLET | Refills: 0 | Status: SHIPPED | OUTPATIENT
Start: 2024-10-08

## 2024-10-08 RX ORDER — NYSTATIN 100000 U/G
1 CREAM TOPICAL 2 TIMES DAILY
Qty: 60 G | Refills: 3 | Status: SHIPPED | OUTPATIENT
Start: 2024-10-08

## 2024-11-04 ENCOUNTER — TELEPHONE (OUTPATIENT)
Dept: FAMILY MEDICINE CLINIC | Facility: CLINIC | Age: 79
End: 2024-11-04
Payer: MEDICARE

## 2024-11-04 DIAGNOSIS — J43.2 CENTRILOBULAR EMPHYSEMA: Primary | ICD-10-CM

## 2024-11-04 RX ORDER — IPRATROPIUM BROMIDE AND ALBUTEROL SULFATE 2.5; .5 MG/3ML; MG/3ML
3 SOLUTION RESPIRATORY (INHALATION) EVERY 4 HOURS PRN
Qty: 75 ML | Refills: 12 | Status: SHIPPED | OUTPATIENT
Start: 2024-11-04

## 2024-11-04 RX ORDER — IPRATROPIUM BROMIDE AND ALBUTEROL SULFATE 2.5; .5 MG/3ML; MG/3ML
3 SOLUTION RESPIRATORY (INHALATION) EVERY 4 HOURS PRN
Qty: 75 ML | Refills: 12 | Status: SHIPPED | OUTPATIENT
Start: 2024-11-04 | End: 2024-11-04 | Stop reason: SDUPTHER

## 2024-11-04 NOTE — TELEPHONE ENCOUNTER
Spoke to pharmacy they are needing the medication to be sent with a diagnosis code so that it can be billed to medicare

## 2024-11-04 NOTE — TELEPHONE ENCOUNTER
Caller: Krystyna Bennett    Relationship: Self    Best call back number: [phone]    Requested Prescriptions:   ipratropium-albuterol (DUO-NEB) nebulizer solution 3 mL        Pharmacy where request should be sent: [facility] [id number] - [place], [place] - [address] - [phone]  - [phone] FX     Last office visit with prescribing clinician: 10/8/2024   Last telemedicine visit with prescribing clinician: Visit date not found   Next office visit with prescribing clinician: 11/12/2024     Additional details provided by patient: PATIENT WOULD LIKE TO GET A REFILL ON THIS MEDICATION BECAUSE SHE WANTS TO START DOING TREATMENTS TO KEEP FROM GETTING SO SICK AND HAVING TO GO TO THE HOSPITAL THIS YEAR    Does the patient have less than a 3 day supply:  [x] Yes  [] No    Would you like a call back once the refill request has been completed: [] Yes [x] No    If the office needs to give you a call back, can they leave a voicemail: [x] Yes [] No    Brian Villanueva Rep   11/04/24 10:25 EST

## 2024-11-06 ENCOUNTER — OFFICE VISIT (OUTPATIENT)
Dept: PULMONOLOGY | Facility: HOSPITAL | Age: 79
End: 2024-11-06
Payer: MEDICARE

## 2024-11-06 VITALS
OXYGEN SATURATION: 94 % | WEIGHT: 182 LBS | HEIGHT: 66 IN | BODY MASS INDEX: 29.25 KG/M2 | DIASTOLIC BLOOD PRESSURE: 69 MMHG | HEART RATE: 64 BPM | RESPIRATION RATE: 14 BRPM | SYSTOLIC BLOOD PRESSURE: 114 MMHG

## 2024-11-06 DIAGNOSIS — J43.2 CENTRILOBULAR EMPHYSEMA: ICD-10-CM

## 2024-11-06 DIAGNOSIS — J44.9 CHRONIC OBSTRUCTIVE PULMONARY DISEASE, UNSPECIFIED COPD TYPE: ICD-10-CM

## 2024-11-06 DIAGNOSIS — J44.9 CHRONIC OBSTRUCTIVE PULMONARY DISEASE, UNSPECIFIED COPD TYPE: Primary | ICD-10-CM

## 2024-11-06 PROCEDURE — G0463 HOSPITAL OUTPT CLINIC VISIT: HCPCS

## 2024-11-06 RX ORDER — FLUTICASONE FUROATE, UMECLIDINIUM BROMIDE AND VILANTEROL TRIFENATATE 100; 62.5; 25 UG/1; UG/1; UG/1
1 POWDER RESPIRATORY (INHALATION)
Qty: 60 EACH | Refills: 5 | Status: SHIPPED | OUTPATIENT
Start: 2024-11-06
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
## 2024-11-11 NOTE — PROGRESS NOTES
Subsequent Medicare Wellness Visit    Subjective    History of Present Illness:  Krystyna Bennett is a 79 y.o. female who presents for a Subsequent Medicare Wellness Visit.    The following portions of the patient's history were reviewed and   updated as appropriate: allergies, current medications, past family history, past medical history, past social history, past surgical history, and problem list.  Family History   Problem Relation Age of Onset    Heart disease Father             Stroke Father     Heart failure Father     Leukemia Paternal Grandmother     Anemia Paternal Grandmother     Heart disease Paternal Grandmother     Cancer Paternal Grandmother         leukemia    Alcohol abuse Maternal Aunt     Cancer Maternal Aunt         stomach    Asthma Maternal Grandmother             Hyperlipidemia Maternal Grandmother             Hypertension Maternal Grandmother     Diabetes Paternal Aunt             Hypertension Son     Hypertension Daughter      Past Surgical History:   Procedure Laterality Date    APPENDECTOMY      BRONCHOSCOPY N/A 2023    Procedure: BRONCHOSCOPY with bilateral lung wash;  Surgeon: Zuly Magallon MD;  Location: [facility] ENDOSCOPY;  Service: Pulmonary;  Laterality: N/A;  Post- hemoptysis    CARDIAC CATHETERIZATION  2019    [facility].    CARDIAC CATHETERIZATION Right 2022    Procedure: Coronary angiography;  Surgeon: Andrea Philippe MD;  Location: [facility] CATH INVASIVE LOCATION;  Service: Cardiovascular;  Laterality: Right;    HYSTERECTOMY      TONSILLECTOMY          Compared to one year ago, the patient feels her physical   health is the same.    Compared to one year ago, the patient feels her mental   health is the same.    Recent Hospitalizations:  This patient has had a [facility] admission record on file within the last 365 days.    Current Medical Providers:  Patient Care Team:  Mireya Kapoor MD as PCP - General  Mireya Kapoor MD as PCP -  Family Medicine  Dyana Green MD as Consulting Physician (Nephrology)  Andrea Philippe MD as Consulting Physician (Cardiology)    Outpatient Medications Prior to Visit   Medication Sig Dispense Refill    albuterol sulfate  (90 Base) MCG/ACT inhaler Inhale 2 puffs Every 4 (Four) Hours As Needed for Wheezing. Indications: Spasm of Lung Air Passages 16 g 1    Boswellia-Glucosamine-Vit D (OSTEO BI-FLEX ONE PER DAY PO) Take  by mouth.      Calcium Carbonate-Vit D-Min (CALTRATE 600+D PLUS MINERALS) 600-800 MG-UNIT chewable tablet Chew 2 tablets Daily. Indications: suppliment      Calquence 100 MG tablet Take 1 tablet by mouth 2 (Two) Times a Day.      Coenzyme Q10 (COQ10 PO) Take 1 tablet by mouth Daily. Indications: suppliment       Cranberry 450 MG capsule Take  by mouth.      Cyanocobalamin (B-12) 1000 MCG capsule Take 1,000 mcg by mouth Daily. Indications: Inadequate Vitamin B12      Dextromethorphan-guaiFENesin (MUCINEX DM PO) Take 60 mg by mouth.      diphenhydrAMINE (BENADRYL) 25 mg capsule Take 1 capsule by mouth Every 6 (Six) Hours As Needed for Itching.      esomeprazole (nexIUM) 40 MG capsule Take 1 capsule by mouth Every Morning Before Breakfast. Indications: Heartburn      Fluticasone-Umeclidin-Vilant (Trelegy Ellipta) 100-62.5-25 MCG/ACT inhaler Inhale 1 puff Daily. Indications: Chronic Obstructive Lung Disease 60 each 5    Glucosamine-Chondroitin 250-200 MG tablet Take 1 tablet by mouth Daily. Indications: suppliment      hydroCHLOROthiazide 25 MG tablet TAKE 1 TABLET BY MOUTH DAILY 90 tablet 3    hydrOXYzine (ATARAX) 10 MG tablet Take 1 tablet by mouth 3 (Three) Times a Day As Needed for Itching. 30 tablet 0    ipratropium-albuterol (DUO-NEB) 0.5-2.5 mg/3 ml nebulizer Take 3 mL by nebulization Every 4 (Four) Hours As Needed for Wheezing. 75 mL 12    lisinopril (PRINIVIL,ZESTRIL) 40 MG tablet TAKE 1 TABLET BY MOUTH DAILY 90 tablet 3    metoprolol succinate XL (TOPROL-XL) 25 MG 24 hr  tablet TAKE 1 TABLET BY MOUTH DAILY 90 tablet 1    montelukast (SINGULAIR) 10 MG tablet Take 1 tablet by mouth Every Night. 30 tablet 11    Multiple Vitamins-Minerals (CENTRUM SILVER) tablet 1 tablet po daily      nystatin (MYCOSTATIN) 129294 UNIT/GM cream Apply 1 Application topically to the appropriate area as directed 2 (Two) Times a Day. 60 g 3    nystatin (MYCOSTATIN) 522997 UNIT/GM powder Apply  topically to the appropriate area as directed 3 (Three) Times a Day. 60 g 2    O2 (OXYGEN) Inhale 4 L/min Continuous. Indications: Hypoxia      Omega-3 Fatty Acids (fish oil) 1000 MG capsule capsule Take 2 capsules by mouth Daily. Indications: suppliment      PARoxetine (PAXIL) 20 MG tablet TAKE ONE TABLET BY MOUTH DAILY 90 tablet 3    potassium chloride (KLOR-CON M10) 10 MEQ CR tablet Take 1 tablet by mouth Daily. Indications: Low Amount of Potassium in the Blood 30 tablet 12    pramipexole (MIRAPEX) 0.25 MG tablet TAKE ONE TABLET BY MOUTH DAILY 90 tablet 3    simvastatin (ZOCOR) 20 MG tablet Take 1 tablet by mouth Daily. 90 tablet 3    triamcinolone (KENALOG) 0.1 % ointment Apply 1 Application topically to the appropriate area as directed 2 (Two) Times a Day. 30 g 0    aspirin 81 MG chewable tablet Chew 2 tablets Daily. (Patient not taking: Reported on 11/12/2024) 30 tablet 0    fluconazole (DIFLUCAN) 150 MG tablet Take one tablet by mouth every 3 days (Patient not taking: Reported on 11/12/2024) 3 tablet 0     No facility-administered medications prior to visit.     Pain Score    11/12/24 1400   PainSc: 0-No pain      No opioid medication identified on active medication list. I have reviewed chart for other potential  high risk medication/s and harmful drug interactions in the elderly.        Aspirin is on active medication list. Aspirin use is indicated based on review of current medical condition/s. Pros and cons of this therapy have been discussed today. Benefits of this medication outweigh potential harm.   "Patient has been encouraged to continue taking this medication.  .      Patient Active Problem List   Diagnosis    Essential hypertension    CLL (chronic lymphocytic leukemia)    Centrilobular emphysema    GERD (gastroesophageal reflux disease)    Arthritis    Systolic CHF, chronic    Anemia, chronic disease    History of Clostridium difficile colitis    Melanoma    Mixed hyperlipidemia    Medicare annual wellness visit, subsequent    Anxiety    Restless legs syndrome    Colon cancer screening    Eczematous dermatitis of upper and lower eyelids of both eyes    Positive colorectal cancer screening using Cologuard test    Mammogram declined    Flu vaccine need    Non-seasonal allergic rhinitis    AK (actinic keratosis)    Cat bite of right wrist    Tear of skin of right wrist    Hospital discharge follow-up    Pulmonary nodule, right    Night sweats    Combined form of senile cataract    Overweight (BMI 25.0-29.9)    Labyrinthitis of both ears    Seborrheic keratoses    Hemoptysis    TMJ pain dysfunction syndrome    Dermatitis    Colon cancer screening declined     Advance Care Planning  Advance Directive is on file.  ACP discussion was held with the patient during this visit. Patient has an advance directive in EMR which is still valid.  No changes           Objective    Vitals:    11/12/24 1400   BP: 128/86   BP Location: Right arm   Patient Position: Sitting   Cuff Size: Adult   Pulse: 61   Resp: 18   Temp: 97.6 °F (36.4 °C)   TempSrc: Temporal   SpO2: 98%  Comment: 4L oxygen   Weight: 84 kg (185 lb 3.2 oz)   Height: 167.6 cm (66\")   PainSc: 0-No pain       Does the patient have evidence of cognitive impairment? No           HEALTH RISK ASSESSMENT    Smoking Status:  Social History     Tobacco Use   Smoking Status Former    Current packs/day: 0.00    Average packs/day: 0.5 packs/day for 58.6 years (29.3 ttl pk-yrs)    Types: Cigarettes, Cigars    Start date: 12/13/1960    Quit date: 7/4/2019    Years since " quittin.3    Passive exposure: Past   Smokeless Tobacco Never     Alcohol Consumption:  Social History     Substance and Sexual Activity   Alcohol Use Yes    Alcohol/week: 4.0 standard drinks of alcohol    Types: 2 Glasses of wine, 2 Drinks containing 0.5 oz of alcohol per week    Comment: social drinker     Fall Risk Screen:    [place] Fall Risk Assessment was completed, and patient is at LOW risk for falls.Assessment completed on:2024    Depression Screenin/12/2024     2:00 PM   PHQ-2/PHQ-9 Depression Screening   Little interest or pleasure in doing things Not at all   Feeling down, depressed, or hopeless Not at all       Health Habits and Functional and Cognitive Screenin/5/2024     8:32 AM   Functional & Cognitive Status   Do you have difficulty preparing food and eating? No    Do you have difficulty bathing yourself, getting dressed or grooming yourself? No    Do you have difficulty using the toilet? No    Do you have difficulty moving around from place to place? No    Do you have trouble with steps or getting out of a bed or a chair? No    Current Diet Well Balanced Diet    Dental Exam Up to date    Eye Exam Up to date    Exercise (times per week) 6 times per week    Current Exercises Include House Cleaning    Do you need help using the phone?  No    Are you deaf or do you have serious difficulty hearing?  No    Do you need help to go to places out of walking distance? No    Do you need help shopping? No    Do you need help preparing meals?  No    Do you need help with housework?  Yes    Do you need help with laundry? No    Do you need help taking your medications? No    Do you need help managing money? No    Do you ever drive or ride in a car without wearing a seat belt? No    Have you felt unusual stress, anger or loneliness in the last month? No    Who do you live with? Alone    If you need help, do you have trouble finding someone available to you? No    Have you been bothered  in the last four weeks by sexual problems? No    Do you have difficulty concentrating, remembering or making decisions? No        Patient-reported       Age-appropriate Screening Schedule:  Refer to the list below for future screening recommendations based on patient's age, sex and/or medical conditions. Orders for these recommended tests are listed in the plan section. The patient has been provided with a written plan.    Health Maintenance   Topic Date Due    HEPATITIS C SCREENING  Never done    DXA SCAN  2021    COVID-19 Vaccine (3 - Moderna risk series) 2022    RSV Vaccine - Adults (1 - 1-dose 75+ series) Never done    BMI FOLLOWUP  10/16/2024    LUNG CANCER SCREENING  2025    LIPID PANEL  2025    ANNUAL WELLNESS VISIT  2025    TDAP/TD VACCINES (2 - Td or Tdap) 2034    INFLUENZA VACCINE  Completed    Pneumococcal Vaccine 65+  Completed    ZOSTER VACCINE  Completed    MAMMOGRAM  Discontinued    URINE MICROALBUMIN  Discontinued    COLORECTAL CANCER SCREENING  Discontinued              Assessment & Plan   Medically necessary, significant, and separately identifiable medical problems identified during this visit are addressed on a separate visit note.    CMS Preventative Services Quick Reference  Risk Factors Identified During Encounter  None Identified  The above risks/problems have been discussed with the patient.  Follow up actions/plans if indicated are seen below in the Assessment/Plan Section.  Pertinent information has been shared with the patient in the After Visit Summary.    Follow Up:   No follow-ups on file.     An After Visit Summary and PPPS were made available to the patient.    Medicare Wellness  Personal Prevention Plan of Service     Date of Office Visit:  2024  Encounter Provider:  Mireya Kapoor MD  Place of Service:  [facility]  Patient Name: Krystyna Bennett  :  1945    As part of the Medicare Wellness portion of  your visit today, we are providing you with this personalized preventive plan of services (PPPS). This plan is based upon recommendations of the [place] Preventive Services Task Force (USPSTF) and the Advisory Committee on Immunization Practices (ACIP).    This lists the preventive care services that should be considered, and provides dates of when you are due. Items listed as completed are up-to-date and do not require any further intervention.    Health Maintenance   Topic Date Due    HEPATITIS C SCREENING  Never done    DXA SCAN  03/14/2021    COVID-19 Vaccine (3 - Moderna risk series) 02/04/2022    RSV Vaccine - Adults (1 - 1-dose 75+ series) Never done    BMI FOLLOWUP  10/16/2024    LUNG CANCER SCREENING  02/20/2025    LIPID PANEL  06/04/2025    ANNUAL WELLNESS VISIT  11/12/2025    TDAP/TD VACCINES (2 - Td or Tdap) 05/01/2034    INFLUENZA VACCINE  Completed    Pneumococcal Vaccine 65+  Completed    ZOSTER VACCINE  Completed    MAMMOGRAM  Discontinued    URINE MICROALBUMIN  Discontinued    COLORECTAL CANCER SCREENING  Discontinued       Orders Placed This Encounter   Procedures    Comprehensive Metabolic Panel     Order Specific Question:   Release to patient     Answer:   Routine Release [[id number]]    TSH     Order Specific Question:   Release to patient     Answer:   Routine Release [[id number]]    Lipid Panel With / Chol / HDL Ratio     Order Specific Question:   Release to patient     Answer:   Routine Release [[id number]]    POCT urinalysis dipstick, manual     Order Specific Question:   Release to patient     Answer:   Routine Release [[id number]]    CBC & Differential     Order Specific Question:   Manual Differential     Answer:   No     Order Specific Question:   Release to patient     Answer:   Routine Release [[id number]]       No follow-ups on file.  Sit-to-Stand Exercise    The sit-to-stand exercise (also known as the chair stand or chair rise exercise) strengthens your lower body and helps  you maintain or improve your mobility and independence. The goal is to do the sit-to-stand exercise without using your hands. This will be easier as you become stronger. You should always talk with your health care provider before starting any exercise program, especially if you have had recent surgery.  Do the exercise exactly as told by your health care provider and adjust it as directed. It is normal to feel mild stretching, pulling, tightness, or discomfort as you do this exercise, but you should stop right away if you feel sudden pain or your pain gets worse. Do not begin doing this exercise until told by your health care provider.  What the sit-to-stand exercise does  The sit-to-stand exercise helps to strengthen the muscles in your thighs and the muscles in the center of your body that give you stability (core muscles). This exercise is especially helpful if:  You have had knee or hip surgery.  You have trouble getting up from a chair, out of a car, or off the toilet.  How to do the sit-to-stand exercise  Sit toward the front edge of a sturdy chair without armrests. Your knees should be bent and your feet should be flat on the floor and shoulder-width apart.  Place your hands lightly on each side of the seat. Keep your back and neck as straight as possible, with your chest slightly forward.  Breathe in slowly. Lean forward and slightly shift your weight to the front of your feet.  Breathe out as you slowly stand up. Use your hands as little as possible.  Stand and pause for a full breath in and out.  Breathe in as you sit down slowly. Tighten your core and abdominal muscles to control your lowering as much as possible.  Breathe out slowly.  Do this exercise 10-15 times. If needed, do it fewer times until you build up strength.  Rest for 1 minute, then do another set of 10-15 repetitions.  To change the difficulty of the sit-to-stand exercise  If the exercise is too difficult, use a chair with sturdy armrests,  and push off the armrests to help you come to the standing position. You can also use the armrests to help slowly lower yourself back to sitting. As this gets easier, try to use your arms less. You can also place a firm cushion or pillow on the chair to make the surface higher.  If this exercise is too easy, do not use your arms to help raise or lower yourself. You can also wear a weighted vest, use hand weights, increase your repetitions, or try a lower chair.  General tips  You may feel tired when starting an exercise routine. This is normal.  You may have muscle soreness that lasts a few days. This is normal. As you get stronger, you may not feel muscle soreness.  Use smooth, steady movements.  Do not  hold your breath during strength exercises. This can cause unsafe changes in your blood pressure.  Breathe in slowly through your nose, and breathe out slowly through your mouth.  Summary  Strengthening your lower body is an important step to help you move safely and independently.  The sit-to-stand exercise helps strengthen the muscles in your thighs and core.  You should always talk with your health care provider before starting any exercise program, especially if you have had recent surgery.  This information is not intended to replace advice given to you by your health care provider. Make sure you discuss any questions you have with your health care provider.  Document Revised: 10/16/2019 Document Reviewed: 02/08/2018  Elsevier Patient Education © 2021 [employer]    Advance Care Planning and Advance Directives     You make decisions on a daily basis - decisions about where you want to live, your career, your home, your life. Perhaps one of the most important decisions you face is your choice for future medical care. Take time to talk with your family and your healthcare team and start planning today.  Advance Care Planning is a process that can help you:  Understand possible future healthcare decisions in  light of your own experiences  Reflect on those decision in light of your goals and values  Discuss your decisions with those closest to you and the healthcare professionals that care for you  Make a plan by creating a document that reflects your wishes    Surrogate Decision Maker  In the event of a medical emergency, which has left you unable to communicate or to make your own decisions, you would need someone to make decisions for you.  It is important to discuss your preferences for medical treatment with this person while you are in good health.     Qualities of a surrogate decision maker:  Willing to take on this role and responsibility  Knows what you want for future medical care  Willing to follow your wishes even if they don't agree with them  Able to make difficult medical decisions under stressful circumstances    Advance Directives  These are legal documents you can create that will guide your healthcare team and decision maker(s) when needed. These documents can be stored in the electronic medical record.    Living Will - a legal document to guide your care if you have a terminal condition or a serious illness and are unable to communicate. States vary by statute in document names/types, but most forms may include one or more of the following:        -  Directions regarding life-prolonging treatments        -  Directions regarding artificially provided nutrition/hydration        -  Choosing a healthcare decision maker        -  Direction regarding organ/tissue donation    Durable Power of  for Healthcare - this document names an -in-fact to make medical decisions for you, but it may also allow this person to make personal and financial decisions for you. Please seek the advice of an  if you need this type of document.    **Advance Directives are not required and no one may discriminate against you if you do not sign one.    Medical Orders  Many states allow specific forms/orders  signed by your physician to record your wishes for medical treatment in your current state of health. This form, signed in personal communication with your physician, addresses resuscitation and other medical interventions that you may or may not want.  For more information or to schedule a time with a [facility] Advance Care Planning Facilitator contact: [facility] or call [phone] and someone will contact you directly.    Fall Prevention in the Home, Adult  Falls can cause injuries and can happen to people of all ages. There are many things you can do to make your home safe and to help prevent falls. Ask for help when making these changes.  What actions can I take to prevent falls?  General Instructions  Use good lighting in all rooms. Replace any light bulbs that burn out.  Turn on the lights in dark areas. Use night-lights.  Keep items that you use often in easy-to-reach places. Lower the shelves around your home if needed.  Set up your furniture so you have a clear path. Avoid moving your furniture around.  Do not have throw rugs or other things on the floor that can make you trip.  Avoid walking on wet floors.  If any of your floors are uneven, fix them.  Add color or contrast paint or tape to clearly jazmin and help you see:  Grab bars or handrails.  First and last steps of staircases.  Where the edge of each step is.  If you use a stepladder:  Make sure that it is fully opened. Do not climb a closed stepladder.  Make sure the sides of the stepladder are locked in place.  Ask someone to hold the stepladder while you use it.  Know where your pets are when moving through your home.  What can I do in the bathroom?         Keep the floor dry. Clean up any water on the floor right away.  Remove soap buildup in the tub or shower.  Use nonskid mats or decals on the floor of the tub or shower.  Attach bath mats securely with double-sided, nonslip rug tape.  If you need to sit down in the shower, use  a plastic, nonslip stool.  Install grab bars by the toilet and in the tub and shower. Do not use towel bars as grab bars.  What can I do in the bedroom?  Make sure that you have a light by your bed that is easy to reach.  Do not use any sheets or blankets for your bed that hang to the floor.  Have a firm chair with side arms that you can use for support when you get dressed.  What can I do in the kitchen?  Clean up any spills right away.  If you need to reach something above you, use a step stool with a grab bar.  Keep electrical cords out of the way.  Do not use floor polish or wax that makes floors slippery.  What can I do with my stairs?  Do not leave any items on the stairs.  Make sure that you have a light switch at the top and the bottom of the stairs.  Make sure that there are handrails on both sides of the stairs. Fix handrails that are broken or loose.  Install nonslip stair treads on all your stairs.  Avoid having throw rugs at the top or bottom of the stairs.  Choose a carpet that does not hide the edge of the steps on the stairs.  Check carpeting to make sure that it is firmly attached to the stairs. Fix carpet that is loose or worn.  What can I do on the outside of my home?  Use bright outdoor lighting.  Fix the edges of walkways and driveways and fix any cracks.  Remove anything that might make you trip as you walk through a door, such as a raised step or threshold.  Trim any bushes or trees on paths to your home.  Check to see if handrails are loose or broken and that both sides of all steps have handrails.  Install guardrails along the edges of any raised decks and porches.  Clear paths of anything that can make you trip, such as tools or rocks.  Have leaves, snow, or ice cleared regularly.  Use sand or salt on paths during winter.  Clean up any spills in your garage right away. This includes grease or oil spills.  What other actions can I take?  Wear shoes that:  Have a low heel. Do not wear high  heels.  Have rubber bottoms.  Feel good on your feet and fit well.  Are closed at the toe. Do not wear open-toe sandals.  Use tools that help you move around if needed. These include:  Canes.  Walkers.  Scooters.  Crutches.  Review your medicines with your doctor. Some medicines can make you feel dizzy. This can increase your chance of falling.  Ask your doctor what else you can do to help prevent falls.  Where to find more information  Centers for Disease Control and PreventionLOURDES: www.cdc.gov  [place] on Aging: www.[place]rielle.nih.gov  Contact a doctor if:  You are afraid of falling at home.  You feel weak, drowsy, or dizzy at home.  You fall at home.  Summary  There are many simple things that you can do to make your home safe and to help prevent falls.  Ways to make your home safe include removing things that can make you trip and installing grab bars in the bathroom.  Ask for help when making these changes in your home.  This information is not intended to replace advice given to you by your health care provider. Make sure you discuss any questions you have with your health care provider.  Document Revised: 07/21/2021 Document Reviewed: 07/21/2021  [employer] Patient Education © 2021 [employer]         Additional E&M Note during same encounter follows:  Patient has additional, significant, and separately identifiable condition(s)/problem(s) that require work above and beyond the Medicare Wellness Visit       Objective     Subjective   Krystyna Bennett is here for:    Chief Complaint   Patient presents with    Medicare Wellness-subsequent    Hypertension    Hyperlipidemia       Subjective   Krystyna is also being seen today for an annual adult preventative physical exam.  and Krystyna is also being seen today for additional medical problem/s.    Hypertension  This is a chronic problem. The current episode started more than 1 year ago. The problem is unchanged. The problem is controlled. Pertinent negatives  include no chest pain, headaches, malaise/fatigue, palpitations or shortness of breath. There are no associated agents to hypertension. Risk factors for coronary artery disease include post-menopausal state and sedentary lifestyle. Current antihypertension treatment includes ACE inhibitors and diuretics. The current treatment provides moderate improvement. There are no compliance problems.    Hyperlipidemia  This is a chronic problem. The current episode started more than 1 year ago. The problem is controlled. Recent lipid tests were reviewed and are variable. She has no history of diabetes or obesity. There are no known factors aggravating her hyperlipidemia. Pertinent negatives include no chest pain or shortness of breath. Current antihyperlipidemic treatment includes statins. The current treatment provides moderate improvement of lipids. There are no compliance problems.  Risk factors for coronary artery disease include hypertension, post-menopausal, a sedentary lifestyle and dyslipidemia.             Physical Exam:  Review of Systems   Constitutional:  Negative for malaise/fatigue.   Respiratory:  Negative for shortness of breath.    Cardiovascular:  Negative for chest pain and palpitations.        Vitals:    11/12/24 1400   BP: 128/86   Pulse: 61   Resp: 18   Temp: 97.6 °F (36.4 °C)   SpO2: 98%       Physical Exam  Vitals and nursing note reviewed.   Constitutional:       General: She is not in acute distress.     Appearance: She is well-developed. She is not diaphoretic.   HENT:      Head: Normocephalic and atraumatic.      Right Ear: Tympanic membrane and external ear normal.      Left Ear: Tympanic membrane and external ear normal.      Nose: Nose normal.      Mouth/Throat:      Pharynx: No oropharyngeal exudate.   Eyes:      General: No scleral icterus.        Right eye: No discharge.         Left eye: No discharge.      Conjunctiva/sclera: Conjunctivae normal.      Pupils: Pupils are equal, round, and  reactive to light.   Neck:      Thyroid: No thyromegaly.      Trachea: No tracheal deviation.   Cardiovascular:      Rate and Rhythm: Normal rate and regular rhythm.      Heart sounds: Normal heart sounds. No murmur heard.     No friction rub. No gallop.   Pulmonary:      Effort: Pulmonary effort is normal. No respiratory distress.      Breath sounds: Normal breath sounds. No stridor. No wheezing or rales.   Abdominal:      General: Bowel sounds are normal. There is no distension.      Palpations: Abdomen is soft. There is no mass.      Tenderness: There is no abdominal tenderness. There is no guarding or rebound.   Musculoskeletal:         General: No tenderness or deformity. Normal range of motion.      Cervical back: Normal range of motion and neck supple.   Lymphadenopathy:      Cervical: No cervical adenopathy.   Skin:     General: Skin is warm and dry.      Capillary Refill: Capillary refill takes less than 2 seconds.      Coloration: Skin is not pale.      Findings: No erythema or rash.   Neurological:      Mental Status: She is alert and oriented to person, place, and time.      Cranial Nerves: No cranial nerve deficit.      Sensory: No sensory deficit.      Motor: No tremor, atrophy or abnormal muscle tone.      Coordination: Coordination normal.      Gait: Gait normal.      Deep Tendon Reflexes: Reflexes are normal and symmetric. Reflexes normal.   Psychiatric:         Behavior: Behavior normal.         Thought Content: Thought content normal.         Cognition and Memory: Memory is not impaired. She does not exhibit impaired recent memory or impaired remote memory.         Judgment: Judgment normal.         Result Review :   The following data was reviewed by: Mireya Kapoor MD on 11/12/2024:         Brief Urine Lab Results  (Last result in the past 365 days)        Color   Clarity   Blood   Leuk Est   Nitrite   Protein   CREAT   Urine HCG        11/12/24 1421 Yellow   Clear   Negative   Negative    Negative   Trace                     Assessment and Plan:  Problem List Items Addressed This Visit          Advance Directives and General Issues    Mammogram declined    Colon cancer screening declined       Cardiac and Vasculature    Essential hypertension    Current Assessment & Plan     Hypertension is stable and controlled  Continue current treatment regimen.  Weight loss.  Blood pressure will be reassessed in 3 months.         Relevant Orders    CBC & Differential    Comprehensive Metabolic Panel    TSH    Systolic CHF, chronic    Overview     stable         Current Assessment & Plan     Congestive heart failure due to coronary artery disease (CAD) and hypertension.  Heart failure is stable.   Continue current treatment regimen.  Heart failure will be reassessed in 3 months.         Mixed hyperlipidemia    Overview     Will get labs   She is on atorvastatin  Continue current treatment         Current Assessment & Plan      Will get labs   She is on atorvastatin  Continue current treatment         Relevant Orders    Lipid Panel With / Chol / HDL Ratio       Endocrine and Metabolic    Overweight (BMI 25.0-29.9)       Health Encounters    Medicare annual wellness visit, subsequent - Primary    Relevant Orders    POCT urinalysis dipstick, manual (Completed)       Pulmonary and Pneumonias    Centrilobular emphysema    Overview     She is in 4 liters             Relevant Medications    Dextromethorphan-guaiFENesin (MUCINEX DM PO)    diphenhydrAMINE (BENADRYL) 25 mg capsule       BMI is >= 25 and <30. (Overweight) The following options were offered after discussion;: weight loss educational material (shared in after visit summary), exercise counseling/recommendations, and nutrition counseling/recommendations               Assessment and Plan Additional age appropriate preventative wellness advice topics were discussed during today's preventative wellness exam(some topics already addressed during AWV portion of the  note above):    Physical Activity: Advised cardiovascular activity 150 minutes per week as tolerated. (example brisk walk for 30 minutes, 5 days a week).   Nutrition: Discussed nutrition plan with patient. Information shared in after visit summary. Goal is for a well balanced diet to enhance overall health.             Follow Up   No follow-ups on file.  Patient was given instructions and counseling regarding his condition or for health maintenance advice. Please see specific information pulled into the AVS if appropriate.

## 2024-11-12 ENCOUNTER — OFFICE VISIT (OUTPATIENT)
Dept: FAMILY MEDICINE CLINIC | Facility: CLINIC | Age: 79
End: 2024-11-12
Payer: MEDICARE

## 2024-11-12 VITALS
HEART RATE: 61 BPM | BODY MASS INDEX: 29.77 KG/M2 | DIASTOLIC BLOOD PRESSURE: 86 MMHG | RESPIRATION RATE: 18 BRPM | TEMPERATURE: 97.6 F | WEIGHT: 185.2 LBS | OXYGEN SATURATION: 98 % | HEIGHT: 66 IN | SYSTOLIC BLOOD PRESSURE: 128 MMHG

## 2024-11-12 DIAGNOSIS — I10 ESSENTIAL HYPERTENSION: ICD-10-CM

## 2024-11-12 DIAGNOSIS — Z53.20 COLON CANCER SCREENING DECLINED: ICD-10-CM

## 2024-11-12 DIAGNOSIS — I50.22 SYSTOLIC CHF, CHRONIC: ICD-10-CM

## 2024-11-12 DIAGNOSIS — J43.2 CENTRILOBULAR EMPHYSEMA: ICD-10-CM

## 2024-11-12 DIAGNOSIS — Z00.00 MEDICARE ANNUAL WELLNESS VISIT, SUBSEQUENT: Primary | ICD-10-CM

## 2024-11-12 DIAGNOSIS — Z53.20 MAMMOGRAM DECLINED: ICD-10-CM

## 2024-11-12 DIAGNOSIS — E66.3 OVERWEIGHT (BMI 25.0-29.9): ICD-10-CM

## 2024-11-12 DIAGNOSIS — E78.2 MIXED HYPERLIPIDEMIA: ICD-10-CM

## 2024-11-12 PROBLEM — R07.9 CHEST PAIN: Status: RESOLVED | Noted: 2023-12-23 | Resolved: 2024-11-12

## 2024-11-12 PROBLEM — J18.9 COMMUNITY ACQUIRED PNEUMONIA OF BOTH LUNGS: Status: RESOLVED | Noted: 2022-11-19 | Resolved: 2024-11-12

## 2024-11-12 LAB
BILIRUB BLD-MCNC: NEGATIVE MG/DL
CLARITY, POC: CLEAR
COLOR UR: YELLOW
GLUCOSE UR STRIP-MCNC: NEGATIVE MG/DL
KETONES UR QL: NEGATIVE
LEUKOCYTE EST, POC: NEGATIVE
NITRITE UR-MCNC: NEGATIVE MG/ML
PH UR: 5 [PH] (ref 5–8)
PROT UR STRIP-MCNC: ABNORMAL MG/DL
RBC # UR STRIP: NEGATIVE /UL
SP GR UR: 1.01 (ref 1–1.03)
UROBILINOGEN UR QL: NORMAL

## 2024-11-12 PROCEDURE — G0439 PPPS, SUBSEQ VISIT: HCPCS | Performed by: FAMILY MEDICINE

## 2024-11-12 PROCEDURE — 3079F DIAST BP 80-89 MM HG: CPT | Performed by: FAMILY MEDICINE

## 2024-11-12 PROCEDURE — 99214 OFFICE O/P EST MOD 30 MIN: CPT | Performed by: FAMILY MEDICINE

## 2024-11-12 PROCEDURE — 3074F SYST BP LT 130 MM HG: CPT | Performed by: FAMILY MEDICINE

## 2024-11-12 PROCEDURE — 81002 URINALYSIS NONAUTO W/O SCOPE: CPT | Performed by: FAMILY MEDICINE

## 2024-11-12 PROCEDURE — 1126F AMNT PAIN NOTED NONE PRSNT: CPT | Performed by: FAMILY MEDICINE

## 2024-11-12 RX ORDER — AMPICILLIN TRIHYDRATE 500 MG
CAPSULE ORAL
COMMUNITY

## 2024-11-12 RX ORDER — DIPHENHYDRAMINE HCL 25 MG
25 CAPSULE ORAL EVERY 6 HOURS PRN
COMMUNITY

## 2024-11-19 ENCOUNTER — HOSPITAL ENCOUNTER (OUTPATIENT)
Dept: CT IMAGING | Facility: HOSPITAL | Age: 79
Discharge: HOME | End: 2024-11-19
Admitting: NURSE PRACTITIONER
Payer: MEDICARE

## 2024-11-19 DIAGNOSIS — R91.8 LUNG NODULES: ICD-10-CM

## 2024-11-19 PROCEDURE — 71250 CT THORAX DX C-: CPT

## 2024-11-20 LAB
ALBUMIN SERPL-MCNC: 4.2 G/DL (ref 3.8–4.8)
ALP SERPL-CCNC: 108 IU/L (ref 44–121)
ALT SERPL-CCNC: 14 IU/L (ref 0–32)
AST SERPL-CCNC: 16 IU/L (ref 0–40)
BASOPHILS # BLD AUTO: 0.1 X10E3/UL (ref 0–0.2)
BASOPHILS NFR BLD AUTO: 1 %
BILIRUB SERPL-MCNC: 0.6 MG/DL (ref 0–1.2)
BUN SERPL-MCNC: 20 MG/DL (ref 8–27)
BUN/CREAT SERPL: 22 (ref 12–28)
CALCIUM SERPL-MCNC: 9.7 MG/DL (ref 8.7–10.3)
CHLORIDE SERPL-SCNC: 99 MMOL/L (ref 96–106)
CHOLEST SERPL-MCNC: 181 MG/DL (ref 100–199)
CHOLEST/HDLC SERPL: 3.8 RATIO (ref 0–4.4)
CO2 SERPL-SCNC: 29 MMOL/L (ref 20–29)
CREAT SERPL-MCNC: 0.9 MG/DL (ref 0.57–1)
EGFRCR SERPLBLD CKD-EPI 2021: 66 ML/MIN/1.73
EOSINOPHIL # BLD AUTO: 0.2 X10E3/UL (ref 0–0.4)
EOSINOPHIL NFR BLD AUTO: 2 %
ERYTHROCYTE [DISTWIDTH] IN BLOOD BY AUTOMATED COUNT: 11.5 % (ref 11.7–15.4)
GLOBULIN SER CALC-MCNC: 2 G/DL (ref 1.5–4.5)
GLUCOSE SERPL-MCNC: 90 MG/DL (ref 70–99)
HCT VFR BLD AUTO: 37.3 % (ref 34–46.6)
HDLC SERPL-MCNC: 48 MG/DL
HGB BLD-MCNC: 11.7 G/DL (ref 11.1–15.9)
IMM GRANULOCYTES # BLD AUTO: 0 X10E3/UL (ref 0–0.1)
IMM GRANULOCYTES NFR BLD AUTO: 0 %
LDLC SERPL CALC-MCNC: 100 MG/DL (ref 0–99)
LYMPHOCYTES # BLD AUTO: 1.3 X10E3/UL (ref 0.7–3.1)
LYMPHOCYTES NFR BLD AUTO: 14 %
MCH RBC QN AUTO: 29.2 PG (ref 26.6–33)
MCHC RBC AUTO-ENTMCNC: 31.4 G/DL (ref 31.5–35.7)
MCV RBC AUTO: 93 FL (ref 79–97)
MONOCYTES # BLD AUTO: 0.5 X10E3/UL (ref 0.1–0.9)
MONOCYTES NFR BLD AUTO: 6 %
NEUTROPHILS # BLD AUTO: 7.3 X10E3/UL (ref 1.4–7)
NEUTROPHILS NFR BLD AUTO: 77 %
PLATELET # BLD AUTO: 262 X10E3/UL (ref 150–450)
POTASSIUM SERPL-SCNC: 4.4 MMOL/L (ref 3.5–5.2)
PROT SERPL-MCNC: 6.2 G/DL (ref 6–8.5)
RBC # BLD AUTO: 4.01 X10E6/UL (ref 3.77–5.28)
SODIUM SERPL-SCNC: 140 MMOL/L (ref 134–144)
TRIGL SERPL-MCNC: 193 MG/DL (ref 0–149)
TSH SERPL DL<=0.005 MIU/L-ACNC: 3.69 UIU/ML (ref 0.45–4.5)
VLDLC SERPL CALC-MCNC: 33 MG/DL (ref 5–40)
WBC # BLD AUTO: 9.3 X10E3/UL (ref 3.4–10.8)

## 2024-11-22 DIAGNOSIS — F41.9 ANXIETY: ICD-10-CM

## 2024-11-22 RX ORDER — PAROXETINE 20 MG/1
20 TABLET, FILM COATED ORAL DAILY
Qty: 90 TABLET | Refills: 3 | Status: SHIPPED | OUTPATIENT
Start: 2024-11-22

## 2024-11-25 RX ORDER — METOPROLOL SUCCINATE 25 MG/1
25 TABLET, EXTENDED RELEASE ORAL DAILY
Qty: 90 TABLET | Refills: 1 | Status: SHIPPED | OUTPATIENT
Start: 2024-11-25

## 2024-11-25 NOTE — TELEPHONE ENCOUNTER
Rx Refill Note  Requested Prescriptions     Pending Prescriptions Disp Refills    metoprolol succinate XL (TOPROL-XL) 25 MG 24 hr tablet [Pharmacy Med Name: METOPROLOL SUCC ER 25 MG TAB] 90 tablet 1     Sig: TAKE 1 TABLET BY MOUTH DAILY      Last office visit with prescribing clinician: 6/19/2024   Last telemedicine visit with prescribing clinician: Visit date not found   Next office visit with prescribing clinician: 6/17/2025                         Would you like a call back once the refill request has been completed: [] Yes [] No    If the office needs to give you a call back, can they leave a voicemail: [] Yes [] No    Peg Gallegos MA  11/25/24, 08:56 EST

## 2024-12-01 DIAGNOSIS — B37.2 CANDIDIASIS OF SKIN: ICD-10-CM

## 2024-12-02 RX ORDER — NYSTATIN 100000 [USP'U]/G
POWDER TOPICAL SEE ADMIN INSTRUCTIONS
Qty: 60 G | Refills: 2 | Status: SHIPPED | OUTPATIENT
Start: 2024-12-02

## 2024-12-06 RX ORDER — ALBUTEROL SULFATE 90 UG/1
2 INHALANT RESPIRATORY (INHALATION) EVERY 4 HOURS PRN
Qty: 17 G | Refills: 3 | Status: SHIPPED | OUTPATIENT
Start: 2024-12-06

## 2025-01-13 ENCOUNTER — OFFICE VISIT (OUTPATIENT)
Dept: FAMILY MEDICINE CLINIC | Facility: CLINIC | Age: 80
End: 2025-01-13
Payer: MEDICARE

## 2025-01-13 VITALS
HEIGHT: 66 IN | TEMPERATURE: 97.8 F | WEIGHT: 179 LBS | DIASTOLIC BLOOD PRESSURE: 82 MMHG | OXYGEN SATURATION: 93 % | SYSTOLIC BLOOD PRESSURE: 134 MMHG | BODY MASS INDEX: 28.77 KG/M2 | HEART RATE: 74 BPM | RESPIRATION RATE: 18 BRPM

## 2025-01-13 DIAGNOSIS — E66.09 CLASS 1 OBESITY DUE TO EXCESS CALORIES WITH SERIOUS COMORBIDITY AND BODY MASS INDEX (BMI) OF 30.0 TO 30.9 IN ADULT: ICD-10-CM

## 2025-01-13 DIAGNOSIS — E66.811 CLASS 1 OBESITY DUE TO EXCESS CALORIES WITH SERIOUS COMORBIDITY AND BODY MASS INDEX (BMI) OF 30.0 TO 30.9 IN ADULT: ICD-10-CM

## 2025-01-13 DIAGNOSIS — S29.012A STRAIN OF THORACIC BACK REGION: Primary | ICD-10-CM

## 2025-01-13 DIAGNOSIS — S29.012A STRAIN OF THORACIC BACK REGION: ICD-10-CM

## 2025-01-13 DIAGNOSIS — I10 ESSENTIAL HYPERTENSION: ICD-10-CM

## 2025-01-13 PROCEDURE — 1126F AMNT PAIN NOTED NONE PRSNT: CPT | Performed by: FAMILY MEDICINE

## 2025-01-13 PROCEDURE — 3079F DIAST BP 80-89 MM HG: CPT | Performed by: FAMILY MEDICINE

## 2025-01-13 PROCEDURE — 99213 OFFICE O/P EST LOW 20 MIN: CPT | Performed by: FAMILY MEDICINE

## 2025-01-13 PROCEDURE — 3075F SYST BP GE 130 - 139MM HG: CPT | Performed by: FAMILY MEDICINE

## 2025-01-13 PROCEDURE — 1160F RVW MEDS BY RX/DR IN RCRD: CPT | Performed by: FAMILY MEDICINE

## 2025-01-13 PROCEDURE — 1159F MED LIST DOCD IN RCRD: CPT | Performed by: FAMILY MEDICINE

## 2025-01-13 RX ORDER — TRAMADOL HYDROCHLORIDE 50 MG/1
TABLET ORAL
Qty: 10 TABLET | Refills: 0 | Status: ON HOLD | OUTPATIENT
Start: 2025-01-13

## 2025-01-13 RX ORDER — PREDNISONE 10 MG/1
10 TABLET ORAL TAKE AS DIRECTED
Qty: 35 TABLET | Refills: 0 | Status: ON HOLD | OUTPATIENT
Start: 2025-01-13 | End: 2025-01-28

## 2025-01-13 RX ORDER — TRAMADOL HYDROCHLORIDE 50 MG/1
50 TABLET ORAL EVERY 6 HOURS PRN
Qty: 10 TABLET | Refills: 0 | Status: SHIPPED | OUTPATIENT
Start: 2025-01-13 | End: 2025-01-13 | Stop reason: SDUPTHER

## 2025-01-13 NOTE — PROGRESS NOTES
Chief Complaint  Back Pain and Hypertension    Subjective     CC  Problem List  Visit Diagnosis   Encounters  Notes  Medications  Labs  Result Review Imaging  Media    Krystyna Bennett presents to [facility] for   Back Pain  This is a new (Bent over cleaning her bathtub 2 weeks ago) problem. The current episode started 1 to 4 weeks ago. The problem occurs constantly. The problem is unchanged. The quality of the pain is described as aching. The pain does not radiate. The pain is mild. The pain is Worse during the day. The symptoms are aggravated by coughing and bending. Stiffness is present In the morning. Pertinent negatives include no bladder incontinence, bowel incontinence, chest pain, dysuria, fever, headaches, leg pain, numbness, paresis, perianal numbness, tingling, weakness or weight loss. Risk factors include history of cancer, menopause, poor posture and sedentary lifestyle. Treatments tried: Tylenol.   Hypertension  This is a chronic problem. The current episode started more than 1 year ago. The problem has been stable since onset. The problem is controlled. Pertinent negatives include no anxiety, blurred vision, chest pain, headaches, malaise/fatigue, neck pain, orthopnea, palpitations or shortness of breath. There are no associated agents to hypertension. Risk factors for coronary artery disease include obesity, post-menopausal state, smoking/tobacco exposure and family history. Past treatments include nothing. Current antihypertension treatment includes diuretics, ACE inhibitors and beta blockers. The current treatment provides mild improvement. There are no compliance problems.  There is no history of angina, heart failure, PVD or retinopathy.     Review of Systems   Constitutional:  Negative for appetite change, fever, malaise/fatigue and unexpected weight loss.   Eyes:  Negative for blurred vision.   Respiratory:  Negative for shortness of breath and wheezing.  "   Cardiovascular:  Negative for chest pain, palpitations, orthopnea and leg swelling.   Gastrointestinal:  Negative for bowel incontinence.   Genitourinary:  Negative for dysuria and urinary incontinence.   Musculoskeletal:  Positive for back pain. Negative for neck pain.   Neurological:  Negative for tingling, weakness and numbness.   Hematological:  Negative for adenopathy. Does not bruise/bleed easily.        Objective   Vital Signs:   /82   Pulse 74   Temp 97.8 °F (36.6 °C) (Temporal)   Resp 18   Ht 167.6 cm (65.98\")   Wt 81.2 kg (179 lb)   SpO2 93% Comment: 4L O2  BMI 28.91 kg/m²     Physical Exam  Constitutional:       General: She is not in acute distress.  Neck:      Thyroid: No thyromegaly.      Vascular: No JVD.   Cardiovascular:      Rate and Rhythm: Normal rate and regular rhythm.      Heart sounds: No murmur heard.  Pulmonary:      Effort: Pulmonary effort is normal.      Breath sounds: Normal breath sounds. No wheezing.   Musculoskeletal:      Cervical back: Neck supple.      Thoracic back: Tenderness (over the mid and lower thoracic back) present. No swelling, deformity or bony tenderness. Decreased range of motion.      Right lower leg: No edema.      Left lower leg: No edema.      Comments: Neg Karen's bilaterally   Lymphadenopathy:      Cervical: No cervical adenopathy.   Skin:     Findings: No bruising or rash.   Neurological:      Mental Status: She is alert.        Result Review :Labs  Result Review  Imaging  Med Tab  Media                 Assessment and Plan CC Problem List  Visit Diagnosis  ROS  Review (Popup)  Health Maintenance  Quality  BestPractice  Medications  SmartSets  SnapShot Encounters  Media  Problem List Items Addressed This Visit          Unprioritized    Essential hypertension    Current Assessment & Plan       Controlled compliant continue meds.           Other Visit Diagnoses       Strain of thoracic back region    -  Primary    steroids, ice heat " ROM, discussed possiblity of compression fx exists even though no fall. xray no improve. Mild DJD on CXR 10/24    Relevant Medications    predniSONE (DELTASONE) 10 MG tablet    Class 1 obesity due to excess calories with serious comorbidity and body mass index (BMI) of 30.0 to 30.9 in adult                Follow Up Instructions Charge Capture  Follow-up Communications  Return if symptoms worsen or fail to improve.  Patient was given instructions and counseling regarding her condition or for health maintenance advice. Please see specific information pulled into the AVS if appropriate.

## 2025-01-13 NOTE — ASSESSMENT & PLAN NOTE
Patient's (Body mass index is 28.91 kg/m².) indicates that they are overweight with health conditions that include hypertension . Weight is improving with lifestyle modifications. BMI is above average; BMI management plan is completed. We discussed low calorie, low carb based diet program, portion control, and increasing exercise.

## 2025-01-17 ENCOUNTER — APPOINTMENT (OUTPATIENT)
Dept: GENERAL RADIOLOGY | Facility: HOSPITAL | Age: 80
End: 2025-01-17
Payer: MEDICARE

## 2025-01-17 ENCOUNTER — HOSPITAL ENCOUNTER (INPATIENT)
Facility: HOSPITAL | Age: 80
LOS: 4 days | Discharge: HOME | End: 2025-01-21
Attending: EMERGENCY MEDICINE | Admitting: STUDENT IN AN ORGANIZED HEALTH CARE EDUCATION/TRAINING PROGRAM
Payer: MEDICARE

## 2025-01-17 DIAGNOSIS — S22.050A COMPRESSION FRACTURE OF T6 VERTEBRA, INITIAL ENCOUNTER: ICD-10-CM

## 2025-01-17 DIAGNOSIS — S22.050D CLOSED WEDGE COMPRESSION FRACTURE OF T6 VERTEBRA WITH ROUTINE HEALING, SUBSEQUENT ENCOUNTER: ICD-10-CM

## 2025-01-17 DIAGNOSIS — S22.059D CLOSED FRACTURE OF SIXTH THORACIC VERTEBRA WITH ROUTINE HEALING, UNSPECIFIED FRACTURE MORPHOLOGY, SUBSEQUENT ENCOUNTER: ICD-10-CM

## 2025-01-17 DIAGNOSIS — R07.9 CHEST PAIN, UNSPECIFIED TYPE: Primary | ICD-10-CM

## 2025-01-17 DIAGNOSIS — R79.89 ELEVATED TROPONIN: ICD-10-CM

## 2025-01-17 LAB
ANION GAP SERPL CALCULATED.3IONS-SCNC: 10.4 MMOL/L (ref 5–15)
APTT PPP: 25 SECONDS (ref 22.7–35.4)
BASOPHILS # BLD AUTO: 0.02 10*3/MM3 (ref 0–0.2)
BASOPHILS NFR BLD AUTO: 0.1 % (ref 0–1.5)
BUN SERPL-MCNC: 35 MG/DL (ref 8–23)
BUN/CREAT SERPL: 31 (ref 7–25)
CALCIUM SPEC-SCNC: 9 MG/DL (ref 8.6–10.5)
CHLORIDE SERPL-SCNC: 106 MMOL/L (ref 98–107)
CO2 SERPL-SCNC: 26.6 MMOL/L (ref 22–29)
CREAT SERPL-MCNC: 1.13 MG/DL (ref 0.57–1)
DEPRECATED RDW RBC AUTO: 42.9 FL (ref 37–54)
EGFRCR SERPLBLD CKD-EPI 2021: 50.2 ML/MIN/1.73
EOSINOPHIL # BLD AUTO: 0.11 10*3/MM3 (ref 0–0.4)
EOSINOPHIL NFR BLD AUTO: 0.7 % (ref 0.3–6.2)
ERYTHROCYTE [DISTWIDTH] IN BLOOD BY AUTOMATED COUNT: 12.6 % (ref 12.3–15.4)
GEN 5 1HR TROPONIN T REFLEX: 166 NG/L
GLUCOSE SERPL-MCNC: 107 MG/DL (ref 65–99)
HCT VFR BLD AUTO: 35 % (ref 34–46.6)
HGB BLD-MCNC: 10.3 G/DL (ref 12–15.9)
IMM GRANULOCYTES # BLD AUTO: 0.14 10*3/MM3 (ref 0–0.05)
IMM GRANULOCYTES NFR BLD AUTO: 0.8 % (ref 0–0.5)
INR PPP: 0.95 (ref 0.9–1.1)
LYMPHOCYTES # BLD AUTO: 1.14 10*3/MM3 (ref 0.7–3.1)
LYMPHOCYTES NFR BLD AUTO: 6.9 % (ref 19.6–45.3)
MCH RBC QN AUTO: 27.7 PG (ref 26.6–33)
MCHC RBC AUTO-ENTMCNC: 29.4 G/DL (ref 31.5–35.7)
MCV RBC AUTO: 94.1 FL (ref 79–97)
MONOCYTES # BLD AUTO: 1.33 10*3/MM3 (ref 0.1–0.9)
MONOCYTES NFR BLD AUTO: 8 % (ref 5–12)
NEUTROPHILS NFR BLD AUTO: 13.85 10*3/MM3 (ref 1.7–7)
NEUTROPHILS NFR BLD AUTO: 83.5 % (ref 42.7–76)
NRBC BLD AUTO-RTO: 0 /100 WBC (ref 0–0.2)
PLATELET # BLD AUTO: 359 10*3/MM3 (ref 140–450)
PMV BLD AUTO: 11 FL (ref 6–12)
POTASSIUM SERPL-SCNC: 3.7 MMOL/L (ref 3.5–5.2)
PROTHROMBIN TIME: 12.8 SECONDS (ref 11.7–14.2)
RBC # BLD AUTO: 3.72 10*6/MM3 (ref 3.77–5.28)
SODIUM SERPL-SCNC: 143 MMOL/L (ref 136–145)
TROPONIN T % DELTA: 13
TROPONIN T NUMERIC DELTA: 19 NG/L
TROPONIN T SERPL HS-MCNC: 147 NG/L
TROPONIN T SERPL HS-MCNC: 181 NG/L
WBC NRBC COR # BLD AUTO: 16.59 10*3/MM3 (ref 3.4–10.8)

## 2025-01-17 PROCEDURE — 93005 ELECTROCARDIOGRAM TRACING: CPT | Performed by: EMERGENCY MEDICINE

## 2025-01-17 PROCEDURE — 84443 ASSAY THYROID STIM HORMONE: CPT | Performed by: NURSE PRACTITIONER

## 2025-01-17 PROCEDURE — 85610 PROTHROMBIN TIME: CPT | Performed by: EMERGENCY MEDICINE

## 2025-01-17 PROCEDURE — 83735 ASSAY OF MAGNESIUM: CPT | Performed by: NURSE PRACTITIONER

## 2025-01-17 PROCEDURE — 85730 THROMBOPLASTIN TIME PARTIAL: CPT | Performed by: STUDENT IN AN ORGANIZED HEALTH CARE EDUCATION/TRAINING PROGRAM

## 2025-01-17 PROCEDURE — 84484 ASSAY OF TROPONIN QUANT: CPT | Performed by: STUDENT IN AN ORGANIZED HEALTH CARE EDUCATION/TRAINING PROGRAM

## 2025-01-17 PROCEDURE — 80048 BASIC METABOLIC PNL TOTAL CA: CPT | Performed by: EMERGENCY MEDICINE

## 2025-01-17 PROCEDURE — 72072 X-RAY EXAM THORAC SPINE 3VWS: CPT

## 2025-01-17 PROCEDURE — 99285 EMERGENCY DEPT VISIT HI MDM: CPT

## 2025-01-17 PROCEDURE — 93005 ELECTROCARDIOGRAM TRACING: CPT

## 2025-01-17 PROCEDURE — 85730 THROMBOPLASTIN TIME PARTIAL: CPT | Performed by: EMERGENCY MEDICINE

## 2025-01-17 PROCEDURE — 71045 X-RAY EXAM CHEST 1 VIEW: CPT

## 2025-01-17 PROCEDURE — 85025 COMPLETE CBC W/AUTO DIFF WBC: CPT | Performed by: EMERGENCY MEDICINE

## 2025-01-17 PROCEDURE — 25010000002 MORPHINE PER 10 MG: Performed by: STUDENT IN AN ORGANIZED HEALTH CARE EDUCATION/TRAINING PROGRAM

## 2025-01-17 PROCEDURE — 84484 ASSAY OF TROPONIN QUANT: CPT | Performed by: EMERGENCY MEDICINE

## 2025-01-17 PROCEDURE — 25010000002 HEPARIN (PORCINE) PER 1000 UNITS: Performed by: STUDENT IN AN ORGANIZED HEALTH CARE EDUCATION/TRAINING PROGRAM

## 2025-01-17 RX ORDER — ACETAMINOPHEN 650 MG/1
650 SUPPOSITORY RECTAL EVERY 4 HOURS PRN
Status: DISCONTINUED | OUTPATIENT
Start: 2025-01-17 | End: 2025-01-21 | Stop reason: HOSPADM

## 2025-01-17 RX ORDER — ACETAMINOPHEN 160 MG/5ML
650 SOLUTION ORAL EVERY 4 HOURS PRN
Status: DISCONTINUED | OUTPATIENT
Start: 2025-01-17 | End: 2025-01-21 | Stop reason: HOSPADM

## 2025-01-17 RX ORDER — MORPHINE SULFATE 2 MG/ML
2 INJECTION, SOLUTION INTRAMUSCULAR; INTRAVENOUS EVERY 4 HOURS PRN
Status: DISCONTINUED | OUTPATIENT
Start: 2025-01-17 | End: 2025-01-21 | Stop reason: HOSPADM

## 2025-01-17 RX ORDER — NITROGLYCERIN 0.4 MG/1
0.4 TABLET SUBLINGUAL
Status: COMPLETED | OUTPATIENT
Start: 2025-01-17 | End: 2025-01-17

## 2025-01-17 RX ORDER — ACETAMINOPHEN 325 MG/1
650 TABLET ORAL EVERY 4 HOURS PRN
Status: DISCONTINUED | OUTPATIENT
Start: 2025-01-17 | End: 2025-01-21 | Stop reason: HOSPADM

## 2025-01-17 RX ORDER — BISACODYL 10 MG
10 SUPPOSITORY, RECTAL RECTAL DAILY PRN
Status: DISCONTINUED | OUTPATIENT
Start: 2025-01-17 | End: 2025-01-21 | Stop reason: HOSPADM

## 2025-01-17 RX ORDER — AMOXICILLIN 250 MG
2 CAPSULE ORAL 2 TIMES DAILY PRN
Status: DISCONTINUED | OUTPATIENT
Start: 2025-01-17 | End: 2025-01-21 | Stop reason: HOSPADM

## 2025-01-17 RX ORDER — HEPARIN SODIUM 10000 [USP'U]/100ML
12 INJECTION, SOLUTION INTRAVENOUS
Status: DISCONTINUED | OUTPATIENT
Start: 2025-01-18 | End: 2025-01-18

## 2025-01-17 RX ORDER — GUAIFENESIN 600 MG/1
1200 TABLET, EXTENDED RELEASE ORAL 2 TIMES DAILY
COMMUNITY

## 2025-01-17 RX ORDER — SODIUM CHLORIDE 0.9 % (FLUSH) 0.9 %
10 SYRINGE (ML) INJECTION AS NEEDED
Status: DISCONTINUED | OUTPATIENT
Start: 2025-01-17 | End: 2025-01-21 | Stop reason: HOSPADM

## 2025-01-17 RX ORDER — OXYCODONE HYDROCHLORIDE 5 MG/1
5 TABLET ORAL EVERY 6 HOURS PRN
Status: DISCONTINUED | OUTPATIENT
Start: 2025-01-17 | End: 2025-01-21 | Stop reason: HOSPADM

## 2025-01-17 RX ORDER — SODIUM CHLORIDE 9 MG/ML
40 INJECTION, SOLUTION INTRAVENOUS AS NEEDED
Status: DISCONTINUED | OUTPATIENT
Start: 2025-01-17 | End: 2025-01-21 | Stop reason: HOSPADM

## 2025-01-17 RX ORDER — BISACODYL 5 MG/1
5 TABLET, DELAYED RELEASE ORAL DAILY PRN
Status: DISCONTINUED | OUTPATIENT
Start: 2025-01-17 | End: 2025-01-21 | Stop reason: HOSPADM

## 2025-01-17 RX ORDER — NITROGLYCERIN 0.4 MG/1
0.4 TABLET SUBLINGUAL
Status: DISCONTINUED | OUTPATIENT
Start: 2025-01-17 | End: 2025-01-21 | Stop reason: HOSPADM

## 2025-01-17 RX ORDER — SODIUM CHLORIDE 0.9 % (FLUSH) 0.9 %
10 SYRINGE (ML) INJECTION EVERY 12 HOURS SCHEDULED
Status: DISCONTINUED | OUTPATIENT
Start: 2025-01-17 | End: 2025-01-21 | Stop reason: HOSPADM

## 2025-01-17 RX ORDER — HEPARIN SODIUM 5000 [USP'U]/ML
5000 INJECTION, SOLUTION INTRAVENOUS; SUBCUTANEOUS EVERY 8 HOURS SCHEDULED
Status: DISCONTINUED | OUTPATIENT
Start: 2025-01-17 | End: 2025-01-17

## 2025-01-17 RX ORDER — LIDOCAINE 4 G/G
1 PATCH TOPICAL NIGHTLY
Status: DISCONTINUED | OUTPATIENT
Start: 2025-01-17 | End: 2025-01-21 | Stop reason: HOSPADM

## 2025-01-17 RX ORDER — POLYETHYLENE GLYCOL 3350 17 G/17G
17 POWDER, FOR SOLUTION ORAL DAILY PRN
Status: DISCONTINUED | OUTPATIENT
Start: 2025-01-17 | End: 2025-01-21 | Stop reason: HOSPADM

## 2025-01-17 RX ADMIN — HEPARIN SODIUM 5000 UNITS: 5000 INJECTION, SOLUTION INTRAVENOUS; SUBCUTANEOUS at 20:26

## 2025-01-17 RX ADMIN — NITROGLYCERIN 0.4 MG: 0.4 TABLET SUBLINGUAL at 18:33

## 2025-01-17 RX ADMIN — MUPIROCIN 1 APPLICATION: 20 OINTMENT TOPICAL at 20:26

## 2025-01-17 RX ADMIN — LIDOCAINE 1 PATCH: 4 PATCH TOPICAL at 20:27

## 2025-01-17 RX ADMIN — NITROGLYCERIN 0.4 MG: 0.4 TABLET SUBLINGUAL at 18:24

## 2025-01-17 RX ADMIN — NITROGLYCERIN 0.4 MG: 0.4 TABLET SUBLINGUAL at 18:18

## 2025-01-17 RX ADMIN — Medication 10 ML: at 20:26

## 2025-01-17 RX ADMIN — OXYCODONE 5 MG: 5 TABLET ORAL at 20:26

## 2025-01-17 RX ADMIN — MORPHINE SULFATE 2 MG: 2 INJECTION, SOLUTION INTRAMUSCULAR; INTRAVENOUS at 20:26

## 2025-01-17 NOTE — ED PROVIDER NOTES
Subjective   History of Present Illness  Chief complaint: Chest pain    79-year-old female presents with chest pain.  Patient states pain started today.  She states she injured her back recently while scrubbing her bathtub.  She went to her primary doctor who put her on steroids and pain medicine.  She states it had been getting better slowly.  Today after lunch her back pain became worse again and she started having pain radiating through to her anterior chest.  She states she felt nauseous and diaphoretic with this.  She denies any increased shortness of breath.  She states she has COPD and is always short of breath.  She denies any alleviating or exacerbating factors.    History provided by:  Patient      Review of Systems   Constitutional:  Positive for diaphoresis. Negative for fever.   HENT:  Negative for congestion.    Respiratory:  Negative for cough and shortness of breath.    Cardiovascular:  Positive for chest pain.   Gastrointestinal:  Positive for nausea. Negative for abdominal pain and vomiting.   Genitourinary:  Negative for dysuria.   Musculoskeletal:  Positive for back pain.   Neurological:  Negative for headaches.       Past Medical History:   Diagnosis Date    Acute bacterial bronchitis 07/05/2019    Anemia 07/05/2019    Arthritis 07/05/2019    Asthma     Breast injury     right side bruised after running into truck mirror    Chest pain 12/23/2023    Chronic obstructive pulmonary disease 06/04/2019    CLL (chronic lymphocytic leukemia) 07/05/2019    GERD (gastroesophageal reflux disease) 07/05/2019    History of Clostridium difficile colitis 01/08/2018    Hypertension     Hypokalemia 07/05/2019    Lymphocytosis 01/08/2018    Melanoma 01/08/2018    Moderate persistent asthma without complication 07/05/2019    Panlobular emphysema 07/05/2019    Pneumonia 11/21/22    Stage 3b chronic kidney disease 12/19/2019    Dr Silva    Systolic CHF, chronic 07/05/2019       Allergies   Allergen Reactions     Latex Rash    Sulfa Antibiotics Other (See Comments)     Tunnel vision,light headed/ may not be allergic to it any longer        Past Surgical History:   Procedure Laterality Date    APPENDECTOMY      BRONCHOSCOPY N/A 2023    Procedure: BRONCHOSCOPY with bilateral lung wash;  Surgeon: Zuly Magallon MD;  Location: [facility] ENDOSCOPY;  Service: Pulmonary;  Laterality: N/A;  Post- hemoptysis    CARDIAC CATHETERIZATION  2019    [facility].    CARDIAC CATHETERIZATION Right 2022    Procedure: Coronary angiography;  Surgeon: Andrea Philippe MD;  Location: [facility] CATH INVASIVE LOCATION;  Service: Cardiovascular;  Laterality: Right;    HYSTERECTOMY      TONSILLECTOMY         Family History   Problem Relation Age of Onset    Heart disease Father             Stroke Father     Heart failure Father     Leukemia Paternal Grandmother     Anemia Paternal Grandmother     Heart disease Paternal Grandmother     Cancer Paternal Grandmother         leukemia    Alcohol abuse Maternal Aunt     Cancer Maternal Aunt         stomach    Asthma Maternal Grandmother             Hyperlipidemia Maternal Grandmother             Hypertension Maternal Grandmother     Diabetes Paternal Aunt             Hypertension Son     Hypertension Daughter        Social History     Socioeconomic History    Marital status:    Tobacco Use    Smoking status: Former     Current packs/day: 0.00     Average packs/day: 0.5 packs/day for 58.6 years (29.3 ttl pk-yrs)     Types: Cigarettes, Cigars     Start date: 1960     Quit date: 2019     Years since quittin.5     Passive exposure: Past    Smokeless tobacco: Never   Vaping Use    Vaping status: Never Used   Substance and Sexual Activity    Alcohol use: Yes     Alcohol/week: 4.0 standard drinks of alcohol     Types: 2 Glasses of wine, 2 Drinks containing 0.5 oz of alcohol per week     Comment: social drinker    Drug use: No    Sexual activity: Not Currently      "Partners: Male     Birth control/protection: Post-menopausal       /62   Pulse 66   Temp 97.4 °F (36.3 °C) (Oral)   Resp 16   Ht 167.6 cm (66\")   Wt 78.9 kg (174 lb)   LMP  (LMP Unknown)   SpO2 97%   BMI 28.08 kg/m²       Objective   Physical Exam  Vitals and nursing note reviewed.   Constitutional:       Appearance: She is well-developed.   HENT:      Head: Normocephalic and atraumatic.   Cardiovascular:      Rate and Rhythm: Normal rate and regular rhythm.      Heart sounds: Normal heart sounds.   Pulmonary:      Effort: Pulmonary effort is normal. No respiratory distress.      Breath sounds: Normal breath sounds.   Abdominal:      Palpations: Abdomen is soft.      Tenderness: There is no abdominal tenderness.   Musculoskeletal:      Right lower leg: No tenderness. No edema.      Left lower leg: No tenderness. No edema.   Skin:     General: Skin is warm and dry.   Neurological:      Mental Status: She is alert and oriented to person, place, and time.         Procedures           ED Course      My interpretation of EKG shows sinus rhythm, rate of 62, left bundle branch block            HEART Score: 8                          Results for orders placed or performed during the hospital encounter of 01/17/25   Basic Metabolic Panel    Collection Time: 01/17/25  1:47 PM    Specimen: Blood   Result Value Ref Range    Glucose 107 (H) 65 - 99 mg/dL    BUN 35 (H) 8 - 23 mg/dL    Creatinine 1.13 (H) 0.57 - 1.00 mg/dL    Sodium 143 136 - 145 mmol/L    Potassium 3.7 3.5 - 5.2 mmol/L    Chloride 106 98 - 107 mmol/L    CO2 26.6 22.0 - 29.0 mmol/L    Calcium 9.0 8.6 - 10.5 mg/dL    BUN/Creatinine Ratio 31.0 (H) 7.0 - 25.0    Anion Gap 10.4 5.0 - 15.0 mmol/L    eGFR 50.2 (L) >60.0 mL/min/1.73   High Sensitivity Troponin T    Collection Time: 01/17/25  1:47 PM    Specimen: Blood   Result Value Ref Range    HS Troponin T 147 (C) <14 ng/L   ECG 12 Lead Dyspnea    Collection Time: 01/17/25  3:22 PM   Result Value Ref " Range    QT Interval 437 ms    QTC Interval 443 ms   Protime-INR    Collection Time: 01/17/25  4:20 PM    Specimen: Blood   Result Value Ref Range    Protime 12.8 11.7 - 14.2 Seconds    INR 0.95 0.90 - 1.10   aPTT    Collection Time: 01/17/25  4:20 PM    Specimen: Blood   Result Value Ref Range    PTT 25.0 22.7 - 35.4 seconds   CBC Auto Differential    Collection Time: 01/17/25  4:20 PM    Specimen: Blood   Result Value Ref Range    WBC 16.59 (H) 3.40 - 10.80 10*3/mm3    RBC 3.72 (L) 3.77 - 5.28 10*6/mm3    Hemoglobin 10.3 (L) 12.0 - 15.9 g/dL    Hematocrit 35.0 34.0 - 46.6 %    MCV 94.1 79.0 - 97.0 fL    MCH 27.7 26.6 - 33.0 pg    MCHC 29.4 (L) 31.5 - 35.7 g/dL    RDW 12.6 12.3 - 15.4 %    RDW-SD 42.9 37.0 - 54.0 fl    MPV 11.0 6.0 - 12.0 fL    Platelets 359 140 - 450 10*3/mm3    Neutrophil % 83.5 (H) 42.7 - 76.0 %    Lymphocyte % 6.9 (L) 19.6 - 45.3 %    Monocyte % 8.0 5.0 - 12.0 %    Eosinophil % 0.7 0.3 - 6.2 %    Basophil % 0.1 0.0 - 1.5 %    Immature Grans % 0.8 (H) 0.0 - 0.5 %    Neutrophils, Absolute 13.85 (H) 1.70 - 7.00 10*3/mm3    Lymphocytes, Absolute 1.14 0.70 - 3.10 10*3/mm3    Monocytes, Absolute 1.33 (H) 0.10 - 0.90 10*3/mm3    Eosinophils, Absolute 0.11 0.00 - 0.40 10*3/mm3    Basophils, Absolute 0.02 0.00 - 0.20 10*3/mm3    Immature Grans, Absolute 0.14 (H) 0.00 - 0.05 10*3/mm3    nRBC 0.0 0.0 - 0.2 /100 WBC   High Sensitivity Troponin T 1Hr    Collection Time: 01/17/25  6:02 PM    Specimen: Blood   Result Value Ref Range    HS Troponin T 166 (C) <14 ng/L    Troponin T Numeric Delta 19 ng/L    Troponin T % Delta 13 Abnormal if >/= 20%     XR Chest 1 View    Result Date: 1/17/2025  Impression: 1.Prominence of the interstitial markings. I cannot exclude pulmonary vascular congestion or atypical infection. 2.Emphysematous changes best demonstrated in the right upper lobe. 3.T6 compression fracture which is likely either acute or subacute in age. It was not apparent on the CT of the chest from  11/19/2024. There are no additional compression fractures in the thoracic spine. 4.Additional incidental findings as noted above. Electronically Signed: Mikey Simpson MD  1/17/2025 5:02 PM EST  Workstation ID: [id number]    XR Spine Thoracic 3 View    Result Date: 1/17/2025  Impression: 1.Prominence of the interstitial markings. I cannot exclude pulmonary vascular congestion or atypical infection. 2.Emphysematous changes best demonstrated in the right upper lobe. 3.T6 compression fracture which is likely either acute or subacute in age. It was not apparent on the CT of the chest from 11/19/2024. There are no additional compression fractures in the thoracic spine. 4.Additional incidental findings as noted above. Electronically Signed: Mikey Simpson MD  1/17/2025 5:02 PM EST  Workstation ID: [id number]                 Medical Decision Making    Patient had the above valuation.  Results were discussed with the patient.  White blood cell count is elevated at 16.59 however patient has been taking steroids.  No evidence of infection.  BMP is unremarkable.  Troponin is elevated at 147.  EKG shows no obvious acute ischemia.  Patient was given some GoNitro with no improvement in her pain.  She was then given morphine.  My interpretation of chest x-ray shows no acute infiltrate or effusion.  X-ray of the thoracic spine does show a T6 compression fracture which is likely either acute or subacute.  I discussed with the provider on-call for the hospitalist and patient will be admitted for further evaluation, including cardiology and neurosurgery consults.      Final diagnoses:   Chest pain, unspecified type   Elevated troponin   Compression fracture of T6 vertebra, initial encounter       ED Disposition  ED Disposition       ED Disposition   Decision to Admit    Condition   --    Comment   Level of Care: Telemetry [5]   Admitting Physician: KHUSHBU VILLAR [[id number]]   Attending Physician: KHUSHBU VILLAR [[id number]]                 No  follow-up provider specified.       Medication List      No changes were made to your prescriptions during this visit.            Loco Billy MD  01/17/25 [id number]

## 2025-01-18 ENCOUNTER — APPOINTMENT (OUTPATIENT)
Dept: MRI IMAGING | Facility: HOSPITAL | Age: 80
End: 2025-01-18
Payer: MEDICARE

## 2025-01-18 ENCOUNTER — APPOINTMENT (OUTPATIENT)
Dept: CARDIOLOGY | Facility: HOSPITAL | Age: 80
End: 2025-01-18
Payer: MEDICARE

## 2025-01-18 PROBLEM — S22.050A CLOSED WEDGE COMPRESSION FRACTURE OF T6 VERTEBRA: Status: ACTIVE | Noted: 2025-01-18

## 2025-01-18 LAB
AORTIC DIMENSIONLESS INDEX: 0.52 (DI)
APTT PPP: 27.1 SECONDS (ref 22.7–35.4)
APTT PPP: 59.8 SECONDS (ref 22.7–35.4)
AV MEAN PRESS GRAD SYS DOP V1V2: 8 MMHG
AV VMAX SYS DOP: 213 CM/SEC
BH CV ECHO MEAS - ACS: 1.6 CM
BH CV ECHO MEAS - AO MAX PG: 18.1 MMHG
BH CV ECHO MEAS - AO V2 VTI: 38.4 CM
BH CV ECHO MEAS - AVA(I,D): 1.87 CM2
BH CV ECHO MEAS - EDV(CUBED): 166.4 ML
BH CV ECHO MEAS - EDV(MOD-SP4): 121 ML
BH CV ECHO MEAS - EF(MOD-SP4): 49.6 %
BH CV ECHO MEAS - ESV(CUBED): 74.1 ML
BH CV ECHO MEAS - ESV(MOD-SP4): 61 ML
BH CV ECHO MEAS - FS: 23.6 %
BH CV ECHO MEAS - IVS/LVPW: 0.82 CM
BH CV ECHO MEAS - IVSD: 0.9 CM
BH CV ECHO MEAS - LA DIMENSION: 3.7 CM
BH CV ECHO MEAS - LAT PEAK E' VEL: 9.7 CM/SEC
BH CV ECHO MEAS - LV DIASTOLIC VOL/BSA (35-75): 63.4 CM2
BH CV ECHO MEAS - LV MASS(C)D: 213.2 GRAMS
BH CV ECHO MEAS - LV MAX PG: 4.9 MMHG
BH CV ECHO MEAS - LV MEAN PG: 2 MMHG
BH CV ECHO MEAS - LV SYSTOLIC VOL/BSA (12-30): 32 CM2
BH CV ECHO MEAS - LV V1 MAX: 111 CM/SEC
BH CV ECHO MEAS - LV V1 VTI: 22.8 CM
BH CV ECHO MEAS - LVIDD: 5.5 CM
BH CV ECHO MEAS - LVIDS: 4.2 CM
BH CV ECHO MEAS - LVOT AREA: 3.1 CM2
BH CV ECHO MEAS - LVOT DIAM: 2 CM
BH CV ECHO MEAS - LVPWD: 1.1 CM
BH CV ECHO MEAS - MED PEAK E' VEL: 7.3 CM/SEC
BH CV ECHO MEAS - MR MAX PG: 8 MMHG
BH CV ECHO MEAS - MR MAX VEL: 141 CM/SEC
BH CV ECHO MEAS - MR MEAN PG: 3 MMHG
BH CV ECHO MEAS - MR MEAN VEL: 82.9 CM/SEC
BH CV ECHO MEAS - MR VTI: 42.6 CM
BH CV ECHO MEAS - MV A MAX VEL: 116 CM/SEC
BH CV ECHO MEAS - MV DEC TIME: 0.23 SEC
BH CV ECHO MEAS - MV E MAX VEL: 89.2 CM/SEC
BH CV ECHO MEAS - MV E/A: 0.77
BH CV ECHO MEAS - PA ACC TIME: 0.05 SEC
BH CV ECHO MEAS - PA V2 MAX: 215 CM/SEC
BH CV ECHO MEAS - RAP SYSTOLE: 3 MMHG
BH CV ECHO MEAS - RV MAX PG: 8.6 MMHG
BH CV ECHO MEAS - RV V1 MAX: 147 CM/SEC
BH CV ECHO MEAS - RV V1 VTI: 23.7 CM
BH CV ECHO MEAS - SV(LVOT): 71.6 ML
BH CV ECHO MEAS - SV(MOD-SP4): 60 ML
BH CV ECHO MEAS - SVI(LVOT): 37.5 ML/M2
BH CV ECHO MEAS - SVI(MOD-SP4): 31.4 ML/M2
BH CV ECHO MEAS - TAPSE (>1.6): 1.65 CM
BH CV ECHO MEASUREMENTS AVERAGE E/E' RATIO: 10.49
BH CV XLRA - TDI S': 16.8 CM/SEC
LV EF BIPLANE MOD: 50 %
MAGNESIUM SERPL-MCNC: 1.3 MG/DL (ref 1.6–2.4)
NT-PROBNP SERPL-MCNC: ABNORMAL PG/ML (ref 0–1800)
QT INTERVAL: 415 MS
QT INTERVAL: 437 MS
QTC INTERVAL: 443 MS
QTC INTERVAL: 470 MS
SINUS: 2.8 CM
STJ: 2.2 CM
TSH SERPL DL<=0.05 MIU/L-ACNC: 0.9 UIU/ML (ref 0.27–4.2)

## 2025-01-18 PROCEDURE — 85730 THROMBOPLASTIN TIME PARTIAL: CPT | Performed by: STUDENT IN AN ORGANIZED HEALTH CARE EDUCATION/TRAINING PROGRAM

## 2025-01-18 PROCEDURE — 93306 TTE W/DOPPLER COMPLETE: CPT | Performed by: INTERNAL MEDICINE

## 2025-01-18 PROCEDURE — 25010000002 HEPARIN (PORCINE) 25000-0.45 UT/250ML-% SOLUTION: Performed by: STUDENT IN AN ORGANIZED HEALTH CARE EDUCATION/TRAINING PROGRAM

## 2025-01-18 PROCEDURE — 94640 AIRWAY INHALATION TREATMENT: CPT

## 2025-01-18 PROCEDURE — 25010000002 MORPHINE PER 10 MG: Performed by: STUDENT IN AN ORGANIZED HEALTH CARE EDUCATION/TRAINING PROGRAM

## 2025-01-18 PROCEDURE — 72157 MRI CHEST SPINE W/O & W/DYE: CPT

## 2025-01-18 PROCEDURE — 93010 ELECTROCARDIOGRAM REPORT: CPT | Performed by: INTERNAL MEDICINE

## 2025-01-18 PROCEDURE — 99222 1ST HOSP IP/OBS MODERATE 55: CPT | Performed by: INTERNAL MEDICINE

## 2025-01-18 PROCEDURE — 83880 ASSAY OF NATRIURETIC PEPTIDE: CPT | Performed by: INTERNAL MEDICINE

## 2025-01-18 PROCEDURE — 99221 1ST HOSP IP/OBS SF/LOW 40: CPT | Performed by: NURSE PRACTITIONER

## 2025-01-18 PROCEDURE — 94799 UNLISTED PULMONARY SVC/PX: CPT

## 2025-01-18 PROCEDURE — 93005 ELECTROCARDIOGRAM TRACING: CPT | Performed by: STUDENT IN AN ORGANIZED HEALTH CARE EDUCATION/TRAINING PROGRAM

## 2025-01-18 PROCEDURE — A9579 GAD-BASE MR CONTRAST NOS,1ML: HCPCS | Performed by: INTERNAL MEDICINE

## 2025-01-18 PROCEDURE — 94664 DEMO&/EVAL PT USE INHALER: CPT

## 2025-01-18 PROCEDURE — 25010000002 GADOTERIDOL PER 1 ML: Performed by: INTERNAL MEDICINE

## 2025-01-18 PROCEDURE — 93306 TTE W/DOPPLER COMPLETE: CPT

## 2025-01-18 PROCEDURE — 25010000002 FUROSEMIDE PER 20 MG: Performed by: INTERNAL MEDICINE

## 2025-01-18 RX ORDER — PAROXETINE 20 MG/1
20 TABLET, FILM COATED ORAL DAILY
Status: DISCONTINUED | OUTPATIENT
Start: 2025-01-18 | End: 2025-01-21 | Stop reason: HOSPADM

## 2025-01-18 RX ORDER — SIMETHICONE 80 MG
80 TABLET,CHEWABLE ORAL 4 TIMES DAILY PRN
Status: DISCONTINUED | OUTPATIENT
Start: 2025-01-18 | End: 2025-01-21 | Stop reason: HOSPADM

## 2025-01-18 RX ORDER — LISINOPRIL 20 MG/1
40 TABLET ORAL DAILY
Status: DISCONTINUED | OUTPATIENT
Start: 2025-01-18 | End: 2025-01-21 | Stop reason: HOSPADM

## 2025-01-18 RX ORDER — ASPIRIN 81 MG/1
81 TABLET ORAL DAILY
Status: DISCONTINUED | OUTPATIENT
Start: 2025-01-19 | End: 2025-01-21 | Stop reason: HOSPADM

## 2025-01-18 RX ORDER — LIDOCAINE 4 G/G
1 PATCH TOPICAL
Status: DISCONTINUED | OUTPATIENT
Start: 2025-01-18 | End: 2025-01-18

## 2025-01-18 RX ORDER — POTASSIUM CHLORIDE 1500 MG/1
40 TABLET, EXTENDED RELEASE ORAL ONCE
Status: COMPLETED | OUTPATIENT
Start: 2025-01-18 | End: 2025-01-18

## 2025-01-18 RX ORDER — PAROXETINE 20 MG/1
20 TABLET, FILM COATED ORAL DAILY
Status: CANCELLED | OUTPATIENT
Start: 2025-01-18

## 2025-01-18 RX ORDER — METOPROLOL SUCCINATE 25 MG/1
25 TABLET, EXTENDED RELEASE ORAL DAILY
Status: DISCONTINUED | OUTPATIENT
Start: 2025-01-18 | End: 2025-01-21 | Stop reason: HOSPADM

## 2025-01-18 RX ORDER — ASPIRIN 81 MG/1
162 TABLET ORAL ONCE
Status: COMPLETED | OUTPATIENT
Start: 2025-01-18 | End: 2025-01-18

## 2025-01-18 RX ORDER — ATORVASTATIN CALCIUM 10 MG/1
10 TABLET, FILM COATED ORAL DAILY
Status: DISCONTINUED | OUTPATIENT
Start: 2025-01-18 | End: 2025-01-21 | Stop reason: HOSPADM

## 2025-01-18 RX ORDER — ALUMINA, MAGNESIA, AND SIMETHICONE 2400; 2400; 240 MG/30ML; MG/30ML; MG/30ML
15 SUSPENSION ORAL EVERY 6 HOURS PRN
Status: DISCONTINUED | OUTPATIENT
Start: 2025-01-18 | End: 2025-01-21 | Stop reason: HOSPADM

## 2025-01-18 RX ORDER — METHOCARBAMOL 500 MG/1
250 TABLET, FILM COATED ORAL EVERY 8 HOURS PRN
Status: DISCONTINUED | OUTPATIENT
Start: 2025-01-18 | End: 2025-01-21 | Stop reason: HOSPADM

## 2025-01-18 RX ORDER — IPRATROPIUM BROMIDE AND ALBUTEROL SULFATE 2.5; .5 MG/3ML; MG/3ML
3 SOLUTION RESPIRATORY (INHALATION)
Status: DISCONTINUED | OUTPATIENT
Start: 2025-01-18 | End: 2025-01-21 | Stop reason: HOSPADM

## 2025-01-18 RX ORDER — FUROSEMIDE 10 MG/ML
40 INJECTION INTRAMUSCULAR; INTRAVENOUS 2 TIMES DAILY
Status: DISCONTINUED | OUTPATIENT
Start: 2025-01-18 | End: 2025-01-19

## 2025-01-18 RX ORDER — MAGNESIUM SULFATE HEPTAHYDRATE 40 MG/ML
2 INJECTION, SOLUTION INTRAVENOUS ONCE
Status: DISCONTINUED | OUTPATIENT
Start: 2025-01-18 | End: 2025-01-18

## 2025-01-18 RX ADMIN — OXYCODONE 5 MG: 5 TABLET ORAL at 09:29

## 2025-01-18 RX ADMIN — FUROSEMIDE 40 MG: 10 INJECTION, SOLUTION INTRAMUSCULAR; INTRAVENOUS at 22:01

## 2025-01-18 RX ADMIN — ASPIRIN 162 MG: 81 TABLET, COATED ORAL at 05:23

## 2025-01-18 RX ADMIN — GADOTERIDOL 16 ML: 279.3 INJECTION, SOLUTION INTRAVENOUS at 16:51

## 2025-01-18 RX ADMIN — METHOCARBAMOL TABLETS 250 MG: 500 TABLET, COATED ORAL at 20:17

## 2025-01-18 RX ADMIN — Medication 10 ML: at 09:31

## 2025-01-18 RX ADMIN — MORPHINE SULFATE 2 MG: 2 INJECTION, SOLUTION INTRAMUSCULAR; INTRAVENOUS at 22:01

## 2025-01-18 RX ADMIN — METOPROLOL SUCCINATE 25 MG: 25 TABLET, EXTENDED RELEASE ORAL at 09:29

## 2025-01-18 RX ADMIN — SIMETHICONE 80 MG: 80 TABLET, CHEWABLE ORAL at 00:20

## 2025-01-18 RX ADMIN — METHOCARBAMOL TABLETS 250 MG: 500 TABLET, COATED ORAL at 12:17

## 2025-01-18 RX ADMIN — OXYCODONE 5 MG: 5 TABLET ORAL at 15:43

## 2025-01-18 RX ADMIN — Medication 10 ML: at 20:18

## 2025-01-18 RX ADMIN — LISINOPRIL 40 MG: 20 TABLET ORAL at 09:29

## 2025-01-18 RX ADMIN — MORPHINE SULFATE 2 MG: 2 INJECTION, SOLUTION INTRAMUSCULAR; INTRAVENOUS at 07:00

## 2025-01-18 RX ADMIN — IPRATROPIUM BROMIDE AND ALBUTEROL SULFATE 3 ML: .5; 3 SOLUTION RESPIRATORY (INHALATION) at 19:01

## 2025-01-18 RX ADMIN — MORPHINE SULFATE 2 MG: 2 INJECTION, SOLUTION INTRAMUSCULAR; INTRAVENOUS at 00:20

## 2025-01-18 RX ADMIN — POTASSIUM CHLORIDE 40 MEQ: 1500 TABLET, EXTENDED RELEASE ORAL at 09:33

## 2025-01-18 RX ADMIN — LIDOCAINE 1 PATCH: 4 PATCH TOPICAL at 20:17

## 2025-01-18 RX ADMIN — MUPIROCIN 1 APPLICATION: 20 OINTMENT TOPICAL at 20:17

## 2025-01-18 RX ADMIN — PAROXETINE HYDROCHLORIDE 20 MG: 20 TABLET, FILM COATED ORAL at 14:30

## 2025-01-18 RX ADMIN — MUPIROCIN 1 APPLICATION: 20 OINTMENT TOPICAL at 09:29

## 2025-01-18 RX ADMIN — ATORVASTATIN CALCIUM 10 MG: 10 TABLET ORAL at 09:29

## 2025-01-18 RX ADMIN — OXYCODONE 5 MG: 5 TABLET ORAL at 03:28

## 2025-01-18 RX ADMIN — HEPARIN SODIUM 12 UNITS/KG/HR: 10000 INJECTION, SOLUTION INTRAVENOUS at 00:08

## 2025-01-18 RX ADMIN — MORPHINE SULFATE 2 MG: 2 INJECTION, SOLUTION INTRAMUSCULAR; INTRAVENOUS at 18:06

## 2025-01-18 RX ADMIN — OXYCODONE 5 MG: 5 TABLET ORAL at 22:01

## 2025-01-18 RX ADMIN — MAGNESIUM OXIDE TAB 400 MG (241.3 MG ELEMENTAL MG) 400 MG: 400 (241.3 MG) TAB at 12:19

## 2025-01-18 NOTE — ED NOTES
Nursing report ED to floor  Krystyna Bennett  79 y.o.  female    HPI:   Chief Complaint   Patient presents with    Chest Pain     Patient c/o CP starting today.        Admitting doctor:   Abundio Benoit MD    Admitting diagnosis:   The primary encounter diagnosis was Chest pain, unspecified type. Diagnoses of Elevated troponin and Compression fracture of T6 vertebra, initial encounter were also pertinent to this visit.    Code status:   Current Code Status       Date Active Code Status Order ID Comments User Context       Prior            Allergies:   Latex and Sulfa antibiotics    Isolation:  No active isolations     Fall Risk:  Fall Risk Assessment was completed, and patient is at moderate risk for falls.   Predictive Model Details         7 (Low) Factor Value    Calculated 1/17/2025 19:11 Age 79    Risk of Fall Model Active Peripheral IV Present     Imaging order in this encounter Present     Magnesium not on file     Dustin Scale not on file     Diastolic BP 79     Calcium 9 mg/dL     Creatinine 1.13 mg/dL     Number of Distinct Medication Classes administered 1     Cardiac Assessment X     Albumin not on file     Gastrointestinal Assessment X     Respiratory Rate 16     Total Bilirubin not on file     Days after Admission 0.151     Chloride 106 mmol/L     Tobacco Use Quit     Potassium 3.7 mmol/L     ALT not on file         Weight:       01/17/25  1531   Weight: 78.9 kg (174 lb)       Intake and Output  No intake or output data in the 24 hours ending 01/17/25 1918    Diet:        Most recent vitals:   Vitals:    01/17/25 1631 01/17/25 1747 01/17/25 1833 01/17/25 1902   BP: 142/67 150/74 114/62 147/79   BP Location:       Patient Position:       Pulse: 66 65 66 70   Resp: 17 16  16   Temp:       TempSrc:       SpO2: 97% 97%  93%   Weight:       Height:           Active LDAs/IV Access:   Lines, Drains & Airways       Active LDAs       Name Placement date Placement time Site Days    Peripheral IV 01/17/25 7025  Right Antecubital 01/17/25  1607  Antecubital  less than 1                    Skin Condition:   Skin Assessments (last day)       None             Labs (abnormal labs have a star):   Labs Reviewed   BASIC METABOLIC PANEL - Abnormal; Notable for the following components:       Result Value    Glucose 107 (*)     BUN 35 (*)     Creatinine 1.13 (*)     BUN/Creatinine Ratio 31.0 (*)     eGFR 50.2 (*)     All other components within normal limits    Narrative:     GFR Categories in Chronic Kidney Disease (CKD)      GFR Category          GFR (mL/min/1.73)    Interpretation  G1                     90 or greater         Normal or high (1)  G2                      60-89                Mild decrease (1)  G3a                   45-59                Mild to moderate decrease  G3b                   30-44                Moderate to severe decrease  G4                    15-29                Severe decrease  G5                    14 or less           Kidney failure          (1)In the absence of evidence of kidney disease, neither GFR category G1 or G2 fulfill the criteria for CKD.    eGFR calculation 2021 CKD-EPI creatinine equation, which does not include race as a factor   TROPONIN - Abnormal; Notable for the following components:    HS Troponin T 147 (*)     All other components within normal limits    Narrative:     High Sensitive Troponin T Reference Range:  <14.0 ng/L- Negative Female for AMI  <22.0 ng/L- Negative Male for AMI  >=14 - Abnormal Female indicating possible myocardial injury.  >=22 - Abnormal Male indicating possible myocardial injury.   Clinicians would have to utilize clinical acumen, EKG, Troponin, and serial changes to determine if it is an Acute Myocardial Infarction or myocardial injury due to an underlying chronic condition.        CBC WITH AUTO DIFFERENTIAL - Abnormal; Notable for the following components:    WBC 16.59 (*)     RBC 3.72 (*)     Hemoglobin 10.3 (*)     MCHC 29.4 (*)     Neutrophil % 83.5  (*)     Lymphocyte % 6.9 (*)     Immature Grans % 0.8 (*)     Neutrophils, Absolute 13.85 (*)     Monocytes, Absolute 1.33 (*)     Immature Grans, Absolute 0.14 (*)     All other components within normal limits   HIGH SENSITIVITIY TROPONIN T 1HR - Abnormal; Notable for the following components:    HS Troponin T 166 (*)     All other components within normal limits    Narrative:     High Sensitive Troponin T Reference Range:  <14.0 ng/L- Negative Female for AMI  <22.0 ng/L- Negative Male for AMI  >=14 - Abnormal Female indicating possible myocardial injury.  >=22 - Abnormal Male indicating possible myocardial injury.   Clinicians would have to utilize clinical acumen, EKG, Troponin, and serial changes to determine if it is an Acute Myocardial Infarction or myocardial injury due to an underlying chronic condition.        PROTIME-INR - Normal   APTT - Normal   CBC AND DIFFERENTIAL    Narrative:     The following orders were created for panel order CBC & Differential.  Procedure                               Abnormality         Status                     ---------                               -----------         ------                     CBC Auto Differential[[id number]]        Abnormal            Final result                 Please view results for these tests on the individual orders.       LOC: Person, Place, Time, and Situation    Telemetry:  Telemetry    Cardiac Monitoring Ordered: yes    EKG:   ECG 12 Lead Dyspnea   Preliminary Result   HEART RATE=62  bpm   RR Babrsdov=652  ms   [place] Qmccfuvj=885  ms   P Horizontal Axis=10  deg   P Front Axis=74  deg   QRSD Pbcxbnqz=345  ms   QT Xyaiastz=657  ms   LBnB=799  ms   QRS Axis=-55  deg   T Wave Axis=91  deg   - ABNORMAL ECG -   Sinus rhythm   Left bundle branch block   Date and Time of Study:2025-01-17 15:22:40          Medications Given in the ED:   Medications   sodium chloride 0.9 % flush 10 mL (has no administration in time range)   nitroglycerin (NITROSTAT) SL  tablet 0.4 mg (0.4 mg Sublingual Given 25 1833)       Imaging results:  XR Chest 1 View    Result Date: 2025  Impression: 1.Prominence of the interstitial markings. I cannot exclude pulmonary vascular congestion or atypical infection. 2.Emphysematous changes best demonstrated in the right upper lobe. 3.T6 compression fracture which is likely either acute or subacute in age. It was not apparent on the CT of the chest from 2024. There are no additional compression fractures in the thoracic spine. 4.Additional incidental findings as noted above. Electronically Signed: Mikey Simpson MD  2025 5:02 PM EST  Workstation ID: [id number]    XR Spine Thoracic 3 View    Result Date: 2025  Impression: 1.Prominence of the interstitial markings. I cannot exclude pulmonary vascular congestion or atypical infection. 2.Emphysematous changes best demonstrated in the right upper lobe. 3.T6 compression fracture which is likely either acute or subacute in age. It was not apparent on the CT of the chest from 2024. There are no additional compression fractures in the thoracic spine. 4.Additional incidental findings as noted above. Electronically Signed: Mikey Simpson MD  2025 5:02 PM EST  Workstation ID: [id number]     Social issues:   Social History     Socioeconomic History    Marital status:    Tobacco Use    Smoking status: Former     Current packs/day: 0.00     Average packs/day: 0.5 packs/day for 58.6 years (29.3 ttl pk-yrs)     Types: Cigarettes, Cigars     Start date: 1960     Quit date: 2019     Years since quittin.5     Passive exposure: Past    Smokeless tobacco: Never   Vaping Use    Vaping status: Never Used   Substance and Sexual Activity    Alcohol use: Yes     Alcohol/week: 4.0 standard drinks of alcohol     Types: 2 Glasses of wine, 2 Drinks containing 0.5 oz of alcohol per week     Comment: social drinker    Drug use: No    Sexual activity: Not Currently     Partners: Male      Birth control/protection: Post-menopausal       NIH Stroke Scale:  Interval: (not recorded)  1a. Level of Consciousness: (not recorded)  1b. LOC Questions: (not recorded)  1c. LOC Commands: (not recorded)  2. Best Gaze: (not recorded)  3. Visual: (not recorded)  4. Facial Palsy: (not recorded)  5a. Motor Arm, Left: (not recorded)  5b. Motor Arm, Right: (not recorded)  6a. Motor Leg, Left: (not recorded)  6b. Motor Leg, Right: (not recorded)  7. Limb Ataxia: (not recorded)  8. Sensory: (not recorded)  9. Best Language: (not recorded)  10. Dysarthria: (not recorded)  11. Extinction and Inattention (formerly Neglect): (not recorded)    Total (NIH Stroke Scale): (not recorded)     Additional notable assessment information:     Nursing report ED to floor:  DOROTA Lee RN   01/17/25 19:18 EST

## 2025-01-18 NOTE — PLAN OF CARE
Goal Outcome Evaluation:  Plan of Care Reviewed With: patient           Outcome Evaluation: Pt arrived from ER with complaints of back pain. Much better with PRN pain medications and lidocaine patch. Elevated troponins - heparin drip and cardiology consulted. Resting well.
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
## 2025-01-18 NOTE — H&P
"[facility] Medicine Services  History & Physical    Patient Name: Krystyna Bennett  : 1945  MRN: [id number]  Primary Care Physician:  Mireya Kapoor MD  Date of admission: 2025  Date and Time of Service: 2025 at 2100    Subjective      Chief Complaint: \"back and chest pain\"    History of Present Illness: Krystyna Bennett is a 79 y.o. female with a PMH of CLL, COPD, chronic hypoxic respiratory failure uses 4 L at baseline, chronic anemia, CLL, Hypogammaglobulinemia who presented to [facility] on 2025 with mid back pain and chest pain. Patient states she believes she pulled a back muscle around 3 weeks ago while trying to scrub her bath tub. She was placed on steroids by her PCP and had been slowly improving however it got worse today and had some chest pain as well, predominantly on her right chest. Patient states it felt as though her body \"was splitting in two parts\". Had some nausea and diaphoresis. Still has some discomfort. Denies worsening of her SOB, fever, chills, orthopnea, pedal edema. In ED her EKG showed LBBB, initial troponin is 147, patient received SL NTG with no improvement in her chest pain. X ray thoracic spine showed T6 compression fracture.       Review of Systems   Constitutional:  Positive for fatigue. Negative for fever.   Respiratory:  Positive for cough and shortness of breath.    Cardiovascular:  Positive for chest pain. Negative for leg swelling.   Gastrointestinal:  Negative for abdominal pain.   Genitourinary:  Negative for dysuria.   Musculoskeletal:  Positive for back pain.   Skin:  Negative for pallor.   Neurological:  Negative for headaches.   Psychiatric/Behavioral:  Negative for confusion.        Personal History     Past Medical History:   Diagnosis Date    Acute bacterial bronchitis 2019    Anemia 2019    Arthritis 2019    Asthma     Breast injury     right side bruised after running into truck mirror    Chest pain " 12/23/2023    Chronic obstructive pulmonary disease 06/04/2019    CLL (chronic lymphocytic leukemia) 07/05/2019    GERD (gastroesophageal reflux disease) 07/05/2019    History of Clostridium difficile colitis 01/08/2018    Hypertension     Hypokalemia 07/05/2019    Lymphocytosis 01/08/2018    Melanoma 01/08/2018    Moderate persistent asthma without complication 07/05/2019    Panlobular emphysema 07/05/2019    Pneumonia 11/21/22    Stage 3b chronic kidney disease 12/19/2019    Dr Silva    Systolic CHF, chronic 07/05/2019       Past Surgical History:   Procedure Laterality Date    APPENDECTOMY      BRONCHOSCOPY N/A 12/25/2023    Procedure: BRONCHOSCOPY with bilateral lung wash;  Surgeon: Zuly Magallon MD;  Location: [facility] ENDOSCOPY;  Service: Pulmonary;  Laterality: N/A;  Post- hemoptysis    CARDIAC CATHETERIZATION  05/2019    [facility].    CARDIAC CATHETERIZATION Right 11/25/2022    Procedure: Coronary angiography;  Surgeon: Andrea Philippe MD;  Location: [facility] CATH INVASIVE LOCATION;  Service: Cardiovascular;  Laterality: Right;    HYSTERECTOMY      TONSILLECTOMY         Family History: family history includes Alcohol abuse in her maternal aunt; Anemia in her paternal grandmother; Asthma in her maternal grandmother; Cancer in her maternal aunt and paternal grandmother; Diabetes in her paternal aunt; Heart disease in her father and paternal grandmother; Heart failure in her father; Hyperlipidemia in her maternal grandmother; Hypertension in her daughter, maternal grandmother, and son; Leukemia in her paternal grandmother; Stroke in her father. Otherwise pertinent FHx was reviewed and not pertinent to current issue.    Social History:  reports that she quit smoking about 5 years ago. Her smoking use included cigarettes and cigars. She started smoking about 64 years ago. She has a 29.3 pack-year smoking history. She has been exposed to tobacco smoke. She has never used smokeless tobacco. She reports current alcohol use of  about 4.0 standard drinks of alcohol per week. She reports that she does not use drugs.    Home Medications:  Prior to Admission Medications       Prescriptions Last Dose Informant Patient Reported? Taking?    Boswellia-Glucosamine-Vit D (OSTEO BI-FLEX ONE PER DAY PO)   Yes Yes    Take  by mouth.    Calcium Carbonate-Vit D-Min (CALTRATE 600+D PLUS MINERALS) 600-800 MG-UNIT chewable tablet   Yes Yes    Chew 2 tablets Daily. Indications: suppliment    Calquence 100 MG tablet 1/17/2025  Yes Yes    Take 1 tablet by mouth 2 (Two) Times a Day.    Coenzyme Q10 (COQ10 PO)   Yes Yes    Take 1 tablet by mouth Daily. Indications: suppliment     Cranberry 450 MG capsule   Yes Yes    Take  by mouth.    Cyanocobalamin (B-12) 1000 MCG capsule   Yes Yes    Take 1,000 mcg by mouth Daily. Indications: Inadequate Vitamin B12    diphenhydrAMINE (BENADRYL) 25 mg capsule   Yes Yes    Take 1 capsule by mouth Every 6 (Six) Hours As Needed for Itching.    esomeprazole (nexIUM) 40 MG capsule   Yes Yes    Take 1 capsule by mouth Every Morning Before Breakfast. Indications: Heartburn    Fluticasone-Umeclidin-Vilant (Trelegy Ellipta) 100-62.5-25 MCG/ACT inhaler   No Yes    Inhale 1 puff Daily. Indications: Chronic Obstructive Lung Disease    Glucosamine-Chondroitin 250-200 MG tablet   Yes Yes    Take 1 tablet by mouth Daily. Indications: suppliment    guaiFENesin (MUCINEX) 600 MG 12 hr tablet   Yes Yes    Take 2 tablets by mouth 2 (Two) Times a Day.    hydroCHLOROthiazide 25 MG tablet   No Yes    TAKE 1 TABLET BY MOUTH DAILY    lisinopril (PRINIVIL,ZESTRIL) 40 MG tablet   No Yes    TAKE 1 TABLET BY MOUTH DAILY    metoprolol succinate XL (TOPROL-XL) 25 MG 24 hr tablet   No Yes    TAKE 1 TABLET BY MOUTH DAILY    montelukast (SINGULAIR) 10 MG tablet   No Yes    Take 1 tablet by mouth Every Night.    Multiple Vitamins-Minerals (CENTRUM SILVER) tablet   Yes Yes    Take 1 tablet by mouth Daily. Indications: suppliment    nystatin (MYCOSTATIN) 406393  UNIT/GM powder   No Yes    APPLY TO AFFECTED AREA(S) THREE TIMES A DAY    Omega-3 Fatty Acids (fish oil) 1000 MG capsule capsule   Yes Yes    Take 2 capsules by mouth Daily. Indications: suppliment    PARoxetine (PAXIL) 20 MG tablet   No Yes    TAKE 1 TABLET BY MOUTH DAILY    potassium chloride (KLOR-CON M10) 10 MEQ CR tablet   No Yes    Take 1 tablet by mouth Daily. Indications: Low Amount of Potassium in the Blood    pramipexole (MIRAPEX) 0.25 MG tablet   No Yes    TAKE ONE TABLET BY MOUTH DAILY    predniSONE (DELTASONE) 10 MG tablet 1/16/2025  No Yes    Take 1 tablet by mouth Take As Directed for 15 days. 5 tab x 1day, 4 tab x 2 day, 3 tab x 3 day, 2 tab x 4 day, 1 tab x 5 day    simvastatin (ZOCOR) 20 MG tablet   No Yes    Take 1 tablet by mouth Daily.    albuterol sulfate  (90 Base) MCG/ACT inhaler   No No    Inhale 2 puffs Every 4 (Four) Hours As Needed for Wheezing. Indications: Spasm of Lung Air Passages    hydrOXYzine (ATARAX) 10 MG tablet   No No    Take 1 tablet by mouth 3 (Three) Times a Day As Needed for Itching.    ipratropium-albuterol (DUO-NEB) 0.5-2.5 mg/3 ml nebulizer   No No    Take 3 mL by nebulization Every 4 (Four) Hours As Needed for Wheezing.    nystatin (MYCOSTATIN) 495015 UNIT/GM cream   No No    Apply 1 Application topically to the appropriate area as directed 2 (Two) Times a Day.    O2 (OXYGEN)   Yes No    Inhale 4 L/min Continuous. Indications: Hypoxia    traMADol (ULTRAM) 50 MG tablet   No No    1 tablet every 8-12 hrs as needed.    triamcinolone (KENALOG) 0.1 % ointment   No No    Apply 1 Application topically to the appropriate area as directed 2 (Two) Times a Day.              Allergies:  Allergies   Allergen Reactions    Latex Rash    Sulfa Antibiotics Other (See Comments)     Tunnel vision,light headed/ may not be allergic to it any longer        Objective      Vitals:   Temp:  [97.4 °F (36.3 °C)-97.6 °F (36.4 °C)] 97.6 °F (36.4 °C)  Heart Rate:  [59-71] 71  Resp:  [16-17]  16  BP: (114-153)/(62-79) 153/72  Body mass index is 29 kg/m².  Physical Exam  Constitutional:       General: She is not in acute distress.     Appearance: Normal appearance. She is not toxic-appearing.   HENT:      Head: Normocephalic.      Nose: Nose normal.      Mouth/Throat:      Mouth: Mucous membranes are dry.   Eyes:      Extraocular Movements: Extraocular movements intact.      Pupils: Pupils are equal, round, and reactive to light.   Cardiovascular:      Rate and Rhythm: Normal rate.      Pulses: Normal pulses.   Pulmonary:      Effort: Pulmonary effort is normal. No respiratory distress.      Breath sounds: No wheezing.   Chest:      Chest wall: Tenderness present.   Abdominal:      General: Abdomen is flat. Bowel sounds are normal. There is no distension.      Palpations: Abdomen is soft.   Musculoskeletal:      Cervical back: Neck supple.   Skin:     General: Skin is warm.      Capillary Refill: Capillary refill takes less than 2 seconds.   Neurological:      General: No focal deficit present.      Mental Status: She is alert and oriented to person, place, and time. Mental status is at baseline.      Motor: No weakness.      Gait: Gait normal.   Psychiatric:         Behavior: Behavior normal.         Diagnostic Data:  Lab Results (last 24 hours)       Procedure Component Value Units Date/Time    High Sensitivity Troponin T [[id number]]  (Abnormal) Collected: 01/17/25 2036    Specimen: Blood Updated: 01/17/25 2103     HS Troponin T 181 ng/L     Narrative:      High Sensitive Troponin T Reference Range:  <14.0 ng/L- Negative Female for AMI  <22.0 ng/L- Negative Male for AMI  >=14 - Abnormal Female indicating possible myocardial injury.  >=22 - Abnormal Male indicating possible myocardial injury.   Clinicians would have to utilize clinical acumen, EKG, Troponin, and serial changes to determine if it is an Acute Myocardial Infarction or myocardial injury due to an underlying chronic condition.         High  Sensitivity Troponin T 1Hr [[id number]]  (Abnormal) Collected: 01/17/25 1802    Specimen: Blood Updated: 01/17/25 1831     HS Troponin T 166 ng/L      Troponin T Numeric Delta 19 ng/L      Troponin T % Delta 13    Narrative:      High Sensitive Troponin T Reference Range:  <14.0 ng/L- Negative Female for AMI  <22.0 ng/L- Negative Male for AMI  >=14 - Abnormal Female indicating possible myocardial injury.  >=22 - Abnormal Male indicating possible myocardial injury.   Clinicians would have to utilize clinical acumen, EKG, Troponin, and serial changes to determine if it is an Acute Myocardial Infarction or myocardial injury due to an underlying chronic condition.         High Sensitivity Troponin T [[id number]]  (Abnormal) Collected: 01/17/25 1347    Specimen: Blood Updated: 01/17/25 1747     HS Troponin T 147 ng/L     Narrative:      High Sensitive Troponin T Reference Range:  <14.0 ng/L- Negative Female for AMI  <22.0 ng/L- Negative Male for AMI  >=14 - Abnormal Female indicating possible myocardial injury.  >=22 - Abnormal Male indicating possible myocardial injury.   Clinicians would have to utilize clinical acumen, EKG, Troponin, and serial changes to determine if it is an Acute Myocardial Infarction or myocardial injury due to an underlying chronic condition.         Basic Metabolic Panel [[id number]]  (Abnormal) Collected: 01/17/25 1347    Specimen: Blood Updated: 01/17/25 1739     Glucose 107 mg/dL      BUN 35 mg/dL      Creatinine 1.13 mg/dL      Sodium 143 mmol/L      Potassium 3.7 mmol/L      Chloride 106 mmol/L      CO2 26.6 mmol/L      Calcium 9.0 mg/dL      BUN/Creatinine Ratio 31.0     Anion Gap 10.4 mmol/L      eGFR 50.2 mL/min/1.73     Narrative:      GFR Categories in Chronic Kidney Disease (CKD)      GFR Category          GFR (mL/min/1.73)    Interpretation  G1                     90 or greater         Normal or high (1)  G2                      60-89                Mild decrease (1)  G3a                    45-59                Mild to moderate decrease  G3b                   30-44                Moderate to severe decrease  G4                    15-29                Severe decrease  G5                    14 or less           Kidney failure          (1)In the absence of evidence of kidney disease, neither GFR category G1 or G2 fulfill the criteria for CKD.    eGFR calculation 2021 CKD-EPI creatinine equation, which does not include race as a factor    Protime-INR [[id number]]  (Normal) Collected: 01/17/25 1620    Specimen: Blood Updated: 01/17/25 1632     Protime 12.8 Seconds      INR 0.95    aPTT [[id number]]  (Normal) Collected: 01/17/25 1620    Specimen: Blood Updated: 01/17/25 1632     PTT 25.0 seconds     CBC & Differential [[id number]]  (Abnormal) Collected: 01/17/25 1620    Specimen: Blood Updated: 01/17/25 1624    Narrative:      The following orders were created for panel order CBC & Differential.  Procedure                               Abnormality         Status                     ---------                               -----------         ------                     CBC Auto Differential[[id number]]        Abnormal            Final result                 Please view results for these tests on the individual orders.    CBC Auto Differential [[id number]]  (Abnormal) Collected: 01/17/25 1620    Specimen: Blood Updated: 01/17/25 1624     WBC 16.59 10*3/mm3      RBC 3.72 10*6/mm3      Hemoglobin 10.3 g/dL      Hematocrit 35.0 %      MCV 94.1 fL      MCH 27.7 pg      MCHC 29.4 g/dL      RDW 12.6 %      RDW-SD 42.9 fl      MPV 11.0 fL      Platelets 359 10*3/mm3      Neutrophil % 83.5 %      Lymphocyte % 6.9 %      Monocyte % 8.0 %      Eosinophil % 0.7 %      Basophil % 0.1 %      Immature Grans % 0.8 %      Neutrophils, Absolute 13.85 10*3/mm3      Lymphocytes, Absolute 1.14 10*3/mm3      Monocytes, Absolute 1.33 10*3/mm3      Eosinophils, Absolute 0.11 10*3/mm3      Basophils, Absolute 0.02 10*3/mm3      Immature  Grans, Absolute 0.14 10*3/mm3      nRBC 0.0 /100 WBC              Imaging Results (Last 24 Hours)       Procedure Component Value Units Date/Time    XR Chest 1 View [[id number]] Collected: 01/17/25 1657     Updated: 01/17/25 1704    Narrative:      XR CHEST 1 VW, XR SPINE THORACIC 3 VW    Date of Exam: 1/17/2025 4:39 PM EST    Indication: Chest and back pain.    Comparison: Chest x-ray performed on 12/31/2023 and a CT of the chest performed on 11/19/2024..    Findings:  Chest: The heart size is normal. There is prominence of the interstitial markings. I cannot exclude pulmonary vascular congestion or atypical infection. There are emphysematous changes best demonstrated in the right upper lobe. There is no pleural   effusion or pneumothorax. There are chronic age-related changes involving the bony thorax.    Thoracic spine: There is a T6 compression fracture. It was not apparent on the CT of the chest from 11/19/2024 indicating that it is likely either acute or subacute in age. There are no additional compression fractures. The disc spaces are   well-maintained. There is mild endplate spurring in the lower thoracic spine indicating spondylosis. There are degenerative changes in the lower cervical spine. The pedicles and posterior ribs are within range of normal.      Impression:      Impression:  1.Prominence of the interstitial markings. I cannot exclude pulmonary vascular congestion or atypical infection.  2.Emphysematous changes best demonstrated in the right upper lobe.  3.T6 compression fracture which is likely either acute or subacute in age. It was not apparent on the CT of the chest from 11/19/2024. There are no additional compression fractures in the thoracic spine.  4.Additional incidental findings as noted above.        Electronically Signed: Mikey Simpson MD    1/17/2025 5:02 PM EST    Workstation ID: [id number]    XR Spine Thoracic 3 View [[id number]] Collected: 01/17/25 1657     Updated: 01/17/25 1704     Narrative:      XR CHEST 1 VW, XR SPINE THORACIC 3 VW    Date of Exam: 1/17/2025 4:39 PM EST    Indication: Chest and back pain.    Comparison: Chest x-ray performed on 12/31/2023 and a CT of the chest performed on 11/19/2024..    Findings:  Chest: The heart size is normal. There is prominence of the interstitial markings. I cannot exclude pulmonary vascular congestion or atypical infection. There are emphysematous changes best demonstrated in the right upper lobe. There is no pleural   effusion or pneumothorax. There are chronic age-related changes involving the bony thorax.    Thoracic spine: There is a T6 compression fracture. It was not apparent on the CT of the chest from 11/19/2024 indicating that it is likely either acute or subacute in age. There are no additional compression fractures. The disc spaces are   well-maintained. There is mild endplate spurring in the lower thoracic spine indicating spondylosis. There are degenerative changes in the lower cervical spine. The pedicles and posterior ribs are within range of normal.      Impression:      Impression:  1.Prominence of the interstitial markings. I cannot exclude pulmonary vascular congestion or atypical infection.  2.Emphysematous changes best demonstrated in the right upper lobe.  3.T6 compression fracture which is likely either acute or subacute in age. It was not apparent on the CT of the chest from 11/19/2024. There are no additional compression fractures in the thoracic spine.  4.Additional incidental findings as noted above.        Electronically Signed: Mikey Simpson MD    1/17/2025 5:02 PM EST    Workstation ID: [id number]              Assessment & Plan        This is a 79 y.o. female with:    Active and Resolved Problems  Active Hospital Problems    Diagnosis  POA    **Elevated troponin [R79.89]  Yes      Resolved Hospital Problems   No resolved problems to display.       Chest pain, rule out ACS  Elevated troponin  Initial troponin 147, repeat  166-->181  Chest pain seems non cardiac, however plaque rupture can not be completely ruled out  Started on heparin drip till evaluated by Cardiology  S/P 162 mg once ASA  Obtaining ECHO, check wall motion  ACS to be ruled out with serial enzymes and EKG  Continue telemetry monitoring  Cardiology consultation       T6 compression fracture  Apply lidocaine patch for topical analgesia  Oxycodone 5 mg q 6 for moderate pain as needed, morphine PRN for severe pain  PT/OT evaluation  Fall precautions      History of CLL  Leukocytosis  Monitor CBC  No evidence of infection, holding on antibiotics  Outpatient follow up with primary hematologist      History of COPD, stable, not in exacerbation  Chronic hypoxic respiratory failure, at baseline on 4 L NC.   Bronchodilators as needed        VTE Prophylaxis:  Pharmacologic VTE prophylaxis orders are present.        The patient desires to be as follows:    CODE STATUS:    Code Status (Patient has no pulse and is not breathing): CPR (Attempt to Resuscitate)  Medical Interventions (Patient has pulse or is breathing): Full Support        Kirk Ramirez, who can be contacted at , is the designated person to make medical decisions on the patient's behalf if She is incapable of doing so. This was clarified with patient and/or next of kin on 1/17/2025 during the course of this H&P.    Admission Status:  I believe this patient meets inpatient status.    Expected Length of Stay: 2-4    PDMP and Medication Dispenses via Sidebar reviewed and consistent with patient reported medications.    I discussed the patient's findings and my recommendations with patient and nursing staff.      Signature:     This document has been electronically signed by Carlos Llanes Alvarez, MD on January 17, 2025 21:42 [facility] Hospitalist Team

## 2025-01-19 LAB
ANION GAP SERPL CALCULATED.3IONS-SCNC: 11.1 MMOL/L (ref 5–15)
BUN SERPL-MCNC: 25 MG/DL (ref 8–23)
BUN/CREAT SERPL: 22.5 (ref 7–25)
CALCIUM SPEC-SCNC: 9.5 MG/DL (ref 8.6–10.5)
CHLORIDE SERPL-SCNC: 98 MMOL/L (ref 98–107)
CO2 SERPL-SCNC: 29.9 MMOL/L (ref 22–29)
CREAT SERPL-MCNC: 1.11 MG/DL (ref 0.57–1)
DEPRECATED RDW RBC AUTO: 44.5 FL (ref 37–54)
EGFRCR SERPLBLD CKD-EPI 2021: 51.3 ML/MIN/1.73
ERYTHROCYTE [DISTWIDTH] IN BLOOD BY AUTOMATED COUNT: 13 % (ref 12.3–15.4)
GLUCOSE SERPL-MCNC: 91 MG/DL (ref 65–99)
HCT VFR BLD AUTO: 33.8 % (ref 34–46.6)
HGB BLD-MCNC: 10 G/DL (ref 12–15.9)
MAGNESIUM SERPL-MCNC: 1.4 MG/DL (ref 1.6–2.4)
MCH RBC QN AUTO: 28.2 PG (ref 26.6–33)
MCHC RBC AUTO-ENTMCNC: 29.6 G/DL (ref 31.5–35.7)
MCV RBC AUTO: 95.2 FL (ref 79–97)
PLATELET # BLD AUTO: 264 10*3/MM3 (ref 140–450)
PMV BLD AUTO: 11.1 FL (ref 6–12)
POTASSIUM SERPL-SCNC: 4.4 MMOL/L (ref 3.5–5.2)
RBC # BLD AUTO: 3.55 10*6/MM3 (ref 3.77–5.28)
SODIUM SERPL-SCNC: 139 MMOL/L (ref 136–145)
WBC NRBC COR # BLD AUTO: 8.97 10*3/MM3 (ref 3.4–10.8)

## 2025-01-19 PROCEDURE — 80048 BASIC METABOLIC PNL TOTAL CA: CPT | Performed by: INTERNAL MEDICINE

## 2025-01-19 PROCEDURE — 94761 N-INVAS EAR/PLS OXIMETRY MLT: CPT

## 2025-01-19 PROCEDURE — 99231 SBSQ HOSP IP/OBS SF/LOW 25: CPT | Performed by: NURSE PRACTITIONER

## 2025-01-19 PROCEDURE — 99233 SBSQ HOSP IP/OBS HIGH 50: CPT | Performed by: INTERNAL MEDICINE

## 2025-01-19 PROCEDURE — 97162 PT EVAL MOD COMPLEX 30 MIN: CPT

## 2025-01-19 PROCEDURE — 94664 DEMO&/EVAL PT USE INHALER: CPT

## 2025-01-19 PROCEDURE — 83735 ASSAY OF MAGNESIUM: CPT | Performed by: NURSE PRACTITIONER

## 2025-01-19 PROCEDURE — 85027 COMPLETE CBC AUTOMATED: CPT | Performed by: INTERNAL MEDICINE

## 2025-01-19 PROCEDURE — 25010000002 MAGNESIUM SULFATE 2 GM/50ML SOLUTION: Performed by: INTERNAL MEDICINE

## 2025-01-19 PROCEDURE — 94799 UNLISTED PULMONARY SVC/PX: CPT

## 2025-01-19 RX ORDER — FUROSEMIDE 40 MG/1
40 TABLET ORAL DAILY
Status: DISCONTINUED | OUTPATIENT
Start: 2025-01-20 | End: 2025-01-21 | Stop reason: HOSPADM

## 2025-01-19 RX ORDER — MAGNESIUM SULFATE HEPTAHYDRATE 40 MG/ML
2 INJECTION, SOLUTION INTRAVENOUS
Status: COMPLETED | OUTPATIENT
Start: 2025-01-19 | End: 2025-01-19

## 2025-01-19 RX ADMIN — OXYCODONE 5 MG: 5 TABLET ORAL at 17:13

## 2025-01-19 RX ADMIN — LIDOCAINE 1 PATCH: 4 PATCH TOPICAL at 20:12

## 2025-01-19 RX ADMIN — IPRATROPIUM BROMIDE AND ALBUTEROL SULFATE 3 ML: .5; 3 SOLUTION RESPIRATORY (INHALATION) at 18:49

## 2025-01-19 RX ADMIN — ASPIRIN 81 MG: 81 TABLET, COATED ORAL at 08:44

## 2025-01-19 RX ADMIN — OXYCODONE 5 MG: 5 TABLET ORAL at 23:13

## 2025-01-19 RX ADMIN — OXYCODONE 5 MG: 5 TABLET ORAL at 08:50

## 2025-01-19 RX ADMIN — MAGNESIUM SULFATE HEPTAHYDRATE 2 G: 40 INJECTION, SOLUTION INTRAVENOUS at 12:05

## 2025-01-19 RX ADMIN — MUPIROCIN 1 APPLICATION: 20 OINTMENT TOPICAL at 08:44

## 2025-01-19 RX ADMIN — METHOCARBAMOL TABLETS 250 MG: 500 TABLET, COATED ORAL at 04:40

## 2025-01-19 RX ADMIN — IPRATROPIUM BROMIDE AND ALBUTEROL SULFATE 3 ML: .5; 3 SOLUTION RESPIRATORY (INHALATION) at 07:00

## 2025-01-19 RX ADMIN — IPRATROPIUM BROMIDE AND ALBUTEROL SULFATE 3 ML: .5; 3 SOLUTION RESPIRATORY (INHALATION) at 00:10

## 2025-01-19 RX ADMIN — METHOCARBAMOL TABLETS 250 MG: 500 TABLET, COATED ORAL at 20:14

## 2025-01-19 RX ADMIN — ALUMINUM HYDROXIDE, MAGNESIUM HYDROXIDE, AND DIMETHICONE 15 ML: 400; 400; 40 SUSPENSION ORAL at 20:22

## 2025-01-19 RX ADMIN — Medication 10 ML: at 20:12

## 2025-01-19 RX ADMIN — MAGNESIUM SULFATE HEPTAHYDRATE 2 G: 40 INJECTION, SOLUTION INTRAVENOUS at 16:28

## 2025-01-19 RX ADMIN — MAGNESIUM OXIDE TAB 400 MG (241.3 MG ELEMENTAL MG) 400 MG: 400 (241.3 MG) TAB at 08:44

## 2025-01-19 RX ADMIN — MUPIROCIN 1 APPLICATION: 20 OINTMENT TOPICAL at 20:12

## 2025-01-19 RX ADMIN — MAGNESIUM SULFATE HEPTAHYDRATE 2 G: 40 INJECTION, SOLUTION INTRAVENOUS at 13:41

## 2025-01-19 RX ADMIN — ACETAMINOPHEN 650 MG: 325 TABLET, FILM COATED ORAL at 13:44

## 2025-01-19 RX ADMIN — ATORVASTATIN CALCIUM 10 MG: 10 TABLET ORAL at 08:44

## 2025-01-19 RX ADMIN — METHOCARBAMOL TABLETS 250 MG: 500 TABLET, COATED ORAL at 12:05

## 2025-01-19 RX ADMIN — PAROXETINE HYDROCHLORIDE 20 MG: 20 TABLET, FILM COATED ORAL at 08:44

## 2025-01-19 RX ADMIN — METOPROLOL SUCCINATE 25 MG: 25 TABLET, EXTENDED RELEASE ORAL at 08:43

## 2025-01-19 RX ADMIN — Medication 10 ML: at 08:44

## 2025-01-19 RX ADMIN — LISINOPRIL 40 MG: 20 TABLET ORAL at 08:44

## 2025-01-19 NOTE — PLAN OF CARE
Goal Outcome Evaluation:  Plan of Care Reviewed With: patient        Progress: improving  Outcome Evaluation: Pt pain more controlled and able to rest with muscle relaxer, 4L NC, vitals stable.

## 2025-01-19 NOTE — PROGRESS NOTES
NEUROSURGERY PROGRESS NOTE     LOS: 2 days   Patient Care Team:  Mireya Kapoor MD as PCP - Family Medicine  Dyana Green MD as Consulting Physician (Nephrology)  Andrea Philippe MD as Consulting Physician (Cardiology)    Chief Complaint:  back pain and chest pain    Subjective     Interval History:     NAEO, back pain better with muscle relaxer. Cardiology with no plans for ischemic work up.    While in the room and during my examination of the patient I wore a mask and eye protection.  I washed my hands before and after this patient encounter.  The patient was also wearing a mask.     History taken from: patient chart    Objective      Vital Signs  Temp:  [97.6 °F (36.4 °C)-98.4 °F (36.9 °C)] 97.8 °F (36.6 °C)  Heart Rate:  [57-99] 68  Resp:  [14-23] 16  BP: ()/(42-62) 103/62  Body mass index is 28.89 kg/m².    Intake/Output last 3 shifts:  I/O last 3 completed shifts:  In: 1248 [P.O.:1200; I.V.:48]  Out: 1750 [Urine:1750]    Intake/Output this shift:  I/O this shift:  In: -   Out: 100 [Urine:100]    Physical    General:  Awake, alert, and oriented x 3. NAD  Motor:   Normal muscle strength, bulk and tone in upper and lower extremities.  No fasciculations, rigidity, spasticity, or abnormal movements.  Sensation:  Normal to light touch,   Focal tenderness midline thoracic spine     Results Review:     I reviewed the patient's new clinical results.  I reviewed the patient's new imaging results and agree with the interpretation.    Labs:    Lab Results (last 24 hours)       Procedure Component Value Units Date/Time    BNP [[id number]]  (Abnormal) Collected: 01/18/25 0959    Specimen: Blood from Arm, Right Updated: 01/18/25 1029     proBNP 15,997.0 pg/mL     Narrative:      This assay is used as an aid in the diagnosis of individuals suspected of having heart failure. It can be used as an aid in the diagnosis of acute decompensated heart failure (ADHF) in patients presenting with signs and  symptoms of ADHF to the emergency department (ED). In addition, NT-proBNP of <300 pg/mL indicates ADHF is not likely.    Age Range Result Interpretation  NT-proBNP Concentration (pg/mL:      <50             Positive            >450                   Gray                 300-450                    Negative             <300    50-75           Positive            >900                  Gray                300-900                  Negative            <300      >75             Positive            >1800                  Gray                300-1800                  Negative            <300    Basic Metabolic Panel [[id number]]  (Abnormal) Collected: 01/19/25 0457    Specimen: Blood Updated: 01/19/25 0527     Glucose 91 mg/dL      BUN 25 mg/dL      Creatinine 1.11 mg/dL      Sodium 139 mmol/L      Potassium 4.4 mmol/L      Comment: Specimen hemolyzed.  Result may be falsely elevated.        Chloride 98 mmol/L      CO2 29.9 mmol/L      Calcium 9.5 mg/dL      BUN/Creatinine Ratio 22.5     Anion Gap 11.1 mmol/L      eGFR 51.3 mL/min/1.73     Narrative:      GFR Categories in Chronic Kidney Disease (CKD)      GFR Category          GFR (mL/min/1.73)    Interpretation  G1                     90 or greater         Normal or high (1)  G2                      60-89                Mild decrease (1)  G3a                   45-59                Mild to moderate decrease  G3b                   30-44                Moderate to severe decrease  G4                    15-29                Severe decrease  G5                    14 or less           Kidney failure          (1)In the absence of evidence of kidney disease, neither GFR category G1 or G2 fulfill the criteria for CKD.    eGFR calculation 2021 CKD-EPI creatinine equation, which does not include race as a factor    Magnesium [[id number]]  (Abnormal) Collected: 01/19/25 0457    Specimen: Blood Updated: 01/19/25 0906     Magnesium 1.4 mg/dL     CBC (No Diff) [[id number]]  (Abnormal)  Collected: 01/19/25 0857    Specimen: Blood Updated: 01/19/25 0904     WBC 8.97 10*3/mm3      RBC 3.55 10*6/mm3      Hemoglobin 10.0 g/dL      Hematocrit 33.8 %      MCV 95.2 fL      MCH 28.2 pg      MCHC 29.6 g/dL      RDW 13.0 %      RDW-SD 44.5 fl      MPV 11.1 fL      Platelets 264 10*3/mm3             Imaging:    I personally reviewed the images from the following radiographic studies.    MRI Thoracic spine     Acute appearing superior endplate fracture of T6 with moderate loss of vertebral height. No significant adjacent spinal canal narrowing noted. . While metastatic disease is difficult to entirely exclude, there is no evidence of extraosseous soft tissue involvement or discrete circumscribed underlying mass to further raise suspicion for metastasis. There is some additional heterogeneous enhancement seen at T3,   without evidence of fracture. Marrow signal at this level is otherwise preserved on remaining sequences and this is favored to represent an osseous hemangioma, with metastatic involvement again not entirely excluded but considered less likely.      Current Medications:   Scheduled Meds:aspirin, 81 mg, Oral, Daily  atorvastatin, 10 mg, Oral, Daily  [START ON 1/20/2025] furosemide, 40 mg, Oral, Daily  ipratropium-albuterol, 3 mL, Nebulization, Q6H - RT  Lidocaine, 1 patch, Transdermal, Nightly  lisinopril, 40 mg, Oral, Daily  magnesium oxide, 400 mg, Oral, Daily  magnesium sulfate, 2 g, Intravenous, Q2H  metoprolol succinate XL, 25 mg, Oral, Daily  mupirocin, 1 Application, Each Nare, BID  PARoxetine, 20 mg, Oral, Daily  sodium chloride, 10 mL, Intravenous, Q12H      Continuous Infusions:     Assessment & Plan       Elevated troponin    Closed wedge compression fracture of T6 vertebra      Assessment: Patient is a 79 year old female being followed for  T6 vertebral fracture. MRI reviewed and does show evidence for edema along superior endplate of T6 consistent with stable acute/subacute fracture.  Likely secondary to osteopenic changes, metastatic involvement less likely but can follow with serial imaging in 3 months if clinically warranted. Medical management with bracing and medication continues to be plan of care per neurosurgical standpoint for the fracture. Neurosurgery also recommending PT gait evaluation for pain tolerance with bracing. If patient unable to tolerate ambulation with brace on, could possibly consider kyphoplasty with pain management/IR. No neurosurgical interventional plans at this time.      I informed patient that typically vertebral compression fractures heal in approximately 12 weeks.  Patients with osteopenia/osteoporosis may take somewhat longer due to underlying bone density issues.  Pain typically subsides on the average of 4 to 6 weeks with a range of 2 to 12 weeks.  Stable vertebral compression fractures are treated initially with analgesics, muscle relaxers and restricting activity with progressive mobilization.  It is recommended that patient limit lifting, pushing, pulling of no more than 5 pounds, with no no bending or twisting.  No exertional impact activity-walking is okay.  Physical therapy will add value to patient's recovery.  And strongly encouraged.  A TLSO brace has been ordered due to diagnosis of T6 compression fracture.  The purpose of the brace is to support weak muscles, stabilize and restrict movement of the trunk to aid in healing and pain relief of (fracture/ postop).Wear brace when sitting higher than 45 degrees, standing, walking, and when riding in a car. I am recommending serial imaging in approximately 6 weeks to rule out progressive deformity. Out patient dexa scan also recommended to assess for worsening bone density.      Plan:      TLSO brace  Pain management per primary  F/u 6 weeks with serial imaging   Outpatient dexa scan        I discussed my findings with patient and Dr. Padilla who agrees with plan of care.        Brina Goss,  KAPIL  01/19/25  09:57 EST

## 2025-01-19 NOTE — PROGRESS NOTES
[facility] MEDICINE SERVICE  DAILY PROGRESS NOTE    NAME: Krystyna Bennett  : 1945  MRN: [id number]      LOS: 1 day     PROVIDER OF SERVICE: Melissa Sharpe MD    Chief Complaint: Elevated troponin    Subjective:     Interval History:  History taken from: patient    No new complaint        Review of Systems:   Review of Systems   All other systems reviewed and are negative.      Objective:     Vital Signs  Temp:  [97.6 °F (36.4 °C)-98.3 °F (36.8 °C)] 97.7 °F (36.5 °C)  Heart Rate:  [61-99] 77  Resp:  [14-21] 14  BP: ()/(40-68) 99/43  Flow (L/min) (Oxygen Therapy):  [4] 4   Body mass index is 28.89 kg/m².    Physical Exam  Physical Exam  Constitutional:       Appearance: Normal appearance.   HENT:      Head: Normocephalic and atraumatic.      Nose: Nose normal.      Mouth/Throat:      Mouth: Mucous membranes are moist.   Eyes:      Extraocular Movements: Extraocular movements intact.      Pupils: Pupils are equal, round, and reactive to light.   Cardiovascular:      Rate and Rhythm: Normal rate and regular rhythm.   Pulmonary:      Effort: Pulmonary effort is normal.      Breath sounds: Normal breath sounds.   Abdominal:      General: Abdomen is flat. Bowel sounds are normal.      Palpations: Abdomen is soft.   Musculoskeletal:         General: Normal range of motion.      Cervical back: Normal range of motion and neck supple.   Skin:     General: Skin is warm and dry.   Neurological:      General: No focal deficit present.      Mental Status: She is alert and oriented to person, place, and time.   Psychiatric:         Mood and Affect: Mood normal.         Behavior: Behavior normal.         Thought Content: Thought content normal.         Judgment: Judgment normal.         Current Medications:  Scheduled Meds:[START ON 2025] aspirin, 81 mg, Oral, Daily  atorvastatin, 10 mg, Oral, Daily  ipratropium-albuterol, 3 mL, Nebulization, Q6H - RT  Lidocaine, 1 patch, Transdermal,  Nightly  lisinopril, 40 mg, Oral, Daily  magnesium oxide, 400 mg, Oral, Daily  metoprolol succinate XL, 25 mg, Oral, Daily  mupirocin, 1 Application, Each Nare, BID  PARoxetine, 20 mg, Oral, Daily  sodium chloride, 10 mL, Intravenous, Q12H      Continuous Infusions:   PRN Meds:.  acetaminophen **OR** acetaminophen **OR** acetaminophen    aluminum-magnesium hydroxide-simethicone    senna-docusate sodium **AND** polyethylene glycol **AND** bisacodyl **AND** bisacodyl    Calcium Replacement - Follow Nurse / BPA Driven Protocol    Magnesium Standard Dose Replacement - Follow Nurse / BPA Driven Protocol    methocarbamol    Morphine    nitroglycerin    oxyCODONE    Phosphorus Replacement - Follow Nurse / BPA Driven Protocol    Potassium Replacement - Follow Nurse / BPA Driven Protocol    simethicone    [COMPLETED] Insert Peripheral IV **AND** sodium chloride    sodium chloride    sodium chloride       Diagnostic Data    Results from last 7 days   Lab Units 01/17/25  1620 01/17/25  1347   WBC 10*3/mm3 16.59*  --    HEMOGLOBIN g/dL 10.3*  --    HEMATOCRIT % 35.0  --    PLATELETS 10*3/mm3 359  --    GLUCOSE mg/dL  --  107*   CREATININE mg/dL  --  1.13*   BUN mg/dL  --  35*   SODIUM mmol/L  --  143   POTASSIUM mmol/L  --  3.7   ANION GAP mmol/L  --  10.4       XR Chest 1 View    Result Date: 1/17/2025  Impression: 1.Prominence of the interstitial markings. I cannot exclude pulmonary vascular congestion or atypical infection. 2.Emphysematous changes best demonstrated in the right upper lobe. 3.T6 compression fracture which is likely either acute or subacute in age. It was not apparent on the CT of the chest from 11/19/2024. There are no additional compression fractures in the thoracic spine. 4.Additional incidental findings as noted above. Electronically Signed: Mikey Simpson MD  1/17/2025 5:02 PM EST  Workstation ID: [id number]    XR Spine Thoracic 3 View    Result Date: 1/17/2025  Impression: 1.Prominence of the interstitial  markings. I cannot exclude pulmonary vascular congestion or atypical infection. 2.Emphysematous changes best demonstrated in the right upper lobe. 3.T6 compression fracture which is likely either acute or subacute in age. It was not apparent on the CT of the chest from 11/19/2024. There are no additional compression fractures in the thoracic spine. 4.Additional incidental findings as noted above. Electronically Signed: Mikey Simpson MD  1/17/2025 5:02 PM EST  Workstation ID: [id number]       I reviewed the patient's new clinical results.    Assessment/Plan:     Active and Resolved Problems  Active Hospital Problems    Diagnosis  POA    **Elevated troponin [R79.89]  Yes    Closed wedge compression fracture of T6 vertebra [S22.050A]  Unknown      Resolved Hospital Problems   No resolved problems to display.       Chest pain  - No complaints of chest pain currently.  Follow-up on echo.  Continue aspirin, nitrates, statin, beta-blocker.  Troponins trending upwards.  Cardiology following.  Patient may have benefited from heart cath pending cardiology recommendations especially in view of newly reduced EF of 36 to 40% on recent echo 1/18/2025.    Acute systolic chf exacerbation EF of 36-40%  Acute on chronic hypoxic respiratory failure on 4L O2 via NC  -Start IV Lasix, patient requiring 4 L of oxygen via nasal cannula , chest x-ray suggestive of pulmonary edema proBNP is greater than 15,000,.  Optimize medical management for CHF, strict I's and O's, daily weights, 2 g sodium diet.  Echo results reviewed.    Hypertension  -Blood pressure is controlled.  Continue current BP medication regimen.    Hyperlipidemia  -check lipid panel.  Continue statin therapy.    T6 compression fracture  - Continue as needed pain medications and muscle relaxants.  TLSO brace.  Has been seen by neurosurgery.    History of COPD  - Not in exacerbation.  Continue bronchodilators.    VTE Prophylaxis:  No VTE prophylaxis order currently exists.              Disposition Planning:     Barriers to Discharge: Pending clinical improvement  Anticipated Date of Discharge: 1/25/2025  Place of Discharge: Home      Code Status and Medical Interventions: CPR (Attempt to Resuscitate); Full Support   Ordered at: 01/17/25 1959     Code Status (Patient has no pulse and is not breathing):    CPR (Attempt to Resuscitate)     Medical Interventions (Patient has pulse or is breathing):    Full Support       Signature: Electronically signed by Melissa Sharpe MD, 01/18/25, 20:42 EST.  [facility] Hospitalist Team

## 2025-01-19 NOTE — PROGRESS NOTES
[facility] MEDICINE SERVICE  DAILY PROGRESS NOTE    NAME: Krystyna Bennett  : 1945  MRN: [id number]      LOS: 2 days     PROVIDER OF SERVICE: Melissa Sharpe MD    Chief Complaint: Elevated troponin    Subjective:     Interval History:  History taken from: patient    No new complaint        Review of Systems:   Review of Systems   All other systems reviewed and are negative.      Objective:     Vital Signs  Temp:  [97.6 °F (36.4 °C)-98.4 °F (36.9 °C)] 98 °F (36.7 °C)  Heart Rate:  [] 100  Resp:  [14-23] 18  BP: ()/(42-62) 117/46  Flow (L/min) (Oxygen Therapy):  [2-4] 4   Body mass index is 28.89 kg/m².    Physical Exam  Physical Exam  Constitutional:       Appearance: Normal appearance.   HENT:      Head: Normocephalic and atraumatic.      Nose: Nose normal.      Mouth/Throat:      Mouth: Mucous membranes are moist.   Eyes:      Extraocular Movements: Extraocular movements intact.      Pupils: Pupils are equal, round, and reactive to light.   Cardiovascular:      Rate and Rhythm: Normal rate and regular rhythm.   Pulmonary:      Effort: Pulmonary effort is normal.      Breath sounds: Normal breath sounds.   Abdominal:      General: Abdomen is flat. Bowel sounds are normal.      Palpations: Abdomen is soft.   Musculoskeletal:         General: Normal range of motion.      Cervical back: Normal range of motion and neck supple.   Skin:     General: Skin is warm and dry.   Neurological:      General: No focal deficit present.      Mental Status: She is alert and oriented to person, place, and time.   Psychiatric:         Mood and Affect: Mood normal.         Behavior: Behavior normal.         Thought Content: Thought content normal.         Judgment: Judgment normal.         Current Medications:  Scheduled Meds:aspirin, 81 mg, Oral, Daily  atorvastatin, 10 mg, Oral, Daily  [START ON 2025] furosemide, 40 mg, Oral, Daily  ipratropium-albuterol, 3 mL, Nebulization, Q6H - RT  Lidocaine, 1  patch, Transdermal, Nightly  lisinopril, 40 mg, Oral, Daily  magnesium oxide, 400 mg, Oral, Daily  magnesium sulfate, 2 g, Intravenous, Q2H  metoprolol succinate XL, 25 mg, Oral, Daily  mupirocin, 1 Application, Each Nare, BID  PARoxetine, 20 mg, Oral, Daily  sodium chloride, 10 mL, Intravenous, Q12H      Continuous Infusions:   PRN Meds:.  acetaminophen **OR** acetaminophen **OR** acetaminophen    aluminum-magnesium hydroxide-simethicone    senna-docusate sodium **AND** polyethylene glycol **AND** bisacodyl **AND** bisacodyl    Calcium Replacement - Follow Nurse / BPA Driven Protocol    Magnesium Standard Dose Replacement - Follow Nurse / BPA Driven Protocol    methocarbamol    Morphine    nitroglycerin    oxyCODONE    Phosphorus Replacement - Follow Nurse / BPA Driven Protocol    Potassium Replacement - Follow Nurse / BPA Driven Protocol    simethicone    [COMPLETED] Insert Peripheral IV **AND** sodium chloride    sodium chloride    sodium chloride       Diagnostic Data    Results from last 7 days   Lab Units 01/19/25  0857 01/19/25  0457   WBC 10*3/mm3 8.97  --    HEMOGLOBIN g/dL 10.0*  --    HEMATOCRIT % 33.8*  --    PLATELETS 10*3/mm3 264  --    GLUCOSE mg/dL  --  91   CREATININE mg/dL  --  1.11*   BUN mg/dL  --  25*   SODIUM mmol/L  --  139   POTASSIUM mmol/L  --  4.4   ANION GAP mmol/L  --  11.1       MRI Thoracic Spine With & Without Contrast    Result Date: 1/19/2025  Impression: Compression fracture at T6 appears acute, with associated marrow edema and enhancement. There is approximate 40% height loss and some mild bony retropulsion of the superior endplate, without significant adjacent spinal canal narrowing. While metastatic disease is difficult to entirely exclude, there is no evidence of extraosseous soft tissue involvement or discrete circumscribed underlying mass to further raise suspicion for metastasis. There is some additional heterogeneous enhancement seen at T3, without evidence of fracture.  Marrow signal at this level is otherwise preserved on remaining sequences and this is favored to represent an osseous hemangioma, with metastatic involvement again not entirely excluded but considered less likely. Electronically Signed: Owen Easley MD  1/19/2025 8:53 AM EST  Workstation ID: [id number]    XR Chest 1 View    Result Date: 1/17/2025  Impression: 1.Prominence of the interstitial markings. I cannot exclude pulmonary vascular congestion or atypical infection. 2.Emphysematous changes best demonstrated in the right upper lobe. 3.T6 compression fracture which is likely either acute or subacute in age. It was not apparent on the CT of the chest from 11/19/2024. There are no additional compression fractures in the thoracic spine. 4.Additional incidental findings as noted above. Electronically Signed: Mikey Simpson MD  1/17/2025 5:02 PM EST  Workstation ID: [id number]    XR Spine Thoracic 3 View    Result Date: 1/17/2025  Impression: 1.Prominence of the interstitial markings. I cannot exclude pulmonary vascular congestion or atypical infection. 2.Emphysematous changes best demonstrated in the right upper lobe. 3.T6 compression fracture which is likely either acute or subacute in age. It was not apparent on the CT of the chest from 11/19/2024. There are no additional compression fractures in the thoracic spine. 4.Additional incidental findings as noted above. Electronically Signed: Mikey Simpson MD  1/17/2025 5:02 PM EST  Workstation ID: [id number]       I reviewed the patient's new clinical results.    Assessment/Plan:     Active and Resolved Problems  Active Hospital Problems    Diagnosis  POA    **Elevated troponin [R79.89]  Yes    Closed wedge compression fracture of T6 vertebra [S22.050A]  Unknown      Resolved Hospital Problems   No resolved problems to display.       Chest pain  - No complaints of chest pain currently.  Follow-up on echo.  Continue aspirin, nitrates, statin, beta-blocker.  Troponins trending  upwards.  Cardiology following.  Patient may have benefited from heart cath pending cardiology recommendations especially in view of newly reduced EF of 36 to 40% on recent echo 1/18/2025 however patient apparently refused cardiac cath per nurse at the bedside and so cardiology is managing medically.      Acute systolic chf exacerbation EF of 36-40%  Acute on chronic hypoxic respiratory failure on 4L O2 via NC  -Start IV Lasix, patient requiring 4 L of oxygen via nasal cannula , chest x-ray suggestive of pulmonary edema proBNP is greater than 15,000,.  Optimize medical management for CHF, strict I's and O's, daily weights, 2 g sodium diet.  Echo results reviewed.    Hypertension  -Blood pressure is controlled.  Continue current BP medication regimen.    Hyperlipidemia  -check lipid panel.  Continue statin therapy.    T6 compression fracture  - Continue as needed pain medications and muscle relaxants.  TLSO brace.  Has been seen by neurosurgery.    History of COPD  - Not in exacerbation.  Continue bronchodilators.    VTE Prophylaxis:  No VTE prophylaxis order currently exists.             Disposition Planning:     Barriers to Discharge: Pending clinical improvement  Anticipated Date of Discharge: 1/25/2025  Place of Discharge: Home      Code Status and Medical Interventions: CPR (Attempt to Resuscitate); Full Support   Ordered at: 01/17/25 1959     Code Status (Patient has no pulse and is not breathing):    CPR (Attempt to Resuscitate)     Medical Interventions (Patient has pulse or is breathing):    Full Support       Signature: Electronically signed by Melissa Sharpe MD, 01/19/25, 16:04 EST.  [facility] Hospitalist Team

## 2025-01-19 NOTE — PROGRESS NOTES
Referring Provider: Melissa Sharpe MD    Reason for follow-up: Chest pain, elevated troponin     Patient Care Team:  Mireya Kapoor MD as PCP - Family Medicine  Dyana Green MD as Consulting Physician (Nephrology)  Andrea Philippe MD as Consulting Physician (Cardiology)      SUBJECTIVE  Resting in bed complains of severe back pain.     ROS  Review of all systems negative except as indicated.    Since I have last seen, the patient has been without any chest discomfort, shortness of breath, palpitations, dizziness or syncope.  Denies having any headache, abdominal pain, nausea, vomiting, diarrhea, constipation, loss of weight or loss of appetite.  Denies having any excessive bruising, hematuria or blood in the stool.        Personal History:    Past Medical History:   Diagnosis Date    Acute bacterial bronchitis 07/05/2019    Anemia 07/05/2019    Arthritis 07/05/2019    Asthma     Breast injury     right side bruised after running into truck mirror    Chest pain 12/23/2023    Chronic obstructive pulmonary disease 06/04/2019    CLL (chronic lymphocytic leukemia) 07/05/2019    GERD (gastroesophageal reflux disease) 07/05/2019    History of Clostridium difficile colitis 01/08/2018    Hypertension     Hypokalemia 07/05/2019    Lymphocytosis 01/08/2018    Melanoma 01/08/2018    Moderate persistent asthma without complication 07/05/2019    Panlobular emphysema 07/05/2019    Pneumonia 11/21/22    Stage 3b chronic kidney disease 12/19/2019    Dr Silva    Systolic CHF, chronic 07/05/2019       Past Surgical History:   Procedure Laterality Date    APPENDECTOMY      BRONCHOSCOPY N/A 12/25/2023    Procedure: BRONCHOSCOPY with bilateral lung wash;  Surgeon: Zuly Magallon MD;  Location: [facility] ENDOSCOPY;  Service: Pulmonary;  Laterality: N/A;  Post- hemoptysis    CARDIAC CATHETERIZATION  05/2019    [facility].    CARDIAC CATHETERIZATION Right 11/25/2022    Procedure: Coronary angiography;  Surgeon: Andrea Philippe,  MD;  Location: [facility] INVASIVE LOCATION;  Service: Cardiovascular;  Laterality: Right;    HYSTERECTOMY      TONSILLECTOMY         Family History   Problem Relation Age of Onset    Heart disease Father             Stroke Father     Heart failure Father     Leukemia Paternal Grandmother     Anemia Paternal Grandmother     Heart disease Paternal Grandmother     Cancer Paternal Grandmother         leukemia    Alcohol abuse Maternal Aunt     Cancer Maternal Aunt         stomach    Asthma Maternal Grandmother             Hyperlipidemia Maternal Grandmother             Hypertension Maternal Grandmother     Diabetes Paternal Aunt             Hypertension Son     Hypertension Daughter        Social History     Tobacco Use    Smoking status: Former     Current packs/day: 0.00     Average packs/day: 0.5 packs/day for 58.6 years (29.3 ttl pk-yrs)     Types: Cigarettes, Cigars     Start date: 1960     Quit date: 2019     Years since quittin.5     Passive exposure: Past    Smokeless tobacco: Never   Vaping Use    Vaping status: Never Used   Substance Use Topics    Alcohol use: Yes     Alcohol/week: 4.0 standard drinks of alcohol     Types: 2 Glasses of wine, 2 Drinks containing 0.5 oz of alcohol per week     Comment: social drinker    Drug use: No        Home meds:  Prior to Admission medications    Medication Sig Start Date End Date Taking? Authorizing Provider   Boswellia-Glucosamine-Vit D (OSTEO BI-FLEX ONE PER DAY PO) Take  by mouth.   Yes Isaiah Moss MD   Calcium Carbonate-Vit D-Min (CALTRATE 600+D PLUS MINERALS) 600-800 MG-UNIT chewable tablet Chew 2 tablets Daily. Indications: suppliment 19  Yes Isaiah Moss MD   Calquence 100 MG tablet Take 1 tablet by mouth 2 (Two) Times a Day. 24  Yes Isaiah Moss MD   Coenzyme Q10 (COQ10 PO) Take 1 tablet by mouth Daily. Indications: suppliment  24  Yes Isaiah Moss MD   Cranberry 450  MG capsule Take  by mouth.   Yes Isaiah Moss MD   Cyanocobalamin (B-12) 1000 MCG capsule Take 1,000 mcg by mouth Daily. Indications: Inadequate Vitamin B12   Yes Isaiah Moss MD   diphenhydrAMINE (BENADRYL) 25 mg capsule Take 1 capsule by mouth Every 6 (Six) Hours As Needed for Itching.   Yes Isaiah Moss MD   esomeprazole (nexIUM) 40 MG capsule Take 1 capsule by mouth Every Morning Before Breakfast. Indications: Heartburn 6/4/19  Yes Isaiah Moss MD   Fluticasone-Umeclidin-Vilant (Trelegy Ellipta) 100-62.5-25 MCG/ACT inhaler Inhale 1 puff Daily. Indications: Chronic Obstructive Lung Disease 11/6/24  Yes Zuly Magallon MD   Glucosamine-Chondroitin 250-200 MG tablet Take 1 tablet by mouth Daily. Indications: suppliment 6/4/19  Yes Isaiah Moss MD   guaiFENesin (MUCINEX) 600 MG 12 hr tablet Take 2 tablets by mouth 2 (Two) Times a Day.   Yes Isaiah Moss MD   hydroCHLOROthiazide 25 MG tablet TAKE 1 TABLET BY MOUTH DAILY 8/29/24  Yes Mireya Kapoor MD   lisinopril (PRINIVIL,ZESTRIL) 40 MG tablet TAKE 1 TABLET BY MOUTH DAILY 6/14/24  Yes Mireya Kapoor MD   metoprolol succinate XL (TOPROL-XL) 25 MG 24 hr tablet TAKE 1 TABLET BY MOUTH DAILY 11/25/24  Yes Andrea Philippe MD   montelukast (SINGULAIR) 10 MG tablet Take 1 tablet by mouth Every Night. 2/6/24  Yes Zuly Magallon MD   Multiple Vitamins-Minerals (CENTRUM SILVER) tablet Take 1 tablet by mouth Daily. Indications: suppliment   Yes Isaiah Moss MD   nystatin (MYCOSTATIN) 607619 UNIT/GM powder APPLY TO AFFECTED AREA(S) THREE TIMES A DAY 12/2/24  Yes Mireya aKpoor MD   Omega-3 Fatty Acids (fish oil) 1000 MG capsule capsule Take 2 capsules by mouth Daily. Indications: suppliment   Yes Isaiah Moss MD   PARoxetine (PAXIL) 20 MG tablet TAKE 1 TABLET BY MOUTH DAILY 11/22/24  Yes Mireya Kapoor MD   potassium chloride (KLOR-CON M10) 10 MEQ CR tablet Take 1 tablet by mouth Daily. Indications: Low  Amount of Potassium in the Blood 4/30/24  Yes Mireya Kapoor MD   pramipexole (MIRAPEX) 0.25 MG tablet TAKE ONE TABLET BY MOUTH DAILY 4/29/24  Yes Mireya Kapoor MD   predniSONE (DELTASONE) 10 MG tablet Take 1 tablet by mouth Take As Directed for 15 days. 5 tab x 1day, 4 tab x 2 day, 3 tab x 3 day, 2 tab x 4 day, 1 tab x 5 day 1/13/25 1/28/25 Yes Seema Quintanilla MD   simvastatin (ZOCOR) 20 MG tablet Take 1 tablet by mouth Daily. 5/6/24  Yes Mireya Kapoor MD   albuterol sulfate  (90 Base) MCG/ACT inhaler Inhale 2 puffs Every 4 (Four) Hours As Needed for Wheezing. Indications: Spasm of Lung Air Passages 12/6/24   Zuly Magallon MD   hydrOXYzine (ATARAX) 10 MG tablet Take 1 tablet by mouth 3 (Three) Times a Day As Needed for Itching. 7/25/24   Sena Capps DO   ipratropium-albuterol (DUO-NEB) 0.5-2.5 mg/3 ml nebulizer Take 3 mL by nebulization Every 4 (Four) Hours As Needed for Wheezing. 11/4/24   Mireya Kapoor MD   nystatin (MYCOSTATIN) 763559 UNIT/GM cream Apply 1 Application topically to the appropriate area as directed 2 (Two) Times a Day. 10/8/24   Mireya Kapoor MD   O2 (OXYGEN) Inhale 4 L/min Continuous. Indications: Hypoxia 1/4/24   ProviderIsaiah MD   traMADol (ULTRAM) 50 MG tablet 1 tablet every 8-12 hrs as needed. 1/13/25   Seema Quintanilla MD   triamcinolone (KENALOG) 0.1 % ointment Apply 1 Application topically to the appropriate area as directed 2 (Two) Times a Day. 9/10/24   Mireya Kapoor MD       Allergies:  Latex and Sulfa antibiotics    Scheduled Meds:aspirin, 81 mg, Oral, Daily  atorvastatin, 10 mg, Oral, Daily  furosemide, 40 mg, Intravenous, BID  ipratropium-albuterol, 3 mL, Nebulization, Q6H - RT  Lidocaine, 1 patch, Transdermal, Nightly  lisinopril, 40 mg, Oral, Daily  magnesium oxide, 400 mg, Oral, Daily  metoprolol succinate XL, 25 mg, Oral, Daily  mupirocin, 1 Application, Each Nare, BID  PARoxetine, 20 mg, Oral, Daily  sodium chloride, 10 mL,  "Intravenous, Q12H      Continuous Infusions:   PRN Meds:.  acetaminophen **OR** acetaminophen **OR** acetaminophen    aluminum-magnesium hydroxide-simethicone    senna-docusate sodium **AND** polyethylene glycol **AND** bisacodyl **AND** bisacodyl    Calcium Replacement - Follow Nurse / BPA Driven Protocol    Magnesium Standard Dose Replacement - Follow Nurse / BPA Driven Protocol    methocarbamol    Morphine    nitroglycerin    oxyCODONE    Phosphorus Replacement - Follow Nurse / BPA Driven Protocol    Potassium Replacement - Follow Nurse / BPA Driven Protocol    simethicone    [COMPLETED] Insert Peripheral IV **AND** sodium chloride    sodium chloride    sodium chloride      OBJECTIVE    Vital Signs  Vitals:    01/19/25 0442 01/19/25 0500 01/19/25 0700 01/19/25 0703   BP: 103/62      BP Location:       Patient Position:       Pulse: 91 75 76 68   Resp:   16 16   Temp: 97.6 °F (36.4 °C)      TempSrc: Oral      SpO2: 95% 95% 99% 98%   Weight:       Height:           Flowsheet Rows      Flowsheet Row First Filed Value   Admission Height 167.6 cm (66\") Documented at 01/17/2025 1531   Admission Weight 78.9 kg (174 lb) Documented at 01/17/2025 1531              Intake/Output Summary (Last 24 hours) at 1/19/2025 0815  Last data filed at 1/19/2025 0430  Gross per 24 hour   Intake 1080 ml   Output 1250 ml   Net -170 ml          Telemetry: Sinus rhythm with PVCs    Physical Exam:  The patient is alert, oriented and in no distress.  Vital signs as noted above.  Head and neck revealed no carotid bruits or jugular venous distention.  No thyromegaly or lymphadenopathy is present  Lungs clear.  No wheezing.  Breath sounds are normal bilaterally.  Heart normal first and second heart sounds.  No murmur. No precordial rub is present.  No gallop is present.  Abdomen soft and nontender.  No organomegaly is present.  Extremities with good peripheral pulses without any pedal edema.  Skin warm and dry.  Musculoskeletal system is grossly " normal.  CNS grossly normal.       Results Review:  I have personally reviewed the results from the time of this admission to 1/19/2025 08:15 EST and agree with these findings:  []  Laboratory  []  Microbiology  []  Radiology  []  EKG/Telemetry   []  Cardiology/Vascular   []  Pathology  []  Old records  []  Other:    Most notable findings include:    Lab Results (last 24 hours)       Procedure Component Value Units Date/Time    CBC (No Diff) [[id number]] Updated: 01/19/25 0542    Specimen: Blood     Basic Metabolic Panel [[id number]]  (Abnormal) Collected: 01/19/25 0457    Specimen: Blood Updated: 01/19/25 0527     Glucose 91 mg/dL      BUN 25 mg/dL      Creatinine 1.11 mg/dL      Sodium 139 mmol/L      Potassium 4.4 mmol/L      Comment: Specimen hemolyzed.  Result may be falsely elevated.        Chloride 98 mmol/L      CO2 29.9 mmol/L      Calcium 9.5 mg/dL      BUN/Creatinine Ratio 22.5     Anion Gap 11.1 mmol/L      eGFR 51.3 mL/min/1.73     Narrative:      GFR Categories in Chronic Kidney Disease (CKD)      GFR Category          GFR (mL/min/1.73)    Interpretation  G1                     90 or greater         Normal or high (1)  G2                      60-89                Mild decrease (1)  G3a                   45-59                Mild to moderate decrease  G3b                   30-44                Moderate to severe decrease  G4                    15-29                Severe decrease  G5                    14 or less           Kidney failure          (1)In the absence of evidence of kidney disease, neither GFR category G1 or G2 fulfill the criteria for CKD.    eGFR calculation 2021 CKD-EPI creatinine equation, which does not include race as a factor    BNP [[id number]]  (Abnormal) Collected: 01/18/25 0959    Specimen: Blood from Arm, Right Updated: 01/18/25 1029     proBNP 15,997.0 pg/mL     Narrative:      This assay is used as an aid in the diagnosis of individuals suspected of having heart failure. It can  be used as an aid in the diagnosis of acute decompensated heart failure (ADHF) in patients presenting with signs and symptoms of ADHF to the emergency department (ED). In addition, NT-proBNP of <300 pg/mL indicates ADHF is not likely.    Age Range Result Interpretation  NT-proBNP Concentration (pg/mL:      <50             Positive            >450                   Gray                 300-450                    Negative             <300    50-75           Positive            >900                  Gray                300-900                  Negative            <300      >75             Positive            >1800                  Gray                300-1800                  Negative            <300    Magnesium [[id number]]  (Abnormal) Collected: 01/17/25 2036    Specimen: Blood Updated: 01/18/25 0932     Magnesium 1.3 mg/dL     TSH [[id number]]  (Normal) Collected: 01/17/25 2036    Specimen: Blood Updated: 01/18/25 0932     TSH 0.902 uIU/mL             Imaging Results (Last 24 Hours)       Procedure Component Value Units Date/Time    MRI Thoracic Spine With & Without Contrast [[id number]] Resulted: 01/18/25 1618     Updated: 01/18/25 1700            LAB RESULTS (LAST 7 DAYS)    CBC  Results from last 7 days   Lab Units 01/17/25  1620   WBC 10*3/mm3 16.59*   RBC 10*6/mm3 3.72*   HEMOGLOBIN g/dL 10.3*   HEMATOCRIT % 35.0   MCV fL 94.1   PLATELETS 10*3/mm3 359       BMP  Results from last 7 days   Lab Units 01/19/25 0457 01/17/25 2036 01/17/25  1347   SODIUM mmol/L 139  --  143   POTASSIUM mmol/L 4.4  --  3.7   CHLORIDE mmol/L 98  --  106   CO2 mmol/L 29.9*  --  26.6   BUN mg/dL 25*  --  35*   CREATININE mg/dL 1.11*  --  1.13*   GLUCOSE mg/dL 91  --  107*   MAGNESIUM mg/dL  --  1.3*  --        CMP   Results from last 7 days   Lab Units 01/19/25 0457 01/17/25  1347   SODIUM mmol/L 139 143   POTASSIUM mmol/L 4.4 3.7   CHLORIDE mmol/L 98 106   CO2 mmol/L 29.9* 26.6   BUN mg/dL 25* 35*   CREATININE mg/dL 1.11* 1.13*    GLUCOSE mg/dL 91 107*       BNP        TROPONIN  Results from last 7 days   Lab Units 01/17/25  2036   HSTROP T ng/L 181*       CoAg  Results from last 7 days   Lab Units 01/18/25  0527 01/17/25  2357 01/17/25  1620   INR   --   --  0.95   APTT seconds 59.8* 27.1 25.0       Creatinine Clearance  Estimated Creatinine Clearance: 45.6 mL/min (A) (by C-G formula based on SCr of 1.11 mg/dL (H)).    ABG        Radiology  XR Chest 1 View    Result Date: 1/17/2025  Impression: 1.Prominence of the interstitial markings. I cannot exclude pulmonary vascular congestion or atypical infection. 2.Emphysematous changes best demonstrated in the right upper lobe. 3.T6 compression fracture which is likely either acute or subacute in age. It was not apparent on the CT of the chest from 11/19/2024. There are no additional compression fractures in the thoracic spine. 4.Additional incidental findings as noted above. Electronically Signed: Mikey Simpson MD  1/17/2025 5:02 PM EST  Workstation ID: [id number]    XR Spine Thoracic 3 View    Result Date: 1/17/2025  Impression: 1.Prominence of the interstitial markings. I cannot exclude pulmonary vascular congestion or atypical infection. 2.Emphysematous changes best demonstrated in the right upper lobe. 3.T6 compression fracture which is likely either acute or subacute in age. It was not apparent on the CT of the chest from 11/19/2024. There are no additional compression fractures in the thoracic spine. 4.Additional incidental findings as noted above. Electronically Signed: Mikey Simpson MD  1/17/2025 5:02 PM EST  Workstation ID: [id number]       EKG  I personally viewed and interpreted the patient's EKG/Telemetry data:  ECG 12 Lead Chest Pain   Final Result   HEART RATE=77  bpm   RR Twclkrob=644  ms   AZ Cesfqvbn=279  ms   P Horizontal Axis=-1  deg   P Front Axis=77  deg   QRSD Interval=79  ms   QT Tenmoapr=561  ms   RTeW=149  ms   QRS Axis=54  deg   T Wave Axis=101  deg   - ABNORMAL ECG -   Sinus  rhythm   Low voltage, extremity leads   Nonspecific  T abnormalities, lateral leads   When compared with ECG of 17-Jan-2025 15:22:40,   Significant repolarization change   Significant axis, voltage or hypertrophy change   Electronically Signed By: Nato Schaffer ([facility]) 2025-01-18 12:52:09   Date and Time of Study:2025-01-18 05:21:07      ECG 12 Lead Dyspnea   Final Result   HEART RATE=62  bpm   RR Lqvknnfs=535  ms   OK Dojsagxo=999  ms   P Horizontal Axis=10  deg   P Front Axis=74  deg   QRSD Ymigvsic=473  ms   QT Weoorole=533  ms   SCsG=832  ms   QRS Axis=-55  deg   T Wave Axis=91  deg   - ABNORMAL ECG -   Sinus rhythm   Left bundle branch block   When compared with ECG of 29-Dec-2023 01:47:19,   New conduction abnormality   Electronically Signed By: Seng Funk ([facility]) 2025-01-18 09:21:17   Date and Time of Study:2025-01-17 15:22:40            Echocardiogram:    Results for orders placed during the hospital encounter of 01/17/25    Adult Transthoracic Echo Complete w/ Color, Spectral and Contrast if Necessary Per Protocol    Interpretation Summary    Left ventricular ejection fraction appears to be 36 - 40%.    The left ventricular cavity is moderately dilated.    The following left ventricular wall segments are hypokinetic: mid anterior, apical anterior, mid anterolateral and apical lateral.    Takotsubo cardiomyopathy is suspected.    Left ventricular diastolic function is consistent with (grade I) impaired relaxation.    Estimated right ventricular systolic pressure from tricuspid regurgitation is normal (<35 mmHg).        Stress Test:         Cardiac Catheterization:  Results for orders placed during the hospital encounter of 11/19/22    Cardiac Catheterization/Vascular Study         Other:         ASSESSMENT & PLAN:    Principal Problem:    Elevated troponin  Active Problems:    Closed wedge compression fracture of T6 vertebra    Chest pain  Patient has a history of Takotsubo cardiomyopathy with previously  elevated troponin.  ECG is nonischemic and high-sensitivity troponins are elevated  166 and 181  Previous troponin of 278 and 202 in 2023  Cardiac catheterization in 2022 with no evidence of obstructive coronary disease.  No indication for repeat ischemic workup     Takotsubo cardiomyopathy  History of elevated troponin and Takotsubo cardiomyopathy with EF of ~20%  EF improved to 60% in December 2023  Recurrent Takotsubo cardiomyopathy  proBNP of 16,000  Continue lisinopril and Toprol-XL  Currently on IV diuretics: Will switch to oral  Monitor I's and O's and replace electrolytes as needed  Add Jardiance tomorrow if renal function allows     Hypertension/hyperlipidemia  Continue lisinopril and Toprol-XL  Continue statin  , HDL 48, triglyceride 193 and total cholesterol 181.  A1c is 4.9     Bigeminy  Potassium and magnesium have been replaced  Echocardiogram shows recurrent Takotsubo cardiomyopathy     T6 compression fracture  Evaluated by neurosurgery.  TLSO brace  Pain control and muscle relaxers    History of CLL  Follow-up with hematology  White count is 16.6     History of COPD  Currently on 2 L of oxygen      Nato Schaffer MD  01/19/25  08:15 EST

## 2025-01-19 NOTE — PLAN OF CARE
Goal Outcome Evaluation:  Plan of Care Reviewed With: patient           Outcome Evaluation: Pt is a 78 YO F admitted with chest pain, T6 compression frx. Pt has TLSO and is to wear when OOB. Pt reports living at home alone, where she is independent wtih all ADLs, ambulation without AD and has had no recent falls. Pt reports having ADs at home if needed. THis date pt demonstrates good mobility, requiring no physical assistance to perform bed mobitliy, transfers or ambulation with RWx. Pt attempted to sit in bedside chair but could not tolerate. Anticipate safe return home at d/c with progress. May benefit from HHPT at d/c.    Anticipated Discharge Disposition (PT): home with home health
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
## 2025-01-20 LAB — MAGNESIUM SERPL-MCNC: 2.5 MG/DL (ref 1.6–2.4)

## 2025-01-20 PROCEDURE — 94799 UNLISTED PULMONARY SVC/PX: CPT

## 2025-01-20 PROCEDURE — 99232 SBSQ HOSP IP/OBS MODERATE 35: CPT | Performed by: INTERNAL MEDICINE

## 2025-01-20 PROCEDURE — 83735 ASSAY OF MAGNESIUM: CPT | Performed by: INTERNAL MEDICINE

## 2025-01-20 PROCEDURE — 94664 DEMO&/EVAL PT USE INHALER: CPT

## 2025-01-20 PROCEDURE — 94761 N-INVAS EAR/PLS OXIMETRY MLT: CPT

## 2025-01-20 RX ADMIN — IPRATROPIUM BROMIDE AND ALBUTEROL SULFATE 3 ML: .5; 3 SOLUTION RESPIRATORY (INHALATION) at 01:11

## 2025-01-20 RX ADMIN — ALUMINUM HYDROXIDE, MAGNESIUM HYDROXIDE, AND DIMETHICONE 15 ML: 400; 400; 40 SUSPENSION ORAL at 02:21

## 2025-01-20 RX ADMIN — IPRATROPIUM BROMIDE AND ALBUTEROL SULFATE 3 ML: .5; 3 SOLUTION RESPIRATORY (INHALATION) at 19:01

## 2025-01-20 RX ADMIN — LIDOCAINE 1 PATCH: 4 PATCH TOPICAL at 19:53

## 2025-01-20 RX ADMIN — METHOCARBAMOL TABLETS 250 MG: 500 TABLET, COATED ORAL at 12:26

## 2025-01-20 RX ADMIN — METHOCARBAMOL TABLETS 250 MG: 500 TABLET, COATED ORAL at 19:54

## 2025-01-20 RX ADMIN — Medication 10 ML: at 08:35

## 2025-01-20 RX ADMIN — IPRATROPIUM BROMIDE AND ALBUTEROL SULFATE 3 ML: .5; 3 SOLUTION RESPIRATORY (INHALATION) at 07:10

## 2025-01-20 RX ADMIN — METOPROLOL SUCCINATE 25 MG: 25 TABLET, EXTENDED RELEASE ORAL at 08:34

## 2025-01-20 RX ADMIN — OXYCODONE 5 MG: 5 TABLET ORAL at 06:06

## 2025-01-20 RX ADMIN — MUPIROCIN 1 APPLICATION: 20 OINTMENT TOPICAL at 19:54

## 2025-01-20 RX ADMIN — MAGNESIUM OXIDE TAB 400 MG (241.3 MG ELEMENTAL MG) 400 MG: 400 (241.3 MG) TAB at 08:35

## 2025-01-20 RX ADMIN — ASPIRIN 81 MG: 81 TABLET, COATED ORAL at 08:34

## 2025-01-20 RX ADMIN — LISINOPRIL 40 MG: 20 TABLET ORAL at 08:34

## 2025-01-20 RX ADMIN — ATORVASTATIN CALCIUM 10 MG: 10 TABLET ORAL at 08:35

## 2025-01-20 RX ADMIN — MUPIROCIN 1 APPLICATION: 20 OINTMENT TOPICAL at 08:34

## 2025-01-20 RX ADMIN — Medication 10 ML: at 20:57

## 2025-01-20 RX ADMIN — OXYCODONE 5 MG: 5 TABLET ORAL at 22:28

## 2025-01-20 RX ADMIN — OXYCODONE 5 MG: 5 TABLET ORAL at 16:41

## 2025-01-20 RX ADMIN — PAROXETINE HYDROCHLORIDE 20 MG: 20 TABLET, FILM COATED ORAL at 08:34

## 2025-01-20 RX ADMIN — FUROSEMIDE 40 MG: 40 TABLET ORAL at 08:34

## 2025-01-20 RX ADMIN — ALUMINUM HYDROXIDE, MAGNESIUM HYDROXIDE, AND DIMETHICONE 15 ML: 400; 400; 40 SUSPENSION ORAL at 22:22

## 2025-01-20 NOTE — PROGRESS NOTES
Referring Provider: Melissa Sharpe MD    Reason for follow-up: Chest pain, elevated troponin     Patient Care Team:  Mireya Kapoor MD as PCP - Family Medicine  Dyana Green MD as Consulting Physician (Nephrology)  Andrea Philippe MD as Consulting Physician (Cardiology)      SUBJECTIVE  Resting in bed complains of severe back pain.     ROS  Review of all systems negative except as indicated.    Since I have last seen, the patient has been without any chest discomfort, shortness of breath, palpitations, dizziness or syncope.  Denies having any headache, abdominal pain, nausea, vomiting, diarrhea, constipation, loss of weight or loss of appetite.  Denies having any excessive bruising, hematuria or blood in the stool.        Personal History:    Past Medical History:   Diagnosis Date    Acute bacterial bronchitis 07/05/2019    Anemia 07/05/2019    Arthritis 07/05/2019    Asthma     Breast injury     right side bruised after running into truck mirror    Chest pain 12/23/2023    Chronic obstructive pulmonary disease 06/04/2019    CLL (chronic lymphocytic leukemia) 07/05/2019    GERD (gastroesophageal reflux disease) 07/05/2019    History of Clostridium difficile colitis 01/08/2018    Hypertension     Hypokalemia 07/05/2019    Lymphocytosis 01/08/2018    Melanoma 01/08/2018    Moderate persistent asthma without complication 07/05/2019    Panlobular emphysema 07/05/2019    Pneumonia 11/21/22    Stage 3b chronic kidney disease 12/19/2019    Dr Silva    Systolic CHF, chronic 07/05/2019       Past Surgical History:   Procedure Laterality Date    APPENDECTOMY      BRONCHOSCOPY N/A 12/25/2023    Procedure: BRONCHOSCOPY with bilateral lung wash;  Surgeon: Zuly Magallon MD;  Location: [facility] ENDOSCOPY;  Service: Pulmonary;  Laterality: N/A;  Post- hemoptysis    CARDIAC CATHETERIZATION  05/2019    [facility].    CARDIAC CATHETERIZATION Right 11/25/2022    Procedure: Coronary angiography;  Surgeon: Andrea Philippe,  MD;  Location: [facility] INVASIVE LOCATION;  Service: Cardiovascular;  Laterality: Right;    HYSTERECTOMY      TONSILLECTOMY         Family History   Problem Relation Age of Onset    Heart disease Father             Stroke Father     Heart failure Father     Leukemia Paternal Grandmother     Anemia Paternal Grandmother     Heart disease Paternal Grandmother     Cancer Paternal Grandmother         leukemia    Alcohol abuse Maternal Aunt     Cancer Maternal Aunt         stomach    Asthma Maternal Grandmother             Hyperlipidemia Maternal Grandmother             Hypertension Maternal Grandmother     Diabetes Paternal Aunt             Hypertension Son     Hypertension Daughter        Social History     Tobacco Use    Smoking status: Former     Current packs/day: 0.00     Average packs/day: 0.5 packs/day for 58.6 years (29.3 ttl pk-yrs)     Types: Cigarettes, Cigars     Start date: 1960     Quit date: 2019     Years since quittin.5     Passive exposure: Past    Smokeless tobacco: Never   Vaping Use    Vaping status: Never Used   Substance Use Topics    Alcohol use: Yes     Alcohol/week: 4.0 standard drinks of alcohol     Types: 2 Glasses of wine, 2 Drinks containing 0.5 oz of alcohol per week     Comment: social drinker    Drug use: No        Home meds:  Prior to Admission medications    Medication Sig Start Date End Date Taking? Authorizing Provider   Boswellia-Glucosamine-Vit D (OSTEO BI-FLEX ONE PER DAY PO) Take  by mouth.   Yes Isaiah Moss MD   Calcium Carbonate-Vit D-Min (CALTRATE 600+D PLUS MINERALS) 600-800 MG-UNIT chewable tablet Chew 2 tablets Daily. Indications: suppliment 19  Yes Isaiah Moss MD   Calquence 100 MG tablet Take 1 tablet by mouth 2 (Two) Times a Day. 24  Yes Isaiah Moss MD   Coenzyme Q10 (COQ10 PO) Take 1 tablet by mouth Daily. Indications: suppliment  24  Yes Isaiah Moss MD   Cranberry 450  MG capsule Take  by mouth.   Yes Isaiah Moss MD   Cyanocobalamin (B-12) 1000 MCG capsule Take 1,000 mcg by mouth Daily. Indications: Inadequate Vitamin B12   Yes Isaiah Moss MD   diphenhydrAMINE (BENADRYL) 25 mg capsule Take 1 capsule by mouth Every 6 (Six) Hours As Needed for Itching.   Yes Isaiah Moss MD   esomeprazole (nexIUM) 40 MG capsule Take 1 capsule by mouth Every Morning Before Breakfast. Indications: Heartburn 6/4/19  Yes Isaiah Moss MD   Fluticasone-Umeclidin-Vilant (Trelegy Ellipta) 100-62.5-25 MCG/ACT inhaler Inhale 1 puff Daily. Indications: Chronic Obstructive Lung Disease 11/6/24  Yes Zuly Magallon MD   Glucosamine-Chondroitin 250-200 MG tablet Take 1 tablet by mouth Daily. Indications: suppliment 6/4/19  Yes Isaiah Moss MD   guaiFENesin (MUCINEX) 600 MG 12 hr tablet Take 2 tablets by mouth 2 (Two) Times a Day.   Yes Isaiah Moss MD   hydroCHLOROthiazide 25 MG tablet TAKE 1 TABLET BY MOUTH DAILY 8/29/24  Yes Mireya Kapoor MD   lisinopril (PRINIVIL,ZESTRIL) 40 MG tablet TAKE 1 TABLET BY MOUTH DAILY 6/14/24  Yes Mireya Kapoor MD   metoprolol succinate XL (TOPROL-XL) 25 MG 24 hr tablet TAKE 1 TABLET BY MOUTH DAILY 11/25/24  Yes Andrea Philippe MD   montelukast (SINGULAIR) 10 MG tablet Take 1 tablet by mouth Every Night. 2/6/24  Yes Zuly Magallon MD   Multiple Vitamins-Minerals (CENTRUM SILVER) tablet Take 1 tablet by mouth Daily. Indications: suppliment   Yes Isaiah Moss MD   nystatin (MYCOSTATIN) 308789 UNIT/GM powder APPLY TO AFFECTED AREA(S) THREE TIMES A DAY 12/2/24  Yes Mireya Kapoor MD   Omega-3 Fatty Acids (fish oil) 1000 MG capsule capsule Take 2 capsules by mouth Daily. Indications: suppliment   Yes Isaiah Moss MD   PARoxetine (PAXIL) 20 MG tablet TAKE 1 TABLET BY MOUTH DAILY 11/22/24  Yes Mireya Kapoor MD   potassium chloride (KLOR-CON M10) 10 MEQ CR tablet Take 1 tablet by mouth Daily. Indications: Low  Amount of Potassium in the Blood 4/30/24  Yes Mireya Kapoor MD   pramipexole (MIRAPEX) 0.25 MG tablet TAKE ONE TABLET BY MOUTH DAILY 4/29/24  Yes Mireya Kapoor MD   predniSONE (DELTASONE) 10 MG tablet Take 1 tablet by mouth Take As Directed for 15 days. 5 tab x 1day, 4 tab x 2 day, 3 tab x 3 day, 2 tab x 4 day, 1 tab x 5 day 1/13/25 1/28/25 Yes Seema Quintanilla MD   simvastatin (ZOCOR) 20 MG tablet Take 1 tablet by mouth Daily. 5/6/24  Yes Mireya Kapoor MD   albuterol sulfate  (90 Base) MCG/ACT inhaler Inhale 2 puffs Every 4 (Four) Hours As Needed for Wheezing. Indications: Spasm of Lung Air Passages 12/6/24   Zuly Magallon MD   hydrOXYzine (ATARAX) 10 MG tablet Take 1 tablet by mouth 3 (Three) Times a Day As Needed for Itching. 7/25/24   Sena Capps DO   ipratropium-albuterol (DUO-NEB) 0.5-2.5 mg/3 ml nebulizer Take 3 mL by nebulization Every 4 (Four) Hours As Needed for Wheezing. 11/4/24   Mireya Kapoor MD   nystatin (MYCOSTATIN) 607618 UNIT/GM cream Apply 1 Application topically to the appropriate area as directed 2 (Two) Times a Day. 10/8/24   Mireya Kapoor MD   O2 (OXYGEN) Inhale 4 L/min Continuous. Indications: Hypoxia 1/4/24   ProviderIsaiah MD   traMADol (ULTRAM) 50 MG tablet 1 tablet every 8-12 hrs as needed. 1/13/25   Seema Quintanilla MD   triamcinolone (KENALOG) 0.1 % ointment Apply 1 Application topically to the appropriate area as directed 2 (Two) Times a Day. 9/10/24   Mireya Kapoor MD       Allergies:  Latex and Sulfa antibiotics    Scheduled Meds:aspirin, 81 mg, Oral, Daily  atorvastatin, 10 mg, Oral, Daily  furosemide, 40 mg, Oral, Daily  ipratropium-albuterol, 3 mL, Nebulization, Q6H - RT  Lidocaine, 1 patch, Transdermal, Nightly  lisinopril, 40 mg, Oral, Daily  magnesium oxide, 400 mg, Oral, Daily  metoprolol succinate XL, 25 mg, Oral, Daily  mupirocin, 1 Application, Each Nare, BID  PARoxetine, 20 mg, Oral, Daily  sodium chloride, 10 mL, Intravenous,  "Q12H      Continuous Infusions:   PRN Meds:.  acetaminophen **OR** acetaminophen **OR** acetaminophen    aluminum-magnesium hydroxide-simethicone    senna-docusate sodium **AND** polyethylene glycol **AND** bisacodyl **AND** bisacodyl    Calcium Replacement - Follow Nurse / BPA Driven Protocol    Magnesium Standard Dose Replacement - Follow Nurse / BPA Driven Protocol    methocarbamol    Morphine    nitroglycerin    oxyCODONE    Phosphorus Replacement - Follow Nurse / BPA Driven Protocol    Potassium Replacement - Follow Nurse / BPA Driven Protocol    simethicone    [COMPLETED] Insert Peripheral IV **AND** sodium chloride    sodium chloride    sodium chloride      OBJECTIVE    Vital Signs  Vitals:    01/20/25 0115 01/20/25 0130 01/20/25 0200 01/20/25 0400   BP:  113/50 94/42 95/42   BP Location:       Patient Position:       Pulse: 74 70 69 73   Resp: 10      Temp:  98.5 °F (36.9 °C)  97.9 °F (36.6 °C)   TempSrc:  Oral  Oral   SpO2: 96% 94% 94% 94%   Weight:       Height:           Flowsheet Rows      Flowsheet Row First Filed Value   Admission Height 167.6 cm (66\") Documented at 01/17/2025 1531   Admission Weight 78.9 kg (174 lb) Documented at 01/17/2025 1531              Intake/Output Summary (Last 24 hours) at 1/20/2025 0614  Last data filed at 1/20/2025 0600  Gross per 24 hour   Intake 1154 ml   Output 1025 ml   Net 129 ml          Telemetry: Sinus rhythm with PVCs    Physical Exam:  The patient is alert, oriented and in no distress.  Vital signs as noted above.  Head and neck revealed no carotid bruits or jugular venous distention.  No thyromegaly or lymphadenopathy is present  Lungs clear.  No wheezing.  Breath sounds are normal bilaterally.  Heart normal first and second heart sounds.  No murmur. No precordial rub is present.  No gallop is present.  Abdomen soft and nontender.  No organomegaly is present.  Extremities with good peripheral pulses without any pedal edema.  Skin warm and dry.  Musculoskeletal " system is grossly normal.  CNS grossly normal.       Results Review:  I have personally reviewed the results from the time of this admission to 1/20/2025 06:14 EST and agree with these findings:  []  Laboratory  []  Microbiology  []  Radiology  []  EKG/Telemetry   []  Cardiology/Vascular   []  Pathology  []  Old records  []  Other:    Most notable findings include:    Lab Results (last 24 hours)       Procedure Component Value Units Date/Time    Magnesium [[id number]]  (Abnormal) Collected: 01/20/25 0456    Specimen: Blood Updated: 01/20/25 0527     Magnesium 2.5 mg/dL     Magnesium [[id number]]  (Abnormal) Collected: 01/19/25 0457    Specimen: Blood Updated: 01/19/25 0906     Magnesium 1.4 mg/dL     CBC (No Diff) [[id number]]  (Abnormal) Collected: 01/19/25 0857    Specimen: Blood Updated: 01/19/25 0904     WBC 8.97 10*3/mm3      RBC 3.55 10*6/mm3      Hemoglobin 10.0 g/dL      Hematocrit 33.8 %      MCV 95.2 fL      MCH 28.2 pg      MCHC 29.6 g/dL      RDW 13.0 %      RDW-SD 44.5 fl      MPV 11.1 fL      Platelets 264 10*3/mm3             Imaging Results (Last 24 Hours)       Procedure Component Value Units Date/Time    MRI Thoracic Spine With & Without Contrast [[id number]] Collected: 01/19/25 0838     Updated: 01/19/25 0855    Narrative:      MRI THORACIC SPINE W WO CONTRAST    Date of Exam: 1/18/2025 4:17 PM EST    Indication: t6 compression fracture/r/o pathologic.     Comparison: Radiographs 1 day prior, CT 11/19/2024.    Technique:  Routine multiplanar/multisequence sequence images of the thoracic spine were obtained before and after the uneventful administration of Prohance.        Findings:  Recently noted T6 fracture demonstrates approximate 40 to 50% height loss ventrally as well as some mild bony retropulsion of the superior endplate, without significant resultant narrowing of the adjacent spinal canal. Diffuse marrow edema is noted, as   well as some associated enhancement, without circumscribed or  otherwise distinct appearing osseous metastatic lesion evident. There is somewhat heterogeneous enhancement also seen within the T3 vertebral body, without corresponding marrow edema or T1   signal loss, favoring osseous hemangioma there is some evidence of diffuse demineralization. No additional fracture or suspicious marrow replacing lesion is evident. The thoracic spinal cord is normal in caliber and signal throughout. No additional   abnormal enhancement is present. There is some minimal exaggeration of the usual thoracic kyphosis, otherwise without listhesis or subluxation.      Impression:      Impression:  Compression fracture at T6 appears acute, with associated marrow edema and enhancement. There is approximate 40% height loss and some mild bony retropulsion of the superior endplate, without significant adjacent spinal canal narrowing. While metastatic   disease is difficult to entirely exclude, there is no evidence of extraosseous soft tissue involvement or discrete circumscribed underlying mass to further raise suspicion for metastasis. There is some additional heterogeneous enhancement seen at T3,   without evidence of fracture. Marrow signal at this level is otherwise preserved on remaining sequences and this is favored to represent an osseous hemangioma, with metastatic involvement again not entirely excluded but considered less likely.        Electronically Signed: Owen Easley MD    1/19/2025 8:53 AM EST    Workstation ID: [id number]            LAB RESULTS (LAST 7 DAYS)    CBC  Results from last 7 days   Lab Units 01/19/25  0857 01/17/25  1620   WBC 10*3/mm3 8.97 16.59*   RBC 10*6/mm3 3.55* 3.72*   HEMOGLOBIN g/dL 10.0* 10.3*   HEMATOCRIT % 33.8* 35.0   MCV fL 95.2 94.1   PLATELETS 10*3/mm3 264 359       BMP  Results from last 7 days   Lab Units 01/20/25  0456 01/19/25  0457 01/17/25  2036 01/17/25  1347   SODIUM mmol/L  --  139  --  143   POTASSIUM mmol/L  --  4.4  --  3.7   CHLORIDE mmol/L  --  98   --  106   CO2 mmol/L  --  29.9*  --  26.6   BUN mg/dL  --  25*  --  35*   CREATININE mg/dL  --  1.11*  --  1.13*   GLUCOSE mg/dL  --  91  --  107*   MAGNESIUM mg/dL 2.5* 1.4* 1.3*  --        CMP   Results from last 7 days   Lab Units 01/19/25  0457 01/17/25  1347   SODIUM mmol/L 139 143   POTASSIUM mmol/L 4.4 3.7   CHLORIDE mmol/L 98 106   CO2 mmol/L 29.9* 26.6   BUN mg/dL 25* 35*   CREATININE mg/dL 1.11* 1.13*   GLUCOSE mg/dL 91 107*       BNP        TROPONIN  Results from last 7 days   Lab Units 01/17/25  2036   HSTROP T ng/L 181*       CoAg  Results from last 7 days   Lab Units 01/18/25  0527 01/17/25  2357 01/17/25  1620   INR   --   --  0.95   APTT seconds 59.8* 27.1 25.0       Creatinine Clearance  Estimated Creatinine Clearance: 45.6 mL/min (A) (by C-G formula based on SCr of 1.11 mg/dL (H)).    ABG        Radiology  MRI Thoracic Spine With & Without Contrast    Result Date: 1/19/2025  Impression: Compression fracture at T6 appears acute, with associated marrow edema and enhancement. There is approximate 40% height loss and some mild bony retropulsion of the superior endplate, without significant adjacent spinal canal narrowing. While metastatic disease is difficult to entirely exclude, there is no evidence of extraosseous soft tissue involvement or discrete circumscribed underlying mass to further raise suspicion for metastasis. There is some additional heterogeneous enhancement seen at T3, without evidence of fracture. Marrow signal at this level is otherwise preserved on remaining sequences and this is favored to represent an osseous hemangioma, with metastatic involvement again not entirely excluded but considered less likely. Electronically Signed: Owen Easley MD  1/19/2025 8:53 AM EST  Workstation ID: [id number]       EKG  I personally viewed and interpreted the patient's EKG/Telemetry data:  ECG 12 Lead Chest Pain   Final Result   HEART RATE=77  bpm   RR Uihzsoky=695  ms   AL Ispufziv=596  ms   P  Horizontal Axis=-1  deg   P Front Axis=77  deg   QRSD Interval=79  ms   QT Mwpdymha=863  ms   BClC=904  ms   QRS Axis=54  deg   T Wave Axis=101  deg   - ABNORMAL ECG -   Sinus rhythm   Low voltage, extremity leads   Nonspecific  T abnormalities, lateral leads   When compared with ECG of 17-Jan-2025 15:22:40,   Significant repolarization change   Significant axis, voltage or hypertrophy change   Electronically Signed By: Nato Schaffer ([facility]) 2025-01-18 12:52:09   Date and Time of Study:2025-01-18 05:21:07      ECG 12 Lead Dyspnea   Final Result   HEART RATE=62  bpm   RR Egvwghsk=607  ms   NY Tudacvhy=653  ms   P Horizontal Axis=10  deg   P Front Axis=74  deg   QRSD Xjbuylnv=624  ms   QT Qjizgvzr=880  ms   ZVnT=185  ms   QRS Axis=-55  deg   T Wave Axis=91  deg   - ABNORMAL ECG -   Sinus rhythm   Left bundle branch block   When compared with ECG of 29-Dec-2023 01:47:19,   New conduction abnormality   Electronically Signed By: Seng Funk ([facility]) 2025-01-18 09:21:17   Date and Time of Study:2025-01-17 15:22:40            Echocardiogram:    Results for orders placed during the hospital encounter of 01/17/25    Adult Transthoracic Echo Complete w/ Color, Spectral and Contrast if Necessary Per Protocol    Interpretation Summary    Left ventricular ejection fraction appears to be 36 - 40%.    The left ventricular cavity is moderately dilated.    The following left ventricular wall segments are hypokinetic: mid anterior, apical anterior, mid anterolateral and apical lateral.    Takotsubo cardiomyopathy is suspected.    Left ventricular diastolic function is consistent with (grade I) impaired relaxation.    Estimated right ventricular systolic pressure from tricuspid regurgitation is normal (<35 mmHg).        Stress Test:         Cardiac Catheterization:  Results for orders placed during the hospital encounter of 11/19/22    Cardiac Catheterization/Vascular Study         Other:         ASSESSMENT & PLAN:    Principal Problem:     Elevated troponin  Active Problems:    Closed wedge compression fracture of T6 vertebra    Chest pain  Patient has a history of Takotsubo cardiomyopathy with previously elevated troponin.  ECG is nonischemic and high-sensitivity troponins are elevated  166 and 181  Previous troponin of 278 and 202 in 2023  Cardiac catheterization in 2022 with no evidence of obstructive coronary disease.  Offered repeat cardiac catheterization however patient refused  Recommended to follow-up outpatient to discuss coronary angiography if EF does not improve     Takotsubo cardiomyopathy  History of elevated troponin and Takotsubo cardiomyopathy with EF of ~20%  EF improved to 60% in December 2023  Recurrent Takotsubo cardiomyopathy  proBNP of 16,000  Continue lisinopril and Toprol-XL  Continue diuretics  Monitor I's and O's and replace electrolytes as needed  Add Jardiance  Repeat echocardiogram in 3 months of GDMT     Hypertension/hyperlipidemia  Continue lisinopril and Toprol-XL  Continue statin  , HDL 48, triglyceride 193 and total cholesterol 181.  A1c is 4.9     Bigeminy  Potassium and magnesium have been replaced  Echocardiogram shows recurrent Takotsubo cardiomyopathy     T6 compression fracture  Evaluated by neurosurgery.  TLSO brace  Pain control and muscle relaxers    History of CLL  Follow-up with hematology     History of COPD  Currently on 4 L of oxygen  Bronchodilators and supportive care      Nato Schaffer MD  01/20/25  06:14 EST

## 2025-01-20 NOTE — CASE MANAGEMENT/SOCIAL WORK
Discharge Planning Assessment   [facility]     Patient Name: Krystyna Bennett  MRN: [id number]  Today's Date: 1/20/2025    Admit Date: 1/17/2025    Plan: DC Plan: Anticipate routine home alone. Refusing HH services. Current with [facility] O2@4L [facility]. Daughter will provide transportation.   Discharge Needs Assessment       Row Name 01/20/25 1440       Living Environment    People in Home alone    Current Living Arrangements home    Potentially Unsafe Housing Conditions none    In the past 12 months has the electric, gas, oil, or water company threatened to shut off services in your home? No    Primary Care Provided by self    Provides Primary Care For no one    Family Caregiver if Needed child(zbigniew), adult    Family Caregiver Names Daughter/POA Ashley Mercer    Quality of Family Relationships helpful;involved;supportive    Able to Return to Prior Arrangements yes       Resource/Environmental Concerns    Resource/Environmental Concerns none    Transportation Concerns none       Transportation Needs    In the past 12 months, has lack of transportation kept you from medical appointments or from getting medications? no    In the past 12 months, has lack of transportation kept you from meetings, work, or from getting things needed for daily living? No       Food Insecurity    Within the past 12 months, you worried that your food would run out before you got the money to buy more. Never true    Within the past 12 months, the food you bought just didn't last and you didn't have money to get more. Never true       Transition Planning    Patient/Family Anticipates Transition to home    Patient/Family Anticipated Services at Transition none    Transportation Anticipated family or friend will provide       Discharge Needs Assessment    Readmission Within the Last 30 Days no previous admission in last 30 days    Equipment Currently Used at Home oxygen    Concerns to be Addressed discharge planning    Do you want help finding or  keeping work or a job? I do not need or want help    Do you want help with school or training? For example, starting or completing job training or getting a high school diploma, GED or equivalent No    Anticipated Changes Related to Illness none    Equipment Needed After Discharge none    Provided Post Acute Provider List? N/A    Provided Post Acute Provider Quality & Resource List? N/A    Offered/Gave Vendor List yes    Patient's Choice of Community Agency(s) Patient refused HH service recommendations    Current Discharge Risk chronically ill;physical impairment                   Discharge Plan       Row Name 01/20/25 1447       Plan    Plan DC Plan: Anticipate routine home alone. Refusing HH services. Current with [facility] O2@4L [facility]. Daughter will provide transportation.    Patient/Family in Agreement with Plan yes    Provided Post Acute Provider List? N/A    Provided Post Acute Provider Quality & Resource List? N/A    Plan Comments CM spoke with patient at bedside to discuss admission assessment and discharge planning. Patient confirms PCP and pharmacy. Patient has declined meds to bed program at this time. Patient denies any difficulty affording medications or food at this time. Patient denies any additional needs for services or DME at this time. CM discussed PT recommendations for HH and patient declines stating she doesn't feel it is needed. Patient confirms she is current with [facility] O2@4L [facility]. Patient reports independent with ADL's but does not drive. Patient daughter will provide transportation when ready for DC.CM will continue to follow for any additional needs and adjust DC plan accordingly. DC Barriers: Cardiac monitoring, O2@4L nc, monitor labs, and pain control.                 Expected Discharge Date and Time       Expected Discharge Date Expected Discharge Time    Jan 21, 2025            Demographic Summary       Row Name 01/20/25 4219       General Information    Admission Type  inpatient    Arrived From emergency department;home    Required Notices Provided Important Message from Medicare    Referral Source admission list    Reason for Consult discharge planning    Preferred Language English       Contact Information    Permission Granted to Share Info With                    Functional Status       Row Name 01/20/25 1439       Functional Status    Usual Activity Tolerance moderate    Current Activity Tolerance moderate       Physical Activity    On average, how many days per week do you engage in moderate to strenuous exercise (like a brisk walk)? 0 days    On average, how many minutes do you engage in exercise at this level? 0 min    Number of minutes of exercise per week 0       Functional Status, IADL    Medications independent    Meal Preparation independent    Housekeeping independent    Laundry independent    Shopping independent    If for any reason you need help with day-to-day activities such as bathing, preparing meals, shopping, managing finances, etc., do you get the help you need? I get all the help I need       Mental Status    General Appearance WDL WDL       Mental Status Summary    Recent Changes in Mental Status/Cognitive Functioning no changes       Employment/    Employment Status retired    Current or Previous Occupation not applicable                  Patient Forms       Row Name 01/20/25 1256       Patient Forms    Important Message from Medicare (IMM) Delivered    Delivered to Patient    Method of delivery In person  Patient verbalized understaning. Signed copy left at bedside.                      Mary Terry RN     Office Phone: [phone]  Office Cell:     [phone]

## 2025-01-20 NOTE — PROGRESS NOTES
[facility] MEDICINE SERVICE  DAILY PROGRESS NOTE    NAME: Krystyna Bennett  : 1945  MRN: [id number]      LOS: 3 days     PROVIDER OF SERVICE: Melissa Sharpe MD    Chief Complaint: Elevated troponin    Subjective:     Interval History:  History taken from: patient    No new complaint        Review of Systems:   Review of Systems   All other systems reviewed and are negative.      Objective:     Vital Signs  Temp:  [97.6 °F (36.4 °C)-98.5 °F (36.9 °C)] 97.7 °F (36.5 °C)  Heart Rate:  [66-85] 84  Resp:  [10-19] 19  BP: ()/(42-55) 127/47  Flow (L/min) (Oxygen Therapy):  [4] 4   Body mass index is 28.89 kg/m².    Physical Exam  Physical Exam  Constitutional:       Appearance: Normal appearance.   HENT:      Head: Normocephalic and atraumatic.      Nose: Nose normal.      Mouth/Throat:      Mouth: Mucous membranes are moist.   Eyes:      Extraocular Movements: Extraocular movements intact.      Pupils: Pupils are equal, round, and reactive to light.   Cardiovascular:      Rate and Rhythm: Normal rate and regular rhythm.   Pulmonary:      Effort: Pulmonary effort is normal.      Breath sounds: Normal breath sounds.   Abdominal:      General: Abdomen is flat. Bowel sounds are normal.      Palpations: Abdomen is soft.   Musculoskeletal:         General: Normal range of motion.      Cervical back: Normal range of motion and neck supple.   Skin:     General: Skin is warm and dry.   Neurological:      General: No focal deficit present.      Mental Status: She is alert and oriented to person, place, and time.   Psychiatric:         Mood and Affect: Mood normal.         Behavior: Behavior normal.         Thought Content: Thought content normal.         Judgment: Judgment normal.         Current Medications:  Scheduled Meds:aspirin, 81 mg, Oral, Daily  atorvastatin, 10 mg, Oral, Daily  furosemide, 40 mg, Oral, Daily  ipratropium-albuterol, 3 mL, Nebulization, Q6H - RT  Lidocaine, 1 patch, Transdermal,  Nightly  lisinopril, 40 mg, Oral, Daily  magnesium oxide, 400 mg, Oral, Daily  metoprolol succinate XL, 25 mg, Oral, Daily  mupirocin, 1 Application, Each Nare, BID  PARoxetine, 20 mg, Oral, Daily  sodium chloride, 10 mL, Intravenous, Q12H      Continuous Infusions:   PRN Meds:.  acetaminophen **OR** acetaminophen **OR** acetaminophen    aluminum-magnesium hydroxide-simethicone    senna-docusate sodium **AND** polyethylene glycol **AND** bisacodyl **AND** bisacodyl    Calcium Replacement - Follow Nurse / BPA Driven Protocol    Magnesium Standard Dose Replacement - Follow Nurse / BPA Driven Protocol    methocarbamol    Morphine    nitroglycerin    oxyCODONE    Phosphorus Replacement - Follow Nurse / BPA Driven Protocol    Potassium Replacement - Follow Nurse / BPA Driven Protocol    simethicone    [COMPLETED] Insert Peripheral IV **AND** sodium chloride    sodium chloride    sodium chloride       Diagnostic Data    Results from last 7 days   Lab Units 01/19/25  0857 01/19/25  0457   WBC 10*3/mm3 8.97  --    HEMOGLOBIN g/dL 10.0*  --    HEMATOCRIT % 33.8*  --    PLATELETS 10*3/mm3 264  --    GLUCOSE mg/dL  --  91   CREATININE mg/dL  --  1.11*   BUN mg/dL  --  25*   SODIUM mmol/L  --  139   POTASSIUM mmol/L  --  4.4   ANION GAP mmol/L  --  11.1       No radiology results for the last day      I reviewed the patient's new clinical results.    Assessment/Plan:     Active and Resolved Problems  Active Hospital Problems    Diagnosis  POA    **Elevated troponin [R79.89]  Yes    Closed wedge compression fracture of T6 vertebra [S22.050A]  Unknown      Resolved Hospital Problems   No resolved problems to display.       Chest pain  - No complaints of chest pain currently.  Follow-up on echo.  Continue aspirin, nitrates, statin, beta-blocker.  Troponins trending upwards.  Cardiology following.  Patient may have benefited from heart cath pending cardiology recommendations especially in view of newly reduced EF of 36 to 40% on  recent echo 1/18/2025 however patient apparently refused cardiac cath per nurse at the bedside and so cardiology is managing medically.      Acute systolic chf exacerbation EF of 36-40%  Acute on chronic hypoxic respiratory failure on 4L O2 via NC  -Start IV Lasix, patient requiring 4 L of oxygen via nasal cannula , chest x-ray suggestive of pulmonary edema proBNP is greater than 15,000,.  Optimize medical management for CHF, strict I's and O's, daily weights, 2 g sodium diet.  Echo results reviewed.    Hypertension  -Blood pressure is controlled.  Continue current BP medication regimen.    Hyperlipidemia  -check lipid panel.  Continue statin therapy.    T6 compression fracture  - Continue as needed pain medications and muscle relaxants.  TLSO brace.  Has been seen by neurosurgery.    History of COPD  - Not in exacerbation.  Continue bronchodilators.  On 4 L of oxygen via nasal cannula at home    VTE Prophylaxis:  No VTE prophylaxis order currently exists.             Disposition Planning:     Barriers to Discharge: Pending clinical improvement  Anticipated Date of Discharge: 1/25/2025  Place of Discharge: Home      Code Status and Medical Interventions: CPR (Attempt to Resuscitate); Full Support   Ordered at: 01/17/25 1959     Code Status (Patient has no pulse and is not breathing):    CPR (Attempt to Resuscitate)     Medical Interventions (Patient has pulse or is breathing):    Full Support       Signature: Electronically signed by Melissa Sharpe MD, 01/20/25, 17:18 EST.  [facility] Hospitalist Team

## 2025-01-21 ENCOUNTER — READMISSION MANAGEMENT (OUTPATIENT)
Dept: CALL CENTER | Facility: HOSPITAL | Age: 80
End: 2025-01-21
Payer: MEDICARE

## 2025-01-21 VITALS
WEIGHT: 179 LBS | HEART RATE: 74 BPM | SYSTOLIC BLOOD PRESSURE: 116 MMHG | OXYGEN SATURATION: 98 % | HEIGHT: 66 IN | RESPIRATION RATE: 17 BRPM | DIASTOLIC BLOOD PRESSURE: 51 MMHG | BODY MASS INDEX: 28.77 KG/M2 | TEMPERATURE: 98.6 F

## 2025-01-21 PROCEDURE — 99232 SBSQ HOSP IP/OBS MODERATE 35: CPT | Performed by: INTERNAL MEDICINE

## 2025-01-21 RX ORDER — ASPIRIN 81 MG/1
81 TABLET ORAL DAILY
Qty: 30 TABLET | Refills: 0 | Status: SHIPPED | OUTPATIENT
Start: 2025-01-21 | End: 2025-02-20

## 2025-01-21 RX ORDER — OXYCODONE HYDROCHLORIDE 5 MG/1
5 TABLET ORAL EVERY 6 HOURS PRN
Qty: 12 TABLET | Refills: 0 | Status: SHIPPED | OUTPATIENT
Start: 2025-01-21 | End: 2025-01-24

## 2025-01-21 RX ORDER — FUROSEMIDE 40 MG/1
40 TABLET ORAL DAILY
Qty: 30 TABLET | Refills: 0 | Status: SHIPPED | OUTPATIENT
Start: 2025-01-21 | End: 2025-02-20

## 2025-01-21 RX ORDER — METHOCARBAMOL 500 MG/1
250 TABLET, FILM COATED ORAL EVERY 8 HOURS PRN
Qty: 11 TABLET | Refills: 0 | Status: SHIPPED | OUTPATIENT
Start: 2025-01-21 | End: 2025-01-28

## 2025-01-21 RX ADMIN — LISINOPRIL 40 MG: 20 TABLET ORAL at 09:11

## 2025-01-21 RX ADMIN — ASPIRIN 81 MG: 81 TABLET, COATED ORAL at 09:11

## 2025-01-21 RX ADMIN — METHOCARBAMOL TABLETS 250 MG: 500 TABLET, COATED ORAL at 09:09

## 2025-01-21 RX ADMIN — ATORVASTATIN CALCIUM 10 MG: 10 TABLET ORAL at 09:09

## 2025-01-21 RX ADMIN — MUPIROCIN 1 APPLICATION: 20 OINTMENT TOPICAL at 09:12

## 2025-01-21 RX ADMIN — Medication 10 ML: at 09:19

## 2025-01-21 RX ADMIN — PAROXETINE HYDROCHLORIDE 20 MG: 20 TABLET, FILM COATED ORAL at 09:19

## 2025-01-21 RX ADMIN — OXYCODONE 5 MG: 5 TABLET ORAL at 09:08

## 2025-01-21 RX ADMIN — METOPROLOL SUCCINATE 25 MG: 25 TABLET, EXTENDED RELEASE ORAL at 09:19

## 2025-01-21 RX ADMIN — MAGNESIUM OXIDE TAB 400 MG (241.3 MG ELEMENTAL MG) 400 MG: 400 (241.3 MG) TAB at 09:19

## 2025-01-21 RX ADMIN — FUROSEMIDE 40 MG: 40 TABLET ORAL at 09:11

## 2025-01-21 NOTE — CASE MANAGEMENT/SOCIAL WORK
Case Management Discharge Note           Transportation Services  Private: Car (with daughter)    Final Discharge Disposition Code: 01 - home or self-care

## 2025-01-21 NOTE — PROGRESS NOTES
Referring Provider: Melissa Sharpe MD    Reason for follow-up: Chest pain, elevated troponin     Patient Care Team:  Mireya Kapoor MD as PCP - Family Medicine  Dyana Green MD as Consulting Physician (Nephrology)  Andrea Philippe MD as Consulting Physician (Cardiology)      SUBJECTIVE  Resting in bed.  Back pain is improving.     ROS  Review of all systems negative except as indicated.    Since I have last seen, the patient has been without any chest discomfort, shortness of breath, palpitations, dizziness or syncope.  Denies having any headache, abdominal pain, nausea, vomiting, diarrhea, constipation, loss of weight or loss of appetite.  Denies having any excessive bruising, hematuria or blood in the stool.        Personal History:    Past Medical History:   Diagnosis Date    Acute bacterial bronchitis 07/05/2019    Anemia 07/05/2019    Arthritis 07/05/2019    Asthma     Breast injury     right side bruised after running into truck mirror    Chest pain 12/23/2023    Chronic obstructive pulmonary disease 06/04/2019    CLL (chronic lymphocytic leukemia) 07/05/2019    GERD (gastroesophageal reflux disease) 07/05/2019    History of Clostridium difficile colitis 01/08/2018    Hypertension     Hypokalemia 07/05/2019    Lymphocytosis 01/08/2018    Melanoma 01/08/2018    Moderate persistent asthma without complication 07/05/2019    Panlobular emphysema 07/05/2019    Pneumonia 11/21/22    Stage 3b chronic kidney disease 12/19/2019    Dr Silva    Systolic CHF, chronic 07/05/2019       Past Surgical History:   Procedure Laterality Date    APPENDECTOMY      BRONCHOSCOPY N/A 12/25/2023    Procedure: BRONCHOSCOPY with bilateral lung wash;  Surgeon: Zuly Magallon MD;  Location: [facility] ENDOSCOPY;  Service: Pulmonary;  Laterality: N/A;  Post- hemoptysis    CARDIAC CATHETERIZATION  05/2019    [facility].    CARDIAC CATHETERIZATION Right 11/25/2022    Procedure: Coronary angiography;  Surgeon: Andrea Philippe MD;   Location: [facility] INVASIVE LOCATION;  Service: Cardiovascular;  Laterality: Right;    HYSTERECTOMY      TONSILLECTOMY         Family History   Problem Relation Age of Onset    Heart disease Father             Stroke Father     Heart failure Father     Leukemia Paternal Grandmother     Anemia Paternal Grandmother     Heart disease Paternal Grandmother     Cancer Paternal Grandmother         leukemia    Alcohol abuse Maternal Aunt     Cancer Maternal Aunt         stomach    Asthma Maternal Grandmother             Hyperlipidemia Maternal Grandmother             Hypertension Maternal Grandmother     Diabetes Paternal Aunt             Hypertension Son     Hypertension Daughter        Social History     Tobacco Use    Smoking status: Former     Current packs/day: 0.00     Average packs/day: 0.5 packs/day for 58.6 years (29.3 ttl pk-yrs)     Types: Cigarettes, Cigars     Start date: 1960     Quit date: 2019     Years since quittin.5     Passive exposure: Past    Smokeless tobacco: Never   Vaping Use    Vaping status: Never Used   Substance Use Topics    Alcohol use: Yes     Alcohol/week: 4.0 standard drinks of alcohol     Types: 2 Glasses of wine, 2 Drinks containing 0.5 oz of alcohol per week     Comment: social drinker    Drug use: No        Home meds:  Prior to Admission medications    Medication Sig Start Date End Date Taking? Authorizing Provider   Boswellia-Glucosamine-Vit D (OSTEO BI-FLEX ONE PER DAY PO) Take  by mouth.   Yes Isaiah Moss MD   Calcium Carbonate-Vit D-Min (CALTRATE 600+D PLUS MINERALS) 600-800 MG-UNIT chewable tablet Chew 2 tablets Daily. Indications: suppliment 19  Yes Isaiah Moss MD   Calquence 100 MG tablet Take 1 tablet by mouth 2 (Two) Times a Day. 24  Yes Isaiah Moss MD   Coenzyme Q10 (COQ10 PO) Take 1 tablet by mouth Daily. Indications: suppliment  24  Yes Isaiah Moss MD   Cranberry 450 MG  capsule Take  by mouth.   Yes Isaiah Moss MD   Cyanocobalamin (B-12) 1000 MCG capsule Take 1,000 mcg by mouth Daily. Indications: Inadequate Vitamin B12   Yes Isaiah Moss MD   diphenhydrAMINE (BENADRYL) 25 mg capsule Take 1 capsule by mouth Every 6 (Six) Hours As Needed for Itching.   Yes Isaiah Moss MD   esomeprazole (nexIUM) 40 MG capsule Take 1 capsule by mouth Every Morning Before Breakfast. Indications: Heartburn 6/4/19  Yes Isaiah Moss MD   Fluticasone-Umeclidin-Vilant (Trelegy Ellipta) 100-62.5-25 MCG/ACT inhaler Inhale 1 puff Daily. Indications: Chronic Obstructive Lung Disease 11/6/24  Yes Zuly Magallon MD   Glucosamine-Chondroitin 250-200 MG tablet Take 1 tablet by mouth Daily. Indications: suppliment 6/4/19  Yes Isaiah Moss MD   guaiFENesin (MUCINEX) 600 MG 12 hr tablet Take 2 tablets by mouth 2 (Two) Times a Day.   Yes Isaiah Moss MD   hydroCHLOROthiazide 25 MG tablet TAKE 1 TABLET BY MOUTH DAILY 8/29/24  Yes Mireya Kapoor MD   lisinopril (PRINIVIL,ZESTRIL) 40 MG tablet TAKE 1 TABLET BY MOUTH DAILY 6/14/24  Yes Mireya Kapoor MD   metoprolol succinate XL (TOPROL-XL) 25 MG 24 hr tablet TAKE 1 TABLET BY MOUTH DAILY 11/25/24  Yes Andrea Philippe MD   montelukast (SINGULAIR) 10 MG tablet Take 1 tablet by mouth Every Night. 2/6/24  Yes Zuly Magallon MD   Multiple Vitamins-Minerals (CENTRUM SILVER) tablet Take 1 tablet by mouth Daily. Indications: suppliment   Yes Isaiah Moss MD   nystatin (MYCOSTATIN) 029840 UNIT/GM powder APPLY TO AFFECTED AREA(S) THREE TIMES A DAY 12/2/24  Yes Mireya Kapoor MD   Omega-3 Fatty Acids (fish oil) 1000 MG capsule capsule Take 2 capsules by mouth Daily. Indications: suppliment   Yes Isaiah Moss MD   PARoxetine (PAXIL) 20 MG tablet TAKE 1 TABLET BY MOUTH DAILY 11/22/24  Yes Mireya Kapoor MD   potassium chloride (KLOR-CON M10) 10 MEQ CR tablet Take 1 tablet by mouth Daily. Indications: Low  Amount of Potassium in the Blood 4/30/24  Yes Mireya Kapoor MD   pramipexole (MIRAPEX) 0.25 MG tablet TAKE ONE TABLET BY MOUTH DAILY 4/29/24  Yes Mireya Kapoor MD   predniSONE (DELTASONE) 10 MG tablet Take 1 tablet by mouth Take As Directed for 15 days. 5 tab x 1day, 4 tab x 2 day, 3 tab x 3 day, 2 tab x 4 day, 1 tab x 5 day 1/13/25 1/28/25 Yes Seema Quintanilla MD   simvastatin (ZOCOR) 20 MG tablet Take 1 tablet by mouth Daily. 5/6/24  Yes Mireya Kapoor MD   albuterol sulfate  (90 Base) MCG/ACT inhaler Inhale 2 puffs Every 4 (Four) Hours As Needed for Wheezing. Indications: Spasm of Lung Air Passages 12/6/24   Zuly Magallon MD   hydrOXYzine (ATARAX) 10 MG tablet Take 1 tablet by mouth 3 (Three) Times a Day As Needed for Itching. 7/25/24   Sena Capps DO   ipratropium-albuterol (DUO-NEB) 0.5-2.5 mg/3 ml nebulizer Take 3 mL by nebulization Every 4 (Four) Hours As Needed for Wheezing. 11/4/24   Mireya Kapoor MD   nystatin (MYCOSTATIN) 043627 UNIT/GM cream Apply 1 Application topically to the appropriate area as directed 2 (Two) Times a Day. 10/8/24   Mireya Kapoor MD   O2 (OXYGEN) Inhale 4 L/min Continuous. Indications: Hypoxia 1/4/24   ProviderIsaiah MD   traMADol (ULTRAM) 50 MG tablet 1 tablet every 8-12 hrs as needed. 1/13/25   Seema Quintanilla MD   triamcinolone (KENALOG) 0.1 % ointment Apply 1 Application topically to the appropriate area as directed 2 (Two) Times a Day. 9/10/24   Mireya Kapoor MD       Allergies:  Latex and Sulfa antibiotics    Scheduled Meds:aspirin, 81 mg, Oral, Daily  atorvastatin, 10 mg, Oral, Daily  furosemide, 40 mg, Oral, Daily  ipratropium-albuterol, 3 mL, Nebulization, Q6H - RT  Lidocaine, 1 patch, Transdermal, Nightly  lisinopril, 40 mg, Oral, Daily  magnesium oxide, 400 mg, Oral, Daily  metoprolol succinate XL, 25 mg, Oral, Daily  mupirocin, 1 Application, Each Nare, BID  PARoxetine, 20 mg, Oral, Daily  sodium chloride, 10 mL, Intravenous,  "Q12H      Continuous Infusions:   PRN Meds:.  acetaminophen **OR** acetaminophen **OR** acetaminophen    aluminum-magnesium hydroxide-simethicone    senna-docusate sodium **AND** polyethylene glycol **AND** bisacodyl **AND** bisacodyl    Calcium Replacement - Follow Nurse / BPA Driven Protocol    Magnesium Standard Dose Replacement - Follow Nurse / BPA Driven Protocol    methocarbamol    Morphine    nitroglycerin    oxyCODONE    Phosphorus Replacement - Follow Nurse / BPA Driven Protocol    Potassium Replacement - Follow Nurse / BPA Driven Protocol    simethicone    [COMPLETED] Insert Peripheral IV **AND** sodium chloride    sodium chloride    sodium chloride      OBJECTIVE    Vital Signs  Vitals:    01/20/25 1905 01/20/25 1958 01/21/25 0000 01/21/25 0400   BP:  (!) 93/36 (!) 108/37 107/40   BP Location:  Left arm Left arm Left arm   Patient Position:  Lying Lying Lying   Pulse: 74 77 67 63   Resp: 16 11 19 18   Temp:  98.5 °F (36.9 °C) 98.5 °F (36.9 °C) 98.4 °F (36.9 °C)   TempSrc:  Oral Oral Oral   SpO2: 98% 95% 93% 98%   Weight:       Height:           Flowsheet Rows      Flowsheet Row First Filed Value   Admission Height 167.6 cm (66\") Documented at 01/17/2025 1531   Admission Weight 78.9 kg (174 lb) Documented at 01/17/2025 1531              Intake/Output Summary (Last 24 hours) at 1/21/2025 0709  Last data filed at 1/21/2025 0600  Gross per 24 hour   Intake 360 ml   Output 1650 ml   Net -1290 ml          Telemetry: Sinus rhythm with PVCs    Physical Exam:  The patient is alert, oriented and in no distress.  Vital signs as noted above.  Head and neck revealed no carotid bruits or jugular venous distention.  No thyromegaly or lymphadenopathy is present  Lungs clear.  No wheezing.  Breath sounds are normal bilaterally.  Heart normal first and second heart sounds.  No murmur. No precordial rub is present.  No gallop is present.  Abdomen soft and nontender.  No organomegaly is present.  Extremities with good " peripheral pulses without any pedal edema.  Skin warm and dry.  Musculoskeletal system is grossly normal.  CNS grossly normal.       Results Review:  I have personally reviewed the results from the time of this admission to 1/21/2025 07:09 EST and agree with these findings:  []  Laboratory  []  Microbiology  []  Radiology  []  EKG/Telemetry   []  Cardiology/Vascular   []  Pathology  []  Old records  []  Other:    Most notable findings include:    Lab Results (last 24 hours)       ** No results found for the last 24 hours. **            Imaging Results (Last 24 Hours)       ** No results found for the last 24 hours. **            LAB RESULTS (LAST 7 DAYS)    CBC  Results from last 7 days   Lab Units 01/19/25  0857 01/17/25  1620   WBC 10*3/mm3 8.97 16.59*   RBC 10*6/mm3 3.55* 3.72*   HEMOGLOBIN g/dL 10.0* 10.3*   HEMATOCRIT % 33.8* 35.0   MCV fL 95.2 94.1   PLATELETS 10*3/mm3 264 359       BMP  Results from last 7 days   Lab Units 01/20/25  0456 01/19/25  0457 01/17/25 2036 01/17/25  1347   SODIUM mmol/L  --  139  --  143   POTASSIUM mmol/L  --  4.4  --  3.7   CHLORIDE mmol/L  --  98  --  106   CO2 mmol/L  --  29.9*  --  26.6   BUN mg/dL  --  25*  --  35*   CREATININE mg/dL  --  1.11*  --  1.13*   GLUCOSE mg/dL  --  91  --  107*   MAGNESIUM mg/dL 2.5* 1.4* 1.3*  --        CMP   Results from last 7 days   Lab Units 01/19/25  0457 01/17/25  1347   SODIUM mmol/L 139 143   POTASSIUM mmol/L 4.4 3.7   CHLORIDE mmol/L 98 106   CO2 mmol/L 29.9* 26.6   BUN mg/dL 25* 35*   CREATININE mg/dL 1.11* 1.13*   GLUCOSE mg/dL 91 107*       BNP        TROPONIN  Results from last 7 days   Lab Units 01/17/25 2036   HSTROP T ng/L 181*       CoAg  Results from last 7 days   Lab Units 01/18/25  0527 01/17/25  2357 01/17/25  1620   INR   --   --  0.95   APTT seconds 59.8* 27.1 25.0       Creatinine Clearance  Estimated Creatinine Clearance: 45.6 mL/min (A) (by C-G formula based on SCr of 1.11 mg/dL (H)).    ABG        Radiology  No  radiology results for the last day      EKG  I personally viewed and interpreted the patient's EKG/Telemetry data:  ECG 12 Lead Chest Pain   Final Result   HEART RATE=77  bpm   RR Vjjyegxf=876  ms   NM Dxwfcuic=993  ms   P Horizontal Axis=-1  deg   P Front Axis=77  deg   QRSD Interval=79  ms   QT Zysewsln=532  ms   GOjT=496  ms   QRS Axis=54  deg   T Wave Axis=101  deg   - ABNORMAL ECG -   Sinus rhythm   Low voltage, extremity leads   Nonspecific  T abnormalities, lateral leads   When compared with ECG of 17-Jan-2025 15:22:40,   Significant repolarization change   Significant axis, voltage or hypertrophy change   Electronically Signed By: Nato Schaffer ([facility]) 2025-01-18 12:52:09   Date and Time of Study:2025-01-18 05:21:07      ECG 12 Lead Dyspnea   Final Result   HEART RATE=62  bpm   RR Hglwzbwd=642  ms   NM Acylvubd=295  ms   P Horizontal Axis=10  deg   P Front Axis=74  deg   QRSD Nphoaucs=127  ms   QT Fwqrhkom=943  ms   UBrR=206  ms   QRS Axis=-55  deg   T Wave Axis=91  deg   - ABNORMAL ECG -   Sinus rhythm   Left bundle branch block   When compared with ECG of 29-Dec-2023 01:47:19,   New conduction abnormality   Electronically Signed By: Seng Funk ([facility]) 2025-01-18 09:21:17   Date and Time of Study:2025-01-17 15:22:40      Telemetry Scan   Final Result      Telemetry Scan   Final Result      Telemetry Scan   Final Result      Telemetry Scan   Final Result      Telemetry Scan   Final Result      Telemetry Scan   Final Result      Telemetry Scan   Final Result      Telemetry Scan   Final Result      Telemetry Scan   Final Result      Telemetry Scan   Final Result      Telemetry Scan   Final Result      Telemetry Scan   Final Result      Telemetry Scan   Final Result      Telemetry Scan   Final Result      Telemetry Scan   Final Result            Echocardiogram:    Results for orders placed during the hospital encounter of 01/17/25    Adult Transthoracic Echo Complete w/ Color, Spectral and Contrast if Necessary  Per Protocol    Interpretation Summary    Left ventricular ejection fraction appears to be 36 - 40%.    The left ventricular cavity is moderately dilated.    The following left ventricular wall segments are hypokinetic: mid anterior, apical anterior, mid anterolateral and apical lateral.    Takotsubo cardiomyopathy is suspected.    Left ventricular diastolic function is consistent with (grade I) impaired relaxation.    Estimated right ventricular systolic pressure from tricuspid regurgitation is normal (<35 mmHg).        Stress Test:         Cardiac Catheterization:  Results for orders placed during the hospital encounter of 11/19/22    Cardiac Catheterization/Vascular Study         Other:         ASSESSMENT & PLAN:    Principal Problem:    Elevated troponin  Active Problems:    Closed wedge compression fracture of T6 vertebra    Chest pain  Patient has a history of Takotsubo cardiomyopathy with previously elevated troponin.  ECG is nonischemic and high-sensitivity troponins are elevated  166 and 181  Previous troponin of 278 and 202 in 2023  Cardiac catheterization in 2022 with no evidence of obstructive coronary disease.  Offered repeat cardiac catheterization however patient is unable to lay flat on cath stable due to back pain  Recommended to follow-up outpatient to discuss coronary angiography if EF does not improve.     Takotsubo cardiomyopathy  History of elevated troponin and Takotsubo cardiomyopathy with EF of ~20%  EF improved to 60% in December 2023  Recurrent Takotsubo cardiomyopathy  proBNP of 16,000  Continue lisinopril and Toprol-XL  Continue diuretics: Monitor renal function  Monitor I's and O's and replace electrolytes as needed  Add Jardiance  Repeat echocardiogram in 3 months of GDMT as outpatient     Hypertension/hyperlipidemia  Continue lisinopril and Toprol-XL  Continue statin  , HDL 48, triglyceride 193 and total cholesterol 181.  A1c is 4.9     Bigeminy  Potassium and magnesium have  been replaced  Echocardiogram shows recurrent Takotsubo cardiomyopathy     T6 compression fracture  Evaluated by neurosurgery.  TLSO brace  Pain control and muscle relaxers    History of CLL  Follow-up with hematology     History of COPD  Currently on 4 L of oxygen  Bronchodilators and supportive care      Nato Schaffer MD  01/21/25  07:09 EST

## 2025-01-21 NOTE — PLAN OF CARE
Problem: Adult Inpatient Plan of Care  Goal: Absence of Hospital-Acquired Illness or Injury  Intervention: Identify and Manage Fall Risk  Recent Flowsheet Documentation  Taken 1/21/2025 1100 by Vianca Rebolledo RN  Safety Promotion/Fall Prevention:   safety round/check completed   room organization consistent   fall prevention program maintained  Taken 1/21/2025 1000 by Vianca Rebolledo RN  Safety Promotion/Fall Prevention:   safety round/check completed   fall prevention program maintained  Taken 1/21/2025 0908 by Vianca Rebolledo RN  Safety Promotion/Fall Prevention: safety round/check completed  Taken 1/21/2025 0900 by Vianca Rebolledo RN  Safety Promotion/Fall Prevention:   safety round/check completed   fall prevention program maintained  Taken 1/21/2025 0810 by Vianca Rebolledo RN  Safety Promotion/Fall Prevention:   safety round/check completed   room organization consistent   fall prevention program maintained  Taken 1/21/2025 0700 by Vianca Rebolledo RN  Safety Promotion/Fall Prevention:   safety round/check completed   fall prevention program maintained  Intervention: Prevent Skin Injury  Recent Flowsheet Documentation  Taken 1/21/2025 0810 by Vianca Rebolledo RN  Body Position: position changed independently  Skin Protection: incontinence pads utilized  Intervention: Prevent and Manage VTE (Venous Thromboembolism) Risk  Recent Flowsheet Documentation  Taken 1/21/2025 0810 by Vianca Rebolledo RN  VTE Prevention/Management: SCDs (sequential compression devices) off  Intervention: Prevent Infection  Recent Flowsheet Documentation  Taken 1/21/2025 0810 by Vianca Rebolledo RN  Infection Prevention: environmental surveillance performed  Goal: Optimal Comfort and Wellbeing  Intervention: Monitor Pain and Promote Comfort  Recent Flowsheet Documentation  Taken 1/21/2025 0810 by Vianca Rebolledo RN  Pain Management Interventions: care clustered  Intervention: Provide Person-Centered  Care  Recent Flowsheet Documentation  Taken 1/21/2025 0810 by Vianca Rebolledo RN  Trust Relationship/Rapport:   care explained   choices provided   emotional support provided   empathic listening provided   questions answered   questions encouraged   reassurance provided   thoughts/feelings acknowledged     Problem: Comorbidity Management  Goal: Maintenance of COPD Symptom Control  Intervention: Maintain COPD (Chronic Obstructive Pulmonary Disease) Symptom Control  Recent Flowsheet Documentation  Taken 1/21/2025 0810 by Vianca Rebolledo RN  Medication Review/Management: medications reviewed     Problem: Fall Injury Risk  Goal: Absence of Fall and Fall-Related Injury  Intervention: Identify and Manage Contributors  Recent Flowsheet Documentation  Taken 1/21/2025 0810 by Vianca Rebolledo RN  Medication Review/Management: medications reviewed  Self-Care Promotion: BADL personal objects within reach  Intervention: Promote Injury-Free Environment  Recent Flowsheet Documentation  Taken 1/21/2025 1100 by Vianca Rebolledo RN  Safety Promotion/Fall Prevention:   safety round/check completed   room organization consistent   fall prevention program maintained  Taken 1/21/2025 1000 by Vianca Rebolledo RN  Safety Promotion/Fall Prevention:   safety round/check completed   fall prevention program maintained  Taken 1/21/2025 0908 by Vianca Rebolledo RN  Safety Promotion/Fall Prevention: safety round/check completed  Taken 1/21/2025 0900 by Vianca Rebolledo RN  Safety Promotion/Fall Prevention:   safety round/check completed   fall prevention program maintained  Taken 1/21/2025 0810 by Vianca Rebolledo RN  Safety Promotion/Fall Prevention:   safety round/check completed   room organization consistent   fall prevention program maintained  Taken 1/21/2025 0700 by Vianca Rebolledo RN  Safety Promotion/Fall Prevention:   safety round/check completed   fall prevention program maintained   Goal Outcome  Evaluation:

## 2025-01-21 NOTE — DISCHARGE SUMMARY
[facility] Medicine Services  Discharge Summary    Date of Service: 2025  Patient Name: Krystyna Bennett  : 1945  MRN: [id number]    Date of Admission: 2025  Discharge Diagnosis:     Chest pain  Acute systolic CHF exacerbation EF of 36 to 40%  Hypertension  Hyperlipidemia  T6 compression fracture  History of COPD with chronic respiratory failure with hypoxia on 4 L oxygen via nasal cannula at home    Date of Discharge: 2025  Primary Care Physician: No primary care provider on file.      Hospital Course       Hospital Course:    This patient is a 79-year-old lady who was admitted with complaints of chest pain as well as dyspnea.  She was found to have elevated troponins.  Chest x-ray suggestive of pulmonary edema and showed a T6 compression fracture.  She had a thoracic x-ray with findings of acute/subacute T6 compression fracture.  Medical management was optimized for chest pain with nitrates, aspirin, beta-blocker therapy, statin therapy.  Echocardiogram showed a newly reduced ejection fraction of 36 to 40%.  Cardiology saw the patient in consult and discussed possible cardiac cath which the patient refused and so medical management was chosen for treatment for this patient.  She was started on IV diuretics for treatment of acute CHF exacerbation.  proBNP was 15,997.  She gradually improved on her treatment regimen and her symptoms of chest pain as well as dyspnea resolved.  She was seen by neurosurgery for acute/subacute T6 compression fracture and they recommended a TLSO brace and follow-up with them in the outpatient clinic with plans for serial imaging and with adequate pain control.  She is being discharged home on optimal medical management for CHF exacerbation will follow-up with cardiology in their outpatient clinic.        DISCHARGE Follow Up Recommendations:-Follow-up with PCP in 1 week, follow-up with neurosurgery, follow-up cardiology in 1 week.      Day of  Discharge     Vital Signs:  Temp:  [97.7 °F (36.5 °C)-98.6 °F (37 °C)] 98.6 °F (37 °C)  Heart Rate:  [63-84] 74  Resp:  [11-19] 17  BP: ()/(36-56) 116/51  Flow (L/min) (Oxygen Therapy):  [4] 4    Physical Exam:  Physical Exam  Constitutional:       Appearance: Normal appearance.   HENT:      Head: Normocephalic and atraumatic.      Nose: Nose normal.      Mouth/Throat:      Mouth: Mucous membranes are moist.   Eyes:      Extraocular Movements: Extraocular movements intact.      Pupils: Pupils are equal, round, and reactive to light.   Cardiovascular:      Rate and Rhythm: Normal rate and regular rhythm.   Pulmonary:      Effort: Pulmonary effort is normal.      Breath sounds: Normal breath sounds.   Abdominal:      General: Abdomen is flat. Bowel sounds are normal.      Palpations: Abdomen is soft.   Musculoskeletal:         General: Normal range of motion.      Cervical back: Normal range of motion and neck supple.   Skin:     General: Skin is warm and dry.   Neurological:      General: No focal deficit present.      Mental Status: She is alert and oriented to person, place, and time.   Psychiatric:         Mood and Affect: Mood normal.         Behavior: Behavior normal.         Thought Content: Thought content normal.         Judgment: Judgment normal.           Pertinent  and/or Most Recent Results     LAB RESULTS:      Lab 01/19/25  0857 01/18/25  0527 01/17/25  2357 01/17/25  1620   WBC 8.97  --   --  16.59*   HEMOGLOBIN 10.0*  --   --  10.3*   HEMATOCRIT 33.8*  --   --  35.0   PLATELETS 264  --   --  359   NEUTROS ABS  --   --   --  13.85*   IMMATURE GRANS (ABS)  --   --   --  0.14*   LYMPHS ABS  --   --   --  1.14   MONOS ABS  --   --   --  1.33*   EOS ABS  --   --   --  0.11   MCV 95.2  --   --  94.1   PROTIME  --   --   --  12.8   APTT  --  59.8* 27.1 25.0         Lab 01/20/25  0456 01/19/25  0457 01/17/25  2036 01/17/25  1347   SODIUM  --  139  --  143   POTASSIUM  --  4.4  --  3.7   CHLORIDE  --  98   --  106   CO2  --  29.9*  --  26.6   ANION GAP  --  11.1  --  10.4   BUN  --  25*  --  35*   CREATININE  --  1.11*  --  1.13*   EGFR  --  51.3*  --  50.2*   GLUCOSE  --  91  --  107*   CALCIUM  --  9.5  --  9.0   MAGNESIUM 2.5* 1.4* 1.3*  --    TSH  --   --  0.902  --              Lab 01/18/25  0959 01/17/25  2036 01/17/25  1802 01/17/25  1620 01/17/25  1347   PROBNP 15,997.0*  --   --   --   --    HSTROP T  --  181* 166*  --  147*   PROTIME  --   --   --  12.8  --    INR  --   --   --  0.95  --                  Brief Urine Lab Results  (Last result in the past 365 days)        Color   Clarity   Blood   Leuk Est   Nitrite   Protein   CREAT   Urine HCG        11/12/24 1421 Yellow   Clear   Negative   Negative   Negative   Trace                 Microbiology Results (last 10 days)       ** No results found for the last 240 hours. **            MRI Thoracic Spine With & Without Contrast    Result Date: 1/19/2025  Impression: Impression: Compression fracture at T6 appears acute, with associated marrow edema and enhancement. There is approximate 40% height loss and some mild bony retropulsion of the superior endplate, without significant adjacent spinal canal narrowing. While metastatic disease is difficult to entirely exclude, there is no evidence of extraosseous soft tissue involvement or discrete circumscribed underlying mass to further raise suspicion for metastasis. There is some additional heterogeneous enhancement seen at T3, without evidence of fracture. Marrow signal at this level is otherwise preserved on remaining sequences and this is favored to represent an osseous hemangioma, with metastatic involvement again not entirely excluded but considered less likely. Electronically Signed: Owen Easley MD  1/19/2025 8:53 AM EST  Workstation ID: [id number]    XR Chest 1 View    Result Date: 1/17/2025  Impression: Impression: 1.Prominence of the interstitial markings. I cannot exclude pulmonary vascular congestion or  atypical infection. 2.Emphysematous changes best demonstrated in the right upper lobe. 3.T6 compression fracture which is likely either acute or subacute in age. It was not apparent on the CT of the chest from 11/19/2024. There are no additional compression fractures in the thoracic spine. 4.Additional incidental findings as noted above. Electronically Signed: Mikey Simpson MD  1/17/2025 5:02 PM EST  Workstation ID: [id number]    XR Spine Thoracic 3 View    Result Date: 1/17/2025  Impression: Impression: 1.Prominence of the interstitial markings. I cannot exclude pulmonary vascular congestion or atypical infection. 2.Emphysematous changes best demonstrated in the right upper lobe. 3.T6 compression fracture which is likely either acute or subacute in age. It was not apparent on the CT of the chest from 11/19/2024. There are no additional compression fractures in the thoracic spine. 4.Additional incidental findings as noted above. Electronically Signed: Mikey Simpson MD  1/17/2025 5:02 PM EST  Workstation ID: [id number]             Results for orders placed during the hospital encounter of 01/17/25    Adult Transthoracic Echo Complete w/ Color, Spectral and Contrast if Necessary Per Protocol    Interpretation Summary    Left ventricular ejection fraction appears to be 36 - 40%.    The left ventricular cavity is moderately dilated.    The following left ventricular wall segments are hypokinetic: mid anterior, apical anterior, mid anterolateral and apical lateral.    Takotsubo cardiomyopathy is suspected.    Left ventricular diastolic function is consistent with (grade I) impaired relaxation.    Estimated right ventricular systolic pressure from tricuspid regurgitation is normal (<35 mmHg).      Labs Pending at Discharge:  Pending Results       None            Procedures Performed           Consults:   Consults       Date and Time Order Name Status Description    1/18/2025  5:06 AM Inpatient Neurosurgery Consult Completed      1/17/2025  9:43 PM Inpatient Cardiology Consult Completed               Discharge Details        Discharge Medications        New Medications        Instructions Start Date   aspirin 81 MG EC tablet   81 mg, Oral, Daily      furosemide 40 MG tablet  Commonly known as: LASIX   40 mg, Oral, Daily      methocarbamol 500 MG tablet  Commonly known as: ROBAXIN   250 mg, Oral, Every 8 Hours PRN      oxyCODONE 5 MG immediate release tablet  Commonly known as: ROXICODONE   5 mg, Oral, Every 6 Hours PRN             Continue These Medications        Instructions Start Date   albuterol sulfate  (90 Base) MCG/ACT inhaler  Commonly known as: PROVENTIL HFA;VENTOLIN HFA;PROAIR HFA   2 puffs, Inhalation, Every 4 Hours PRN      B-12 1000 MCG capsule   1,000 mcg, Daily      Calquence 100 MG tablet  Generic drug: Acalabrutinib Maleate   100 mg, 2 Times Daily      Caltrate 600+D Plus Minerals 600-800 MG-UNIT chewable tablet   2 tablets, Daily      Centrum Silver tablet  Generic drug: multivitamin with minerals   Take 1 tablet by mouth Daily. Indications: suppliment      COQ10 PO   1 tablet, Daily      Cranberry 450 MG capsule   Take  by mouth.      esomeprazole 40 MG capsule  Commonly known as: nexIUM   1 capsule, Every Morning Before Breakfast      fish oil 1000 MG capsule capsule   2 capsules, Daily      Glucosamine-Chondroitin 250-200 MG tablet   1 tablet, Every 24 Hours      guaiFENesin 600 MG 12 hr tablet  Commonly known as: MUCINEX   1,200 mg, 2 Times Daily      hydroCHLOROthiazide 25 MG tablet   25 mg, Oral, Daily      hydrOXYzine 10 MG tablet  Commonly known as: ATARAX   10 mg, Oral, 3 Times Daily PRN      ipratropium-albuterol 0.5-2.5 mg/3 ml nebulizer  Commonly known as: DUO-NEB   3 mL, Nebulization, Every 4 Hours PRN      lisinopril 40 MG tablet  Commonly known as: PRINIVIL,ZESTRIL   40 mg, Oral, Daily      metoprolol succinate XL 25 MG 24 hr tablet  Commonly known as: TOPROL-XL   25 mg, Oral, Daily      montelukast 10  MG tablet  Commonly known as: SINGULAIR   10 mg, Oral, Nightly      nystatin 811199 UNIT/GM cream  Commonly known as: MYCOSTATIN   1 Application, Topical, 2 Times Daily      nystatin 186184 UNIT/GM powder  Commonly known as: MYCOSTATIN   Topical, See Admin Instructions, Apply to the affected areas three times daily      O2  Commonly known as: OXYGEN   4 L/min, Continuous      OSTEO BI-FLEX ONE PER DAY PO   Take  by mouth.      PARoxetine 20 MG tablet  Commonly known as: PAXIL   20 mg, Oral, Daily      potassium chloride 10 MEQ CR tablet  Commonly known as: KLOR-CON M10   10 mEq, Oral, Daily      pramipexole 0.25 MG tablet  Commonly known as: MIRAPEX   TAKE ONE TABLET BY MOUTH DAILY      simvastatin 20 MG tablet  Commonly known as: ZOCOR   20 mg, Oral, Daily      traMADol 50 MG tablet  Commonly known as: ULTRAM   1 tablet every 8-12 hrs as needed.      Trelegy Ellipta 100-62.5-25 MCG/ACT inhaler  Generic drug: Fluticasone-Umeclidin-Vilant   1 puff, Inhalation, Daily - RT      triamcinolone 0.1 % ointment  Commonly known as: KENALOG   1 Application, Topical, 2 Times Daily             Stop These Medications      diphenhydrAMINE 25 mg capsule  Commonly known as: BENADRYL     predniSONE 10 MG tablet  Commonly known as: DELTASONE              Allergies   Allergen Reactions    Latex Rash    Sulfa Antibiotics Other (See Comments)     Tunnel vision,light headed/ may not be allergic to it any longer          Discharge Disposition: Home  Home or Self Care    Diet: Cardiac diet          Discharge Activity:   Activity Instructions       Activity as Tolerated                CODE STATUS:  Code Status and Medical Interventions: CPR (Attempt to Resuscitate); Full Support   Ordered at: 01/17/25 1959     Code Status (Patient has no pulse and is not breathing):    CPR (Attempt to Resuscitate)     Medical Interventions (Patient has pulse or is breathing):    Full Support         Future Appointments   Date Time Provider Department Center    2/25/2025  2:00 PM Mireya Kapoor MD [facility]   5/14/2025 10:50 AM Zuly Magallon MD [facility] None   6/17/2025  1:30 PM Andrea Philippe MD [facility]       Additional Instructions for the Follow-ups that You Need to Schedule       Discharge Follow-up with PCP   As directed       Currently Documented PCP:    No primary care provider on file.    PCP Phone Number:    None     Follow Up Details: 1 week        CT Thoracic Spine Without Contrast   Mar 02, 2025      Exam reason: f/u T6 compression fracture   Release to patient: Routine Release            Follow-up with neurosurgery in 1 week  Follow-up with cardiology in 1 week.    Time spent on Discharge including face to face service: Greater than 30 minutes    Signature: Electronically signed by Melissa Sharpe MD, 01/21/25, 14:31 EST.  [facility] Hospitalist Team

## 2025-01-21 NOTE — SIGNIFICANT NOTE
01/21/25 1008   OTHER   Discipline physical therapist   Rehab Time/Intention   Session Not Performed other (see comments)  (hospital d/c pending)   Recommendation   PT - Next Appointment 01/22/25

## 2025-01-21 NOTE — OUTREACH NOTE
Prep Survey      Flowsheet Row Responses   [employer] facility patient discharged from? [facility]   Is LACE score < 7 ? No   Eligibility Readm Mgmt   Discharge diagnosis Elevated troponin, T6 compression fracture, CHF acute exacerbation   Does the patient have one of the following disease processes/diagnoses(primary or secondary)? CHF   Does the patient have Home health ordered? No   Is there a DME ordered? No   Prep survey completed? Yes            Ailyn MILLER - Registered Nurse

## 2025-01-22 ENCOUNTER — TELEPHONE (OUTPATIENT)
Dept: NEUROSURGERY | Facility: CLINIC | Age: 80
End: 2025-01-22
Payer: MEDICARE

## 2025-01-22 NOTE — TELEPHONE ENCOUNTER
----- Message from Brina Goss sent at 1/19/2025 10:28 AM EST -----  Regarding: f/u  Patient needs 6 week f/u for compression fracture. Imaging already ordered.

## 2025-01-23 ENCOUNTER — TELEPHONE (OUTPATIENT)
Dept: FAMILY MEDICINE CLINIC | Facility: CLINIC | Age: 80
End: 2025-01-23
Payer: MEDICARE

## 2025-01-23 NOTE — TELEPHONE ENCOUNTER
Patient's daughter called requesting hospital f/u from recent stay at [facility] d/c 1/21. Please advise due to no availability

## 2025-01-28 ENCOUNTER — READMISSION MANAGEMENT (OUTPATIENT)
Dept: CALL CENTER | Facility: HOSPITAL | Age: 80
End: 2025-01-28
Payer: MEDICARE

## 2025-01-28 NOTE — OUTREACH NOTE
CHF Week 1 Survey      Flowsheet Row Responses   [employer] facility patient discharged from? [facility]   Does the patient have one of the following disease processes/diagnoses(primary or secondary)? CHF   CHF Week 1 attempt successful? No   Unsuccessful attempts Attempt 1  [Attempted patient and daughter.]            Татьяна NELSON - Registered Nurse

## 2025-02-03 NOTE — PROGRESS NOTES
Subjective   Krystyna Bennett is a 79 y.o. female. Presents to [facility]    Krystyna was seen at [facility] . She was admitted on 25  for chest pain and dyspnea . She was discharged on 25. Discharge diagnosis was CHF. Labs that were performed during the encounter included: CMP, CBC, and troponin. Diagnostic studies that were performed included: X-Ray- , Chest x-ray- , and MRI-  . Currently Krystyna receives care at home. Complications from the hospital stay include none. The patient stated that they do not need help with their daily life and activities. The patient stated that they do have emotional support at home.  Current outpatient and discharge medications have been reconciled for the patient.  Reviewed by: Mireya Kapoor MD      Chief Complaint   Patient presents with    Hospital Follow Up Visit    Congestive Heart Failure       Congestive Heart Failure  Presents for follow-up visit. Associated symptoms include fatigue and shortness of breath. Pertinent negatives include no chest pain, chest pressure or palpitations. The symptoms have been stable.        I personally reviewed and updated the patient's allergies, medications, problem list, and past medical, surgical, social, and family history. I have reviewed and confirmed the accuracy of the History of Present Illness and Review of Symptoms as documented by the MA/LPN/RN. Mireya Kapoor MD    Allergies:  Allergies   Allergen Reactions    Latex Rash    Sulfa Antibiotics Other (See Comments)     Tunnel vision,light headed/ may not be allergic to it any longer        Social History:  Social History     Socioeconomic History    Marital status:    Tobacco Use    Smoking status: Former     Current packs/day: 0.00     Average packs/day: 0.5 packs/day for 58.6 years (29.3 ttl pk-yrs)     Types: Cigarettes, Cigars     Start date: 1960     Quit date: 2019     Years since quittin.6     Passive exposure: Past     Smokeless tobacco: Never   Vaping Use    Vaping status: Never Used   Substance and Sexual Activity    Alcohol use: Yes     Alcohol/week: 4.0 standard drinks of alcohol     Types: 2 Glasses of wine, 2 Drinks containing 0.5 oz of alcohol per week     Comment: social drinker    Drug use: No    Sexual activity: Not Currently     Partners: Male     Birth control/protection: Post-menopausal       Family History:  Family History   Problem Relation Age of Onset    Heart disease Father             Stroke Father     Heart failure Father     Leukemia Paternal Grandmother     Anemia Paternal Grandmother     Heart disease Paternal Grandmother     Cancer Paternal Grandmother         leukemia    Alcohol abuse Maternal Aunt     Cancer Maternal Aunt         stomach    Asthma Maternal Grandmother             Hyperlipidemia Maternal Grandmother             Hypertension Maternal Grandmother     Diabetes Paternal Aunt             Hypertension Son     Hypertension Daughter        Past Medical History :  Patient Active Problem List   Diagnosis    Essential hypertension    CLL (chronic lymphocytic leukemia)    Centrilobular emphysema    GERD (gastroesophageal reflux disease)    Arthritis    Systolic CHF, chronic    Anemia, chronic disease    History of Clostridium difficile colitis    Melanoma    Mixed hyperlipidemia    Medicare annual wellness visit, subsequent    Anxiety    Restless legs syndrome    Colon cancer screening    Eczematous dermatitis of upper and lower eyelids of both eyes    Positive colorectal cancer screening using Cologuard test    Mammogram declined    Flu vaccine need    Non-seasonal allergic rhinitis    AK (actinic keratosis)    Cat bite of right wrist    Tear of skin of right wrist    Hospital discharge follow-up    Pulmonary nodule, right    Night sweats    Combined form of senile cataract    Overweight (BMI 25.0-29.9)    Labyrinthitis of both ears    Seborrheic keratoses     Hemoptysis    TMJ pain dysfunction syndrome    Dermatitis    Colon cancer screening declined    Elevated troponin    Closed wedge compression fracture of T6 vertebra       Medication List:    Current Outpatient Medications:     albuterol sulfate  (90 Base) MCG/ACT inhaler, Inhale 2 puffs Every 4 (Four) Hours As Needed for Wheezing. Indications: Spasm of Lung Air Passages, Disp: 17 g, Rfl: 3    aspirin 81 MG EC tablet, Take 1 tablet by mouth Daily for 30 days., Disp: 30 tablet, Rfl: 0    Boswellia-Glucosamine-Vit D (OSTEO BI-FLEX ONE PER DAY PO), Take  by mouth., Disp: , Rfl:     Calcium Carbonate-Vit D-Min (CALTRATE 600+D PLUS MINERALS) 600-800 MG-UNIT chewable tablet, Chew 2 tablets Daily. Indications: suppliment, Disp: , Rfl:     Calquence 100 MG tablet, Take 1 tablet by mouth 2 (Two) Times a Day., Disp: , Rfl:     Coenzyme Q10 (COQ10 PO), Take 1 tablet by mouth Daily. Indications: suppliment , Disp: , Rfl:     Cranberry 450 MG capsule, Take  by mouth., Disp: , Rfl:     Cyanocobalamin (B-12) 1000 MCG capsule, Take 1,000 mcg by mouth Daily. Indications: Inadequate Vitamin B12, Disp: , Rfl:     esomeprazole (nexIUM) 40 MG capsule, Take 1 capsule by mouth Every Morning Before Breakfast. Indications: Heartburn, Disp: , Rfl:     Fluticasone-Umeclidin-Vilant (Trelegy Ellipta) 100-62.5-25 MCG/ACT inhaler, Inhale 1 puff Daily. Indications: Chronic Obstructive Lung Disease, Disp: 60 each, Rfl: 5    furosemide (LASIX) 40 MG tablet, Take 1 tablet by mouth Daily for 30 days., Disp: 30 tablet, Rfl: 0    Glucosamine-Chondroitin 250-200 MG tablet, Take 1 tablet by mouth Daily. Indications: suppliment, Disp: , Rfl:     guaiFENesin (MUCINEX) 600 MG 12 hr tablet, Take 2 tablets by mouth 2 (Two) Times a Day., Disp: , Rfl:     hydroCHLOROthiazide 25 MG tablet, TAKE 1 TABLET BY MOUTH DAILY, Disp: 90 tablet, Rfl: 3    hydrOXYzine (ATARAX) 10 MG tablet, Take 1 tablet by mouth 3 (Three) Times a Day As Needed for Itching., Disp: 30  tablet, Rfl: 0    ipratropium-albuterol (DUO-NEB) 0.5-2.5 mg/3 ml nebulizer, Take 3 mL by nebulization Every 4 (Four) Hours As Needed for Wheezing., Disp: 75 mL, Rfl: 12    lisinopril (PRINIVIL,ZESTRIL) 40 MG tablet, TAKE 1 TABLET BY MOUTH DAILY, Disp: 90 tablet, Rfl: 3    metoprolol succinate XL (TOPROL-XL) 25 MG 24 hr tablet, TAKE 1 TABLET BY MOUTH DAILY, Disp: 90 tablet, Rfl: 1    montelukast (SINGULAIR) 10 MG tablet, Take 1 tablet by mouth Every Night., Disp: 30 tablet, Rfl: 11    Multiple Vitamins-Minerals (CENTRUM SILVER) tablet, Take 1 tablet by mouth Daily. Indications: suppliment, Disp: , Rfl:     nystatin (MYCOSTATIN) 484113 UNIT/GM cream, Apply 1 Application topically to the appropriate area as directed 2 (Two) Times a Day., Disp: 60 g, Rfl: 3    nystatin (MYCOSTATIN) 793248 UNIT/GM powder, APPLY TO AFFECTED AREA(S) THREE TIMES A DAY, Disp: 60 g, Rfl: 2    O2 (OXYGEN), Inhale 4 L/min Continuous. Indications: Hypoxia, Disp: , Rfl:     Omega-3 Fatty Acids (fish oil) 1000 MG capsule capsule, Take 2 capsules by mouth Daily. Indications: suppliment, Disp: , Rfl:     PARoxetine (PAXIL) 20 MG tablet, TAKE 1 TABLET BY MOUTH DAILY, Disp: 90 tablet, Rfl: 3    potassium chloride (KLOR-CON M10) 10 MEQ CR tablet, Take 1 tablet by mouth Daily. Indications: Low Amount of Potassium in the Blood, Disp: 30 tablet, Rfl: 12    pramipexole (MIRAPEX) 0.25 MG tablet, TAKE ONE TABLET BY MOUTH DAILY, Disp: 90 tablet, Rfl: 3    simvastatin (ZOCOR) 20 MG tablet, Take 1 tablet by mouth Daily., Disp: 90 tablet, Rfl: 3    traMADol (ULTRAM) 50 MG tablet, 1 tablet every 8-12 hrs as needed., Disp: 10 tablet, Rfl: 0    triamcinolone (KENALOG) 0.1 % ointment, Apply 1 Application topically to the appropriate area as directed 2 (Two) Times a Day., Disp: 30 g, Rfl: 0    tiZANidine (ZANAFLEX) 4 MG tablet, Take 1 tablet by mouth 2 (Two) Times a Day As Needed for Muscle Spasms., Disp: 60 tablet, Rfl: 1    Past Surgical History:  Past Surgical  "History:   Procedure Laterality Date    APPENDECTOMY      BRONCHOSCOPY N/A 12/25/2023    Procedure: BRONCHOSCOPY with bilateral lung wash;  Surgeon: Zuly Magallon MD;  Location: [facility] ENDOSCOPY;  Service: Pulmonary;  Laterality: N/A;  Post- hemoptysis    CARDIAC CATHETERIZATION  05/2019    [facility].    CARDIAC CATHETERIZATION Right 11/25/2022    Procedure: Coronary angiography;  Surgeon: Andrea Philippe MD;  Location: [facility] CATH INVASIVE LOCATION;  Service: Cardiovascular;  Laterality: Right;    HYSTERECTOMY      TONSILLECTOMY         Review of Systems:  Review of Systems   Constitutional:  Positive for fatigue.   Respiratory:  Positive for shortness of breath.    Cardiovascular:  Negative for chest pain and palpitations.       Physical Exam:  Vital Signs:  Vital Signs:   /80 (BP Location: Right arm, Patient Position: Sitting, Cuff Size: Adult)   Pulse 64   Temp 97.3 °F (36.3 °C) (Temporal)   Resp 20   Ht 167.6 cm (66\")   Wt 78.1 kg (172 lb 3.2 oz)   SpO2 97% Comment: 4 liters of o2  BMI 27.79 kg/m²     Result Review :   The following data was reviewed by: Mireya Kapoor MD on 02/04/2025:            Latest Reference Range & Units 01/19/25 04:57 01/19/25 08:57 01/20/25 04:56   Sodium 136 - 145 mmol/L 139     Potassium 3.5 - 5.2 mmol/L 4.4     Chloride 98 - 107 mmol/L 98     CO2 22.0 - 29.0 mmol/L 29.9 (H)     Anion Gap 5.0 - 15.0 mmol/L 11.1     BUN 8 - 23 mg/dL 25 (H)     Creatinine 0.57 - 1.00 mg/dL 1.11 (H)     BUN/Creatinine Ratio 7.0 - 25.0  22.5     eGFR >60.0 mL/min/1.73 51.3 (L)     Glucose 65 - 99 mg/dL 91     Calcium 8.6 - 10.5 mg/dL 9.5     Magnesium 1.6 - 2.4 mg/dL 1.4 (L)  2.5 (H)   WBC 3.40 - 10.80 10*3/mm3  8.97    RBC 3.77 - 5.28 10*6/mm3  3.55 (L)    Hemoglobin 12.0 - 15.9 g/dL  10.0 (L)    Hematocrit 34.0 - 46.6 %  33.8 (L)    Platelets 140 - 450 10*3/mm3  264    RDW 12.3 - 15.4 %  13.0    MCV 79.0 - 97.0 fL  95.2    MCH 26.6 - 33.0 pg  28.2    MCHC 31.5 - 35.7 g/dL  29.6 (L)    MPV 6.0 - " 12.0 fL  11.1    RDW-SD 37.0 - 54.0 fl  44.5    (H): Data is abnormally high  (L): Data is abnormally low    Physical Exam  Vitals reviewed.   Constitutional:       Appearance: Normal appearance. She is well-developed.   HENT:      Head: Normocephalic and atraumatic.   Eyes:      General:         Right eye: No discharge.         Left eye: No discharge.   Cardiovascular:      Rate and Rhythm: Normal rate and regular rhythm.      Heart sounds: Normal heart sounds. No murmur heard.     No friction rub. No gallop.   Pulmonary:      Effort: Pulmonary effort is normal. No respiratory distress.      Breath sounds: Normal breath sounds. No wheezing or rales.   Skin:     General: Skin is warm and dry.      Findings: No rash.   Neurological:      Mental Status: She is alert and oriented to person, place, and time.      Coordination: Coordination normal.      Gait: Gait normal.   Psychiatric:         Behavior: Behavior is cooperative.         Assessment and Plan:  Problems Addressed this Visit          Cardiac and Vasculature    Essential hypertension     Hypertension is stable and controlled  Continue current treatment regimen.  Blood pressure will be reassessed in 3 months.         Relevant Orders    Comprehensive Metabolic Panel (Completed)    Systolic CHF, chronic     Congestive heart failure due to coronary artery disease (CAD) and hypertension.  Heart failure is stable.   Continue current treatment regimen.  Heart failure will be reassessed in 3 months.    She will be seeing her cardiologist, Dr Philippe next week    She declined cath in hospital           Relevant Orders    Comprehensive Metabolic Panel (Completed)       Endocrine and Metabolic    Overweight (BMI 25.0-29.9)       Health Encounters    Hospital discharge follow-up - Primary       Hematology and Neoplasia    Anemia, chronic disease       Neuro    Closed wedge compression fracture of T6 vertebra     She can not wear the brace. Its causes more pain and keeps  her from taking deep breaths. Makes her feel short of breath.   She would like a refill of tizanidine to take as needed  Advised her daughter to call Neurosurgery to let them know about the brace         Relevant Medications    tiZANidine (ZANAFLEX) 4 MG tablet     Other Visit Diagnoses       Hypomagnesemia        Relevant Orders    Magnesium (Completed)          Diagnoses         Codes Comments    Hospital discharge follow-up    -  Primary ICD-10-CM: Z09  ICD-9-CM: V67.59     Systolic CHF, chronic     ICD-10-CM: I50.22  ICD-9-CM: 428.22, 428.0     Overweight (BMI 25.0-29.9)     ICD-10-CM: E66.3  ICD-9-CM: 278.02     Closed wedge compression fracture of T6 vertebra, initial encounter     ICD-10-CM: S22.050A  ICD-9-CM: 805.2     Anemia, chronic disease     ICD-10-CM: D63.8  ICD-9-CM: 285.29     Hypomagnesemia     ICD-10-CM: E83.42  ICD-9-CM: 275.2     Essential hypertension     ICD-10-CM: I10  ICD-9-CM: 401.9              An After Visit Summary and PPPS were given to the patient.

## 2025-02-04 ENCOUNTER — OFFICE VISIT (OUTPATIENT)
Dept: FAMILY MEDICINE CLINIC | Facility: CLINIC | Age: 80
End: 2025-02-04
Payer: MEDICARE

## 2025-02-04 VITALS
OXYGEN SATURATION: 97 % | BODY MASS INDEX: 27.68 KG/M2 | HEIGHT: 66 IN | HEART RATE: 64 BPM | DIASTOLIC BLOOD PRESSURE: 80 MMHG | SYSTOLIC BLOOD PRESSURE: 126 MMHG | WEIGHT: 172.2 LBS | TEMPERATURE: 97.3 F | RESPIRATION RATE: 20 BRPM

## 2025-02-04 DIAGNOSIS — I50.22 SYSTOLIC CHF, CHRONIC: ICD-10-CM

## 2025-02-04 DIAGNOSIS — E83.42 HYPOMAGNESEMIA: ICD-10-CM

## 2025-02-04 DIAGNOSIS — D63.8 ANEMIA, CHRONIC DISEASE: ICD-10-CM

## 2025-02-04 DIAGNOSIS — Z09 HOSPITAL DISCHARGE FOLLOW-UP: Primary | ICD-10-CM

## 2025-02-04 DIAGNOSIS — E66.3 OVERWEIGHT (BMI 25.0-29.9): ICD-10-CM

## 2025-02-04 DIAGNOSIS — I10 ESSENTIAL HYPERTENSION: ICD-10-CM

## 2025-02-04 DIAGNOSIS — S22.050A CLOSED WEDGE COMPRESSION FRACTURE OF T6 VERTEBRA, INITIAL ENCOUNTER: ICD-10-CM

## 2025-02-04 PROCEDURE — 3079F DIAST BP 80-89 MM HG: CPT | Performed by: FAMILY MEDICINE

## 2025-02-04 PROCEDURE — G2211 COMPLEX E/M VISIT ADD ON: HCPCS | Performed by: FAMILY MEDICINE

## 2025-02-04 PROCEDURE — 1111F DSCHRG MED/CURRENT MED MERGE: CPT | Performed by: FAMILY MEDICINE

## 2025-02-04 PROCEDURE — 3074F SYST BP LT 130 MM HG: CPT | Performed by: FAMILY MEDICINE

## 2025-02-04 PROCEDURE — 99214 OFFICE O/P EST MOD 30 MIN: CPT | Performed by: FAMILY MEDICINE

## 2025-02-04 PROCEDURE — 1125F AMNT PAIN NOTED PAIN PRSNT: CPT | Performed by: FAMILY MEDICINE

## 2025-02-04 NOTE — ASSESSMENT & PLAN NOTE
Congestive heart failure due to coronary artery disease (CAD) and hypertension.  Heart failure is stable.   Continue current treatment regimen.  Heart failure will be reassessed in 3 months.    She will be seeing her cardiologist, Dr Philippe next week    She declined cath in hospital

## 2025-02-04 NOTE — ASSESSMENT & PLAN NOTE
She can not wear the brace. Its causes more pain and keeps her from taking deep breaths. Makes her feel short of breath.   She would like a refill of tizanidine to take as needed  Advised her daughter to call Neurosurgery to let them know about the brace

## 2025-02-05 LAB
ALBUMIN SERPL-MCNC: 4.2 G/DL (ref 3.8–4.8)
ALP SERPL-CCNC: 121 IU/L (ref 44–121)
ALT SERPL-CCNC: 15 IU/L (ref 0–32)
AST SERPL-CCNC: 19 IU/L (ref 0–40)
BILIRUB SERPL-MCNC: 0.3 MG/DL (ref 0–1.2)
BUN SERPL-MCNC: 32 MG/DL (ref 8–27)
BUN/CREAT SERPL: 23 (ref 12–28)
CALCIUM SERPL-MCNC: 10 MG/DL (ref 8.7–10.3)
CHLORIDE SERPL-SCNC: 96 MMOL/L (ref 96–106)
CO2 SERPL-SCNC: 30 MMOL/L (ref 20–29)
CREAT SERPL-MCNC: 1.39 MG/DL (ref 0.57–1)
EGFRCR SERPLBLD CKD-EPI 2021: 39 ML/MIN/1.73
GLOBULIN SER CALC-MCNC: 1.7 G/DL (ref 1.5–4.5)
GLUCOSE SERPL-MCNC: 86 MG/DL (ref 70–99)
MAGNESIUM SERPL-MCNC: 1.6 MG/DL (ref 1.6–2.3)
POTASSIUM SERPL-SCNC: 4.3 MMOL/L (ref 3.5–5.2)
PROT SERPL-MCNC: 5.9 G/DL (ref 6–8.5)
SODIUM SERPL-SCNC: 141 MMOL/L (ref 134–144)

## 2025-02-07 ENCOUNTER — READMISSION MANAGEMENT (OUTPATIENT)
Dept: CALL CENTER | Facility: HOSPITAL | Age: 80
End: 2025-02-07
Payer: MEDICARE

## 2025-02-07 NOTE — OUTREACH NOTE
CHF Week 3 Survey      Flowsheet Row Responses   Congregational facility patient discharged from? [facility]   Does the patient have one of the following disease processes/diagnoses(primary or secondary)? CHF   Week 3 attempt successful? No   Unsuccessful attempts Attempt 1            CATRACHO LATHAM - Registered Nurse
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
## 2025-02-12 ENCOUNTER — READMISSION MANAGEMENT (OUTPATIENT)
Dept: CALL CENTER | Facility: HOSPITAL | Age: 80
End: 2025-02-12
Payer: MEDICARE

## 2025-02-12 NOTE — OUTREACH NOTE
"CHF Week 3 Survey      Flowsheet Row Responses   [facility] patient discharged from? [facility]   Does the patient have one of the following disease processes/diagnoses(primary or secondary)? CHF   Week 3 attempt successful? Yes   Call start time 1711   Call end time 1721   Discharge diagnosis Elevated troponin, T6 compression fracture, CHF acute exacerbation   Person spoke with today (if not patient) and relationship Patient   Medication alerts for this patient Aspirin, Lasix, Robaxin, Oxycodone   Meds reviewed with patient/caregiver? Yes   Is the patient having any side effects they believe may be caused by any medication additions or changes? Yes   Side effects comments  Patient states she has not been taking muscle relaxer, Robaxin--it makes her feel \"weird\".   Does the patient have all medications ordered at discharge? Yes   Prescription comments No questions or concerns with medications.   Is the patient taking all medications as directed (includes completed medication regime)? Yes   Comments regarding appointments Cardiology f/u appt tomorrow, 2/13/25 at 1:00 PM with KAPIL Kinney. Neurosurgery f/u appt on 3/6/25 at 2:00 PM with KAPIL Christian.   Does the patient have a primary care provider?  Yes   Does the patient have an appointment with their PCP within 7 days of discharge? Yes   Comments regarding PCP PCP--Dr. Mireya Kapoor---another appt scheduled for 2/25/25 at 2:00 PM.   Has the patient kept scheduled appointments due by today? Yes   Has home health visited the patient within 72 hours of discharge? N/A   DME comments Oxygen 4 LPM via nasal cannula continuous through [facility]   Pulse Ox monitoring Intermittent   Pulse Ox device source Patient   O2 Sat comments Patient states her O2 sats run 94-95% on 4 LPM of oxygen   O2 Sat: education provided Sat levels, Monitoring frequency, When to seek care   Psychosocial issues? No   Comments Patient has a T6 compression fx. She states she has not worn the " brace for her back because it is too tight and she cannot breathe. She takes Advil 400 mgs for pain as needed. Patient states she will have a CT prior to her Neurosurgery appt to evaluate her back. Advised patient to weigh daily to monitor for CHF.   Did the patient receive a copy of their discharge instructions? Yes   Nursing interventions Reviewed instructions with patient   What is the patient's perception of their health status since discharge? Improving   Nursing interventions Nurse provided patient education   Is the patient able to teach back signs and symptoms of worsening condition? (i.e. weight gain, shortness of air, etc.) Yes   If the patient is a current smoker, are they able to teach back resources for cessation? Not a smoker   Is the patient/caregiver able to teach back the hierarchy of who to call/visit for symptoms/problems? PCP, Specialist, Home health nurse, Urgent Care, ED, 911 Yes   CHF Zone this Call Green Zone   Green Zone No new or worsening shortness of breath, Physical activity level is normal for you, No new swelling -  feet, ankles and legs look normal for you, No chest pain   Green Zone Interventions Daily weight check, Meds as directed, Low sodium diet, Follow up visits planned   CHF Week 3 call completed? Yes   Graduated Yes   Is the patient interested in additional calls from an ambulatory ? No   Would this patient benefit from a Referral to Amb Social Work? No   Wrap up additional comments Patient denies any needs or concerns. Appreciative of the call and the care she received at [facility].   Call end time 1721            Adriana SCRUGGS - Registered Nurse

## 2025-02-13 ENCOUNTER — TELEPHONE (OUTPATIENT)
Dept: FAMILY MEDICINE CLINIC | Facility: CLINIC | Age: 80
End: 2025-02-13
Payer: MEDICARE

## 2025-02-13 ENCOUNTER — OFFICE VISIT (OUTPATIENT)
Dept: CARDIOLOGY | Facility: CLINIC | Age: 80
End: 2025-02-13
Payer: MEDICARE

## 2025-02-13 VITALS
HEIGHT: 66 IN | DIASTOLIC BLOOD PRESSURE: 49 MMHG | SYSTOLIC BLOOD PRESSURE: 128 MMHG | BODY MASS INDEX: 27.51 KG/M2 | OXYGEN SATURATION: 95 % | HEART RATE: 71 BPM | WEIGHT: 171.2 LBS

## 2025-02-13 DIAGNOSIS — Z09 HOSPITAL DISCHARGE FOLLOW-UP: Primary | ICD-10-CM

## 2025-02-13 DIAGNOSIS — I50.22 SYSTOLIC CHF, CHRONIC: ICD-10-CM

## 2025-02-13 DIAGNOSIS — S22.050A CLOSED WEDGE COMPRESSION FRACTURE OF T6 VERTEBRA, INITIAL ENCOUNTER: Primary | ICD-10-CM

## 2025-02-13 DIAGNOSIS — I51.81 TAKOTSUBO CARDIOMYOPATHY: ICD-10-CM

## 2025-02-13 DIAGNOSIS — E78.2 MIXED HYPERLIPIDEMIA: ICD-10-CM

## 2025-02-13 DIAGNOSIS — R79.89 ELEVATED TROPONIN: ICD-10-CM

## 2025-02-13 DIAGNOSIS — I10 ESSENTIAL HYPERTENSION: ICD-10-CM

## 2025-02-13 DIAGNOSIS — C91.10 CLL (CHRONIC LYMPHOCYTIC LEUKEMIA): ICD-10-CM

## 2025-02-13 RX ORDER — DAPAGLIFLOZIN 10 MG/1
10 TABLET, FILM COATED ORAL DAILY
Qty: 90 TABLET | Refills: 1 | Status: SHIPPED | OUTPATIENT
Start: 2025-02-13

## 2025-02-13 RX ORDER — BACLOFEN 10 MG/1
10 TABLET ORAL NIGHTLY PRN
Qty: 90 TABLET | Refills: 0 | Status: SHIPPED | OUTPATIENT
Start: 2025-02-13

## 2025-02-13 RX ORDER — ASPIRIN 81 MG/1
81 TABLET ORAL DAILY
Qty: 90 TABLET | Refills: 1 | Status: SHIPPED | OUTPATIENT
Start: 2025-02-13 | End: 2025-08-12

## 2025-02-13 NOTE — TELEPHONE ENCOUNTER
Caller: VEGA WHITE (POA)    Relationship: Emergency Contact    Best call back number: [phone]    What medication are you requesting:  SUBSTITUTE FOR TIZANIDINE,,WOULD LIKE  METHOCARBAMOL 500 MG 2 TIMES DAILY  CALLED IN     What are your current symptoms: MUSCLE PAIN         Have you had these symptoms before:    [x] Yes  [] No    Have you been treated for these symptoms before:   [x] Yes  [] No    If a prescription is needed, what is your preferred pharmacy and phone number: [id number] - [place], [place] - [address] [place] [address] - [phone] [facility] [phone]      Additional notes:

## 2025-02-21 NOTE — PROGRESS NOTES
Subjective   Krystyna Bennett is a 79 y.o. female. Presents to [facility]    Chief Complaint   Patient presents with    Hypertension    Hyperlipidemia       Hypertension  This is a chronic problem. The current episode started more than 1 month ago. The problem is controlled. Associated symptoms include headaches and shortness of breath (with weather change). Pertinent negatives include no blurred vision, chest pain, malaise/fatigue, orthopnea, palpitations or sweats. Risk factors for coronary artery disease include post-menopausal state, dyslipidemia and family history. Current antihypertension treatment includes beta blockers and ACE inhibitors. The current treatment provides moderate improvement. There are no compliance problems.    Hyperlipidemia  This is a chronic problem. The current episode started more than 1 year ago. She has no history of diabetes or obesity. Associated symptoms include shortness of breath (with weather change). Pertinent negatives include no chest pain or leg pain. Current antihyperlipidemic treatment includes statins. The current treatment provides moderate improvement of lipids. There are no compliance problems.  Risk factors for coronary artery disease include dyslipidemia and hypertension.        I personally reviewed and updated the patient's allergies, medications, problem list, and past medical, surgical, social, and family history. I have reviewed and confirmed the accuracy of the History of Present Illness and Review of Symptoms as documented by the MA/LPN/RN. Mireya Kapoor MD    Allergies:  Allergies   Allergen Reactions    Latex Rash    Sulfa Antibiotics Other (See Comments)     Tunnel vision,light headed/ may not be allergic to it any longer        Social History:  Social History     Socioeconomic History    Marital status:    Tobacco Use    Smoking status: Former     Current packs/day: 0.00     Average packs/day: 0.5 packs/day for 58.6 years (29.3 ttl  pk-yrs)     Types: Cigarettes, Cigars     Start date: 1960     Quit date: 2019     Years since quittin.6     Passive exposure: Past    Smokeless tobacco: Never   Vaping Use    Vaping status: Never Used   Substance and Sexual Activity    Alcohol use: Yes     Alcohol/week: 4.0 standard drinks of alcohol     Types: 2 Glasses of wine, 2 Drinks containing 0.5 oz of alcohol per week     Comment: social drinker    Drug use: Never    Sexual activity: Not Currently     Partners: Male     Birth control/protection: Post-menopausal       Family History:  Family History   Problem Relation Age of Onset    Heart disease Father             Stroke Father     Heart failure Father     Leukemia Paternal Grandmother     Anemia Paternal Grandmother     Heart disease Paternal Grandmother     Cancer Paternal Grandmother         leukemia    Alcohol abuse Maternal Aunt     Cancer Maternal Aunt         stomach    Asthma Maternal Grandmother             Hyperlipidemia Maternal Grandmother             Hypertension Maternal Grandmother     Diabetes Paternal Aunt             Hypertension Son     Hypertension Daughter        Past Medical History :  Patient Active Problem List   Diagnosis    Essential hypertension    CLL (chronic lymphocytic leukemia)    Centrilobular emphysema    GERD (gastroesophageal reflux disease)    Arthritis    Systolic CHF, chronic    Anemia, chronic disease    History of Clostridium difficile colitis    Melanoma    Mixed hyperlipidemia    Medicare annual wellness visit, subsequent    Anxiety    Restless legs syndrome    Colon cancer screening    Eczematous dermatitis of upper and lower eyelids of both eyes    Positive colorectal cancer screening using Cologuard test    Mammogram declined    Flu vaccine need    Non-seasonal allergic rhinitis    AK (actinic keratosis)    Hospital discharge follow-up    Pulmonary nodule, right    Night sweats    Combined form of senile cataract     Overweight (BMI 25.0-29.9)    Labyrinthitis of both ears    Seborrheic keratoses    Hemoptysis    TMJ pain dysfunction syndrome    Dermatitis    Colon cancer screening declined    Elevated troponin    Closed wedge compression fracture of T6 vertebra    Chronic renal failure, stage 3a       Medication List:    Current Outpatient Medications:     albuterol sulfate  (90 Base) MCG/ACT inhaler, Inhale 2 puffs Every 4 (Four) Hours As Needed for Wheezing. Indications: Spasm of Lung Air Passages, Disp: 17 g, Rfl: 3    aspirin 81 MG EC tablet, Take 1 tablet by mouth Daily for 180 days., Disp: 90 tablet, Rfl: 1    Boswellia-Glucosamine-Vit D (OSTEO BI-FLEX ONE PER DAY PO), Take  by mouth., Disp: , Rfl:     Calcium Carbonate-Vit D-Min (CALTRATE 600+D PLUS MINERALS) 600-800 MG-UNIT chewable tablet, Chew 2 tablets Daily. Indications: suppliment, Disp: , Rfl:     Calquence 100 MG tablet, Take 1 tablet by mouth 2 (Two) Times a Day., Disp: , Rfl:     Coenzyme Q10 (COQ10 PO), Take 1 tablet by mouth Daily. Indications: suppliment , Disp: , Rfl:     Cranberry 450 MG capsule, Take  by mouth., Disp: , Rfl:     Cyanocobalamin (B-12) 1000 MCG capsule, Take 1,000 mcg by mouth Daily. Indications: Inadequate Vitamin B12, Disp: , Rfl:     dapagliflozin Propanediol (Farxiga) 10 MG tablet, Take 10 mg by mouth Daily., Disp: 90 tablet, Rfl: 1    esomeprazole (nexIUM) 40 MG capsule, Take 1 capsule by mouth Every Morning Before Breakfast. Indications: Heartburn, Disp: , Rfl:     Fluticasone-Umeclidin-Vilant (Trelegy Ellipta) 100-62.5-25 MCG/ACT inhaler, Inhale 1 puff Daily. Indications: Chronic Obstructive Lung Disease, Disp: 60 each, Rfl: 5    Glucosamine-Chondroitin 250-200 MG tablet, Take 1 tablet by mouth Daily. Indications: suppliment, Disp: , Rfl:     guaiFENesin (MUCINEX) 600 MG 12 hr tablet, Take 2 tablets by mouth 2 (Two) Times a Day., Disp: , Rfl:     hydrOXYzine (ATARAX) 10 MG tablet, Take 1 tablet by mouth 3 (Three) Times a Day  As Needed for Itching., Disp: 30 tablet, Rfl: 0    ipratropium-albuterol (DUO-NEB) 0.5-2.5 mg/3 ml nebulizer, Take 3 mL by nebulization Every 4 (Four) Hours As Needed for Wheezing., Disp: 75 mL, Rfl: 12    lisinopril (PRINIVIL,ZESTRIL) 40 MG tablet, TAKE 1 TABLET BY MOUTH DAILY, Disp: 90 tablet, Rfl: 3    metoprolol succinate XL (TOPROL-XL) 25 MG 24 hr tablet, TAKE 1 TABLET BY MOUTH DAILY, Disp: 90 tablet, Rfl: 1    montelukast (SINGULAIR) 10 MG tablet, Take 1 tablet by mouth Every Night., Disp: 30 tablet, Rfl: 11    Multiple Vitamins-Minerals (CENTRUM SILVER) tablet, Take 1 tablet by mouth Daily. Indications: suppliment, Disp: , Rfl:     nystatin (MYCOSTATIN) 514472 UNIT/GM cream, Apply 1 Application topically to the appropriate area as directed 2 (Two) Times a Day., Disp: 60 g, Rfl: 3    nystatin (MYCOSTATIN) 525888 UNIT/GM powder, APPLY TO AFFECTED AREA(S) THREE TIMES A DAY, Disp: 60 g, Rfl: 2    O2 (OXYGEN), Inhale 4 L/min Continuous. Indications: Hypoxia, Disp: , Rfl:     Omega-3 Fatty Acids (fish oil) 1000 MG capsule capsule, Take 2 capsules by mouth Daily. Indications: suppliment, Disp: , Rfl:     PARoxetine (PAXIL) 20 MG tablet, TAKE 1 TABLET BY MOUTH DAILY, Disp: 90 tablet, Rfl: 3    potassium chloride (KLOR-CON M10) 10 MEQ CR tablet, Take 1 tablet by mouth Daily. Indications: Low Amount of Potassium in the Blood, Disp: 30 tablet, Rfl: 12    pramipexole (MIRAPEX) 0.25 MG tablet, TAKE ONE TABLET BY MOUTH DAILY, Disp: 90 tablet, Rfl: 3    simvastatin (ZOCOR) 20 MG tablet, Take 1 tablet by mouth Daily., Disp: 90 tablet, Rfl: 3    traMADol (ULTRAM) 50 MG tablet, 1 tablet every 8-12 hrs as needed., Disp: 10 tablet, Rfl: 0    triamcinolone (KENALOG) 0.1 % ointment, Apply 1 Application topically to the appropriate area as directed 2 (Two) Times a Day., Disp: 30 g, Rfl: 0    furosemide (LASIX) 40 MG tablet, Take 1 tablet by mouth Daily for 30 days., Disp: 30 tablet, Rfl: 0    methocarbamol (ROBAXIN) 750 MG tablet,  "Take 1 tablet by mouth Daily As Needed for Muscle Spasms., Disp: 30 tablet, Rfl: 0    Past Surgical History:  Past Surgical History:   Procedure Laterality Date    APPENDECTOMY      BRONCHOSCOPY N/A 12/25/2023    Procedure: BRONCHOSCOPY with bilateral lung wash;  Surgeon: Zuly Magallon MD;  Location: [facility] ENDOSCOPY;  Service: Pulmonary;  Laterality: N/A;  Post- hemoptysis    CARDIAC CATHETERIZATION  05/2019    [facility].    CARDIAC CATHETERIZATION Right 11/25/2022    Procedure: Coronary angiography;  Surgeon: Andrea Philippe MD;  Location: [facility] CATH INVASIVE LOCATION;  Service: Cardiovascular;  Laterality: Right;    HYSTERECTOMY      TONSILLECTOMY           Physical Exam:      Vital Signs:    Vitals:    02/25/25 1358   BP: 128/70   Pulse: 65   Resp: 17   Temp: 97.1 °F (36.2 °C)   SpO2: 95%        /70 (BP Location: Right arm, Patient Position: Sitting, Cuff Size: Adult)   Pulse 65   Temp 97.1 °F (36.2 °C) (Temporal)   Resp 17   Ht 167.6 cm (66\")   Wt 78.5 kg (173 lb)   LMP  (LMP Unknown)   SpO2 95% Comment: 4 L oxygen  BMI 27.92 kg/m²     Wt Readings from Last 3 Encounters:   02/25/25 78.5 kg (173 lb)   02/13/25 77.7 kg (171 lb 3.2 oz)   02/04/25 78.1 kg (172 lb 3.2 oz)       Result Review :   The following data was reviewed by: Mireya Kapoor MD on 02/25/2025:            Latest Reference Range & Units 02/04/25 15:39   Sodium 134 - 144 mmol/L 141   Potassium 3.5 - 5.2 mmol/L 4.3   Chloride 96 - 106 mmol/L 96   CO2 20 - 29 mmol/L 30 (H)   BUN 8 - 27 mg/dL 32 (H)   Creatinine 0.57 - 1.00 mg/dL 1.39 (H)   BUN/Creatinine Ratio 12 - 28  23   EGFR Result >59 mL/min/1.73 39 (L)   Glucose 70 - 99 mg/dL 86   Calcium 8.7 - 10.3 mg/dL 10.0   Magnesium 1.6 - 2.3 mg/dL 1.6   Alkaline Phosphatase 44 - 121 IU/L 121   Total Protein 6.0 - 8.5 g/dL 5.9 (L)   Albumin 3.8 - 4.8 g/dL 4.2   AST (SGOT) 0 - 40 IU/L 19   ALT (SGPT) 0 - 32 IU/L 15   Total Bilirubin 0.0 - 1.2 mg/dL 0.3   Globulin 1.5 - 4.5 g/dL 1.7   (H): Data is " abnormally high  (L): Data is abnormally low    Physical Exam  Vitals reviewed.   Constitutional:       Appearance: Normal appearance. She is well-developed.   HENT:      Head: Normocephalic and atraumatic.   Eyes:      General:         Right eye: No discharge.         Left eye: No discharge.   Cardiovascular:      Rate and Rhythm: Normal rate and regular rhythm.      Heart sounds: Normal heart sounds. No murmur heard.     No friction rub. No gallop.   Pulmonary:      Effort: Pulmonary effort is normal. No respiratory distress.      Breath sounds: Normal breath sounds. No wheezing or rales.   Skin:     General: Skin is warm and dry.      Findings: No rash.   Neurological:      Mental Status: She is alert and oriented to person, place, and time.      Coordination: Coordination normal.      Gait: Gait normal.   Psychiatric:         Behavior: Behavior is cooperative.         Assessment and Plan:  Problems Addressed this Visit          Cardiac and Vasculature    Essential hypertension - Primary     Hypertension is stable and controlled  Continue current treatment regimen.  Weight loss.  Blood pressure will be reassessed in 3 months.         Mixed hyperlipidemia     Will get labs   She is on atorvastatin  Continue current treatment         Relevant Orders    Lipid Panel With / Chol / HDL Ratio (Completed)       Endocrine and Metabolic    Overweight (BMI 25.0-29.9)       Hematology and Neoplasia    CLL (chronic lymphocytic leukemia)       Musculoskeletal and Injuries    Arthritis     She says baclofen is not helping  She would like methocarbamol  Discussed risk of drowsiness   She would like to start it and she will message if it made her too drowsy         Relevant Medications    methocarbamol (ROBAXIN) 750 MG tablet       Pulmonary and Pneumonias    Centrilobular emphysema       Other    Chronic renal failure, stage 3a     recheck         Relevant Orders    Basic Metabolic Panel (Completed)     Other Visit Diagnoses        Hypogammaglobulinemia        Chronic respiratory failure with hypoxia              Diagnoses         Codes Comments    Essential hypertension    -  Primary ICD-10-CM: I10  ICD-9-CM: 401.9     Mixed hyperlipidemia     ICD-10-CM: E78.2  ICD-9-CM: 272.2     Overweight (BMI 25.0-29.9)     ICD-10-CM: E66.3  ICD-9-CM: 278.02     Chronic renal failure, stage 3a     ICD-10-CM: N18.31  ICD-9-CM: 585.3     Arthritis     ICD-10-CM: M19.90  ICD-9-CM: 716.90     Hypogammaglobulinemia     ICD-10-CM: D80.1  ICD-9-CM: 279.00     CLL (chronic lymphocytic leukemia)     ICD-10-CM: C91.10  ICD-9-CM: 204.10     Centrilobular emphysema     ICD-10-CM: J43.2  ICD-9-CM: 492.8     Chronic respiratory failure with hypoxia     ICD-10-CM: J96.11  ICD-9-CM: 518.83, 799.02                          An After Visit Summary and PPPS were given to the patient.       This document is intended for medical expert use only. Reading of this document by patients and/or patient's family without participating medical staff guidance may result in misinterpretation and unintended morbidity. Any interpretation of such data is the responsibility of the patient and/or family member responsible for the patient in concert with their primary or specialist providers, not to be left for sources of online searches such as [employer], [employer] or similar queries. Relying on these approaches to knowledge may result in misinterpretation, misguided goals of care and even death should patients or family members try recommendations outside of the realm of professional medical care.

## 2025-02-25 ENCOUNTER — OFFICE VISIT (OUTPATIENT)
Dept: FAMILY MEDICINE CLINIC | Facility: CLINIC | Age: 80
End: 2025-02-25
Payer: MEDICARE

## 2025-02-25 VITALS
SYSTOLIC BLOOD PRESSURE: 128 MMHG | WEIGHT: 173 LBS | OXYGEN SATURATION: 95 % | DIASTOLIC BLOOD PRESSURE: 70 MMHG | RESPIRATION RATE: 17 BRPM | TEMPERATURE: 97.1 F | BODY MASS INDEX: 27.8 KG/M2 | HEART RATE: 65 BPM | HEIGHT: 66 IN

## 2025-02-25 DIAGNOSIS — I10 ESSENTIAL HYPERTENSION: Primary | ICD-10-CM

## 2025-02-25 DIAGNOSIS — M19.90 ARTHRITIS: ICD-10-CM

## 2025-02-25 DIAGNOSIS — J96.11 CHRONIC RESPIRATORY FAILURE WITH HYPOXIA: ICD-10-CM

## 2025-02-25 DIAGNOSIS — N18.31 CHRONIC RENAL FAILURE, STAGE 3A: ICD-10-CM

## 2025-02-25 DIAGNOSIS — E66.3 OVERWEIGHT (BMI 25.0-29.9): ICD-10-CM

## 2025-02-25 DIAGNOSIS — C91.10 CLL (CHRONIC LYMPHOCYTIC LEUKEMIA): ICD-10-CM

## 2025-02-25 DIAGNOSIS — D80.1 HYPOGAMMAGLOBULINEMIA: ICD-10-CM

## 2025-02-25 DIAGNOSIS — J43.2 CENTRILOBULAR EMPHYSEMA: ICD-10-CM

## 2025-02-25 DIAGNOSIS — E78.2 MIXED HYPERLIPIDEMIA: ICD-10-CM

## 2025-02-25 PROBLEM — S61.511A TEAR OF SKIN OF RIGHT WRIST: Status: RESOLVED | Noted: 2022-05-09 | Resolved: 2025-02-25

## 2025-02-25 PROBLEM — S61.551A CAT BITE OF RIGHT WRIST: Status: RESOLVED | Noted: 2022-05-09 | Resolved: 2025-02-25
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
## 2025-02-25 RX ORDER — METHOCARBAMOL 750 MG/1
750 TABLET, FILM COATED ORAL DAILY PRN
Qty: 30 TABLET | Refills: 0 | Status: SHIPPED | OUTPATIENT
Start: 2025-02-25

## 2025-02-26 LAB
BUN SERPL-MCNC: 28 MG/DL (ref 8–27)
BUN/CREAT SERPL: 26 (ref 12–28)
CALCIUM SERPL-MCNC: 9.7 MG/DL (ref 8.7–10.3)
CHLORIDE SERPL-SCNC: 98 MMOL/L (ref 96–106)
CHOLEST SERPL-MCNC: 150 MG/DL (ref 100–199)
CHOLEST/HDLC SERPL: 3.7 RATIO (ref 0–4.4)
CO2 SERPL-SCNC: 27 MMOL/L (ref 20–29)
CREAT SERPL-MCNC: 1.09 MG/DL (ref 0.57–1)
EGFRCR SERPLBLD CKD-EPI 2021: 52 ML/MIN/1.73
GLUCOSE SERPL-MCNC: 85 MG/DL (ref 70–99)
HDLC SERPL-MCNC: 41 MG/DL
LDLC SERPL CALC-MCNC: 78 MG/DL (ref 0–99)
POTASSIUM SERPL-SCNC: 4.6 MMOL/L (ref 3.5–5.2)
SODIUM SERPL-SCNC: 142 MMOL/L (ref 134–144)
TRIGL SERPL-MCNC: 181 MG/DL (ref 0–149)
VLDLC SERPL CALC-MCNC: 31 MG/DL (ref 5–40)

## 2025-03-01 NOTE — ASSESSMENT & PLAN NOTE
She says baclofen is not helping  She would like methocarbamol  Discussed risk of drowsiness   She would like to start it and she will message if it made her too drowsy

## 2025-03-03 ENCOUNTER — HOSPITAL ENCOUNTER (OUTPATIENT)
Dept: CT IMAGING | Facility: HOSPITAL | Age: 80
Discharge: HOME | End: 2025-03-03
Admitting: NURSE PRACTITIONER
Payer: MEDICARE

## 2025-03-03 DIAGNOSIS — S22.050D CLOSED WEDGE COMPRESSION FRACTURE OF T6 VERTEBRA WITH ROUTINE HEALING, SUBSEQUENT ENCOUNTER: ICD-10-CM

## 2025-03-03 PROCEDURE — 72128 CT CHEST SPINE W/O DYE: CPT

## 2025-03-06 ENCOUNTER — TELEPHONE (OUTPATIENT)
Dept: NEUROSURGERY | Facility: CLINIC | Age: 80
End: 2025-03-06

## 2025-03-06 NOTE — PROGRESS NOTES
"Subjective   History of Present Illness: Krystyna Bennett is a 79 y.o. female is here today for follow-up on  of her T6 compression fracture.  She was initially seen and evaluated in the hospital for 3-week history of thoracic back pain that started after she was scrubbing her bathtub.  MRI imaging demonstrated subacute superior endplate fracture along T6 secondary osteopenic changes.  She was placed in bracing and she was recommended follow-up imaging and she is also recommended DEXA scan outpatient  Patient returns with slight betterment of her thoracic back pain mainly between her shoulder blades.  She was unable to tolerate the brace due to her COPD and the weight of the brace.  Pain is worse on palpation and laying flat on it as well as movement.  It does radiate slightly off to the right.  She describes no neurologic involvement.  She has continued to utilize muscle relaxers.  She has a history of CLL.  She did not obtain any DEXA scan.  She reports no other focal back pain, or radicular pain.    History of Present Illness    The following portions of the patient's history were reviewed and updated as appropriate: allergies, current medications, past family history, past medical history, past social history, past surgical history, and problem list.    Review of Systems   Constitutional:  Positive for activity change.   HENT: Negative.     Eyes: Negative.    Respiratory: Negative.     Cardiovascular: Negative.    Gastrointestinal: Negative.    Endocrine: Negative.    Genitourinary: Negative.    Musculoskeletal:  Positive for arthralgias, back pain (upper back) and myalgias.   Skin: Negative.    Allergic/Immunologic: Negative.    Neurological: Negative.    Hematological: Negative.    Psychiatric/Behavioral:  Positive for sleep disturbance.    All other systems reviewed and are negative.      Objective     /74 (BP Location: Right arm, Patient Position: Sitting)   Pulse 69   Ht 167.6 cm (66\")   Wt 78.9 kg " (174 lb)   LMP  (LMP Unknown)   SpO2 92%   BMI 28.08 kg/m²    Body mass index is 28.08 kg/m².    Vitals:    03/11/25 1036   PainSc: 6           Physical Exam    Tenderness to palpation along upper to midline thoracic spine    Assessment & Plan   Independent Review of Radiographic Studies:      I personally reviewed the images from the following studies.      CT thoracic spine 3/3/2025    Oblique fracture along T6 vertebral body with slight worsening loss of height.  It does appear that at least the left side showing some healing callus.   No significant canal stenosis.    Medical Decision Making:      Krystyna Kaplan a 79 y.o. female for follow-up of her T6 compression fracture.  Patient continues with focal back pain along prior known compression deformity.  It does appear slightly worse than prior imaging which is not unexpected given her clinical presentation but also showing some possible healing callus.  She has been refractory to recommended conservative measures and desiring to  be referred over to pain management for evaluation for potential kyphoplasty.  I do feel she probably needs another MRI to make sure there is still some active bone marrow edema prior to undergoing the procedure.  She is declining any physical therapy recommendations at this time.  I also recommending a DEXA scan as well.  Patient agrees to plan of care and wishes to proceed.  Encouraged to call the office with questions or concerns.    Advanced imaging (i.e. MRI, CT scan, or CT myelogram) was ordered today during your office visit. Once this order is approved by insurance, our office will call you ASAP in regards to appointment details for your test. If there is a waiting time on this, it is because we are waiting on approval from your insurance.     Please schedule a follow-up appointment to discuss your test results in the office.    For the fastest turn around time for your advanced imaging to be reviewed by a provider and  uploaded into our system, pick a [facility] facility.     If you choose non-[facility] facility, you will be responsible for bringing the images on a CD to your follow-up appointment. If you forget the CD at the time of your appointment, you may be asked to reschedule.     Diagnoses and all orders for this visit:    1. Compression fracture of T6 vertebra with delayed healing, subsequent encounter (Primary)  -     DEXA Bone Density Axial; Future  -     Ambulatory Referral to Pain Management  -     MRI Thoracic Spine Without Contrast; Future    2. Osteopenia of spine    3. Age-related osteoporosis with current pathological fracture, vertebra(e), initial encounter for fracture  -     DEXA Bone Density Axial; Future      Return if symptoms worsen or fail to improve.    This patient was examined wearing appropriate personal protective equipment.     Krystyna Bennett  reports that she quit smoking about 5 years ago. Her smoking use included cigarettes and cigars. She started smoking about 64 years ago. She has a 29.3 pack-year smoking history. She has been exposed to tobacco smoke. She has never used smokeless tobacco.      Body mass index is 28.08 kg/m².  BMI is >= 25 and <30. (Overweight) Recommendations for exercise counseling/recommendations and nutrition counseling/recommendations    Patient's blood pressure was reviewed.  Recommendations for  a low-salt diet and exercise to maintain/improve BP in addition to taking any presribed medications.    Advance Care Planning   ACP discussion was held with the patient during this visit. Patient has an advance directive in EMR which is still valid.              Brina Goss DNP, APRN    03/11/25  11:11 EDT

## 2025-03-06 NOTE — TELEPHONE ENCOUNTER
Called the daughter to let her know we have to cancel her appointment today and to get rescheduled as the provider is out sick. HUB OKAY TO RELAY MESSAGE

## 2025-03-11 ENCOUNTER — OFFICE VISIT (OUTPATIENT)
Dept: NEUROSURGERY | Facility: CLINIC | Age: 80
End: 2025-03-11
Payer: MEDICARE

## 2025-03-11 ENCOUNTER — TELEPHONE (OUTPATIENT)
Dept: NEUROSURGERY | Facility: CLINIC | Age: 80
End: 2025-03-11

## 2025-03-11 VITALS
SYSTOLIC BLOOD PRESSURE: 147 MMHG | BODY MASS INDEX: 27.97 KG/M2 | WEIGHT: 174 LBS | OXYGEN SATURATION: 92 % | DIASTOLIC BLOOD PRESSURE: 74 MMHG | HEART RATE: 69 BPM | HEIGHT: 66 IN

## 2025-03-11 DIAGNOSIS — M85.88 OSTEOPENIA OF SPINE: ICD-10-CM

## 2025-03-11 DIAGNOSIS — S22.050G COMPRESSION FRACTURE OF T6 VERTEBRA WITH DELAYED HEALING, SUBSEQUENT ENCOUNTER: Primary | ICD-10-CM

## 2025-03-11 DIAGNOSIS — M80.08XA AGE-RELATED OSTEOPOROSIS WITH CURRENT PATHOLOGICAL FRACTURE, VERTEBRA(E), INITIAL ENCOUNTER FOR FRACTURE: ICD-10-CM

## 2025-03-11 PROBLEM — S22.000G COMPRESSION FRACTURE OF THORACIC VERTEBRA WITH DELAYED HEALING: Status: ACTIVE | Noted: 2025-03-11

## 2025-03-11 RX ORDER — IBUPROFEN 200 MG
200 TABLET ORAL EVERY 6 HOURS PRN
COMMUNITY

## 2025-03-11 NOTE — TELEPHONE ENCOUNTER
EH FROM [facility] CALLED AND IS REQUESTING THE OFFICE NOTE TO BE FAXED [phone].    EH'S CONTACT: [phone]

## 2025-04-07 NOTE — PROGRESS NOTES
Subjective   Krystyna Bennett is a 79 y.o. female. Presents to [facility]    Chief Complaint   Patient presents with    Back Pain    Hypertension       Back Pain  This is a chronic problem. The current episode started more than 1 month ago. The problem occurs daily. The problem is unchanged. The pain is present in the thoracic spine (T6). The quality of the pain is described as aching, cramping, burning and shooting. The pain does not radiate. The pain is mild. The pain is The same all the time. The symptoms are aggravated by position, bending and twisting. Pertinent negatives include no headaches. She has tried NSAIDs for the symptoms. The treatment provided mild relief.   Hypertension  This is a chronic problem. The current episode started more than 1 month ago. The problem is controlled. Associated symptoms include shortness of breath. Pertinent negatives include no blurred vision, headaches, malaise/fatigue, palpitations or sweats. Risk factors for coronary artery disease include post-menopausal state, dyslipidemia and family history. Current antihypertension treatment includes beta blockers and ACE inhibitors. The current treatment provides moderate improvement. There are no compliance problems.       Back pain started again 3 days after having kyphoplasty. She did laundry and it started to hurt again. Hurts worse with moving around and lifting.     I personally reviewed and updated the patient's allergies, medications, problem list, and past medical, surgical, social, and family history. I have reviewed and confirmed the accuracy of the History of Present Illness and Review of Symptoms as documented by the MA/LPN/RN. Mireya Kapoor MD    Allergies:  Allergies   Allergen Reactions    Latex Rash    Sulfa Antibiotics Other (See Comments)     Tunnel vision,light headed/ may not be allergic to it any longer        Social History:  Social History     Socioeconomic History    Marital status:     Tobacco Use    Smoking status: Former     Current packs/day: 0.00     Average packs/day: 0.5 packs/day for 58.6 years (29.3 ttl pk-yrs)     Types: Cigarettes, Cigars     Start date: 1960     Quit date: 2019     Years since quittin.8     Passive exposure: Past    Smokeless tobacco: Never   Vaping Use    Vaping status: Never Used   Substance and Sexual Activity    Alcohol use: Yes     Alcohol/week: 4.0 standard drinks of alcohol     Types: 2 Glasses of wine, 2 Drinks containing 0.5 oz of alcohol per week     Comment: social drinker    Drug use: Never    Sexual activity: Not Currently     Partners: Male     Birth control/protection: Post-menopausal       Family History:  Family History   Problem Relation Age of Onset    Heart disease Father             Stroke Father     Heart failure Father     Leukemia Paternal Grandmother     Anemia Paternal Grandmother     Heart disease Paternal Grandmother     Cancer Paternal Grandmother         leukemia    Alcohol abuse Maternal Aunt     Cancer Maternal Aunt         stomach    Asthma Maternal Grandmother             Hyperlipidemia Maternal Grandmother             Hypertension Maternal Grandmother     Diabetes Paternal Aunt             Hypertension Son     Hypertension Daughter        Past Medical History :  Patient Active Problem List   Diagnosis    Essential hypertension    CLL (chronic lymphocytic leukemia)    Centrilobular emphysema    GERD (gastroesophageal reflux disease)    Arthritis    Systolic CHF, chronic    Anemia, chronic disease    History of Clostridium difficile colitis    Melanoma    Mixed hyperlipidemia    Medicare annual wellness visit, subsequent    Anxiety    Restless legs syndrome    Colon cancer screening    Eczematous dermatitis of upper and lower eyelids of both eyes    Positive colorectal cancer screening using Cologuard test    Mammogram declined    Flu vaccine need    Non-seasonal allergic rhinitis    AK  (actinic keratosis)    Hospital discharge follow-up    Pulmonary nodule, right    Night sweats    Combined form of senile cataract    Overweight with body mass index (BMI) of 28 to 28.9 in adult    Labyrinthitis of both ears    Seborrheic keratoses    Hemoptysis    TMJ pain dysfunction syndrome    Dermatitis    Colon cancer screening declined    Elevated troponin    Closed wedge compression fracture of T6 vertebra    Chronic renal failure, stage 3a    Compression fracture of thoracic vertebra with delayed healing    Osteopenia of spine    Age-related osteoporosis with current pathological fracture, vertebra(e), initial encounter for fracture       Medication List:    Current Outpatient Medications:     albuterol sulfate  (90 Base) MCG/ACT inhaler, Inhale 2 puffs Every 4 (Four) Hours As Needed for Wheezing. Indications: Spasm of Lung Air Passages, Disp: 17 g, Rfl: 3    aspirin 81 MG EC tablet, Take 1 tablet by mouth Daily for 180 days., Disp: 90 tablet, Rfl: 1    Boswellia-Glucosamine-Vit D (OSTEO BI-FLEX ONE PER DAY PO), Take  by mouth., Disp: , Rfl:     Calcium Carbonate-Vit D-Min (CALTRATE 600+D PLUS MINERALS) 600-800 MG-UNIT chewable tablet, Chew 2 tablets Daily. Indications: suppliment, Disp: , Rfl:     Calquence 100 MG tablet, Take 1 tablet by mouth 2 (Two) Times a Day., Disp: , Rfl:     Coenzyme Q10 (COQ10 PO), Take 1 tablet by mouth Daily. Indications: suppliment , Disp: , Rfl:     Cranberry 450 MG capsule, Take  by mouth., Disp: , Rfl:     Cyanocobalamin (B-12) 1000 MCG capsule, Take 1,000 mcg by mouth Daily. Indications: Inadequate Vitamin B12, Disp: , Rfl:     dapagliflozin Propanediol (Farxiga) 10 MG tablet, Take 10 mg by mouth Daily., Disp: 90 tablet, Rfl: 1    esomeprazole (nexIUM) 40 MG capsule, Take 1 capsule by mouth Every Morning Before Breakfast. Indications: Heartburn, Disp: , Rfl:     Fluticasone-Umeclidin-Vilant (Trelegy Ellipta) 100-62.5-25 MCG/ACT inhaler, Inhale 1 puff Daily.  Indications: Chronic Obstructive Lung Disease, Disp: 60 each, Rfl: 5    Glucosamine-Chondroitin 250-200 MG tablet, Take 1 tablet by mouth Daily. Indications: suppliment, Disp: , Rfl:     guaiFENesin (MUCINEX) 600 MG 12 hr tablet, Take 2 tablets by mouth 2 (Two) Times a Day., Disp: , Rfl:     hydrOXYzine (ATARAX) 10 MG tablet, Take 1 tablet by mouth 3 (Three) Times a Day As Needed for Itching., Disp: 30 tablet, Rfl: 0    ibuprofen (ADVIL,MOTRIN) 200 MG tablet, Take 1 tablet by mouth Every 6 (Six) Hours As Needed for Mild Pain., Disp: , Rfl:     ipratropium-albuterol (DUO-NEB) 0.5-2.5 mg/3 ml nebulizer, Take 3 mL by nebulization Every 4 (Four) Hours As Needed for Wheezing., Disp: 75 mL, Rfl: 12    lisinopril (PRINIVIL,ZESTRIL) 40 MG tablet, TAKE 1 TABLET BY MOUTH DAILY, Disp: 90 tablet, Rfl: 3    methocarbamol (ROBAXIN) 750 MG tablet, Take 1 tablet by mouth Daily As Needed for Muscle Spasms., Disp: 30 tablet, Rfl: 0    metoprolol succinate XL (TOPROL-XL) 25 MG 24 hr tablet, TAKE 1 TABLET BY MOUTH DAILY, Disp: 90 tablet, Rfl: 1    montelukast (SINGULAIR) 10 MG tablet, Take 1 tablet by mouth Every Night., Disp: 30 tablet, Rfl: 11    Multiple Vitamins-Minerals (CENTRUM SILVER) tablet, Take 1 tablet by mouth Daily. Indications: suppliment, Disp: , Rfl:     nystatin (MYCOSTATIN) 791972 UNIT/GM cream, Apply 1 Application topically to the appropriate area as directed 2 (Two) Times a Day., Disp: 60 g, Rfl: 3    nystatin (MYCOSTATIN) 355193 UNIT/GM powder, APPLY TO AFFECTED AREA(S) THREE TIMES A DAY, Disp: 60 g, Rfl: 2    O2 (OXYGEN), Inhale 4 L/min Continuous. Indications: Hypoxia, Disp: , Rfl:     Omega-3 Fatty Acids (fish oil) 1000 MG capsule capsule, Take 2 capsules by mouth Daily. Indications: suppliment, Disp: , Rfl:     PARoxetine (PAXIL) 20 MG tablet, TAKE 1 TABLET BY MOUTH DAILY, Disp: 90 tablet, Rfl: 3    potassium chloride (KLOR-CON M10) 10 MEQ CR tablet, Take 1 tablet by mouth Daily. Indications: Low Amount of  "Potassium in the Blood, Disp: 30 tablet, Rfl: 12    pramipexole (MIRAPEX) 0.25 MG tablet, TAKE ONE TABLET BY MOUTH DAILY, Disp: 90 tablet, Rfl: 3    simvastatin (ZOCOR) 20 MG tablet, Take 1 tablet by mouth Daily., Disp: 90 tablet, Rfl: 3    triamcinolone (KENALOG) 0.1 % ointment, Apply 1 Application topically to the appropriate area as directed 2 (Two) Times a Day., Disp: 30 g, Rfl: 0    furosemide (LASIX) 40 MG tablet, Take 1 tablet by mouth Daily for 30 days., Disp: 30 tablet, Rfl: 0    HYDROcodone-acetaminophen (NORCO) 5-325 MG per tablet, Take 1 tablet by mouth Every 12 (Twelve) Hours As Needed for Severe Pain., Disp: 15 tablet, Rfl: 0    Past Surgical History:  Past Surgical History:   Procedure Laterality Date    APPENDECTOMY      BRONCHOSCOPY N/A 12/25/2023    Procedure: BRONCHOSCOPY with bilateral lung wash;  Surgeon: Zuly Magallon MD;  Location: [facility] ENDOSCOPY;  Service: Pulmonary;  Laterality: N/A;  Post- hemoptysis    CARDIAC CATHETERIZATION  05/2019    [facility].    CARDIAC CATHETERIZATION Right 11/25/2022    Procedure: Coronary angiography;  Surgeon: Andrea Philippe MD;  Location: [facility] CATH INVASIVE LOCATION;  Service: Cardiovascular;  Laterality: Right;    HYSTERECTOMY      TONSILLECTOMY           Physical Exam:      Vital Signs:    Vitals:    04/08/25 1219   BP: 132/80   Pulse:    Resp:    Temp:    SpO2:         /80   Pulse 75   Temp 97.5 °F (36.4 °C) (Temporal)   Resp 18   Ht 167.6 cm (66\")   Wt 78.8 kg (173 lb 12.8 oz)   LMP  (LMP Unknown)   SpO2 95% Comment: 4 L oxygen  BMI 28.05 kg/m²     Wt Readings from Last 3 Encounters:   04/08/25 78.8 kg (173 lb 12.8 oz)   03/11/25 78.9 kg (174 lb)   02/25/25 78.5 kg (173 lb)       Result Review :                Physical Exam  Vitals reviewed.   Constitutional:       Appearance: Normal appearance. She is well-developed.   HENT:      Head: Normocephalic and atraumatic.   Eyes:      General:         Right eye: No discharge.         Left eye: No " discharge.   Cardiovascular:      Rate and Rhythm: Normal rate and regular rhythm.      Heart sounds: Normal heart sounds. No murmur heard.     No friction rub. No gallop.   Pulmonary:      Effort: Pulmonary effort is normal. No respiratory distress.      Breath sounds: Normal breath sounds. No wheezing or rales.   Skin:     General: Skin is warm and dry.      Findings: No rash.   Neurological:      Mental Status: She is alert and oriented to person, place, and time.      Coordination: Coordination normal.      Gait: Gait normal.   Psychiatric:         Behavior: Behavior is cooperative.       Assessment and Plan:  Problems Addressed this Visit          Cardiac and Vasculature    Essential hypertension - Primary    Hypertension is stable and controlled  Continue current treatment regimen.  Blood pressure will be reassessed in 3 months.            Neuro    Closed wedge compression fracture of T6 vertebra    Relevant Medications    HYDROcodone-acetaminophen (NORCO) 5-325 MG per tablet    Other Relevant Orders    XR Spine Thoracic 3 View (Completed)       Other    Overweight with body mass index (BMI) of 28 to 28.9 in adult    Patient's (Body mass index is 28.05 kg/m².) indicates that they are overweight with health conditions that include hypertension . Weight is unchanged. BMI is above average; BMI management plan is completed. We discussed low calorie, low carb based diet program, portion control, and increasing exercise.           Other Visit Diagnoses         Chronic midline thoracic back pain        Will get Xray to make sure there is no new compression fracture. Advised though it will take time for pain to improve. Start nsaids as needed          Diagnoses         Codes Comments      Essential hypertension    -  Primary ICD-10-CM: I10  ICD-9-CM: 401.9       Chronic midline thoracic back pain     ICD-10-CM: M54.6, G89.29  ICD-9-CM: 724.1, 338.29 Will get Xray to make sure there is no new compression fracture.  Advised though it will take time for pain to improve. Start nsaids as needed      Overweight with body mass index (BMI) of 28 to 28.9 in adult     ICD-10-CM: E66.3, Z68.28  ICD-9-CM: 278.02, V85.24       Closed wedge compression fracture of T6 vertebra with delayed healing, subsequent encounter     ICD-10-CM: S22.050G  ICD-9-CM: V54.17                          An After Visit Summary and PPPS were given to the patient.       This document is intended for medical expert use only. Reading of this document by patients and/or patient's family without participating medical staff guidance may result in misinterpretation and unintended morbidity. Any interpretation of such data is the responsibility of the patient and/or family member responsible for the patient in concert with their primary or specialist providers, not to be left for sources of online searches such as [employer], [employer] or similar queries. Relying on these approaches to knowledge may result in misinterpretation, misguided goals of care and even death should patients or family members try recommendations outside of the realm of professional medical care.

## 2025-04-08 ENCOUNTER — OFFICE VISIT (OUTPATIENT)
Dept: FAMILY MEDICINE CLINIC | Facility: CLINIC | Age: 80
End: 2025-04-08
Payer: MEDICARE

## 2025-04-08 ENCOUNTER — HOSPITAL ENCOUNTER (OUTPATIENT)
Dept: GENERAL RADIOLOGY | Facility: HOSPITAL | Age: 80
Discharge: HOME | End: 2025-04-08
Admitting: FAMILY MEDICINE
Payer: MEDICARE

## 2025-04-08 VITALS
OXYGEN SATURATION: 95 % | TEMPERATURE: 97.5 F | DIASTOLIC BLOOD PRESSURE: 80 MMHG | HEART RATE: 75 BPM | RESPIRATION RATE: 18 BRPM | WEIGHT: 173.8 LBS | SYSTOLIC BLOOD PRESSURE: 132 MMHG | HEIGHT: 66 IN | BODY MASS INDEX: 27.93 KG/M2

## 2025-04-08 DIAGNOSIS — E66.3 OVERWEIGHT WITH BODY MASS INDEX (BMI) OF 28 TO 28.9 IN ADULT: ICD-10-CM

## 2025-04-08 DIAGNOSIS — M54.6 CHRONIC MIDLINE THORACIC BACK PAIN: ICD-10-CM

## 2025-04-08 DIAGNOSIS — I10 ESSENTIAL HYPERTENSION: Primary | ICD-10-CM

## 2025-04-08 DIAGNOSIS — S22.050G CLOSED WEDGE COMPRESSION FRACTURE OF T6 VERTEBRA WITH DELAYED HEALING, SUBSEQUENT ENCOUNTER: ICD-10-CM

## 2025-04-08 DIAGNOSIS — G89.29 CHRONIC MIDLINE THORACIC BACK PAIN: ICD-10-CM

## 2025-04-08 PROCEDURE — 72072 X-RAY EXAM THORAC SPINE 3VWS: CPT

## 2025-04-08 RX ORDER — HYDROCODONE BITARTRATE AND ACETAMINOPHEN 5; 325 MG/1; MG/1
1 TABLET ORAL EVERY 12 HOURS PRN
Qty: 15 TABLET | Refills: 0 | Status: SHIPPED | OUTPATIENT
Start: 2025-04-08

## 2025-04-24 RX ORDER — PRAMIPEXOLE DIHYDROCHLORIDE 0.25 MG/1
0.25 TABLET ORAL DAILY
Qty: 90 TABLET | Refills: 3 | Status: SHIPPED | OUTPATIENT
Start: 2025-04-24

## 2025-04-28 ENCOUNTER — HOSPITAL ENCOUNTER (OUTPATIENT)
Dept: CARDIOLOGY | Facility: HOSPITAL | Age: 80
Discharge: HOME | End: 2025-04-28
Payer: MEDICARE

## 2025-04-28 VITALS
HEART RATE: 72 BPM | WEIGHT: 173 LBS | DIASTOLIC BLOOD PRESSURE: 77 MMHG | HEIGHT: 66 IN | BODY MASS INDEX: 27.8 KG/M2 | SYSTOLIC BLOOD PRESSURE: 154 MMHG

## 2025-04-28 DIAGNOSIS — I51.81 TAKOTSUBO CARDIOMYOPATHY: ICD-10-CM

## 2025-04-28 DIAGNOSIS — I50.22 SYSTOLIC CHF, CHRONIC: ICD-10-CM

## 2025-04-28 LAB
AORTIC DIMENSIONLESS INDEX: 0.35 (DI)
AV MEAN PRESS GRAD SYS DOP V1V2: 8.6 MMHG
AV VMAX SYS DOP: 196.5 CM/SEC
BH CV ECHO MEAS - AO MAX PG: 15.4 MMHG
BH CV ECHO MEAS - AO ROOT DIAM: 2.9 CM
BH CV ECHO MEAS - AO V2 VTI: 49.5 CM
BH CV ECHO MEAS - AVA(I,D): 1.18 CM2
BH CV ECHO MEAS - EDV(CUBED): 120.9 ML
BH CV ECHO MEAS - EDV(MOD-SP4): 92 ML
BH CV ECHO MEAS - EF(MOD-SP4): 53.2 %
BH CV ECHO MEAS - ESV(CUBED): 60.4 ML
BH CV ECHO MEAS - ESV(MOD-SP4): 43 ML
BH CV ECHO MEAS - FS: 20.7 %
BH CV ECHO MEAS - IVS/LVPW: 1.12 CM
BH CV ECHO MEAS - IVSD: 1.23 CM
BH CV ECHO MEAS - LA DIMENSION: 4 CM
BH CV ECHO MEAS - LAT PEAK E' VEL: 6 CM/SEC
BH CV ECHO MEAS - LV MASS(C)D: 218.7 GRAMS
BH CV ECHO MEAS - LV MAX PG: 1.65 MMHG
BH CV ECHO MEAS - LV MEAN PG: 1.19 MMHG
BH CV ECHO MEAS - LV V1 MAX: 64.2 CM/SEC
BH CV ECHO MEAS - LV V1 VTI: 17.4 CM
BH CV ECHO MEAS - LVIDD: 4.9 CM
BH CV ECHO MEAS - LVIDS: 3.9 CM
BH CV ECHO MEAS - LVOT AREA: 3.4 CM2
BH CV ECHO MEAS - LVOT DIAM: 2.07 CM
BH CV ECHO MEAS - LVPWD: 1.09 CM
BH CV ECHO MEAS - MED PEAK E' VEL: 4 CM/SEC
BH CV ECHO MEAS - MR MAX PG: 72.4 MMHG
BH CV ECHO MEAS - MR MAX VEL: 425.5 CM/SEC
BH CV ECHO MEAS - MV A MAX VEL: 103 CM/SEC
BH CV ECHO MEAS - MV DEC SLOPE: 190.9 CM/SEC2
BH CV ECHO MEAS - MV DEC TIME: 0.4 SEC
BH CV ECHO MEAS - MV E MAX VEL: 75.6 CM/SEC
BH CV ECHO MEAS - MV E/A: 0.73
BH CV ECHO MEAS - MV MAX PG: 4.2 MMHG
BH CV ECHO MEAS - MV MEAN PG: 1.58 MMHG
BH CV ECHO MEAS - MV V2 VTI: 40.3 CM
BH CV ECHO MEAS - MVA(VTI): 1.45 CM2
BH CV ECHO MEAS - PA V2 MAX: 109.4 CM/SEC
BH CV ECHO MEAS - PI END-D VEL: 137.8 CM/SEC
BH CV ECHO MEAS - RAP SYSTOLE: 8 MMHG
BH CV ECHO MEAS - RV MAX PG: 1.16 MMHG
BH CV ECHO MEAS - RV V1 MAX: 53.8 CM/SEC
BH CV ECHO MEAS - RV V1 VTI: 15 CM
BH CV ECHO MEAS - RVSP: 32 MMHG
BH CV ECHO MEAS - SV(LVOT): 58.4 ML
BH CV ECHO MEAS - SV(MOD-SP4): 48.9 ML
BH CV ECHO MEAS - TAPSE (>1.6): 2.25 CM
BH CV ECHO MEAS - TR MAX PG: 24 MMHG
BH CV ECHO MEAS - TR MAX VEL: 244.5 CM/SEC
BH CV ECHO MEASUREMENTS AVERAGE E/E' RATIO: 15.12
LV EF BIPLANE MOD: 53 %

## 2025-04-28 PROCEDURE — 93306 TTE W/DOPPLER COMPLETE: CPT

## 2025-04-28 PROCEDURE — 93306 TTE W/DOPPLER COMPLETE: CPT | Performed by: INTERNAL MEDICINE

## 2025-04-28 RX ORDER — SIMVASTATIN 20 MG
20 TABLET ORAL DAILY
Qty: 90 TABLET | Refills: 3 | Status: SHIPPED | OUTPATIENT
Start: 2025-04-28

## 2025-04-30 PROBLEM — Z09 HOSPITAL DISCHARGE FOLLOW-UP: Status: RESOLVED | Noted: 2022-12-22 | Resolved: 2025-04-30
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
## 2025-05-02 ENCOUNTER — OFFICE VISIT (OUTPATIENT)
Dept: CARDIOLOGY | Facility: CLINIC | Age: 80
End: 2025-05-02
Payer: MEDICARE

## 2025-05-02 VITALS
SYSTOLIC BLOOD PRESSURE: 131 MMHG | HEART RATE: 63 BPM | HEIGHT: 66 IN | OXYGEN SATURATION: 94 % | BODY MASS INDEX: 27 KG/M2 | DIASTOLIC BLOOD PRESSURE: 67 MMHG | WEIGHT: 168 LBS

## 2025-05-02 DIAGNOSIS — I50.22 SYSTOLIC CHF, CHRONIC: Primary | ICD-10-CM

## 2025-05-02 DIAGNOSIS — N18.31 CHRONIC RENAL FAILURE, STAGE 3A: ICD-10-CM

## 2025-05-02 DIAGNOSIS — E78.2 MIXED HYPERLIPIDEMIA: ICD-10-CM

## 2025-05-02 DIAGNOSIS — I10 ESSENTIAL HYPERTENSION: ICD-10-CM

## 2025-05-02 DIAGNOSIS — C91.10 CLL (CHRONIC LYMPHOCYTIC LEUKEMIA): ICD-10-CM

## 2025-05-02 DIAGNOSIS — I51.81 TAKOTSUBO CARDIOMYOPATHY: ICD-10-CM

## 2025-05-06 DIAGNOSIS — J96.11 CHRONIC RESPIRATORY FAILURE WITH HYPOXIA: ICD-10-CM

## 2025-05-06 RX ORDER — MONTELUKAST SODIUM 10 MG/1
10 TABLET ORAL NIGHTLY
Qty: 90 TABLET | Refills: 1 | Status: SHIPPED | OUTPATIENT
Start: 2025-05-06

## 2025-05-12 RX ORDER — METOPROLOL SUCCINATE 25 MG/1
25 TABLET, EXTENDED RELEASE ORAL DAILY
Qty: 90 TABLET | Refills: 1 | Status: SHIPPED | OUTPATIENT
Start: 2025-05-12

## 2025-05-12 NOTE — TELEPHONE ENCOUNTER
Rx Refill Note  Requested Prescriptions     Signed Prescriptions Disp Refills    metoprolol succinate XL (TOPROL-XL) 25 MG 24 hr tablet 90 tablet 1     Sig: TAKE 1 TABLET BY MOUTH DAILY     Authorizing Provider: SHALA CONTRERAS     Ordering User: KRYSTA GALLEGOS      Last office visit with prescribing clinician: 6/19/2024   Last telemedicine visit with prescribing clinician: Visit date not found   Next office visit with prescribing clinician: 11/11/2025                         Would you like a call back once the refill request has been completed: [] Yes [] No    If the office needs to give you a call back, can they leave a voicemail: [] Yes [] No    Krysta Gallegos MA  05/12/25, 08:57 EDT

## 2025-05-14 ENCOUNTER — OFFICE VISIT (OUTPATIENT)
Dept: PULMONOLOGY | Facility: HOSPITAL | Age: 80
End: 2025-05-14
Payer: MEDICARE

## 2025-05-14 VITALS
BODY MASS INDEX: 27.8 KG/M2 | HEIGHT: 66 IN | OXYGEN SATURATION: 95 % | WEIGHT: 173 LBS | SYSTOLIC BLOOD PRESSURE: 108 MMHG | DIASTOLIC BLOOD PRESSURE: 62 MMHG | HEART RATE: 63 BPM

## 2025-05-14 DIAGNOSIS — J96.11 CHRONIC RESPIRATORY FAILURE WITH HYPOXIA: Primary | ICD-10-CM

## 2025-05-14 PROCEDURE — G0463 HOSPITAL OUTPT CLINIC VISIT: HCPCS

## 2025-05-14 RX ORDER — ALBUTEROL SULFATE 90 UG/1
2 INHALANT RESPIRATORY (INHALATION) EVERY 4 HOURS PRN
Qty: 17 G | Refills: 3 | Status: SHIPPED | OUTPATIENT
Start: 2025-05-14
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
## 2025-05-21 DIAGNOSIS — I50.22 SYSTOLIC CHF, CHRONIC: ICD-10-CM

## 2025-05-22 RX ORDER — POTASSIUM CHLORIDE 750 MG/1
10 TABLET, EXTENDED RELEASE ORAL DAILY
Qty: 90 TABLET | Refills: 3 | Status: SHIPPED | OUTPATIENT
Start: 2025-05-22

## 2025-07-09 DIAGNOSIS — E78.2 MIXED HYPERLIPIDEMIA: ICD-10-CM

## 2025-07-09 DIAGNOSIS — I51.81 TAKOTSUBO CARDIOMYOPATHY: ICD-10-CM

## 2025-07-09 DIAGNOSIS — R79.89 ELEVATED TROPONIN: ICD-10-CM

## 2025-07-09 RX ORDER — ASPIRIN 81 MG/1
81 TABLET ORAL DAILY
Qty: 90 TABLET | Refills: 3 | Status: SHIPPED | OUTPATIENT
Start: 2025-07-09 | End: 2026-07-04

## 2025-07-09 NOTE — TELEPHONE ENCOUNTER
Rx Refill Note  Requested Prescriptions     Pending Prescriptions Disp Refills    aspirin 81 MG EC tablet 90 tablet 3     Sig: Take 1 tablet by mouth Daily for 360 days.      Last office visit with prescribing clinician: 5/2/2025   Last telemedicine visit with prescribing clinician: Visit date not found   Next office visit with prescribing clinician: Visit date not found                         Would you like a call back once the refill request has been completed: [] Yes [] No    If the office needs to give you a call back, can they leave a voicemail: [] Yes [] No    Leny Del Castillo MA  07/09/25, 16:28 EDT

## 2025-07-09 NOTE — TELEPHONE ENCOUNTER
Spoke to patient per [facility] RN No to make sure patient was tolerating the aspirin before continuing it. Patient states she is doing good with it and denied any cardiac symptoms. Medication continued per AP RN.

## 2025-07-17 DIAGNOSIS — J44.9 CHRONIC OBSTRUCTIVE PULMONARY DISEASE, UNSPECIFIED COPD TYPE: ICD-10-CM

## 2025-07-17 DIAGNOSIS — J43.2 CENTRILOBULAR EMPHYSEMA: ICD-10-CM

## 2025-07-17 RX ORDER — FLUTICASONE FUROATE, UMECLIDINIUM BROMIDE AND VILANTEROL TRIFENATATE 100; 62.5; 25 UG/1; UG/1; UG/1
1 POWDER RESPIRATORY (INHALATION)
Qty: 60 EACH | Refills: 5 | Status: SHIPPED | OUTPATIENT
Start: 2025-07-17

## 2025-07-25 DIAGNOSIS — I10 ESSENTIAL HYPERTENSION: ICD-10-CM

## 2025-07-25 DIAGNOSIS — M19.90 ARTHRITIS: ICD-10-CM

## 2025-07-25 DIAGNOSIS — I51.81 TAKOTSUBO CARDIOMYOPATHY: ICD-10-CM

## 2025-07-25 DIAGNOSIS — I50.22 SYSTOLIC CHF, CHRONIC: ICD-10-CM

## 2025-07-25 RX ORDER — LISINOPRIL 40 MG/1
40 TABLET ORAL DAILY
Qty: 90 TABLET | Refills: 3 | Status: SHIPPED | OUTPATIENT
Start: 2025-07-25

## 2025-07-25 RX ORDER — HYDROCHLOROTHIAZIDE 25 MG/1
25 TABLET ORAL DAILY
Qty: 90 TABLET | Refills: 3 | OUTPATIENT
Start: 2025-07-25

## 2025-07-25 RX ORDER — DAPAGLIFLOZIN 10 MG/1
10 TABLET, FILM COATED ORAL DAILY
Qty: 90 TABLET | Refills: 1 | Status: SHIPPED | OUTPATIENT
Start: 2025-07-25

## 2025-07-25 NOTE — TELEPHONE ENCOUNTER
Rx Refill Note  Requested Prescriptions     Pending Prescriptions Disp Refills    dapagliflozin Propanediol (Farxiga) 10 MG tablet 90 tablet 1     Sig: Take 10 mg by mouth Daily.      Last office visit with prescribing clinician: 5/2/2025   Last telemedicine visit with prescribing clinician: Visit date not found   Next office visit with prescribing clinician: Visit date not found                         Would you like a call back once the refill request has been completed: [] Yes [] No    If the office needs to give you a call back, can they leave a voicemail: [] Yes [] No    Leny Del Castillo MA  07/25/25, 14:54 EDT

## 2025-07-28 RX ORDER — METHOCARBAMOL 750 MG/1
750 TABLET, FILM COATED ORAL DAILY PRN
Qty: 30 TABLET | Refills: 0 | OUTPATIENT
Start: 2025-07-28

## 2025-08-19 DIAGNOSIS — I10 ESSENTIAL HYPERTENSION: ICD-10-CM

## 2025-08-21 RX ORDER — HYDROCHLOROTHIAZIDE 25 MG/1
25 TABLET ORAL DAILY
COMMUNITY
Start: 2025-05-10

## 2025-08-28 RX ORDER — HYDROCHLOROTHIAZIDE 25 MG/1
25 TABLET ORAL DAILY
Qty: 90 TABLET | Refills: 3 | OUTPATIENT
Start: 2025-08-28

## (undated) DEVICE — PK TRY HEART CATH 50

## (undated) DEVICE — GLIDESHEATH SLENDER STAINLESS STEEL KIT: Brand: GLIDESHEATH SLENDER

## (undated) DEVICE — RADIFOCUS OPTITORQUE ANGIOGRAPHIC CATHETER: Brand: OPTITORQUE

## (undated) DEVICE — GW EMR FIX EXCHG J STD .035 3MM 260CM

## (undated) DEVICE — BAPTIST FLOYD BRONCHOSCOPY: Brand: MEDLINE INDUSTRIES, INC.

## (undated) DEVICE — TR BAND RADIAL ARTERY COMPRESSION DEVICE: Brand: TR BAND